# Patient Record
Sex: MALE | Race: BLACK OR AFRICAN AMERICAN | NOT HISPANIC OR LATINO | Employment: UNEMPLOYED | URBAN - METROPOLITAN AREA
[De-identification: names, ages, dates, MRNs, and addresses within clinical notes are randomized per-mention and may not be internally consistent; named-entity substitution may affect disease eponyms.]

---

## 2018-10-18 ENCOUNTER — RADIANT APPOINTMENT (OUTPATIENT)
Dept: GENERAL RADIOLOGY | Facility: CLINIC | Age: 43
End: 2018-10-18
Attending: NURSE PRACTITIONER
Payer: COMMERCIAL

## 2018-10-18 ENCOUNTER — TELEPHONE (OUTPATIENT)
Dept: FAMILY MEDICINE | Facility: CLINIC | Age: 43
End: 2018-10-18

## 2018-10-18 ENCOUNTER — TRANSFERRED RECORDS (OUTPATIENT)
Dept: HEALTH INFORMATION MANAGEMENT | Facility: CLINIC | Age: 43
End: 2018-10-18

## 2018-10-18 ENCOUNTER — OFFICE VISIT (OUTPATIENT)
Dept: FAMILY MEDICINE | Facility: CLINIC | Age: 43
End: 2018-10-18
Payer: COMMERCIAL

## 2018-10-18 VITALS
DIASTOLIC BLOOD PRESSURE: 80 MMHG | SYSTOLIC BLOOD PRESSURE: 124 MMHG | RESPIRATION RATE: 20 BRPM | BODY MASS INDEX: 26.89 KG/M2 | WEIGHT: 161.4 LBS | HEART RATE: 81 BPM | OXYGEN SATURATION: 97 % | TEMPERATURE: 97.9 F | HEIGHT: 65 IN

## 2018-10-18 DIAGNOSIS — Z86.11 HISTORY OF TB (TUBERCULOSIS): ICD-10-CM

## 2018-10-18 DIAGNOSIS — R23.8 VESICULAR SKIN LESIONS: ICD-10-CM

## 2018-10-18 DIAGNOSIS — N30.01 ACUTE CYSTITIS WITH HEMATURIA: ICD-10-CM

## 2018-10-18 DIAGNOSIS — R06.09 DYSPNEA ON EXERTION: Primary | ICD-10-CM

## 2018-10-18 DIAGNOSIS — R10.31 ABDOMINAL PAIN, RIGHT LOWER QUADRANT: ICD-10-CM

## 2018-10-18 DIAGNOSIS — Z11.3 ROUTINE SCREENING FOR STI (SEXUALLY TRANSMITTED INFECTION): ICD-10-CM

## 2018-10-18 DIAGNOSIS — R93.89 ABNORMAL CHEST X-RAY: ICD-10-CM

## 2018-10-18 DIAGNOSIS — R63.4 WEIGHT LOSS: ICD-10-CM

## 2018-10-18 DIAGNOSIS — R82.90 NONSPECIFIC FINDING ON EXAMINATION OF URINE: ICD-10-CM

## 2018-10-18 LAB
ALBUMIN SERPL-MCNC: 4.4 G/DL (ref 3.4–5)
ALBUMIN UR-MCNC: NEGATIVE MG/DL
ALP SERPL-CCNC: 93 U/L (ref 40–150)
ALT SERPL W P-5'-P-CCNC: 30 U/L (ref 0–70)
ANION GAP SERPL CALCULATED.3IONS-SCNC: 6 MMOL/L (ref 3–14)
APPEARANCE UR: ABNORMAL
AST SERPL W P-5'-P-CCNC: 27 U/L (ref 0–45)
BACTERIA #/AREA URNS HPF: ABNORMAL /HPF
BASOPHILS # BLD AUTO: 0 10E9/L (ref 0–0.2)
BASOPHILS NFR BLD AUTO: 0.2 %
BILIRUB SERPL-MCNC: 1.5 MG/DL (ref 0.2–1.3)
BILIRUB UR QL STRIP: NEGATIVE
BUN SERPL-MCNC: 17 MG/DL (ref 7–30)
CALCIUM SERPL-MCNC: 9.8 MG/DL (ref 8.5–10.1)
CHLORIDE SERPL-SCNC: 101 MMOL/L (ref 94–109)
CO2 SERPL-SCNC: 30 MMOL/L (ref 20–32)
COLOR UR AUTO: YELLOW
CREAT SERPL-MCNC: 1.16 MG/DL (ref 0.66–1.25)
DIFFERENTIAL METHOD BLD: ABNORMAL
EOSINOPHIL # BLD AUTO: 0.2 10E9/L (ref 0–0.7)
EOSINOPHIL NFR BLD AUTO: 3.7 %
ERYTHROCYTE [DISTWIDTH] IN BLOOD BY AUTOMATED COUNT: 10.9 % (ref 10–15)
GFR SERPL CREATININE-BSD FRML MDRD: 68 ML/MIN/1.7M2
GGT SERPL-CCNC: 54 U/L (ref 0–75)
GLUCOSE SERPL-MCNC: 76 MG/DL (ref 70–99)
GLUCOSE UR STRIP-MCNC: NEGATIVE MG/DL
HCT VFR BLD AUTO: 40.6 % (ref 40–53)
HGB BLD-MCNC: 13.7 G/DL (ref 13.3–17.7)
HGB UR QL STRIP: ABNORMAL
KETONES UR STRIP-MCNC: NEGATIVE MG/DL
LEUKOCYTE ESTERASE UR QL STRIP: ABNORMAL
LYMPHOCYTES # BLD AUTO: 2.1 10E9/L (ref 0.8–5.3)
LYMPHOCYTES NFR BLD AUTO: 42.8 %
MCH RBC QN AUTO: 32.9 PG (ref 26.5–33)
MCHC RBC AUTO-ENTMCNC: 33.7 G/DL (ref 31.5–36.5)
MCV RBC AUTO: 97 FL (ref 78–100)
MONOCYTES # BLD AUTO: 0.6 10E9/L (ref 0–1.3)
MONOCYTES NFR BLD AUTO: 11.4 %
NEUTROPHILS # BLD AUTO: 2 10E9/L (ref 1.6–8.3)
NEUTROPHILS NFR BLD AUTO: 41.9 %
NITRATE UR QL: POSITIVE
NON-SQ EPI CELLS #/AREA URNS LPF: ABNORMAL /LPF
PH UR STRIP: 7 PH (ref 5–7)
PLATELET # BLD AUTO: 196 10E9/L (ref 150–450)
POTASSIUM SERPL-SCNC: 3.9 MMOL/L (ref 3.4–5.3)
PROT SERPL-MCNC: 8.8 G/DL (ref 6.8–8.8)
RBC # BLD AUTO: 4.17 10E12/L (ref 4.4–5.9)
RBC #/AREA URNS AUTO: ABNORMAL /HPF
SODIUM SERPL-SCNC: 137 MMOL/L (ref 133–144)
SOURCE: ABNORMAL
SP GR UR STRIP: 1.02 (ref 1–1.03)
UROBILINOGEN UR STRIP-ACNC: 1 EU/DL (ref 0.2–1)
WBC # BLD AUTO: 4.8 10E9/L (ref 4–11)
WBC #/AREA URNS AUTO: ABNORMAL /HPF

## 2018-10-18 PROCEDURE — 87088 URINE BACTERIA CULTURE: CPT | Performed by: NURSE PRACTITIONER

## 2018-10-18 PROCEDURE — 85025 COMPLETE CBC W/AUTO DIFF WBC: CPT | Performed by: NURSE PRACTITIONER

## 2018-10-18 PROCEDURE — 36415 COLL VENOUS BLD VENIPUNCTURE: CPT | Performed by: NURSE PRACTITIONER

## 2018-10-18 PROCEDURE — 81001 URINALYSIS AUTO W/SCOPE: CPT | Performed by: NURSE PRACTITIONER

## 2018-10-18 PROCEDURE — 87389 HIV-1 AG W/HIV-1&-2 AB AG IA: CPT | Performed by: NURSE PRACTITIONER

## 2018-10-18 PROCEDURE — 86780 TREPONEMA PALLIDUM: CPT | Mod: 91 | Performed by: NURSE PRACTITIONER

## 2018-10-18 PROCEDURE — 86592 SYPHILIS TEST NON-TREP QUAL: CPT | Performed by: NURSE PRACTITIONER

## 2018-10-18 PROCEDURE — 87252 VIRUS INOCULATION TISSUE: CPT | Mod: 90 | Performed by: NURSE PRACTITIONER

## 2018-10-18 PROCEDURE — 87340 HEPATITIS B SURFACE AG IA: CPT | Performed by: NURSE PRACTITIONER

## 2018-10-18 PROCEDURE — 99207 ZZC OFFICE-HOSPITAL ADMIT: CPT | Performed by: NURSE PRACTITIONER

## 2018-10-18 PROCEDURE — 71046 X-RAY EXAM CHEST 2 VIEWS: CPT | Mod: FY

## 2018-10-18 PROCEDURE — 93000 ELECTROCARDIOGRAM COMPLETE: CPT | Performed by: NURSE PRACTITIONER

## 2018-10-18 PROCEDURE — 86803 HEPATITIS C AB TEST: CPT | Performed by: NURSE PRACTITIONER

## 2018-10-18 PROCEDURE — 87591 N.GONORRHOEAE DNA AMP PROB: CPT | Performed by: NURSE PRACTITIONER

## 2018-10-18 PROCEDURE — 86480 TB TEST CELL IMMUN MEASURE: CPT | Performed by: NURSE PRACTITIONER

## 2018-10-18 PROCEDURE — 87086 URINE CULTURE/COLONY COUNT: CPT | Performed by: NURSE PRACTITIONER

## 2018-10-18 PROCEDURE — 87186 SC STD MICRODIL/AGAR DIL: CPT | Performed by: NURSE PRACTITIONER

## 2018-10-18 PROCEDURE — 80053 COMPREHEN METABOLIC PANEL: CPT | Performed by: NURSE PRACTITIONER

## 2018-10-18 PROCEDURE — 82977 ASSAY OF GGT: CPT | Performed by: NURSE PRACTITIONER

## 2018-10-18 PROCEDURE — 87338 HPYLORI STOOL AG IA: CPT | Performed by: NURSE PRACTITIONER

## 2018-10-18 PROCEDURE — 87491 CHLMYD TRACH DNA AMP PROBE: CPT | Performed by: NURSE PRACTITIONER

## 2018-10-18 PROCEDURE — 99000 SPECIMEN HANDLING OFFICE-LAB: CPT | Performed by: NURSE PRACTITIONER

## 2018-10-18 PROCEDURE — 86780 TREPONEMA PALLIDUM: CPT | Mod: 90 | Performed by: NURSE PRACTITIONER

## 2018-10-18 ASSESSMENT — PAIN SCALES - GENERAL: PAINLEVEL: NO PAIN (0)

## 2018-10-18 NOTE — PROGRESS NOTES
"  SUBJECTIVE:   Chaim Norman is a 43 year old male who presents to clinic today for the following health issues:      ABDOMINAL and FLANK PAIN     Onset: x 1 week    Description:   Character: Sharp  Location: right upper quadrant right flank pelvic region  Radiation: None, Shoulder, Sternal area, Pelvic region and Scrotum    Intensity: moderate    Progression of Symptoms:  worsening    Accompanying Signs & Symptoms:  Fever/Chills?: YES- chills  Gas/Bloating: no   Nausea: no   Vomitting: no   Diarrhea?: no   Constipation:no   Dysuria or Hematuria: YES   History:   Trauma: no   Previous similar pain: no    Previous tests done: none    Precipitating factors:   Does the pain change with:     Food: no      BM: no     Urination: no     Alleviating factors:  none    Therapies Tried and outcome: no medication was taken    LMP:  not applicable   Pain with peeing  No fever  shortness of breath, + cough, yellow sputum, no blood for more than a month  No history surgery, no PMH  Last seen 2016- tests \"negative\" for stomach pain (different from now)  No recent travel, [facility] in [facility]  Had TB 2013- treatment was 6 months.  No repeat xray  No history of hepatitis    Bowel movements daily, sometimes constipated  No blood in urine, just painful and dark yellow, has to push for urine to come, normal flow  Sexually active one partner  No history of stds  + 60 lbs weight loss over last few months, doesn't feel well.  Admits to shortness of breath with exertion (running just a few steps).   Denies chest pain.      Problem list and histories reviewed & adjusted, as indicated.  Additional history: as documented    Patient Active Problem List   Diagnosis     History of TB (tuberculosis)     Weight loss     History reviewed. No pertinent surgical history.    Social History   Substance Use Topics     Smoking status: Never Smoker     Smokeless tobacco: Never Used     Alcohol use No     History reviewed. No pertinent family history.      No " "current outpatient prescriptions on file.     No Known Allergies  Recent Labs   Lab Test  10/18/18   1158   ALT  30   CR  1.16   GFRESTIMATED  68   GFRESTBLACK  82   POTASSIUM  3.9      BP Readings from Last 3 Encounters:   10/18/18 124/80    Wt Readings from Last 3 Encounters:   10/18/18 161 lb 6.4 oz (73.2 kg)                    Reviewed and updated as needed this visit by clinical staff  Tobacco  Allergies  Meds  Med Hx  Surg Hx  Fam Hx  Soc Hx      Reviewed and updated as needed this visit by Provider  Tobacco  Allergies  Meds  Med Hx  Surg Hx  Fam Hx  Soc Hx        ROS:  Constitutional, HEENT, cardiovascular, pulmonary, GI, , musculoskeletal, neuro, skin, endocrine and psych systems are negative, except as otherwise noted.    OBJECTIVE:     /80 (BP Location: Left arm, Patient Position: Chair, Cuff Size: Adult Regular)  Pulse 81  Temp 97.9  F (36.6  C) (Oral)  Resp 20  Ht 5' 5.25\" (1.657 m)  Wt 161 lb 6.4 oz (73.2 kg)  SpO2 97%  BMI 26.65 kg/m2  Body mass index is 26.65 kg/(m^2).  GENERAL: healthy, alert and no distress  EYES: Eyes grossly normal to inspection, PERRL and conjunctivae and sclerae normal  HENT: ear canals and TM's normal, nose and mouth without ulcers or lesions  NECK: no adenopathy, no asymmetry, masses, or scars and thyroid normal to palpation  RESP: somewhat diminished throughout, no crackles or wheezing, no increased WOB.  CV: regular rate and rhythm, normal S1 S2, no S3 or S4, no murmur, click or rub, no peripheral edema and peripheral pulses strong  ABDOMEN: soft, nontender, no hepatosplenomegaly, no masses and bowel sounds normal  MS: no gross musculoskeletal defects noted, no edema  SKIN: no suspicious lesions or rashes  NEURO: Normal strength and tone, mentation intact and speech normal  PSYCH: mentation appears normal, affect normal/bright    Diagnostic Test Results:  Results for orders placed or performed in visit on 10/18/18 (from the past 24 hour(s)) "   Comprehensive metabolic panel   Result Value Ref Range    Sodium 137 133 - 144 mmol/L    Potassium 3.9 3.4 - 5.3 mmol/L    Chloride 101 94 - 109 mmol/L    Carbon Dioxide 30 20 - 32 mmol/L    Anion Gap 6 3 - 14 mmol/L    Glucose 76 70 - 99 mg/dL    Urea Nitrogen 17 7 - 30 mg/dL    Creatinine 1.16 0.66 - 1.25 mg/dL    GFR Estimate 68 >60 mL/min/1.7m2    GFR Estimate If Black 82 >60 mL/min/1.7m2    Calcium 9.8 8.5 - 10.1 mg/dL    Bilirubin Total 1.5 (H) 0.2 - 1.3 mg/dL    Albumin 4.4 3.4 - 5.0 g/dL    Protein Total 8.8 6.8 - 8.8 g/dL    Alkaline Phosphatase 93 40 - 150 U/L    ALT 30 0 - 70 U/L    AST 27 0 - 45 U/L   GGT   Result Value Ref Range    GGT 54 0 - 75 U/L   CBC with platelets differential   Result Value Ref Range    WBC 4.8 4.0 - 11.0 10e9/L    RBC Count 4.17 (L) 4.4 - 5.9 10e12/L    Hemoglobin 13.7 13.3 - 17.7 g/dL    Hematocrit 40.6 40.0 - 53.0 %    MCV 97 78 - 100 fl    MCH 32.9 26.5 - 33.0 pg    MCHC 33.7 31.5 - 36.5 g/dL    RDW 10.9 10.0 - 15.0 %    Platelet Count 196 150 - 450 10e9/L    Diff Method Automated Method     % Neutrophils 41.9 %    % Lymphocytes 42.8 %    % Monocytes 11.4 %    % Eosinophils 3.7 %    % Basophils 0.2 %    Absolute Neutrophil 2.0 1.6 - 8.3 10e9/L    Absolute Lymphocytes 2.1 0.8 - 5.3 10e9/L    Absolute Monocytes 0.6 0.0 - 1.3 10e9/L    Absolute Eosinophils 0.2 0.0 - 0.7 10e9/L    Absolute Basophils 0.0 0.0 - 0.2 10e9/L   *UA reflex to Microscopic and Culture ([place] and [facility] (except [place] and [place])   Result Value Ref Range    Color Urine Yellow     Appearance Urine Cloudy     Glucose Urine Negative NEG^Negative mg/dL    Bilirubin Urine Negative NEG^Negative    Ketones Urine Negative NEG^Negative mg/dL    Specific Gravity Urine 1.020 1.003 - 1.035    Blood Urine Trace (A) NEG^Negative    pH Urine 7.0 5.0 - 7.0 pH    Protein Albumin Urine Negative NEG^Negative mg/dL    Urobilinogen Urine 1.0 0.2 - 1.0 EU/dL    Nitrite Urine Positive (A) NEG^Negative     Leukocyte Esterase Urine Moderate (A) NEG^Negative    Source Midstream Urine    Urine Microscopic   Result Value Ref Range    WBC Urine 25-50 (A) OTO5^0 - 5 /HPF    RBC Urine 2-5 (A) OTO2^O - 2 /HPF    Squamous Epithelial /LPF Urine Few FEW^Few /LPF    Bacteria Urine Many (A) NEG^Negative /HPF   Urine Culture Aerobic Bacterial   Result Value Ref Range    Specimen Description Midstream Urine     Culture Micro       Referred to [facility], await final report.       ASSESSMENT/PLAN:       ICD-10-CM    1. Dyspnea on exertion R06.09 EKG 12-lead complete w/read - Clinics     Urine Microscopic     Quantiferon TB Gold Plus   2. Acute cystitis with hematuria N30.01 NEISSERIA GONORRHOEA PCR     CHLAMYDIA TRACHOMATIS PCR     *UA reflex to Microscopic and Culture ([place] and [facility] (except [place] and [place])     Urine Culture Aerobic Bacterial   3. History of TB (tuberculosis) Z86.11 XR Chest 2 Views     CBC with platelets differential     CANCELED: M Tuberculosis by Quantiferon   4. Abnormal chest x-ray R93.89    5. Vesicular skin lesions R23.8 Viral Culture Non-respiratory   6. Abdominal pain, right lower quadrant R10.31 Comprehensive metabolic panel     GGT     H Pylori antigen, stool     H Pylori antigen, stool   7. Weight loss R63.4    8. Routine screening for STI (sexually transmitted infection) Z11.3 Hepatitis B surface antigen     HIV Antigen Antibody Combo     Treponema Abs w Reflex to RPR and Titer     Hepatitis C antibody   9. Nonspecific finding on examination of urine R82.90 Urine Culture Aerobic Bacterial     Upon seeing result of chest xray, patient was sent to emergency room.  See emergency room follow up notes for further information.  Concern is for active. TB.  UTI was handled inpatient.    Patient Instructions     Based on the work up we did today, we will follow up with a plan.  Plan to hear from us later today.    At [facility], we  strive to deliver an exceptional experience to you, every time we see you.  If you receive a survey in the mail, please send us back your thoughts. We really do value your feedback.    Your care team:                            Family Medicine Internal Medicine   MD Tunde Hernandez MD Shantel Branch-Fleming, MD Katya Georgiev PA-C Megan Hill, APRN CNP Nam Ho, MD Pediatrics   MINNA Ramsey, MD Beth Vee CNP, MD Bethany Templen, MD Deborah Mielke, MD Kim Thein, APRN CNP      Clinic hours: Monday - Thursday 7 am-7 pm; Fridays 7 am-5 pm.   Urgent care: Monday - Friday 11 am-9 pm; Saturday and Sunday 9 am-5 pm.  Pharmacy : Monday -Thursday 8 am-8 pm; Friday 8 am-6 pm; Saturday and Sunday 9 am-5 pm.     Clinic: [phone]   Pharmacy: [phone]            CHAZ Mcmahan CNP  [facility]

## 2018-10-18 NOTE — TELEPHONE ENCOUNTER
RN spoke with patient and relayed message to him regarding xray showing evidence of active TB. Patient had a difficult time understanding RN and asked if RN could call [facility] emergency department and do warm hand off to them as he stated that he did not know what to tell them when he arrived. RN called [facility] ER and ER would not allow this RN to speak with Nurse or provider to do warm hand off. RN huddled with Sana Joshua NP and told her this. She stated she would call the ER and do warm hand off.  Clotilde Segovia RN

## 2018-10-18 NOTE — TELEPHONE ENCOUNTER
Called [place] emergency room at 2:20 pm.  Discussed case with provider and likelihood of active TB.   CHAZ Mcmahan CNP

## 2018-10-18 NOTE — MR AVS SNAPSHOT
After Visit Summary   10/18/2018    Chaim Norman    MRN: [id number]           Patient Information     Date Of Birth          1975        Visit Information        Provider Department      10/18/2018 11:00 AM aSna Joshua APRN CNP [facility]        Today's Diagnoses     Acute chest pain    -  1    Dysuria        History of TB (tuberculosis)        Vesicular skin lesions        Abdominal pain, right lower quadrant          Care Instructions    Based on the work up we did today, we will follow up with a plan.  Plan to hear from us later today.    At [facility], we strive to deliver an exceptional experience to you, every time we see you.  If you receive a survey in the mail, please send us back your thoughts. We really do value your feedback.    Your care team:                            Family Medicine Internal Medicine   MD Tunde Hernandez MD Shantel Branch-Fleming, MD Katya Georgiev PA-C Megan Hill, APRN CNP Nam Ho, MD Pediatrics   MINNA Ramsey, MD Beth Vee CNP, MD Bethany Templen, MD Deborah Mielke, MD Kim Thein, CHAZ [facility]      Clinic hours: Monday - Thursday 7 am-7 pm; Fridays 7 am-5 pm.   Urgent care: Monday - Friday 11 am-9 pm; Saturday and Sunday 9 am-5 pm.  Pharmacy : Monday -Thursday 8 am-8 pm; Friday 8 am-6 pm; Saturday and Sunday 9 am-5 pm.     Clinic: [phone]   Pharmacy: [phone]                Follow-ups after your visit        Future tests that were ordered for you today     Open Future Orders        Priority Expected Expires Ordered    XR Chest 2 Views Routine 10/18/2018 10/18/2019 10/18/2018            Who to contact     If you have questions or need follow up information about today's clinic visit or your schedule [facility] [phone].  Normal or non-critical lab and imaging  "results will be communicated to you by MyChart, letter or phone within 4 business days after the clinic has received the results. If you do not hear from us within 7 days, please contact the clinic through Be[employer] or phone. If you have a critical or abnormal lab result, we will notify you by phone as soon as possible.  Submit refill requests through [employer] or call your pharmacy and they will forward the refill request to us. Please allow 3 business days for your refill to be completed.          Additional Information About Your Visit        [employer] Information     [employer] lets you send messages to your doctor, view your test results, renew your prescriptions, schedule appointments and more. To sign up, go to [email or web address] . Click on \"Log in\" on the left side of the screen, which will take you to the Welcome page. Then click on \"Sign up Now\" on the right side of the page.     You will be asked to enter the access code listed below, as well as some personal information. Please follow the directions to create your username and password.     Your access code is: [email or web address]  Expires: 2019 11:49 AM     Your access code will  in 90 days. If you need help or a new code, please call your [facility] clinic or [phone].        Care EveryWhere ID     This is your Care EveryWhere ID. This could be used by other organizations to access your [facility] medical records  [id number]        Your Vitals Were     Pulse Temperature Respirations Height Pulse Oximetry BMI (Body Mass Index)    81 97.9  F (36.6  C) (Oral) 20 5' 5.25\" (1.657 m) 97% 26.65 kg/m2       Blood Pressure from Last 3 Encounters:   10/18/18 124/80    Weight from Last 3 Encounters:   10/18/18 161 lb 6.4 oz (73.2 kg)              We Performed the Following     *UA reflex to Microscopic and Culture ([place] and [facility] (except [place] and [place])     CBC with platelets differential     CHLAMYDIA TRACHOMATIS PCR     Comprehensive " metabolic panel     EKG 12-lead complete w/read - Clinics     GGT     M Tuberculosis by Quantiferon     NEISSERIA GONORRHOEA PCR     Viral Culture Non-respiratory        Primary Care Provider Office Phone # Fax #    CHAZ Beasley [phone]       [address]  [facility] [address]        Equal Access to Services     [facility] AYAAN : Hadii aad ku hadasho Soomaali, waaxda luqadaha, qaybta kaalmada adeegyada, waxay idiin hayaan adeeg kharash la'aan . So [facility] [phone].    ATENCIÓN: Si habla español, tiene a garvey disposición servicios gratuitos de asistencia lingüística. Llame al [phone].    We comply with applicable federal civil rights laws and [place] laws. We do not discriminate on the basis of race, color, national origin, age, disability, sex, sexual orientation, or gender identity.            Thank you!     Thank you for choosing [facility]  for your care. Our goal is always to provide you with excellent care. Hearing back from our patients is one way we can continue to improve our services. Please take a few minutes to complete the written survey that you may receive in the mail after your visit with us. Thank you!             Your Updated Medication List - Protect others around you: Learn how to safely use, store and throw away your medicines at www.disposemymeds.org.      Notice  As of 10/18/2018 11:49 AM    You have not been prescribed any medications.

## 2018-10-18 NOTE — PATIENT INSTRUCTIONS
Based on the work up we did today, we will follow up with a plan.  Plan to hear from us later today.    At [facility], we strive to deliver an exceptional experience to you, every time we see you.  If you receive a survey in the mail, please send us back your thoughts. We really do value your feedback.    Your care team:                            Family Medicine Internal Medicine   MD Tunde Hernandez MD Shantel Branch-Fleming, MD Katya Georgiev PA-C Megan Hill, APRN CNP Nam Ho, MD Pediatrics   Dell Tabor, MINNA Joshua, MD Beth Vee APRN CNP   MD Nel Evans MD Deborah Mielke, MD Kim Thein, APRROBERT BARRIENTOS      Clinic hours: Monday - Thursday 7 am-7 pm; Fridays 7 am-5 pm.   Urgent care: Monday - Friday 11 am-9 pm; Saturday and Sunday 9 am-5 pm.  Pharmacy : Monday -Thursday 8 am-8 pm; Friday 8 am-6 pm; Saturday and Sunday 9 am-5 pm.     Clinic: [phone]   Pharmacy: [phone]

## 2018-10-18 NOTE — TELEPHONE ENCOUNTER
Attempted to call patient but no answer- LVM.  Please call him again and notify him that he has evidence of active TB on his xray and needs to go to emergency room immediately for further evaluation and treatment. If he does not answer, we need to contact the department of health.  Thank you,  CHAZ Mcmahan CNP

## 2018-10-19 LAB
C TRACH DNA SPEC QL NAA+PROBE: NEGATIVE
H PYLORI AG STL QL IA: NORMAL
HBV SURFACE AG SERPL QL IA: NONREACTIVE
HCV AB SERPL QL IA: NONREACTIVE
HIV 1+2 AB+HIV1 P24 AG SERPL QL IA: NONREACTIVE
N GONORRHOEA DNA SPEC QL NAA+PROBE: NEGATIVE
RPR SER QL: NONREACTIVE
SPECIMEN SOURCE: NORMAL
T PALLIDUM AB SER QL: REACTIVE

## 2018-10-20 LAB
BACTERIA SPEC CULT: ABNORMAL
SPECIMEN SOURCE: ABNORMAL

## 2018-10-21 LAB
GAMMA INTERFERON BACKGROUND BLD IA-ACNC: 0.33 IU/ML
M TB IFN-G BLD-IMP: POSITIVE
M TB IFN-G CD4+ BCKGRND COR BLD-ACNC: >10 IU/ML
MITOGEN IGNF BCKGRD COR BLD-ACNC: 3.21 IU/ML
MITOGEN IGNF BCKGRD COR BLD-ACNC: 3.51 IU/ML
RPR SER QL: NONREACTIVE
T PALLIDUM AB SER QL AGGL: REACTIVE

## 2018-10-25 ENCOUNTER — TRANSFERRED RECORDS (OUTPATIENT)
Dept: HEALTH INFORMATION MANAGEMENT | Facility: CLINIC | Age: 43
End: 2018-10-25

## 2018-10-25 ENCOUNTER — TELEPHONE (OUTPATIENT)
Dept: FAMILY MEDICINE | Facility: CLINIC | Age: 43
End: 2018-10-25

## 2018-10-25 NOTE — TELEPHONE ENCOUNTER
Reason for Call:  Other: Work Comp    Detailed comments: [employer]  calling to request diagnotic codes and out of work dates as well as a the projected start of work date as recommended by Pt's PCP.  They will be faxing a form requesting this information.    Phone Number [employer]  can be reached at: Other phone number:  [phone]    Best Time: anytime    Can we leave a detailed message on this number? YES    Call taken on 10/25/2018 at 1:37 PM by Devin Cuellar

## 2018-10-26 ENCOUNTER — TRANSFERRED RECORDS (OUTPATIENT)
Dept: HEALTH INFORMATION MANAGEMENT | Facility: CLINIC | Age: 43
End: 2018-10-26

## 2018-10-29 ENCOUNTER — TRANSFERRED RECORDS (OUTPATIENT)
Dept: HEALTH INFORMATION MANAGEMENT | Facility: CLINIC | Age: 43
End: 2018-10-29

## 2018-10-29 NOTE — TELEPHONE ENCOUNTER
Faxed completed/signed form to [employer], [phone], right fax confirmed at 1:32 pm today. Copy to TC and abstracting.  Miya Whitfield MA/  For [employer] and Devora

## 2018-10-30 LAB
SPECIMEN SOURCE: NORMAL
VIRUS SPEC CULT: NORMAL
VIRUS SPEC CULT: NORMAL

## 2018-11-13 ENCOUNTER — TELEPHONE (OUTPATIENT)
Dept: FAMILY MEDICINE | Facility: CLINIC | Age: 43
End: 2018-11-13

## 2018-11-13 NOTE — TELEPHONE ENCOUNTER
Postponing message for after appointment with Nyla Florian.  Miya Whitfield MA/  For Teams Christina

## 2018-11-13 NOTE — TELEPHONE ENCOUNTER
Received fomrs for [employer].  I would like patient to be seen in order to fill out.  Has appointment with LATONIA Florian CNP tomorrow.  I am ok with filling them out just need more information on his current condition.  Thanks!  Sana

## 2018-11-14 ENCOUNTER — OFFICE VISIT (OUTPATIENT)
Dept: FAMILY MEDICINE | Facility: CLINIC | Age: 43
End: 2018-11-14
Payer: COMMERCIAL

## 2018-11-14 ENCOUNTER — RADIANT APPOINTMENT (OUTPATIENT)
Dept: GENERAL RADIOLOGY | Facility: CLINIC | Age: 43
End: 2018-11-14
Attending: NURSE PRACTITIONER
Payer: COMMERCIAL

## 2018-11-14 VITALS
DIASTOLIC BLOOD PRESSURE: 90 MMHG | HEIGHT: 65 IN | OXYGEN SATURATION: 95 % | WEIGHT: 168 LBS | SYSTOLIC BLOOD PRESSURE: 138 MMHG | BODY MASS INDEX: 27.99 KG/M2 | TEMPERATURE: 98.3 F | RESPIRATION RATE: 16 BRPM | HEART RATE: 83 BPM

## 2018-11-14 DIAGNOSIS — Z86.11 HISTORY OF TB (TUBERCULOSIS): ICD-10-CM

## 2018-11-14 DIAGNOSIS — R63.4 WEIGHT LOSS: ICD-10-CM

## 2018-11-14 DIAGNOSIS — A52.8 LATE LATENT SYPHILIS: ICD-10-CM

## 2018-11-14 DIAGNOSIS — R91.8 PULMONARY NODULES: ICD-10-CM

## 2018-11-14 DIAGNOSIS — J95.811 IATROGENIC PNEUMOTHORAX: ICD-10-CM

## 2018-11-14 DIAGNOSIS — R07.9 CHEST PAIN, UNSPECIFIED TYPE: ICD-10-CM

## 2018-11-14 DIAGNOSIS — R07.9 CHEST PAIN, UNSPECIFIED TYPE: Primary | ICD-10-CM

## 2018-11-14 LAB
BASOPHILS # BLD AUTO: 0 10E9/L (ref 0–0.2)
BASOPHILS NFR BLD AUTO: 0.3 %
DIFFERENTIAL METHOD BLD: ABNORMAL
EOSINOPHIL # BLD AUTO: 0.1 10E9/L (ref 0–0.7)
EOSINOPHIL NFR BLD AUTO: 3.1 %
ERYTHROCYTE [DISTWIDTH] IN BLOOD BY AUTOMATED COUNT: 11.4 % (ref 10–15)
HCT VFR BLD AUTO: 38.4 % (ref 40–53)
HGB BLD-MCNC: 12.7 G/DL (ref 13.3–17.7)
LYMPHOCYTES # BLD AUTO: 1.2 10E9/L (ref 0.8–5.3)
LYMPHOCYTES NFR BLD AUTO: 41 %
MCH RBC QN AUTO: 33.5 PG (ref 26.5–33)
MCHC RBC AUTO-ENTMCNC: 33.1 G/DL (ref 31.5–36.5)
MCV RBC AUTO: 101 FL (ref 78–100)
MONOCYTES # BLD AUTO: 0.4 10E9/L (ref 0–1.3)
MONOCYTES NFR BLD AUTO: 13.8 %
NEUTROPHILS # BLD AUTO: 1.2 10E9/L (ref 1.6–8.3)
NEUTROPHILS NFR BLD AUTO: 41.8 %
PLATELET # BLD AUTO: 170 10E9/L (ref 150–450)
RBC # BLD AUTO: 3.79 10E12/L (ref 4.4–5.9)
WBC # BLD AUTO: 2.9 10E9/L (ref 4–11)

## 2018-11-14 PROCEDURE — 93000 ELECTROCARDIOGRAM COMPLETE: CPT | Performed by: NURSE PRACTITIONER

## 2018-11-14 PROCEDURE — 36415 COLL VENOUS BLD VENIPUNCTURE: CPT | Performed by: NURSE PRACTITIONER

## 2018-11-14 PROCEDURE — 71046 X-RAY EXAM CHEST 2 VIEWS: CPT | Mod: FY

## 2018-11-14 PROCEDURE — 85025 COMPLETE CBC W/AUTO DIFF WBC: CPT | Performed by: NURSE PRACTITIONER

## 2018-11-14 PROCEDURE — 99215 OFFICE O/P EST HI 40 MIN: CPT | Performed by: NURSE PRACTITIONER

## 2018-11-14 RX ORDER — TAMSULOSIN HYDROCHLORIDE 0.4 MG/1
0.4 CAPSULE ORAL
COMMUNITY
Start: 2018-10-23 | End: 2019-06-24

## 2018-11-14 RX ORDER — ALBUTEROL SULFATE 90 UG/1
2 AEROSOL, METERED RESPIRATORY (INHALATION)
COMMUNITY
Start: 2018-10-22 | End: 2019-05-20

## 2018-11-14 ASSESSMENT — PAIN SCALES - GENERAL: PAINLEVEL: SEVERE PAIN (7)

## 2018-11-14 NOTE — MR AVS SNAPSHOT
After Visit Summary   11/14/2018    Chaim Norman    MRN: [id number]           Patient Information     Date Of Birth          1975        Visit Information        Provider Department      11/14/2018 11:40 AM Nyla Florian APRN CNP [facility]        Today's Diagnoses     Chest pain, unspecified type    -  1    Pulmonary nodules        History of TB (tuberculosis)        Weight loss        Iatrogenic pneumothorax        Late latent syphilis          Care Instructions    Go to the emergency department now for further evaluation of your chest discomfort that occurred 2 days ago. If you experience pain again while driving, call [phone]  Please follow up with Sana in clinic for treatment of late latent syphilis      At [facility], we strive to deliver an exceptional experience to you, every time we see you.  If you receive a survey in the mail, please send us back your thoughts. We really do value your feedback.    Based on your medical history, these are the current health maintenance/preventive care services that you are due for (some may have been done at this visit.)  Health Maintenance Due   Topic Date Due     PHQ-2 Q1 YR  08/28/1985     TETANUS IMMUNIZATION (SYSTEM ASSIGNED)  08/28/1991     LIPID SCREEN Q5 YR MALE (SYSTEM ASSIGNED)  08/28/2008     INFLUENZA VACCINE (1) 09/01/2018         Suggested websites for health information:  Www.[employer] : Up to date and easily searchable information on multiple topics.  Www.medlineplus.gov : medication info, interactive tutorials, watch real surgeries online  Www.familydoctor.org : good info from the Academy of Family Physicians  Www.cdc.gov : public health info, travel advisories, epidemics (H1N1)  Www.aap.org : children's health info, normal development, vaccinations  Www.health.state.[place].us : [place] dept of health, public health issues in [place], N1N1    Your care team:                            Family Medicine  "Internal Medicine   MD Tunde Hernandez MD Shantel Branch-Fleming, MD Katya Georgiev PA-C Nam Ho, MD Pediatrics   MINNA Ramsey, CNP Beth Mcclellan APRN CNP   MD Nel Evans MD Deborah Mielke, MD Kim Thein, APRN [facility]      Clinic hours: Monday - Thursday 7 am-7 pm; Fridays 7 am-5 pm.   Urgent care: Monday - Friday 11 am-9 pm; Saturday and Sunday 9 am-5 pm.  Pharmacy : Monday -Thursday 8 am-8 pm; Friday 8 am-6 pm; Saturday and Sunday 9 am-5 pm.     Clinic: [phone]   Pharmacy: [phone]            Follow-ups after your visit        Who to contact     If you have questions or need follow up information about today's clinic visit or your schedule please contact [facility] directly at [phone].  Normal or non-critical lab and imaging results will be communicated to you by [employer], letter or phone within 4 business days after the clinic has received the results. If you do not hear from us within 7 days, please contact the clinic through [employer] or phone. If you have a critical or abnormal lab result, we will notify you by phone as soon as possible.  Submit refill requests through [employer] or call your pharmacy and they will forward the refill request to us. Please allow 3 business days for your refill to be completed.          Additional Information About Your Visit        [employer] Information     [employer] lets you send messages to your doctor, view your test results, renew your prescriptions, schedule appointments and more. To sign up, go to [email or web address] . Click on \"Log in\" on the left side of the screen, which will take you to the Welcome page. Then click on \"Sign up Now\" on the right side of the page.     You will be asked to enter the access code listed below, as well as some personal information. Please follow the directions to create your username and password.     Your access code is: [email or web address]  Expires: " "2019 10:49 AM     Your access code will  in 90 days. If you need help or a new code, please call your [facility] or [phone].        Care EveryWhere ID     This is your Care EveryWhere ID. This could be used by other organizations to access your [place] medical records  [id number]        Your Vitals Were     Pulse Temperature Respirations Height Pulse Oximetry BMI (Body Mass Index)    83 98.3  F (36.8  C) (Oral) 16 5' 5.25\" (1.657 m) 95% 27.74 kg/m2       Blood Pressure from Last 3 Encounters:   18 138/90   10/18/18 124/80    Weight from Last 3 Encounters:   18 168 lb (76.2 kg)   10/18/18 161 lb 6.4 oz (73.2 kg)              We Performed the Following     CBC with platelets differential     Comprehensive metabolic panel     D dimer, quantitative     EKG 12-lead complete w/read - Clinics     Troponin I        Primary Care Provider Office Phone # Fax #    Nyla JordynCHAZ Pizarro [phone]       [address]  [facility] [address]        Equal Access to Services     KEYUR KUO : Hadii aad ku hadasho Soomaali, waaxda luqadaha, qaybta kaalmada adeegyada, waxay idiin hayaan adeofe kharalilian lanilda ah. So [facility] [phone].    ATENCIÓN: Si habla español, tiene a garvey disposición servicios gratuitos de asistencia lingüística. Giancarloame al [phone].    We comply with applicable federal civil rights laws and [place] laws. We do not discriminate on the basis of race, color, national origin, age, disability, sex, sexual orientation, or gender identity.            Thank you!     Thank you for choosing [facility]  for your care. Our goal is always to provide you with excellent care. Hearing back from our patients is one way we can continue to improve our services. Please take a few minutes to complete the written survey that you may receive in the mail after your visit with us. Thank you!             Your Updated Medication List - Protect others around " you: Learn how to safely use, store and throw away your medicines at www.disposemymeds.org.          This list is accurate as of 11/14/18 12:50 PM.  Always use your most recent med list.                   Brand Name Dispense Instructions for use Diagnosis    PROVENTIL  (90 Base) MCG/ACT inhaler   Generic drug:  albuterol      Inhale 2 puffs into the lungs        tamsulosin 0.4 MG capsule    FLOMAX     Take 0.4 mg by mouth

## 2018-11-14 NOTE — PATIENT INSTRUCTIONS
Go to the emergency department now for further evaluation of your chest discomfort that occurred 2 days ago. If you experience pain again while driving, call 911  Please follow up with Sana in clinic for treatment of late latent syphilis      At [facility], we strive to deliver an exceptional experience to you, every time we see you.  If you receive a survey in the mail, please send us back your thoughts. We really do value your feedback.    Based on your medical history, these are the current health maintenance/preventive care services that you are due for (some may have been done at this visit.)  Health Maintenance Due   Topic Date Due     PHQ-2 Q1 YR  08/28/1985     TETANUS IMMUNIZATION (SYSTEM ASSIGNED)  08/28/1991     LIPID SCREEN Q5 YR MALE (SYSTEM ASSIGNED)  08/28/2008     INFLUENZA VACCINE (1) 09/01/2018         Suggested websites for health information:  Www.[employer] Life.[employer] : Up to date and easily searchable information on multiple topics.  Www.medlineplus.gov : medication info, interactive tutorials, watch real surgeries online  Www.familydoctor.org : good info from the Academy of Family Physicians  Www.cdc.gov : public health info, travel advisories, epidemics (H1N1)  Www.aap.org : children's health info, normal development, vaccinations  Www.health.[facility].[place].us : [place] dept of health, public health issues in [place], N1N1    Your care team:                            Family Medicine Internal Medicine   MD Tunde Hernandez MD Shantel Branch-Fleming, MD Katya Georgiev PA-C Nam Ho, MD Pediatrics   MINNA Ramsey, MD Nel Holt CNP, MD Deborah Mielke, MD Kim Thein, APRN CNP      Clinic hours: Monday - Thursday 7 am-7 pm; Fridays 7 am-5 pm.   Urgent care: Monday - Friday 11 am-9 pm; Saturday and Sunday 9 am-5 pm.  Pharmacy : Monday -Thursday 8 am-8 pm; Friday 8 am-6 pm; Saturday and Sunday 9 am-5 pm.      Clinic: [phone]   Pharmacy: [phone]

## 2018-11-14 NOTE — PROGRESS NOTES
SUBJECTIVE:   Chaim Norman is a 43 year old male who presents to clinic today for the following health issues:          Hospital Follow-up Visit:    Hospital/Nursing Home/IP Rehab Facility: [facility]  Date of Admission: 10/25/18  Date of Discharge: 10/30/18  Reason(s) for Admission: Respiratory Infection            Problems taking medications regularly:  None       Medication changes since discharge: Updated       Problems adhering to non-medication therapy:  None    Summary of hospitalization:  CareEverywhere information obtained and reviewed  Diagnostic Tests/Treatments reviewed.  Follow up needed: infectious disease  Other Healthcare Providers Involved in Patient s Care:         Specialist appointment - infectious disease, pulmonary medicine  Update since discharge: improved.  Additional issues: chest pain     Post Discharge Medication Reconciliation: discharge medications reconciled, continue medications without change. He has now completed 14 day course of ethambutol, isoniazid, pyrazinamide, B6, and rifampin.  Plan of care communicated with patient     Coding guidelines for this visit:  Type of Medical   Decision Making Face-to-Face Visit       within 7 Days of discharge Face-to-Face Visit        within 14 days of discharge   Moderate Complexity [id number]   High Complexity [id number]            Pleasant 43 year old male presents for hospital follow up. He was treated for reactivation TB with RIPE protocol and finished his antibiotics this morning. He suffered a pneumothorax after bronchoscopy on 10/25 and had a chest tube for a few days. He had been feeling well until 2 days ago when he developed chest pain. Pain was sharp and traveled from his chest to his neck. He took tylenol and thinks it might have helped. Last time he had it was 2 days ago. No shortness of breath. Pain is not reproducible. Feels like it might come on sometimes with exertion but hasn't come back like it did 2 days ago. No  "numbness, tingling or weakness. No headache. No fever. No cough or cold symptoms. No rash.     He missed his infectious disease appointment yesterday because his car broke down. He has not yet rescheduled.    On 10/18/18 his labs also revealed latent syphilis. Denies every having syphilis or being treated for it. No hx of penile or groin rash or lesion. No discharge. He was noted to have UTI in mid October but was successfully treated and now denies symptoms.     Problem list and histories reviewed & adjusted, as indicated.  Additional history: as documented    Patient Active Problem List   Diagnosis     History of TB (tuberculosis)     Weight loss     History reviewed. No pertinent surgical history.    Social History   Substance Use Topics     Smoking status: Never Smoker     Smokeless tobacco: Never Used     Alcohol use No     History reviewed. No pertinent family history.      Current Outpatient Prescriptions   Medication Sig Dispense Refill     albuterol (PROVENTIL HFA) 108 (90 Base) MCG/ACT inhaler Inhale 2 puffs into the lungs       tamsulosin (FLOMAX) 0.4 MG capsule Take 0.4 mg by mouth       No Known Allergies    Reviewed and updated as needed this visit by clinical staff  Tobacco  Allergies  Meds  Problems  Med Hx  Surg Hx  Fam Hx  Soc Hx        Reviewed and updated as needed this visit by Provider  Allergies  Meds  Problems         ROS:  Constitutional, HEENT, cardiovascular, pulmonary, GI, , musculoskeletal, neuro, skin, endocrine and psych systems are negative, except as otherwise noted.    OBJECTIVE:     /90  Pulse 83  Temp 98.3  F (36.8  C) (Oral)  Resp 16  Ht 5' 5.25\" (1.657 m)  Wt 168 lb (76.2 kg)  SpO2 95%  BMI 27.74 kg/m2  Body mass index is 27.74 kg/(m^2).  GENERAL: healthy, alert and no distress  EYES: Eyes grossly normal to inspection, PERRL and conjunctivae and sclerae normal  HENT: ear canals and TM's normal, nose and mouth without ulcers or lesions  NECK: no " adenopathy, no asymmetry, masses, or scars and thyroid normal to palpation  RESP: lungs clear to auscultation - no rales, rhonchi or wheezes  CV: regular rate and rhythm, normal S1 S2, no S3 or S4, no murmur, click or rub, no peripheral edema and peripheral pulses strong  MS: no gross musculoskeletal defects noted, no edema. Chest pain not reproducible with palpation  SKIN: healing wound to left chest consistent with old CT site. No drainage, erythema or pain  PSYCH: mentation appears normal, affect normal/bright    Diagnostic Test Results:  Results for orders placed or performed in visit on 11/14/18 (from the past 24 hour(s))   CBC with platelets differential   Result Value Ref Range    WBC 2.9 (L) 4.0 - 11.0 10e9/L    RBC Count 3.79 (L) 4.4 - 5.9 10e12/L    Hemoglobin 12.7 (L) 13.3 - 17.7 g/dL    Hematocrit 38.4 (L) 40.0 - 53.0 %     (H) 78 - 100 fl    MCH 33.5 (H) 26.5 - 33.0 pg    MCHC 33.1 31.5 - 36.5 g/dL    RDW 11.4 10.0 - 15.0 %    Platelet Count 170 150 - 450 10e9/L    % Neutrophils 41.8 %    % Lymphocytes 41.0 %    % Monocytes 13.8 %    % Eosinophils 3.1 %    % Basophils 0.3 %    Absolute Neutrophil 1.2 (L) 1.6 - 8.3 10e9/L    Absolute Lymphocytes 1.2 0.8 - 5.3 10e9/L    Absolute Monocytes 0.4 0.0 - 1.3 10e9/L    Absolute Eosinophils 0.1 0.0 - 0.7 10e9/L    Absolute Basophils 0.0 0.0 - 0.2 10e9/L    Diff Method Automated Method      EKG: sinus rhythm with new T waves changes    ASSESSMENT/PLAN:       ICD-10-CM    1. Chest pain, unspecified type R07.9 EKG 12-lead complete w/read - Clinics     XR Chest 2 Views     CBC with platelets differential                  2. Pulmonary nodules R91.8    3. History of TB (tuberculosis) Z86.11    4. Weight loss R63.4    5. Iatrogenic pneumothorax J95.811    6. Late latent syphilis A52.8      Given new chest pain 2 days ago and now EKG with new T waves changes, I ultimately referred patient to the emergency department. Although pain has not been present today, his  EKG is concerning for ischemia. I spoke with [facility] emergency department provider who also recommended emergency department for troponin.     I asked the patient to follow up in clinic for syphilis treatment.     I also asked the patient to reschedule with infectious disease.    He is not currently experiencing chest pain and is stable for private transport.     See Patient Instructions      CHAZ Garrett [facility]

## 2018-11-15 ENCOUNTER — TELEPHONE (OUTPATIENT)
Dept: FAMILY MEDICINE | Facility: CLINIC | Age: 43
End: 2018-11-15

## 2018-11-15 NOTE — TELEPHONE ENCOUNTER
Patient was sent to emergency room yesterday.  I cannot yet provide a return to work date. There is no other requested information on this form.  Will wait until more information about patient's condition.  CHAZ Mcmahan CNP

## 2018-11-15 NOTE — TELEPHONE ENCOUNTER
Patient saw Nyla Florian yesterday, 11/14/18. Routing message to Sana to complete forms.  Miya Whitfield MA/  For [employer] and [employer]

## 2018-11-15 NOTE — TELEPHONE ENCOUNTER
....Reason for Call:  Other     Detailed comments: Patient said he went to [facility] got the EKG there was no heart attack and he should follow up with Anival.    Phone Number Patient can be reached at: Home number on file [phone] (home)    Best Time: anytime    Can we leave a detailed message on this number? YES    Call taken on 11/15/2018 at 1:36 PM by Hermann Lynn

## 2018-11-20 ENCOUNTER — OFFICE VISIT (OUTPATIENT)
Dept: FAMILY MEDICINE | Facility: CLINIC | Age: 43
End: 2018-11-20
Payer: COMMERCIAL

## 2018-11-20 VITALS
SYSTOLIC BLOOD PRESSURE: 134 MMHG | BODY MASS INDEX: 29.01 KG/M2 | OXYGEN SATURATION: 95 % | TEMPERATURE: 97.5 F | HEART RATE: 66 BPM | WEIGHT: 169.9 LBS | DIASTOLIC BLOOD PRESSURE: 94 MMHG | RESPIRATION RATE: 14 BRPM | HEIGHT: 64 IN

## 2018-11-20 DIAGNOSIS — R63.4 WEIGHT LOSS: ICD-10-CM

## 2018-11-20 DIAGNOSIS — Z86.11 HISTORY OF TB (TUBERCULOSIS): ICD-10-CM

## 2018-11-20 DIAGNOSIS — Z23 NEED FOR PROPHYLACTIC VACCINATION WITH TETANUS-DIPHTHERIA (TD): ICD-10-CM

## 2018-11-20 DIAGNOSIS — J95.811 IATROGENIC PNEUMOTHORAX: ICD-10-CM

## 2018-11-20 DIAGNOSIS — R91.8 PULMONARY NODULES: ICD-10-CM

## 2018-11-20 DIAGNOSIS — A52.8 LATE LATENT SYPHILIS: Primary | ICD-10-CM

## 2018-11-20 DIAGNOSIS — R03.0 ELEVATED BLOOD PRESSURE READING WITHOUT DIAGNOSIS OF HYPERTENSION: ICD-10-CM

## 2018-11-20 PROCEDURE — 99214 OFFICE O/P EST MOD 30 MIN: CPT | Mod: 25 | Performed by: NURSE PRACTITIONER

## 2018-11-20 PROCEDURE — 90471 IMMUNIZATION ADMIN: CPT | Performed by: NURSE PRACTITIONER

## 2018-11-20 PROCEDURE — 90715 TDAP VACCINE 7 YRS/> IM: CPT | Performed by: NURSE PRACTITIONER

## 2018-11-20 PROCEDURE — 96372 THER/PROPH/DIAG INJ SC/IM: CPT | Performed by: NURSE PRACTITIONER

## 2018-11-20 ASSESSMENT — PAIN SCALES - GENERAL: PAINLEVEL: NO PAIN (0)

## 2018-11-20 NOTE — MR AVS SNAPSHOT
After Visit Summary   11/20/2018    Chaim Norman    MRN: [id number]           Patient Information     Date Of Birth          1975        Visit Information        Provider Department      11/20/2018 10:20 AM Nyla Florian APRN [facility]        Today's Diagnoses     Late latent syphilis    -  1    Pulmonary nodules        Weight loss        History of TB (tuberculosis)        Iatrogenic pneumothorax        Need for prophylactic vaccination with tetanus-diphtheria (Td)        Elevated blood pressure reading without diagnosis of hypertension          Care Instructions    We will give penicillin injection today (Bicillin 2.4 million units)  Please return for nurse only appointment in 7 and 14 days for another injection each day  Please follow up in for repeat testing in 6 months, 12 months, and 24 months for nontreponemal test (RPR). I will place future order for RPR in 6 months placed. Ok to make lab only appointment for this around May 20, 2019    Please follow up with infectious disease specialist next week and ask them to send us your visit summary    Your blood pressure is a bit elevated.  I recommend watching diet: eat lots of fruits/vegetables and lean meats. Avoid processed foods and added salt. Get at least 30 minutes of exercise 4-5 days per week. Please follow up in 3 months.      At [facility], we strive to deliver an exceptional experience to you, every time we see you.  If you receive a survey in the mail, please send us back your thoughts. We really do value your feedback.    Your care team:                            Family Medicine Internal Medicine   MD Tunde Hernandez MD Shantel Branch-Fleming, MD Katya Georgiev PA-C Megan Hill, APRN CNP Nam Ho, MD Pediatrics   MINNA Ramsey CNP Paula Brito, MD Amelia Massimini APRN CNP   MD Nel Evans MD Deborah Mielke, MD Kim  Minda APRROBERT CNP      Clinic hours: Monday - Thursday 7 am-7 pm; Fridays 7 am-5 pm.   Urgent care: Monday - Friday 11 am-9 pm; Saturday and Sunday 9 am-5 pm.  Pharmacy : Monday -Thursday 8 am-8 pm; Friday 8 am-6 pm; Saturday and Sunday 9 am-5 pm.     Clinic: [phone]   Pharmacy: [phone]        Syphilis  The sexually transmitted disease syphilis is a bacterial infection. Left untreated, it can cause heart and brain damage, even death. Pregnant women can infect their unborn babies. This can lead to deformities or even death.    People don t talk as much about syphilis today as they did in the past. But people still become infected with syphilis, and it can cause very serious problems.   Symptoms  Syphilis progresses in stages. The signs and symptoms in each stage may be so mild that you do not notice them. During the first stage, there is often a painless sore on the sex organs, anus, or mouth. The sore goes away in 3 to 6 weeks. During the next stage, you may develop a rash or flu-like symptoms. These also go away. But the infection is still there. You may have no other signs until serious problems develop, often years or even decades later.   Treatment  Syphilis is treated with antibiotics. During treatment, it is important not to have sex, or you may infect someone else. And be sure to return for follow-up visits. Your partner should also be checked for the disease.  Prevention  As with all STDs, knowing your partner s sexual history is a big step toward preventing syphilis. Know the signs and symptoms of the infection. And use latex condoms to reduce your risk.  Symptoms of syphilis stages  Stage 1: A painless sore on the mouth or sex organs  Stage 2: A rash, fever, sore throat, muscle aches, fatigue, and other flu-like symptoms  Stage 3: Severe diseases such as paralysis, mental deterioration, damage to internal organs, blindness, or death  Resources  American Social Health Association STD Hotline   [phone]  www.ashastd.org  Centers for Disease Control and Prevention  616.807.8131  www.cdc.gov/std   Date Last Reviewed: 12/1/2016 2000-2018 [employer]. [address] [place] [address], [place] [address]. All rights reserved. This information is not intended as a substitute for professional medical care. Always follow your healthcare professional's instructions.        High Blood Pressure, To Be Confirmed, No Treatment  Your blood pressure today was higher than normal. Sometimes anxiety or pain can cause a temporary rise in blood pressure. It later returns to normal. Blood pressure that is high only one time doesn t mean that you have high blood pressure (hypertension). High blood pressure is a chronic illness. But you should have your blood pressure measured again within the next few days to find out if it s still high.    Blood pressure measurements are given as 2 numbers. Systolic blood pressure is the upper number. This is the pressure when the heart contracts. Diastolic blood pressure is the lower number. This is the pressure when the heart relaxes between beats. You will see your blood pressure readings written together. For example, a person with a systolic pressure of 118 and a diastolic pressure of 78 will have 118/78 written in the medical record.  Blood pressure is categorized as normal, elevated, or stage 1 or stage 2 high blood pressure:    Normal blood pressure is systolic of less than 120 and diastolic of less than 80 (120/80)    Elevated blood pressure is systolic of 120 to 129 and diastolic less than 80    Stage 1 high blood pressure is systolic is 130 to 139 or diastolic between 80 to 89    Stage 2 high blood pressure is when systolic is 140 or higher or the diastolic is 90 or higher  Lifestyle changes such as weight loss, exercise, and quitting smoking, can help manage your blood pressure. Have your blood pressure checked regularly to be sure it is under control.  Home care  To  track your blood pressure, your provider may ask you to come into the office at different times and on different days. If your healthcare provider asks you to check your readings at home, ask him or her what times of the day to test and for how many days. Before you leave the office, ask your provider to show you how to take your blood pressure and be sure to ask questions if you don't understand something.  Consider buying an automatic blood pressure monitor. Ask your provider for a recommendation as well as the proper size cuff to fit your arm. You can buy blood pressure monitors at most pharmacies.  The American Heart Association recommends the following guidelines for home blood pressure monitoring:    Don't smoke or drink coffee or other caffeinated drinks for 30 minutes before taking your blood pressure.    Go to the bathroom before the test.    Relax for 5 minutes before taking the measurement.    Sit with your back supported (don't sit on a couch or soft chair); keep your feet on the floor uncrossed. Place your arm on a solid flat surface (like a table) with the upper part of the arm at heart level. Place the middle of the cuff directly above the bend of the elbow. Check the monitor's instruction manual for an illustration.    Take multiple readings. When you measure, take 2 to 3 readings one minute apart and record all of the results.    Take your blood pressure at the same time every day, or as your healthcare provider recommends.    Record the date, time, and blood pressure reading.    Take the record with you to your next medical appointment. If your blood pressure monitor has a built-in memory, simply take the monitor with you to your next appointment.    Call your provider if you have several high readings. Don't be frightened by a single high blood pressure reading, but if you get several high readings, check in with your healthcare provider.    Note: When blood pressure reaches a systolic (top  number) of 180 or higher OR diastolic (bottom number) of 110 or higher, seek emergency medical treatment.  Follow-up care  Keep all of your follow up appointments. If your blood pressure is more than 120 over 80 on 2 out of 3 days, you will need to follow up with your healthcare provider for more evaluation and treatment.  Don t put this off! High blood pressure can be treated. High blood pressure that s not treated raises your risk for heart attack, heart failure, and stroke.  When to seek medical advice  Call your healthcare provider right away if any of these occur:    Blood pressure reaches a systolic (top number) of 180 or higher, OR diastolic (bottom number) of 110 or higher    Chest pain or shortness of breath    Severe headache    Throbbing or rushing sound in the ears    Nosebleed    Sudden severe pain in your belly (abdomen)    Extreme drowsiness, confusion, or fainting    Dizziness or dizziness with spinning sensation (vertigo)    Weakness of an arm or leg or one side of the face    You have problems speaking or seeing   Date Last Reviewed: 12/1/2016 2000-2018 [employer]. [address] [place] [address]. All rights reserved. This information is not intended as a substitute for professional medical care. Always follow your healthcare professional's instructions.                Follow-ups after your visit        Your next 10 appointments already scheduled     Nov 27, 2018 11:00 AM CST   N[facility] with BK ANCILLARY   [facility]    [address] [place] [id number]   [phone]            Dec 04, 2018 11:20 AM CST   Nurse Only with BK ANCILLARY   [facility]    [address] [facility] [address]   [phone]              Future tests that were ordered for you today     Open Future Orders        Priority Expected Expires Ordered     "Treponema Abs w Reflex to RPR and Titer Routine 2019            Who to contact     If you have questions or need follow up information about today's clinic visit or your schedule please contact [facility] [place] directly at [phone].  Normal or non-critical lab and imaging results will be communicated to you by MyChart, letter or phone within 4 business days after the clinic has received the results. If you do not hear from us within 7 days, please contact the clinic through [employer] or phone. If you have a critical or abnormal lab result, we will notify you by phone as soon as possible.  Submit refill requests through [employer] or call your pharmacy and they will forward the refill request to us. Please allow 3 business days for your refill to be completed.          Additional Information About Your Visit        MyChart Information     [employer] lets you send messages to your doctor, view your test results, renew your prescriptions, schedule appointments and more. To sign up, go to [email or web address] . Click on \"Log in\" on the left side of the screen, which will take you to the Welcome page. Then click on \"Sign up Now\" on the right side of the page.     You will be asked to enter the access code listed below, as well as some personal information. Please follow the directions to create your username and password.     Your access code is: [email or web address]  Expires: 2019 10:49 AM     Your access code will  in 90 days. If you need help or a new code, please call your [facility] or [phone].        Care EveryWhere ID     This is your Care EveryWhere ID. This could be used by other organizations to access your [facility] medical records  [id number]        Your Vitals Were     Pulse Temperature Respirations Height Pulse Oximetry BMI (Body Mass Index)    66 97.5  F (36.4  C) (Oral) 14 5' 4\" (1.626 m) 95% 29.16 kg/m2       Blood Pressure from Last 3 Encounters: "   11/20/18 (!) 134/94   11/14/18 138/90   10/18/18 124/80    Weight from Last 3 Encounters:   11/20/18 169 lb 14.4 oz (77.1 kg)   11/14/18 168 lb (76.2 kg)   10/18/18 161 lb 6.4 oz (73.2 kg)              We Performed the Following     TDAP, IM (10 - 64 YRS) - Adacel          Today's Medication Changes          These changes are accurate as of 11/20/18 11:22 AM.  If you have any questions, ask your nurse or doctor.               Start taking these medicines.        Dose/Directions    penicillin G benzathine [id number] UNIT/4ML injection   Commonly known as:  BICILLIN L-A   Used for:  Late latent syphilis   Started by:  Nyla Florian APRN CNP        Dose:  [id number] Units   Inject 4 mLs (2,400,000 Units) into the muscle every 7 days for 3 doses   Quantity:  12 mL   Refills:  0            Where to get your medicines      Some of these will need a paper prescription and others can be bought over the counter.  Ask your nurse if you have questions.     You don't need a prescription for these medications     penicillin G benzathine [id number] UNIT/4ML injection                Primary Care Provider Office Phone # Fax #    CHAZ Beasley [phone]       [address]  [facility] [address]        Equal Access to Services     ROB KUO : Hadii matteo ku hadasho Soomaali, waaxda luqadaha, qaybta kaalmada adeegyada, jordan fitch. So [facility] [phone].    ATENCIÓN: Si habla español, tiene a garvey disposición servicios gratuitos de asistencia lingüística. Anthony al [phone].    We comply with applicable federal civil rights laws and [place] laws. We do not discriminate on the basis of race, color, national origin, age, disability, sex, sexual orientation, or gender identity.            Thank you!     Thank you for choosing [facility]  for your care. Our goal is always to provide you with excellent care. Hearing back from our patients is one way we can  continue to improve our services. Please take a few minutes to complete the written survey that you may receive in the mail after your visit with us. Thank you!             Your Updated Medication List - Protect others around you: Learn how to safely use, store and throw away your medicines at www.disposemymeds.org.          This list is accurate as of 11/20/18 11:22 AM.  Always use your most recent med list.                   Brand Name Dispense Instructions for use Diagnosis    penicillin G benzathine [id number] UNIT/4ML injection    BICILLIN L-A    12 mL    Inject 4 mLs (2,400,000 Units) into the muscle every 7 days for 3 doses    Late latent syphilis       PROVENTIL  (90 Base) MCG/ACT inhaler   Generic drug:  albuterol      Inhale 2 puffs into the lungs        tamsulosin 0.4 MG capsule    FLOMAX     Take 0.4 mg by mouth

## 2018-11-20 NOTE — PATIENT INSTRUCTIONS
We will give penicillin injection today (Bicillin 2.4 million units)  Please return for nurse only appointment in 7 and 14 days for another injection each day  Please follow up in for repeat testing in 6 months, 12 months, and 24 months for nontreponemal test (RPR). I will place future order for RPR in 6 months placed. Ok to make lab only appointment for this around May 20, 2019    Please follow up with infectious disease specialist next week and ask them to send us your visit summary    Your blood pressure is a bit elevated.  I recommend watching diet: eat lots of fruits/vegetables and lean meats. Avoid processed foods and added salt. Get at least 30 minutes of exercise 4-5 days per week. Please follow up in 3 months.      At [facility], we strive to deliver an exceptional experience to you, every time we see you.  If you receive a survey in the mail, please send us back your thoughts. We really do value your feedback.    Your care team:                            Family Medicine Internal Medicine   MD Tunde Hernandez MD Shantel Branch-Fleming, MD Katya Georgiev PA-C Megan Hill, APRN CNP    Marco Florentino MD Pediatrics   MINNA Ramsey, CNP MD Beth Platt APRN CNP   MD Nel Evans MD Deborah Mielke, MD Kim Thein, APRN [facility]      Clinic hours: Monday - Thursday 7 am-7 pm; Fridays 7 am-5 pm.   Urgent care: Monday - Friday 11 am-9 pm; Saturday and Sunday 9 am-5 pm.  Pharmacy : Monday -Thursday 8 am-8 pm; Friday 8 am-6 pm; Saturday and Sunday 9 am-5 pm.     Clinic: [phone]   Pharmacy: [phone]        Syphilis  The sexually transmitted disease syphilis is a bacterial infection. Left untreated, it can cause heart and brain damage, even death. Pregnant women can infect their unborn babies. This can lead to deformities or even death.    People don t talk as much about syphilis today as they did in the past. But  people still become infected with syphilis, and it can cause very serious problems.   Symptoms  Syphilis progresses in stages. The signs and symptoms in each stage may be so mild that you do not notice them. During the first stage, there is often a painless sore on the sex organs, anus, or mouth. The sore goes away in 3 to 6 weeks. During the next stage, you may develop a rash or flu-like symptoms. These also go away. But the infection is still there. You may have no other signs until serious problems develop, often years or even decades later.   Treatment  Syphilis is treated with antibiotics. During treatment, it is important not to have sex, or you may infect someone else. And be sure to return for follow-up visits. Your partner should also be checked for the disease.  Prevention  As with all STDs, knowing your partner s sexual history is a big step toward preventing syphilis. Know the signs and symptoms of the infection. And use latex condoms to reduce your risk.  Symptoms of syphilis stages  Stage 1: A painless sore on the mouth or sex organs  Stage 2: A rash, fever, sore throat, muscle aches, fatigue, and other flu-like symptoms  Stage 3: Severe diseases such as paralysis, mental deterioration, damage to internal organs, blindness, or death  Resources  American Social Health Association STD Hotline  124.329.8182  www.ashastd.org  Centers for Disease Control and Prevention  181.680.1324  www.cdc.gov/std   Date Last Reviewed: 12/1/2016 2000-2018 [employer]. [address] [place] [address]. All rights reserved. This information is not intended as a substitute for professional medical care. Always follow your healthcare professional's instructions.        High Blood Pressure, To Be Confirmed, No Treatment  Your blood pressure today was higher than normal. Sometimes anxiety or pain can cause a temporary rise in blood pressure. It later returns to normal. Blood pressure that is high only  one time doesn t mean that you have high blood pressure (hypertension). High blood pressure is a chronic illness. But you should have your blood pressure measured again within the next few days to find out if it s still high.    Blood pressure measurements are given as 2 numbers. Systolic blood pressure is the upper number. This is the pressure when the heart contracts. Diastolic blood pressure is the lower number. This is the pressure when the heart relaxes between beats. You will see your blood pressure readings written together. For example, a person with a systolic pressure of 118 and a diastolic pressure of 78 will have 118/78 written in the medical record.  Blood pressure is categorized as normal, elevated, or stage 1 or stage 2 high blood pressure:    Normal blood pressure is systolic of less than 120 and diastolic of less than 80 (120/80)    Elevated blood pressure is systolic of 120 to 129 and diastolic less than 80    Stage 1 high blood pressure is systolic is 130 to 139 or diastolic between 80 to 89    Stage 2 high blood pressure is when systolic is 140 or higher or the diastolic is 90 or higher  Lifestyle changes such as weight loss, exercise, and quitting smoking, can help manage your blood pressure. Have your blood pressure checked regularly to be sure it is under control.  Home care  To track your blood pressure, your provider may ask you to come into the office at different times and on different days. If your healthcare provider asks you to check your readings at home, ask him or her what times of the day to test and for how many days. Before you leave the office, ask your provider to show you how to take your blood pressure and be sure to ask questions if you don't understand something.  Consider buying an automatic blood pressure monitor. Ask your provider for a recommendation as well as the proper size cuff to fit your arm. You can buy blood pressure monitors at most pharmacies.  The American  Heart Association recommends the following guidelines for home blood pressure monitoring:    Don't smoke or drink coffee or other caffeinated drinks for 30 minutes before taking your blood pressure.    Go to the bathroom before the test.    Relax for 5 minutes before taking the measurement.    Sit with your back supported (don't sit on a couch or soft chair); keep your feet on the floor uncrossed. Place your arm on a solid flat surface (like a table) with the upper part of the arm at heart level. Place the middle of the cuff directly above the bend of the elbow. Check the monitor's instruction manual for an illustration.    Take multiple readings. When you measure, take 2 to 3 readings one minute apart and record all of the results.    Take your blood pressure at the same time every day, or as your healthcare provider recommends.    Record the date, time, and blood pressure reading.    Take the record with you to your next medical appointment. If your blood pressure monitor has a built-in memory, simply take the monitor with you to your next appointment.    Call your provider if you have several high readings. Don't be frightened by a single high blood pressure reading, but if you get several high readings, check in with your healthcare provider.    Note: When blood pressure reaches a systolic (top number) of 180 or higher OR diastolic (bottom number) of 110 or higher, seek emergency medical treatment.  Follow-up care  Keep all of your follow up appointments. If your blood pressure is more than 120 over 80 on 2 out of 3 days, you will need to follow up with your healthcare provider for more evaluation and treatment.  Don t put this off! High blood pressure can be treated. High blood pressure that s not treated raises your risk for heart attack, heart failure, and stroke.  When to seek medical advice  Call your healthcare provider right away if any of these occur:    Blood pressure reaches a systolic (top number) of  180 or higher, OR diastolic (bottom number) of 110 or higher    Chest pain or shortness of breath    Severe headache    Throbbing or rushing sound in the ears    Nosebleed    Sudden severe pain in your belly (abdomen)    Extreme drowsiness, confusion, or fainting    Dizziness or dizziness with spinning sensation (vertigo)    Weakness of an arm or leg or one side of the face    You have problems speaking or seeing   Date Last Reviewed: 12/1/2016 2000-2018 [employer]. [address] [place] [address]. All rights reserved. This information is not intended as a substitute for professional medical care. Always follow your healthcare professional's instructions.

## 2018-11-20 NOTE — PROGRESS NOTES
SUBJECTIVE:   Chaim Norman is a 43 year old male who presents to clinic today for the following health issues:    Lab Result:     Pleasant 43 year old male presents for follow up. Briefly, he was recently treated for reactivation TB and completed antibiotics over 1 week ago. During his bronch he suffered a pneumothorax. At follow up visit last week I sent him back to emergency department for chest pain work up as he had T wave changes on EKG and chest pain. Trops/work up were negative and he was discharged home. He was also dx with late latent syphilis but has not yet received treatment. No known exposures. No lesions. Feeling otherwise well.     Has ID follow up next week at [facility] for pulmonary nodules/reactivation TB.    Due for Tdap.    Problem list and histories reviewed & adjusted, as indicated.  Additional history: as documented    Patient Active Problem List   Diagnosis     History of TB (tuberculosis)     Weight loss     Pulmonary nodules     Late latent syphilis     Iatrogenic pneumothorax     Elevated blood pressure reading without diagnosis of hypertension     No past surgical history on file.    Social History   Substance Use Topics     Smoking status: Never Smoker     Smokeless tobacco: Never Used     Alcohol use No     No family history on file.      Current Outpatient Prescriptions   Medication Sig Dispense Refill     penicillin G benzathine (BICILLIN L-A) 4550508 UNIT/4ML injection Inject 4 mLs (2,400,000 Units) into the muscle every 7 days for 3 doses 12 mL 0     albuterol (PROVENTIL HFA) 108 (90 Base) MCG/ACT inhaler Inhale 2 puffs into the lungs       tamsulosin (FLOMAX) 0.4 MG capsule Take 0.4 mg by mouth       No Known Allergies  BP Readings from Last 3 Encounters:   11/20/18 (!) 134/94   11/14/18 138/90   10/18/18 124/80    Wt Readings from Last 3 Encounters:   11/20/18 169 lb 14.4 oz (77.1 kg)   11/14/18 168 lb (76.2 kg)   10/18/18 161 lb 6.4 oz (73.2 kg)                  Labs reviewed  "in EPIC    Reviewed and updated as needed this visit by clinical staff  Tobacco  Allergies  Meds  Problems       Reviewed and updated as needed this visit by Provider  Allergies  Meds  Problems         ROS:  Constitutional, HEENT, cardiovascular, pulmonary, GI, , musculoskeletal, neuro, skin, endocrine and psych systems are negative, except as otherwise noted.    OBJECTIVE:     BP (!) 134/94  Pulse 66  Temp 97.5  F (36.4  C) (Oral)  Resp 14  Ht 5' 4\" (1.626 m)  Wt 169 lb 14.4 oz (77.1 kg)  SpO2 95%  BMI 29.16 kg/m2  Body mass index is 29.16 kg/(m^2).  GENERAL: healthy, alert and no distress  EYES: Eyes grossly normal to inspection, PERRL and conjunctivae and sclerae normal  HENT: ear canals and TM's normal, nose and mouth without ulcers or lesions  NECK: no adenopathy, no asymmetry, masses, or scars and thyroid normal to palpation  RESP: lungs clear to auscultation - no rales, rhonchi or wheezes  CV: regular rate and rhythm, normal S1 S2, no S3 or S4, no murmur, click or rub, no peripheral edema and peripheral pulses strong  MS: no gross musculoskeletal defects noted, no edema  SKIN: no suspicious lesions or rashes  PSYCH: mentation appears normal, affect normal/bright    Diagnostic Test Results:  none     ASSESSMENT/PLAN:       ICD-10-CM    1. Late latent syphilis A52.8 penicillin G benzathine (BICILLIN L-A) [id number] UNIT/4ML injection     Treponema Abs w Reflex to RPR and Titer     C INJECTION, PENICILLIN G BENZATHINE ,000 UNITS     INJECTION INTRAMUSCULAR OR SUB-Q   2. Pulmonary nodules R91.8    3. Weight loss R63.4    4. History of TB (tuberculosis) Z86.11    5. Iatrogenic pneumothorax J95.811    6. Need for prophylactic vaccination with tetanus-diphtheria (Td) Z23 TDAP, IM (10 - 64 YRS) - Adacel   7. Elevated blood pressure reading without diagnosis of hypertension R03.0      We will give penicillin injection today (Bicillin 2.4 million units)  Please return for nurse only appointment in 7 " and 14 days for another injection each day  Please follow up in for repeat testing in 6 months, 12 months, and 24 months for nontreponemal test (RPR). I will place future order for RPR in 6 months placed. Ok to make lab only appointment for this around May 20, 2019    Please follow up with infectious disease specialist next week and ask them to send us your visit summary    Your blood pressure is a bit elevated.  I recommend watching diet: eat lots of fruits/vegetables and lean meats. Avoid processed foods and added salt. Get at least 30 minutes of exercise 4-5 days per week. Please follow up in 3 months.    See Patient Instructions    The benefits, risks and potential side effects were discussed in detail. Black box warnings discussed as relevant. All patient questions were answered. The patient was instructed to follow up immediately if any adverse reactions develop.    Patient verbalizes understanding and agrees with plan of care. Patient stable for discharge.      CHAZ Garrett [facility]

## 2018-11-28 ENCOUNTER — ALLIED HEALTH/NURSE VISIT (OUTPATIENT)
Dept: NURSING | Facility: CLINIC | Age: 43
End: 2018-11-28
Payer: COMMERCIAL

## 2018-11-28 DIAGNOSIS — A52.8 LATE LATENT SYPHILIS: Primary | ICD-10-CM

## 2018-11-28 PROCEDURE — 99207 ZZC NO CHARGE NURSE ONLY: CPT

## 2018-11-28 PROCEDURE — 96372 THER/PROPH/DIAG INJ SC/IM: CPT

## 2018-12-04 ENCOUNTER — ALLIED HEALTH/NURSE VISIT (OUTPATIENT)
Dept: NURSING | Facility: CLINIC | Age: 43
End: 2018-12-04
Payer: COMMERCIAL

## 2018-12-04 DIAGNOSIS — A52.8 LATE LATENT SYPHILIS: Primary | ICD-10-CM

## 2018-12-04 PROCEDURE — 96372 THER/PROPH/DIAG INJ SC/IM: CPT

## 2018-12-04 PROCEDURE — 99207 ZZC NO CHARGE NURSE ONLY: CPT

## 2019-05-20 ENCOUNTER — OFFICE VISIT (OUTPATIENT)
Dept: FAMILY MEDICINE | Facility: CLINIC | Age: 44
End: 2019-05-20
Payer: COMMERCIAL

## 2019-05-20 ENCOUNTER — ANCILLARY PROCEDURE (OUTPATIENT)
Dept: GENERAL RADIOLOGY | Facility: CLINIC | Age: 44
End: 2019-05-20
Attending: NURSE PRACTITIONER
Payer: COMMERCIAL

## 2019-05-20 VITALS
BODY MASS INDEX: 29.82 KG/M2 | WEIGHT: 179 LBS | HEIGHT: 65 IN | RESPIRATION RATE: 18 BRPM | SYSTOLIC BLOOD PRESSURE: 134 MMHG | OXYGEN SATURATION: 97 % | HEART RATE: 77 BPM | DIASTOLIC BLOOD PRESSURE: 93 MMHG | TEMPERATURE: 98.5 F

## 2019-05-20 DIAGNOSIS — B95.2 URINARY TRACT INFECTION DUE TO ENTEROCOCCUS: ICD-10-CM

## 2019-05-20 DIAGNOSIS — N39.0 URINARY TRACT INFECTION DUE TO ENTEROCOCCUS: ICD-10-CM

## 2019-05-20 DIAGNOSIS — R07.9 LEFT SIDED CHEST PAIN: ICD-10-CM

## 2019-05-20 DIAGNOSIS — R91.8 PULMONARY NODULES: ICD-10-CM

## 2019-05-20 DIAGNOSIS — R06.02 SHORTNESS OF BREATH: ICD-10-CM

## 2019-05-20 DIAGNOSIS — R30.0 DYSURIA: ICD-10-CM

## 2019-05-20 DIAGNOSIS — A52.8 LATE LATENT SYPHILIS: Primary | ICD-10-CM

## 2019-05-20 DIAGNOSIS — Z86.11 HISTORY OF TB (TUBERCULOSIS): ICD-10-CM

## 2019-05-20 LAB
ALBUMIN UR-MCNC: NEGATIVE MG/DL
APPEARANCE UR: ABNORMAL
BACTERIA #/AREA URNS HPF: ABNORMAL /HPF
BILIRUB UR QL STRIP: NEGATIVE
COLOR UR AUTO: YELLOW
GLUCOSE UR STRIP-MCNC: NEGATIVE MG/DL
HGB UR QL STRIP: ABNORMAL
KETONES UR STRIP-MCNC: NEGATIVE MG/DL
LEUKOCYTE ESTERASE UR QL STRIP: ABNORMAL
NITRATE UR QL: NEGATIVE
NON-SQ EPI CELLS #/AREA URNS LPF: ABNORMAL /LPF
PH UR STRIP: 6 PH (ref 5–7)
RBC #/AREA URNS AUTO: ABNORMAL /HPF
SOURCE: ABNORMAL
SP GR UR STRIP: 1.02 (ref 1–1.03)
UROBILINOGEN UR STRIP-ACNC: 0.2 EU/DL (ref 0.2–1)
WBC #/AREA URNS AUTO: ABNORMAL /HPF

## 2019-05-20 PROCEDURE — 87186 SC STD MICRODIL/AGAR DIL: CPT | Performed by: NURSE PRACTITIONER

## 2019-05-20 PROCEDURE — 99214 OFFICE O/P EST MOD 30 MIN: CPT | Performed by: NURSE PRACTITIONER

## 2019-05-20 PROCEDURE — 99000 SPECIMEN HANDLING OFFICE-LAB: CPT | Performed by: NURSE PRACTITIONER

## 2019-05-20 PROCEDURE — 36415 COLL VENOUS BLD VENIPUNCTURE: CPT | Performed by: NURSE PRACTITIONER

## 2019-05-20 PROCEDURE — 87088 URINE BACTERIA CULTURE: CPT | Performed by: NURSE PRACTITIONER

## 2019-05-20 PROCEDURE — 84484 ASSAY OF TROPONIN QUANT: CPT | Performed by: NURSE PRACTITIONER

## 2019-05-20 PROCEDURE — 93000 ELECTROCARDIOGRAM COMPLETE: CPT | Performed by: NURSE PRACTITIONER

## 2019-05-20 PROCEDURE — 71046 X-RAY EXAM CHEST 2 VIEWS: CPT

## 2019-05-20 PROCEDURE — 81001 URINALYSIS AUTO W/SCOPE: CPT | Performed by: NURSE PRACTITIONER

## 2019-05-20 PROCEDURE — 87086 URINE CULTURE/COLONY COUNT: CPT | Performed by: NURSE PRACTITIONER

## 2019-05-20 PROCEDURE — 86592 SYPHILIS TEST NON-TREP QUAL: CPT | Performed by: NURSE PRACTITIONER

## 2019-05-20 PROCEDURE — 86780 TREPONEMA PALLIDUM: CPT | Mod: 90 | Performed by: NURSE PRACTITIONER

## 2019-05-20 PROCEDURE — 87491 CHLMYD TRACH DNA AMP PROBE: CPT | Performed by: NURSE PRACTITIONER

## 2019-05-20 PROCEDURE — 86780 TREPONEMA PALLIDUM: CPT | Mod: 91 | Performed by: NURSE PRACTITIONER

## 2019-05-20 PROCEDURE — 87591 N.GONORRHOEAE DNA AMP PROB: CPT | Performed by: NURSE PRACTITIONER

## 2019-05-20 RX ORDER — ALBUTEROL SULFATE 90 UG/1
2 AEROSOL, METERED RESPIRATORY (INHALATION) EVERY 6 HOURS PRN
Qty: 8.5 G | Refills: 3 | Status: SHIPPED | OUTPATIENT
Start: 2019-05-20 | End: 2021-04-13

## 2019-05-20 ASSESSMENT — PAIN SCALES - GENERAL: PAINLEVEL: NO PAIN (0)

## 2019-05-20 ASSESSMENT — MIFFLIN-ST. JEOR: SCORE: 1629.43

## 2019-05-20 NOTE — PATIENT INSTRUCTIONS
At [facility], we strive to deliver an exceptional experience to you, every time we see you.  If you receive a survey in the mail, please send us back your thoughts. We really do value your feedback.    Based on your medical history, these are the current health maintenance/preventive care services that you are due for (some may have been done at this visit.)  Health Maintenance Due   Topic Date Due     PREVENTIVE CARE VISIT  1975     LIPID  08/28/2008     PHQ-2  01/01/2019         Suggested websites for health information:  Www.[email or web address].[email or web address] : Up to date and easily searchable information on multiple topics.  Www.medlineplus.gov : medication info, interactive tutorials, watch real surgeries online  Www.familydoctor.org : good info from the Academy of Family Physicians  Www.cdc.gov : public health info, travel advisories, epidemics (H1N1)  Www.aap.org : children's health info, normal development, vaccinations  Www.health.[facility].[place].us : [place] dept of health, public health issues in [place], N1N1    Your care team:                            Family Medicine Internal Medicine   MD Tunde Hernandez MD Shantel Branch-Fleming, MD Katya Georgiev PA-C Nam Ho, MD Pediatrics   MINNA Ramsey, FLOYD Mcclellan APRN CNP   MD Nel Evans MD Deborah Mielke, MD Kim Thein, APRN CNP      Clinic hours: Monday - Thursday 7 am-7 pm; Fridays 7 am-5 pm.   Urgent care: Monday - Friday 11 am-9 pm; Saturday and Sunday 9 am-5 pm.  Pharmacy : Monday -Thursday 8 am-8 pm; Friday 8 am-6 pm; Saturday and Sunday 9 am-5 pm.     Clinic: [phone]   Pharmacy: [phone]

## 2019-05-20 NOTE — PROGRESS NOTES
Subjective     Chaim Norman is a 43 year old male who presents to clinic today for the following health issues:    HPI   Concern - Breathing problem  Onset: 6-7 months ago    Description:   Difficulty breathing/Shortness of breath    Intensity: severe    Progression of Symptoms:  same    Accompanying Signs & Symptoms:  None    Previous history of similar problem:   Yes    Precipitating factors:   Worsened by: walking    Alleviating factors:  Improved by: rest    Therapies Tried and outcome: Prescribed meds: effective, currently out    Recheck STD: Late latent syphilis    Breathing worse with walking. Feels like he's more wheezing at times. Was using albuterol but ran out; however, it helped when he had it. Saw infectious disease who ruled out active TB in Dec.    Gets left sided chest pain at times. Last episode 1 week ago. Lasts for 2 days when he gets it. Waxes and wanes. 7/10 at it's worst. Pain doesn't radiate.   Breathing worse with walking. Feels like he's more wheezing at times. Was using albuterol but ran out; however, it helped when he had it    Here for late latent syphilis recheck. Reports some dysuria at times for couple months. Hx prostatitis vs UTI in the past? No fevers. No nausea or vomiting.     {  Patient Active Problem List   Diagnosis     History of TB (tuberculosis)     Weight loss     Pulmonary nodules     Late latent syphilis     Iatrogenic pneumothorax     Elevated blood pressure reading without diagnosis of hypertension     History reviewed. No pertinent surgical history.    Social History     Tobacco Use     Smoking status: Never Smoker     Smokeless tobacco: Never Used   Substance Use Topics     Alcohol use: No     History reviewed. No pertinent family history.      Current Outpatient Medications   Medication Sig Dispense Refill     albuterol (PROVENTIL HFA) 108 (90 Base) MCG/ACT inhaler Inhale 2 puffs into the lungs every 6 hours as needed for shortness of breath / dyspnea or wheezing 8.5 g  "3     tamsulosin (FLOMAX) 0.4 MG capsule Take 0.4 mg by mouth       No Known Allergies  BP Readings from Last 3 Encounters:   05/20/19 (!) 134/93   11/20/18 (!) 134/94   11/14/18 138/90    Wt Readings from Last 3 Encounters:   05/20/19 81.2 kg (179 lb)   11/20/18 77.1 kg (169 lb 14.4 oz)   11/14/18 76.2 kg (168 lb)                    Reviewed and updated as needed this visit by Provider  Tobacco  Allergies  Meds  Problems  Med Hx  Surg Hx  Fam Hx         Review of Systems   ROS COMP: Constitutional, HEENT, cardiovascular, pulmonary, GI, , musculoskeletal, neuro, skin, endocrine and psych systems are negative, except as otherwise noted.      Objective    BP (!) 134/93   Pulse 77   Temp 98.5  F (36.9  C) (Oral)   Resp 18   Ht 1.66 m (5' 5.35\")   Wt 81.2 kg (179 lb)   SpO2 97%   BMI 29.47 kg/m    Body mass index is 29.47 kg/m .  Physical Exam   GENERAL: healthy, alert and no distress  NECK: no adenopathy, no asymmetry, masses, or scars and thyroid normal to palpation  RESP: lungs clear to auscultation - no rales, rhonchi or wheezes  CV: regular rate and rhythm, normal S1 S2, no S3 or S4, no murmur, click or rub, no peripheral edema and peripheral pulses strong  ABDOMEN: soft, nontender, no hepatosplenomegaly, no masses and bowel sounds normal  MS: no gross musculoskeletal defects noted, no edema    Diagnostic Test Results:  Labs reviewed in Epic        Assessment & Plan       ICD-10-CM    1. Late latent syphilis A52.8 Treponema Abs w Reflex to RPR and Titer     Urine Microscopic   2. Dysuria R30.0 UA reflex to Microscopic and Culture     Chlamydia trachomatis PCR     Neisseria gonorrhoeae PCR     Urine Culture Aerobic Bacterial   3. Left sided chest pain R07.9 XR Chest 2 Views     EKG 12-lead complete w/read - Clinics     Troponin I   4. History of TB (tuberculosis) Z86.11 PULMONARY MEDICINE REFERRAL   5. Pulmonary nodules R91.8 PULMONARY MEDICINE REFERRAL   6. Shortness of breath R06.02 PULMONARY " "MEDICINE REFERRAL     albuterol (PROVENTIL HFA) 108 (90 Base) MCG/ACT inhaler     Will refer to pulmonology for shortness of breath - likely related to lung scaring.   Further plan pending results of the above.     BMI:   Estimated body mass index is 29.47 kg/m  as calculated from the following:    Height as of this encounter: 1.66 m (5' 5.35\").    Weight as of this encounter: 81.2 kg (179 lb).   Weight management plan: Discussed healthy diet and exercise guidelines        See Patient Instructions    Return in about 1 week (around 5/27/2019) for with pulmonology.    CHAZ Garrett [facility]    "

## 2019-05-20 NOTE — LETTER
May 24, 2019      Chaim Emerson  [address]    [place] [address]        Chaim,   Your lab results were normal - the treatment last fall was effective. Please be sure to finish the treatment for the painful urination and follow up in clinic if symptoms not improving. Please let us know if you have any questions.   Thank you for allowing us to participate in your care.   Nyla Florian, APRN CNP    Resulted Orders   Treponema Abs w Reflex to RPR and Titer   Result Value Ref Range    Treponema Antibodies Reactive (A) NR^Nonreactive      Comment:      The Anti-Treponema Antibody screening tends to remain positive for life,   therefore it does not distinguish between active and past syphilis infections.   All positive and equivocal results will automatically reflex to a   non-specific Rapid Plasma Reagin (RPR) test.  If the Treponema Antibody is positive, and the RPR is positive, this is   presumptive evidence of current infection, inadequately treated infection,   persistent infection or reinfection.  If the Anti-Treponema Antibody is positive and the RPR is negative, then a   second Treponema specific test (TP-PA) will be performed to determine whether   the antibody test is falsely positive or is detecting early infection.  If the   latter is suspected, repeat testing in approximately two weeks is   recommended.     UA reflex to Microscopic and Culture   Result Value Ref Range    Color Urine Yellow     Appearance Urine Slightly Cloudy     Glucose Urine Negative NEG^Negative mg/dL    Bilirubin Urine Negative NEG^Negative    Ketones Urine Negative NEG^Negative mg/dL    Specific Gravity Urine 1.020 1.003 - 1.035    Blood Urine Trace (A) NEG^Negative    pH Urine 6.0 5.0 - 7.0 pH    Protein Albumin Urine Negative NEG^Negative mg/dL    Urobilinogen Urine 0.2 0.2 - 1.0 EU/dL    Nitrite Urine Negative NEG^Negative    Leukocyte Esterase Urine Small (A) NEG^Negative    Source Midstream Urine    Chlamydia trachomatis  PCR   Result Value Ref Range    Specimen Description Urine     Chlamydia Trachomatis PCR Negative NEG^Negative      Comment:      Negative for C. trachomatis rRNA by transcription mediated amplification.  A negative result by transcription mediated amplification does not preclude   the presence of C. trachomatis infection because results are dependent on   proper and adequate collection, absence of inhibitors, and sufficient rRNA to   be detected.     Neisseria gonorrhoeae PCR   Result Value Ref Range    Specimen Descrip Urine     N Gonorrhea PCR Negative NEG^Negative      Comment:      Negative for N. gonorrhoeae rRNA by transcription mediated amplification.  A negative result by transcription mediated amplification does not preclude   the presence of N. gonorrhoeae infection because results are dependent on   proper and adequate collection, absence of inhibitors, and sufficient rRNA to   be detected.     Troponin I   Result Value Ref Range    Troponin I ES <0.015 0.000 - 0.045 ug/L      Comment:      The 99th percentile for upper reference range is 0.045 ug/L.  Troponin values   in the range of 0.045 - 0.120 ug/L may be associated with risks of adverse   clinical events.     Urine Microscopic   Result Value Ref Range    WBC Urine 5-10 (A) OTO5^0 - 5 /HPF      Comment:      Clumps of WBC's seen    RBC Urine O - 2 OTO2^O - 2 /HPF    Squamous Epithelial /LPF Urine Few FEW^Few /LPF    Bacteria Urine Few (A) NEG^Negative /HPF   Urine Culture Aerobic Bacterial   Result Value Ref Range    Specimen Description Midstream Urine     Culture Micro (A)      50,000 to 100,000 colonies/mL  Enterococcus faecalis     Rapid Plasma Reagin w Rflx to TITER   Result Value Ref Range    Rapid Plasma Reagin Nonreactive NR^Nonreactive      Comment:      This test should not be used as a primary diagnostic approach for syphilis,   and a serum specimen should be submitted for a treponemal-specific antibody   test.  Biological false-positive  reactions with cardiolipin-type antigens have been   reported in disease such as infectious mononucleosis, leprosy, malaria, lupus   erythematosus, vaccinia, and viral pneumonia.  Pregnancy, autoimmune diseases,   and narcotic additions may give false-positives. Pinta, yaws, bejel, and   other treponemal diseases may also produce false-positive results with this   test.  Specimen sent to [facility]) for   confirmation.     Rapid Plasma Reagin w Rflx to TITER   Result Value Ref Range    Rapid Plasma Reagin Nonreactive NR^Nonreactive      Comment:      This test should not be used as a primary diagnostic approach for syphilis,   and a serum specimen should be submitted for a treponemal-specific antibody   test.  Biological false-positive reactions with cardiolipin-type antigens have been   reported in disease such as infectious mononucleosis, leprosy, malaria, lupus   erythematosus, vaccinia, and viral pneumonia.  Pregnancy, autoimmune diseases,   and narcotic additions may give false-positives. Pinta, yaws, bejel, and   other treponemal diseases may also produce false-positive results with this   test.  Specimen sent to [facility]) for   confirmation.     Treponema pallidum antibody confirm   Result Value Ref Range    T Pallidum by TP-PA conf Reactive (A) Non Reactive      Comment:      (Note)  Performed by [facility],  [address] [place] [id number] [phone]  [email or web address], Ezra Mororw MD, Lab. Director

## 2019-05-21 LAB
RPR SER QL: NONREACTIVE
T PALLIDUM AB SER QL: REACTIVE
TROPONIN I SERPL-MCNC: <0.015 UG/L (ref 0–0.04)

## 2019-05-22 ENCOUNTER — TELEPHONE (OUTPATIENT)
Dept: FAMILY MEDICINE | Facility: CLINIC | Age: 44
End: 2019-05-22

## 2019-05-22 LAB
BACTERIA SPEC CULT: ABNORMAL
SPECIMEN SOURCE: ABNORMAL

## 2019-05-22 NOTE — TELEPHONE ENCOUNTER
Notes recorded by Nyla Florian APRN CNP on 5/22/2019 at 7:25 AM CDT  Please call patient and let them know their lab result was abnormal.  Urine culture shows UTI. I have sent Augmentin to the pharmacy. Take 1 tablet twice daily x5 days.  ------    Gave patient results and providers message. Patient had no further questions or concerns at this time.       Tien Sheth RN, BSN, PHN

## 2019-05-22 NOTE — RESULT ENCOUNTER NOTE
Please call patient and let them know their lab result was abnormal.  Urine culture shows UTI. I have sent Augmentin to the pharmacy. Take 1 tablet twice daily x5 days.

## 2019-05-23 LAB
C TRACH DNA SPEC QL NAA+PROBE: NEGATIVE
N GONORRHOEA DNA SPEC QL NAA+PROBE: NEGATIVE
SPECIMEN SOURCE: NORMAL
SPECIMEN SOURCE: NORMAL

## 2019-05-24 LAB
RPR SER QL: NONREACTIVE
T PALLIDUM AB SER QL AGGL: REACTIVE

## 2019-05-24 NOTE — RESULT ENCOUNTER NOTE
Please send letter:    Chaim,  Your lab results were normal - the treatment last fall was effective. Please be sure to finish the treatment for the painful urination and follow up in clinic if symptoms not improving. Please let us know if you have any questions.  Thank you for allowing us to participate in your care.  CHAZ Garrett CNP

## 2019-05-30 DIAGNOSIS — A15.9 TB (TUBERCULOSIS), TREATED: ICD-10-CM

## 2019-05-30 DIAGNOSIS — R91.8 PULMONARY NODULES: ICD-10-CM

## 2019-05-30 DIAGNOSIS — R06.02 SOB (SHORTNESS OF BREATH): Primary | ICD-10-CM

## 2019-06-17 ENCOUNTER — OFFICE VISIT (OUTPATIENT)
Dept: FAMILY MEDICINE | Facility: CLINIC | Age: 44
End: 2019-06-17
Payer: COMMERCIAL

## 2019-06-17 ENCOUNTER — ANCILLARY PROCEDURE (OUTPATIENT)
Dept: GENERAL RADIOLOGY | Facility: CLINIC | Age: 44
End: 2019-06-17
Attending: NURSE PRACTITIONER
Payer: COMMERCIAL

## 2019-06-17 VITALS
OXYGEN SATURATION: 92 % | HEART RATE: 118 BPM | RESPIRATION RATE: 19 BRPM | WEIGHT: 168.1 LBS | TEMPERATURE: 100.2 F | DIASTOLIC BLOOD PRESSURE: 81 MMHG | SYSTOLIC BLOOD PRESSURE: 123 MMHG | BODY MASS INDEX: 28.01 KG/M2 | HEIGHT: 65 IN

## 2019-06-17 DIAGNOSIS — R05.9 COUGH: ICD-10-CM

## 2019-06-17 DIAGNOSIS — R50.9 FEVER, UNSPECIFIED FEVER CAUSE: ICD-10-CM

## 2019-06-17 DIAGNOSIS — J18.9 ATYPICAL PNEUMONIA: ICD-10-CM

## 2019-06-17 DIAGNOSIS — R50.9 FEVER, UNSPECIFIED FEVER CAUSE: Primary | ICD-10-CM

## 2019-06-17 PROCEDURE — 96372 THER/PROPH/DIAG INJ SC/IM: CPT | Performed by: NURSE PRACTITIONER

## 2019-06-17 PROCEDURE — 99214 OFFICE O/P EST MOD 30 MIN: CPT | Mod: 25 | Performed by: NURSE PRACTITIONER

## 2019-06-17 PROCEDURE — 71046 X-RAY EXAM CHEST 2 VIEWS: CPT

## 2019-06-17 PROCEDURE — 94640 AIRWAY INHALATION TREATMENT: CPT | Performed by: NURSE PRACTITIONER

## 2019-06-17 RX ORDER — CEFTRIAXONE SODIUM 1 G
1 VIAL (EA) INJECTION ONCE
Status: COMPLETED | OUTPATIENT
Start: 2019-06-17 | End: 2019-06-17

## 2019-06-17 RX ORDER — ACETAMINOPHEN 500 MG
1000 TABLET ORAL ONCE
Status: COMPLETED | OUTPATIENT
Start: 2019-06-17 | End: 2019-06-17

## 2019-06-17 RX ORDER — LEVALBUTEROL INHALATION SOLUTION 1.25 MG/3ML
1.25 SOLUTION RESPIRATORY (INHALATION) ONCE
Status: COMPLETED | OUTPATIENT
Start: 2019-06-17 | End: 2019-06-17

## 2019-06-17 RX ORDER — DOXYCYCLINE 100 MG/1
100 CAPSULE ORAL 2 TIMES DAILY
Qty: 20 CAPSULE | Refills: 0 | Status: SHIPPED | OUTPATIENT
Start: 2019-06-17 | End: 2019-07-08

## 2019-06-17 RX ORDER — LEVALBUTEROL TARTRATE 45 UG/1
2 AEROSOL, METERED ORAL EVERY 4 HOURS PRN
Status: CANCELLED | OUTPATIENT
Start: 2019-06-17

## 2019-06-17 RX ORDER — DOXYCYCLINE 100 MG/1
100 CAPSULE ORAL 2 TIMES DAILY
Qty: 20 CAPSULE | Refills: 0 | Status: SHIPPED | OUTPATIENT
Start: 2019-06-17 | End: 2019-06-17

## 2019-06-17 RX ADMIN — LEVALBUTEROL INHALATION SOLUTION 1.25 MG: 1.25 SOLUTION RESPIRATORY (INHALATION) at 12:13

## 2019-06-17 RX ADMIN — Medication 1 G: at 12:13

## 2019-06-17 RX ADMIN — Medication 1000 MG: at 11:30

## 2019-06-17 ASSESSMENT — PAIN SCALES - GENERAL: PAINLEVEL: SEVERE PAIN (7)

## 2019-06-17 ASSESSMENT — MIFFLIN-ST. JEOR: SCORE: 1579.99

## 2019-06-17 NOTE — NURSING NOTE
Prior to injection, verified patient identity using patient's name and date of birth.  Due to injection administration, patient instructed to remain in clinic for 15 minutes  afterwards, and to report any adverse reaction to me immediately.    Tylenol 500mg [id number]    Drug Amount Wasted:  None.  Vial/Syringe: NA  Expiration Date:  5/20  NDC 8529-1418-08  Lot [id number]   GABO Hawthorne CMA

## 2019-06-17 NOTE — PATIENT INSTRUCTIONS
xopenex neb and rocephin (antibiotic) in clinic  Start doxycycline 1 tab every 12 hours x10 days  Push fluids, rest  Tylenol or ibuprofen as needed for fever  Follow up in 2 days if not improving    Patient Education     Pneumonia (Adult)  Pneumonia is an infection deep within the lungs. It is in the small air sacs (alveoli). Pneumonia may be caused by a virus or bacteria. Pneumonia caused by bacteria is usually treated with an antibiotic. Severe cases may need to be treated in the hospital. Milder cases can be treated at home. Symptoms usually start to get better during the first 2 days of treatment.    Home care  Follow these guidelines when caring for yourself at home:    Rest at home for the first 2 to 3 days, or until you feel stronger. Don t let yourself get overly tired when you go back to your activities.    Stay away from cigarette smoke - yours or other people s.    You may use acetaminophen or ibuprofen to control fever or pain, unless another medicine was prescribed. If you have chronic liver or kidney disease, talk with your healthcare provider before using these medicines. Also talk with your provider if you ve had a stomach ulcer or gastrointestinal bleeding. Don t give aspirin to anyone younger than 18 years of age who is ill with a fever. It may cause severe liver damage.    Your appetite may be poor, so a light diet is fine.    Drink 6 to 8 glasses of fluids every day to make sure you are getting enough fluids. Beverages can include water, sport drinks, sodas without caffeine, juices, tea, or soup. Fluids will help loosen secretions in the lung. This will make it easier for you to cough up the phlegm (sputum). If you also have heart or kidney disease, check with your healthcare provider before you drink extra fluids.    Take antibiotic medicine prescribed until it is all gone, even if you are feeling better after a few days.  Follow-up care  Follow up with your healthcare provider in the next 2 to  3 days, or as advised. This is to be sure the medicine is helping you get better.  If you are 65 or older, you should get a pneumococcal vaccine and a yearly flu (influenza) shot. You should also get these vaccines if you have chronic lung disease like asthma, emphysema, or COPD. Recently, a second type of pneumonia vaccine has become available for everyone over 65 years old. This is in addition to the previous vaccine. Ask your provider about this.  When to seek medical advice  Call your healthcare provider right away if any of these occur:    You don t get better within the first 48 hours of treatment    Shortness of breath gets worse    Rapid breathing (more than 25 breaths per minute)    Coughing up blood    Chest pain gets worse with breathing    Fever of 100.4 F (38 C) or higher that doesn t get better with fever medicine    Weakness, dizziness, or fainting that gets worse    Thirst or dry mouth that gets worse    Sinus pain, headache, or a stiff neck    Chest pain not caused by coughing  Date Last Reviewed: 1/1/2017 2000-2018 [employer]. [address] [place] [address], [place] [address]. All rights reserved. This information is not intended as a substitute for professional medical care. Always follow your healthcare professional's instructions.

## 2019-06-17 NOTE — PROGRESS NOTES
Subjective     Chaim Norman is a 43 year old male who presents to clinic today for the following health issues:    HPI   Acute Illness   Acute illness concerns: Fever, cough  Onset: X2 weeks    Fever: YES- 103.0    Chills/Sweats: YES    Headache (location?): YES- Frontal    Sinus Pressure:YES    Conjunctivitis:  no    Ear Pain: no    Rhinorrhea: no     Congestion: YES    Sore Throat: no      Cough: YES-productive of yellow sputum    Wheeze: YES    Decreased Appetite: YES    Nausea: no     Vomiting: no     Diarrhea:  no     Dysuria/Freq.: no     Fatigue/Achiness: YES    Sick/Strep Exposure: no      Therapies Tried and outcome: Nothing    More short of breath than baseline. No chest pain. Productive cough. Fever every night.     Patient Active Problem List   Diagnosis     History of TB (tuberculosis)     Weight loss     Pulmonary nodules     Late latent syphilis     Iatrogenic pneumothorax     Elevated blood pressure reading without diagnosis of hypertension     History reviewed. No pertinent surgical history.    Social History     Tobacco Use     Smoking status: Never Smoker     Smokeless tobacco: Never Used   Substance Use Topics     Alcohol use: No     History reviewed. No pertinent family history.      Current Outpatient Medications   Medication Sig Dispense Refill     albuterol (PROVENTIL HFA) 108 (90 Base) MCG/ACT inhaler Inhale 2 puffs into the lungs every 6 hours as needed for shortness of breath / dyspnea or wheezing 8.5 g 3     doxycycline hyclate (VIBRAMYCIN) 100 MG capsule Take 1 capsule (100 mg) by mouth 2 times daily 20 capsule 0     tamsulosin (FLOMAX) 0.4 MG capsule Take 0.4 mg by mouth       No Known Allergies  BP Readings from Last 3 Encounters:   06/17/19 123/81   05/20/19 (!) 134/93   11/20/18 (!) 134/94    Wt Readings from Last 3 Encounters:   06/17/19 76.2 kg (168 lb 1.6 oz)   05/20/19 81.2 kg (179 lb)   11/20/18 77.1 kg (169 lb 14.4 oz)                      Reviewed and updated as needed this visit  "by Provider  Tobacco  Allergies  Meds  Problems  Med Hx  Surg Hx  Fam Hx         Review of Systems   ROS COMP: Constitutional, HEENT, cardiovascular, pulmonary, gi and gu systems are negative, except as otherwise noted.      Objective    /81 (BP Location: Left arm, Patient Position: Sitting, Cuff Size: Adult Regular)   Pulse 118   Temp 100.2  F (37.9  C) (Oral)   Resp 19   Ht 1.66 m (5' 5.35\")   Wt 76.2 kg (168 lb 1.6 oz)   SpO2 92%   BMI 27.67 kg/m    Body mass index is 27.67 kg/m .  Physical Exam   GENERAL: healthy, alert and no distress  EYES: Eyes grossly normal to inspection, PERRL and conjunctivae and sclerae normal  HENT: ear canals and TM's normal, nose and mouth without ulcers or lesions  NECK: no adenopathy, no asymmetry, masses, or scars and thyroid normal to palpation  RESP: lungs clear to auscultation - no rales, rhonchi or wheezes  CV: regular rate and rhythm, normal S1 S2, no S3 or S4, no murmur, click or rub, no peripheral edema and peripheral pulses strong  MS: no gross musculoskeletal defects noted, no edema  PSYCH: mentation appears normal, affect normal/bright    Diagnostic Test Results:  Xray -     CHEST TWO VIEWS  6/17/2019 11:25 AM      HISTORY: Fever, cough, spo2 90. Fever, unspecified fever cause. Cough.                                                                      IMPRESSION: Severe right lung fibrosis, volume loss, and cystic  changes with deviation of the mediastinum towards the right, unchanged  from 5/20/2019. Left lung fibrotic changes are also noted and probably  unchanged. While the chest appears stable, extensive chronic changes  make superimposed pneumonitis difficult to entirely exclude.     YONI NINO MD        Assessment & Plan       ICD-10-CM    1. Fever, unspecified fever cause R50.9 acetaminophen (TYLENOL) tablet 1,000 mg     XR Chest 2 Views     cefTRIAXone (ROCEPHIN) injection 1 g     doxycycline hyclate (VIBRAMYCIN) 100 MG capsule        2. " "Cough R05 XR Chest 2 Views     levalbuterol (XOPENEX) neb solution 1.25 mg     cefTRIAXone (ROCEPHIN) injection 1 g     doxycycline hyclate (VIBRAMYCIN) 100 MG capsule        3. Atypical pneumonia J18.9 cefTRIAXone (ROCEPHIN) injection 1 g     doxycycline hyclate (VIBRAMYCIN) 100 MG capsule          xopenex neb and rocephin (antibiotic) in clinic  Start doxycycline 1 tab every 12 hours x10 days  Push fluids, rest  Tylenol or ibuprofen as needed for fever  Follow up in 2 days if not improving     BMI:   Estimated body mass index is 27.67 kg/m  as calculated from the following:    Height as of this encounter: 1.66 m (5' 5.35\").    Weight as of this encounter: 76.2 kg (168 lb 1.6 oz).           See Patient Instructions    Return in about 2 days (around 6/19/2019), or if symptoms worsen or fail to improve.    The benefits, risks and potential side effects were discussed in detail. Black box warnings discussed as relevant. All patient questions were answered. The patient was instructed to follow up immediately if any adverse reactions develop.    Return precautions discussed, including when to seek urgent/emergent care.    Patient verbalizes understanding and agrees with plan of care. Patient stable for discharge.      CHAZ Garrett [facility]      "

## 2019-06-17 NOTE — NURSING NOTE
The following medication was given:     MEDICATION: Rocephin 1g and Lidocaine 2.1cc  ROUTE: IM  SITE: Ventrogluteal - Right  DOSE: 1g  LOT #: [id number]  :  [employer]  EXPIRATION DATE:  07/2021  NDC#: 7375-8684-96       The following nebulizer treatment was given:     MEDICATION: Xopenox 1.25 mg (Levalbuterol HCL)  : [employer] [facility]  LOT #: [id number]  EXPIRATION DATE:  09/2019  NDC # 62878-254-95     Nebulizer Start Time:  11:57 AM  Nebulizer Stop Time:  12:03 PM  See Vital Signs Flowsheet    Val Avila MA on 6/17/2019 at 12:17 PM

## 2019-06-24 ENCOUNTER — OFFICE VISIT (OUTPATIENT)
Dept: FAMILY MEDICINE | Facility: CLINIC | Age: 44
End: 2019-06-24
Payer: COMMERCIAL

## 2019-06-24 VITALS
HEART RATE: 107 BPM | OXYGEN SATURATION: 94 % | WEIGHT: 165 LBS | RESPIRATION RATE: 18 BRPM | DIASTOLIC BLOOD PRESSURE: 70 MMHG | TEMPERATURE: 98.4 F | HEIGHT: 65 IN | SYSTOLIC BLOOD PRESSURE: 105 MMHG | BODY MASS INDEX: 27.49 KG/M2

## 2019-06-24 DIAGNOSIS — R91.8 PULMONARY NODULES: ICD-10-CM

## 2019-06-24 DIAGNOSIS — R05.9 COUGH: ICD-10-CM

## 2019-06-24 DIAGNOSIS — R06.02 SHORTNESS OF BREATH: Primary | ICD-10-CM

## 2019-06-24 PROCEDURE — 99213 OFFICE O/P EST LOW 20 MIN: CPT | Performed by: NURSE PRACTITIONER

## 2019-06-24 ASSESSMENT — MIFFLIN-ST. JEOR: SCORE: 1565.93

## 2019-06-24 ASSESSMENT — PAIN SCALES - GENERAL: PAINLEVEL: SEVERE PAIN (7)

## 2019-06-24 NOTE — PROGRESS NOTES
Subjective     Chaim Norman is a 43 year old male who presents to clinic today for the following health issues:    HPI   Medication Followup of Doxycycline 100mg capsules for Pneumonia    Taking Medication as prescribed: yes still continuing    Side Effects:  None    Medication Helping Symptoms:  Helped with some symptoms but not the coughing and fatigue     Pleasant 43 year old male presents for follow up of his visit last week. He is feeling somewhat better but still c/o shortness of breath, worse in the evenings. He reports this has been the same over the last few months. Feels better but not 100%. No leg swelling. No chest pain. Seeing pulmonology next Wed. Was using albuterol inhaler but ran out - doesn't have money to refill. No bloody sputum. Coughs occasionally to often. No night sweats. Weight stable. No fevers since last week when he was seen. No travel.    Breathing was good for 6 months and then worsened about 1 month ago.   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 Had bronch and then suffered post-procedure pneumothorax. He completed his antibiotics last fall - RIPE x14 days. It's unclear to me if he ever followed up for reactivation TB and pulmonary nodules with ID at [facility] - he had it scheduled and reports that he went but there is no record in Care Everywhere. He reports he has appointment at [facility] with ID on 7/3 which I do see in Epic.    Of note, he was previously treated for syphilis last fall and nonreactive RPR 5/20/19.     Reviewed and updated as needed this visit by Provider  Tobacco  Allergies  Meds  Problems  Med Hx  Surg Hx  Fam Hx         Review of Systems   ROS COMP: Constitutional, HEENT, cardiovascular, pulmonary, GI, , musculoskeletal, neuro, skin, endocrine and psych systems are negative, except as otherwise noted.      Objective    /70 (BP Location: Right arm, Patient Position: Chair, Cuff Size:  "Adult Regular)   Pulse (P) 97   Temp 98.4  F (36.9  C) (Oral)   Resp 18   Ht 1.66 m (5' 5.35\")   Wt 74.8 kg (165 lb)   SpO2 (P) 95%   BMI 27.16 kg/m    Body mass index is 27.16 kg/m .  Physical Exam   GENERAL: healthy, alert and no distress  EYES: Eyes grossly normal to inspection, PERRL and conjunctivae and sclerae normal  HENT: ear canals and TM's normal, nose and mouth without ulcers or lesions  NECK: no adenopathy, no asymmetry, masses, or scars and thyroid normal to palpation  RESP: expiratory wheezing throughout. No rhonchi or crackles  CV: regular rate and rhythm, normal S1 S2, no S3 or S4, no murmur, click or rub, no peripheral edema and peripheral pulses strong  ABDOMEN: soft, nontender, no hepatosplenomegaly, no masses and bowel sounds normal  MS: no gross musculoskeletal defects noted, no edema  NEURO: Normal strength and tone, mentation intact and speech normal  PSYCH: mentation appears normal, affect normal/bright    Diagnostic Test Results:  Labs reviewed in Epic        Assessment & Plan       ICD-10-CM    1. Shortness of breath R06.02 CANCELED: CBC with platelets differential     CANCELED: D dimer, quantitative   2. Cough R05 CANCELED: CBC with platelets differential     CANCELED: D dimer, quantitative   3. Pulmonary nodules R91.8       Vitals are improved today - no longer febrile.  Finish doxycycline  Declined labs as he reports he's been stable over the last month  Seeing pulmonology next week on Wednesday  Will wait to see what pulmonology finds next Wednesday. Discussed if worsening shortness of breath or if chest pain develops, go to the emergency department.    BMI:   Estimated body mass index is 27.16 kg/m  as calculated from the following:    Height as of this encounter: 1.66 m (5' 5.35\").    Weight as of this encounter: 74.8 kg (165 lb).           See Patient Instructions    Return in about 1 week (around 7/1/2019), or if symptoms worsen or fail to improve.     The benefits, risks and " potential side effects were discussed in detail. Black box warnings discussed as relevant. All patient questions were answered. The patient was instructed to follow up immediately if any adverse reactions develop.    Return precautions discussed, including when to seek urgent/emergent care.    Patient verbalizes understanding and agrees with plan of care. Patient stable for discharge.      CHAZ Garrett [facility]

## 2019-06-24 NOTE — PATIENT INSTRUCTIONS
Finish course of doxycycline  Blood work is pending - we will notify yo with results when available  Follow up with pulmonology at your appointment that's already scheduled    Patient Education     Shortness of Breath (Dyspnea)  Shortness of breath is the feeling that you can't catch your breath or get enough air. It is also known as dyspnea.  Dyspnea can be caused by many different conditions. They include:    Acute asthma attack    Worsening of chronic lung diseases such as chronic bronchitis and emphysema    Heart failure. This is when weak heart muscle allows extra fluid to collect in the lungs.    Panic attacks or anxiety. Fear can cause rapid breathing (hyperventilation).    Pneumonia, or an infection in the lung tissue    Exposure to toxic substances, fumes, smoke, or certain medicines    Blood clot in the lung (pulmonary embolism). This is often from a piece of blood clot in a deep vein of the leg (deep vein thrombosis) that breaks off and travels to the lungs.    Heart attack or heart-related chest pain (angina)    Anemia    Collapsed lung (pneumothorax)    Dehydration    Pregnancy  Based on your visit today, the exact cause of your shortness of breath is not certain. Your tests don t show any of the serious causes of dyspnea. You may need other tests to find out if you have a serious problem. It s important to watch for any new symptoms or symptoms that get worse. Follow up with your healthcare provider as directed.  Home care  Follow these tips to take care of yourself at home:    When your symptoms are better, go back to your usual activities.    If you smoke, you should stop. Join a quit-smoking program or ask your healthcare provider for help.    Eat a healthy diet and get plenty of sleep.    Get regular exercise. Talk with your healthcare provider before starting to exercise, especially if you have other medical problems.    Cut down on the amount of caffeine and stimulants you consume.  Follow-up  care  Follow up with your healthcare provider, or as advised.  If tests were done, you will be told if your treatment needs to be changed. You can call as directed for the results.  If an X-ray was taken, a specialist will review it. You will be notified of any new findings that may affect your care.  Call 911  Shortness of breath may be a sign of a serious medical problem. For example, it may be a problem with your heart or lungs. Call 911 if you have worsening shortness of breath or trouble breathing, especially with any of the symptoms below:    Confusion or difficulty waking    Fainting or loss of consciousness.    Fast or irregular heartbeat    Coughing up blood    Pain in your chest, arm, shoulder, neck, or upper back    Sweating  When to seek medical advice  Call your healthcare provider right away if any of these occur:    Slight shortness of breath or wheezing    Redness, pain or swelling in your leg, arm, or other body area    Swelling in both legs or ankles    Fast weight gain    Dizziness or weakness    Fever of 100.4 F (38 C) or higher, or as directed by your healthcare provider  Date Last Reviewed: 6/1/2018 2000-2018 [employer]. [address] [place] [address], [place] [address]. All rights reserved. This information is not intended as a substitute for professional medical care. Always follow your healthcare professional's instructions.

## 2019-06-25 NOTE — TELEPHONE ENCOUNTER
RECORDS RECEIVED FROM: External   DATE RECEIVED: 7.3.19   NOTES STATUS DETAILS   OFFICE NOTE from referring provider Internal 6.24.19 5.20.19 Dr. Florian  11.20.18   OFFICE NOTE from other specialist Internal 10.18.18 Dr. Joshua   DISCHARGE SUMMARY from hospital Care Everywhere 10.25.18  10.19.18   DISCHARGE REPORT from the ER Care Everywhere 11.15.18  10.18.18   MEDICATION LIST In process    IMAGING  (NEED IMAGES AND REPORTS)     CT SCAN N/A 10.29.18  10.21.18  10.18.18   CHEST XRAY (CXR) Internal 6.17.19  5.20.19  11.14.18  10.26.18  10.25.18  10.18.18   TESTS     PULMONARY FUNCTION TESTING (PFT) In process Scheduled for 7.3.19      Action    Action Taken Requested all chest imaging within the last 2 years. Reports in CE     Action    Action Taken Pulled all imaging.

## 2019-07-03 ENCOUNTER — PRE VISIT (OUTPATIENT)
Dept: PULMONOLOGY | Facility: CLINIC | Age: 44
End: 2019-07-03

## 2019-07-08 ENCOUNTER — OFFICE VISIT (OUTPATIENT)
Dept: FAMILY MEDICINE | Facility: CLINIC | Age: 44
End: 2019-07-08
Payer: COMMERCIAL

## 2019-07-08 ENCOUNTER — PATIENT OUTREACH (OUTPATIENT)
Dept: CARE COORDINATION | Facility: CLINIC | Age: 44
End: 2019-07-08

## 2019-07-08 VITALS
WEIGHT: 157 LBS | DIASTOLIC BLOOD PRESSURE: 58 MMHG | BODY MASS INDEX: 26.16 KG/M2 | RESPIRATION RATE: 16 BRPM | TEMPERATURE: 98.6 F | HEIGHT: 65 IN | HEART RATE: 97 BPM | OXYGEN SATURATION: 95 % | SYSTOLIC BLOOD PRESSURE: 108 MMHG

## 2019-07-08 DIAGNOSIS — R06.9 BREATHING PROBLEM: Primary | ICD-10-CM

## 2019-07-08 DIAGNOSIS — Z91.120 PATIENT'S INTENTIONAL UNDERDOSING OF MEDICATION REGIMEN DUE TO FINANCIAL HARDSHIP: ICD-10-CM

## 2019-07-08 PROCEDURE — 99213 OFFICE O/P EST LOW 20 MIN: CPT | Performed by: NURSE PRACTITIONER

## 2019-07-08 ASSESSMENT — MIFFLIN-ST. JEOR: SCORE: 1529.64

## 2019-07-08 ASSESSMENT — PAIN SCALES - GENERAL: PAINLEVEL: NO PAIN (0)

## 2019-07-08 NOTE — PATIENT INSTRUCTIONS
Schedule with pulmonology  I will enter care coordination referral to see if they're able to help with transportation and program for albuterol inhaler  They will reach out to you in the next few days

## 2019-07-08 NOTE — PROGRESS NOTES
Clinic Care Coordination Contact  [facility]/Voicemail    Referral Source: PCP Care Coordination Referral - Financial Support: Medication Affordability - unable to  albuterol inhaler due to financial concerns and Resources for Transportation - no showed pulmonology due to lack of reliable transportation  Clinical Data: Care Coordinator Outreach  Outreach attempted x 1.  Left message on voicemail with call back information and requested return call.  Plan: Care Coordinator will await return call. Care Coordinator will try to reach patient again in 1-2 business days.    Anya Good NOELLE  [facility]   Care Coordination  Brandy Guajardo Rogers  [phone]

## 2019-07-08 NOTE — LETTER
July 8, 2019      Chaim Norman  [address]   [place] [facility] [address]        To Whom It May Concern:    Chaim Norman is a patient of our clinic. He has an ongoing medical condition for which we have been treating him. He had an acute exacerbation over the last month and has been unable to work due to his illness. He may now return to work without restrictions.    Sincerely,        CHAZ Garrett CNP

## 2019-07-08 NOTE — LETTER
July 8, 2019      Chaim Norman  [address]   [place] [facility] [address]        To Whom It May Concern:    Chaim Norman is a patient of our clinic. He has an ongoing medication for which we have been treating him. He had an acute exacerbation over the last month and has been unable to work due to his illness. He may now return to work without restrictions.      Sincerely,        CHAZ Garrett CNP

## 2019-07-08 NOTE — PROGRESS NOTES
Subjective     Chaim Norman is a 43 year old male who presents to clinic today for the following health issues:    HPI   Recheck Lung Problem: No symptoms currently    Pleasant 43 year old male presents for follow up of breathing issues. He was supposed to see pulmonology last week but car broke down and his ride was 3 hours late to pick him up so he missed his appointment. He wants to reschedule but is worried about getting there. Breathing is much better, barbara over the last 2 days. No cough. Baseline shortness of breath. Hasn't been taking albuterol inhaler because can't afford. It was really helping when he had it.  Needs note for work - hasn't gone in the last month due to breathing issues.       Patient Active Problem List   Diagnosis     History of TB (tuberculosis)     Weight loss     Pulmonary nodules     Late latent syphilis     Iatrogenic pneumothorax     Elevated blood pressure reading without diagnosis of hypertension     Patient's intentional underdosing of medication regimen due to financial hardship     History reviewed. No pertinent surgical history.    Social History     Tobacco Use     Smoking status: Never Smoker     Smokeless tobacco: Never Used   Substance Use Topics     Alcohol use: No     History reviewed. No pertinent family history.      Current Outpatient Medications   Medication Sig Dispense Refill     albuterol (PROVENTIL HFA) 108 (90 Base) MCG/ACT inhaler Inhale 2 puffs into the lungs every 6 hours as needed for shortness of breath / dyspnea or wheezing (Patient not taking: Reported on 7/8/2019) 8.5 g 3     No Known Allergies      Reviewed and updated as needed this visit by Provider  Tobacco  Allergies  Meds  Problems  Med Hx  Surg Hx  Fam Hx         Review of Systems   ROS COMP: Constitutional, HEENT, cardiovascular, pulmonary, gi and gu systems are negative, except as otherwise noted.      Objective    /58 (BP Location: Right arm, Patient Position: Chair, Cuff Size: Adult  "Large)   Pulse 97   Temp 98.6  F (37  C) (Oral)   Resp 16   Ht 1.66 m (5' 5.35\")   Wt 71.2 kg (157 lb)   SpO2 95%   BMI 25.84 kg/m    Body mass index is 25.84 kg/m .  Physical Exam   GENERAL: healthy, alert and no distress  RESP: lungs clear to auscultation - no rales, rhonchi or wheezes  CV: regular rate and rhythm, normal S1 S2, no S3 or S4, no murmur, click or rub, no peripheral edema and peripheral pulses strong  PSYCH: mentation appears normal, affect normal/bright    Diagnostic Test Results:  Labs reviewed in Epic        Assessment & Plan       ICD-10-CM    1. Breathing problem R06.9 CARE COORDINATION REFERRAL   2. Patient's intentional underdosing of medication regimen due to financial hardship Z91.120 CARE COORDINATION REFERRAL     Doing well today. Note written to return to work. I recommend follow up with pulmonology due to ongoing breathing issues. He will reschedule. I will also enter care coordination referral so they can give him resources for transportation and medication.     BMI:   Estimated body mass index is 25.84 kg/m  as calculated from the following:    Height as of this encounter: 1.66 m (5' 5.35\").    Weight as of this encounter: 71.2 kg (157 lb).           See Patient Instructions    Return in about 3 months (around 10/8/2019).     Return precautions discussed, including when to seek urgent/emergent care.    Patient verbalizes understanding and agrees with plan of care. Patient stable for discharge.      CHAZ Garrett [facility]      "

## 2019-07-08 NOTE — LETTER
[place] CARE COORDINATION - [facility]  [address] [place] [address]    July 10, 2019    Chaim Norman  [address]   [facility] [address]      Dear Chaim,    I am a clinic care coordinator who works with CHAZ Garrett CNP at [place]. I have been trying to reach you recently to introduce Clinic Care Coordination and to see if there was anything I could assist you with.  I wanted to introduce myself and provide you with my contact information so that you can call me with questions or concerns about your health care. Below is a description of clinic care coordination and how I can further assist you.     The clinic care coordinator is a registered nurse and/or  who understand the health care system. The goal of clinic care coordination is to help you manage your health and improve access to the [facility] system in the most efficient manner. The registered nurse can assist you in meeting your health care goals by providing education, coordinating services, and strengthening the communication among your providers. The  can assist you with financial, behavioral, psychosocial, chemical dependency, counseling, and/or psychiatric resources.    Please feel free to contact me at [phone], with any questions or concerns. We at [facility] are focused on providing you with the highest-quality healthcare experience possible and that all starts with you.     Sincerely,     BING Jordan

## 2019-07-10 NOTE — PROGRESS NOTES
Clinic Care Coordination Contact  [facility]/Voicemail    Referral Source: PCP  Clinical Data: Care Coordinator Outreach  Outreach attempted x 2.  Left message on voicemail with call back information and requested return call.  Plan: Care Coordinator will mail out care coordination introduction letter with care coordinator contact information and explanation of care coordination services. Care Coordinator will do no further outreaches at this time.    Anya Good, [facility]   Care Coordination  Brandy Guajardo Rogers  [phone]

## 2019-07-11 ENCOUNTER — PATIENT OUTREACH (OUTPATIENT)
Dept: CARE COORDINATION | Facility: CLINIC | Age: 44
End: 2019-07-11

## 2019-07-11 ASSESSMENT — ACTIVITIES OF DAILY LIVING (ADL): DEPENDENT_IADLS:: INDEPENDENT

## 2019-07-11 NOTE — PROGRESS NOTES
Clinic Care Coordination Contact    Clinic Care Coordination Contact  OUTREACH    Referral Information:  Referral Source: PCP    Primary Diagnosis: Psychosocial    Chief Complaint   Patient presents with     Clinic Care Coordination - Follow-up     MIGUEL        [place] Utilization: Clinic Utilization  Difficulty keeping appointments:: Yes  Compliance Concerns: No  No-Show Concerns: Yes  No PCP office visit in Past Year: No  Utilization    Last refreshed: 7/10/2019  7:02 PM:  Hospital Admissions 0           Last refreshed: 7/10/2019  7:02 PM:  ED Visits 0           Last refreshed: 7/10/2019  7:02 PM:  No Show Count (past year) 3              Current as of: 7/10/2019  7:02 PM              Clinical Concerns:  Current Medical Concerns:    Patient Active Problem List   Diagnosis     History of TB (tuberculosis)     Weight loss     Pulmonary nodules     Late latent syphilis     Iatrogenic pneumothorax     Elevated blood pressure reading without diagnosis of hypertension     Patient's intentional underdosing of medication regimen due to financial hardship       Current Behavioral Concerns: None.      Education Provided to patient:   Introduced self and provided patient with overview of Care Coordination services. Patient reports he is still working on getting his car fixed. MIGUEL provides overview of the [facility]'s car repair program; it appears patient is eligible. MIGUEL mails application to patient. We discuss other transportation options so patient can get to pulmonary appt including [employer]. Contact number provided. After call, MIGUEL transferred patient to scheduling line to reschedule pulmonary appt. We discuss inability to afford inhaler; MIGUEL provides [facility] Rx Assistance resource. MIGUEL reviews chart, no HCD on file MIGUEL provides education on ACP and mails patient HCD and guide to ACP.     Pain  Pain (GOAL):: No  Health Maintenance Reviewed: Due/Overdue - To be reviewed with patient at next PCP office visit.   Clinical Pathway:  None    Medication Management:  Independently      Functional Status:  Dependent ADLs:: Independent  Dependent IADLs:: Independent  Bed or wheelchair confined:: No  Mobility Status: Independent  Fallen 2 or more times in the past year?: No  Any fall with injury in the past year?: No    Living Situation:  Current living arrangement:: I live in a private home with family(with son)  Type of residence:: Apartment    Diet/Exercise/Sleep:  Diet:: Regular  Inadequate nutrition (GOAL):: No  Food Insecurity: No  Tube Feeding: No  Exercise:: Currently not exercising  Inadequate activity/exercise (GOAL):: No  Significant changes in sleep pattern (GOAL): No    Transportation:  Transportation concerns (GOAL):: Yes  Transportation means:: Regular car(Car currently broke down)     Psychosocial:  Episcopalian or spiritual beliefs that impact treatment:: No  Mental health DX:: No  Mental health management concern (GOAL):: No  Informal Support system:: None     Financial/Insurance:   Financial/Insurance concerns (GOAL):: Yes     Resources and Interventions:  Current Resources:   Community Resources: None  Supplies used at home:: None  Equipment Currently Used at Home: none    Advance Care Plan/Directive  Advanced Care Plans/Directives on file:: No  Advanced Care Plan/Directive Status: In Process    Referrals Placed: Community Resources, Prescription Assistance Programs, Transportation, Financial Services ([facility] car repair program, [employer])     Goals:   Goals        General    #1 Transportation (pt-stated)     Notes - Note edited  7/11/2019  2:56 PM by Anya Good LSW    Goal Statement: I need reliable transportation.   Measure of Success: Patient will apply for [facility] Car Program and consider utilizing [employer] for medical appts.  Supportive Steps to Achieve: Provided patient with CAP application and [employer] resource.  Barriers: may be over income.  Strengths: Willingness to work ongoing with Care  Coordination.  Date to Achieve By: 9/1/19  Patient expressed understanding of goal: Yes      #2 Medical (pt-stated)     Notes - Note created  7/11/2019  2:58 PM by Anya Good LSW    Goal Statement: I need to complete HCD.  Measure of Success: Patient will complete and return completed HCD to [facility].   Supportive Steps to Achieve: Provided patient with education on ACP and copy of HCD.  Barriers: none identified.  Strengths: Willingness to work ongoing with Care Coordination.  Date to Achieve By: 10/1/19  Patient expressed understanding of goal: Yes          Patient/Caregiver understanding: Patient had a good understanding of our conversation related to next steps.    Outreach Frequency: monthly    Plan:   Patient will reschedule pulmonary appt and set up transportation with [employer].   Patient will contact  Prescription Assistance Program to apply for funding for inhaler.  Patient will apply for [employer]s car repair program.  Patient agreed to continue to work with Care Coordinator and will outreach to SW as needed.   SW mailed out blank copy of HCD with ACP guide, application for [employer]-'s car repair program; and patients care plan.  SW will begin monthly outreaches.    BING Jordan  [facility]   Care Coordination  Brandy Guajardo Rogers  [phone]

## 2019-07-11 NOTE — LETTER
[facility]  Complex Care Plan  About Me:    Patient Name:  Chiam Norman    YOB: 1975  Age:         43 year old   [place] MRN:    [id number] Telephone Information:  Home Phone [phone]   Mobile [phone]       Address:  [address]  [place] [facility] [address] Email address:  No e-mail address on record      Emergency Contact(s)    Name Relationship Lgl Grd Work Phone Home Phone Mobile Phone   SARAH EPPS Son   [phone]            Primary language:  English     needed? No   [place] [employer]:  [phone] op. 1  Other communication barriers: None  Preferred Method of Communication:  Mail  Current living arrangement: I live in a private home with family(with son)  Mobility Status/ Medical Equipment: Independent    Health Maintenance  Health Maintenance Reviewed: Due/Overdue   Health Maintenance Due   Topic Date Due     PREVENTIVE CARE VISIT  1973     LIPID  08/28/2008         My Access Plan  Medical Emergency 911   Primary Clinic Line [facility] - [phone]   24 Hour Appointment Line [phone] or  [phone]) (toll-free)   24 Hour Nurse Line [phone] (toll-free)   Preferred Urgent Care [facility], [phone]   Preferred Hospital [facility]  [phone]   Preferred Pharmacy [facility] [address] [facility] [address] [facility]     Behavioral Health Crisis Line The National Suicide Prevention Lifeline at 1-571.839.8415 or 911             My Care Team Members  Patient Care Team       Relationship Specialty Notifications Start End    Nyla Florian APRN CNP PCP - General Nurse Practitioner  9/25/18     Phone: [phone] Fax: [phone]         [address] [facility] [address]    Nyla Florian APRN CNP Assigned PCP   10/25/18     Phone: [phone] Fax: [phone]         [address] [facility] [address]    Karime Kendall MD Resident Student in organized health care education/training program  5/30/19     Phone: [phone] Fax: [phone]         [address] [place] [address] [address] [facility] [address]    Anya Good LSW Lead Care Coordinator Primary Care - CC Admissions 7/11/19     Phone: [phone]                 My Care Plans  Self Management and Treatment Plan  Goals and (Comments)  Goals        General    #1 Transportation (pt-stated)     Notes - Note edited  7/11/2019  2:56 PM by Anya Good LSW    Goal Statement: I need reliable transportation.   Measure of Success: Patient will apply for [facility] CAP Car Program and consider utilizing [employer] for medical appts.  Supportive Steps to Achieve: Provided patient with CAP application and [employer] resource.  Barriers: may be over income.  Strengths: Willingness to work ongoing with Care Coordination.  Date to Achieve By: 9/1/19  Patient expressed understanding of goal: Yes      #2 Medical (pt-stated)     Notes - Note created  7/11/2019  2:58 PM by Anya Good LSW    Goal Statement: I need to complete HCD.  Measure of Success: Patient will complete and return completed HCD to [facility].   Supportive Steps to Achieve: Provided patient with education on ACP and copy of HCD.  Barriers: none identified.  Strengths: Willingness to work ongoing with Care Coordination.  Date to Achieve By: 10/1/19  Patient expressed understanding of goal: Yes               My Medical and Care Information  Problem List   Patient Active Problem List   Diagnosis     History of TB (tuberculosis)     Weight loss     Pulmonary nodules     Late latent syphilis     Iatrogenic pneumothorax     Elevated blood pressure reading without diagnosis of hypertension     Patient's intentional underdosing of medication regimen due to financial hardship      Current Medications and Allergies:  See printed Medication Report.    Care Coordination Start  Date: 7/11/2019   Frequency of Care Coordination: monthly   Form Last Updated: 07/11/2019

## 2019-08-12 ENCOUNTER — PATIENT OUTREACH (OUTPATIENT)
Dept: CARE COORDINATION | Facility: CLINIC | Age: 44
End: 2019-08-12

## 2019-08-12 ASSESSMENT — ACTIVITIES OF DAILY LIVING (ADL): DEPENDENT_IADLS:: INDEPENDENT

## 2019-08-12 NOTE — PROGRESS NOTES
Clinic Care Coordination Contact  [facility]/Voicemail    Referral Source: PCP  Clinical Data: Care Coordinator Outreach  Outreach attempted x 1.  Left message on voicemail with call back information and requested return call.  Plan: Care Coordinator mailed out care coordination introduction letter on 7/10/19. Care Coordinator will try to reach patient again in 7-10 business days.    BING Jordan  [facility]   Care Coordination  [facility]  [phone]

## 2019-08-26 ENCOUNTER — PATIENT OUTREACH (OUTPATIENT)
Dept: CARE COORDINATION | Facility: CLINIC | Age: 44
End: 2019-08-26

## 2019-08-26 ASSESSMENT — ACTIVITIES OF DAILY LIVING (ADL): DEPENDENT_IADLS:: INDEPENDENT

## 2019-08-26 NOTE — PROGRESS NOTES
Clinic Care Coordination Contact  [facility]/Voicemail    Referral Source: PCP  Clinical Data: Care Coordinator Outreach  Outreach attempted x 2.  Left message on voicemail with call back information and requested return call.  Plan: Care Coordinator will mail out unable to contact letter with care coordinator contact information. Care Coordinator will try to reach patient again in 3-4 weeks.    BING Jordan  [facility]   Care Coordination  [facility]  [phone]

## 2019-08-26 NOTE — LETTER
[place] CARE COORDINATION - [facility]  [address] [place] [address]    August 26, 2019    Chaim Norman  [address]   [facility] [address]      Dear Chaim,    I have been attempting to reach you since our last contact. I would like to continue to work with you and provide any additional support you may need on achieving your health care related goals. I would appreciate if you would give me a call at [phone] to let me know if you would like to continue working together. I know that there are many things that can affect our ability to communicate and I hope we can continue to work together.    All of us at the [facility] are invested in your health and are here to assist you in meeting your goals.     Sincerely,    BING Jordan

## 2019-08-30 ENCOUNTER — TRANSFERRED RECORDS (OUTPATIENT)
Dept: HEALTH INFORMATION MANAGEMENT | Facility: CLINIC | Age: 44
End: 2019-08-30

## 2019-08-31 ENCOUNTER — TRANSFERRED RECORDS (OUTPATIENT)
Dept: HEALTH INFORMATION MANAGEMENT | Facility: CLINIC | Age: 44
End: 2019-08-31

## 2019-09-01 ENCOUNTER — TRANSFERRED RECORDS (OUTPATIENT)
Dept: HEALTH INFORMATION MANAGEMENT | Facility: CLINIC | Age: 44
End: 2019-09-01

## 2019-09-08 ENCOUNTER — TRANSFERRED RECORDS (OUTPATIENT)
Dept: HEALTH INFORMATION MANAGEMENT | Facility: CLINIC | Age: 44
End: 2019-09-08

## 2019-09-20 ENCOUNTER — TRANSFERRED RECORDS (OUTPATIENT)
Dept: HEALTH INFORMATION MANAGEMENT | Facility: CLINIC | Age: 44
End: 2019-09-20

## 2019-09-24 ENCOUNTER — PATIENT OUTREACH (OUTPATIENT)
Dept: CARE COORDINATION | Facility: CLINIC | Age: 44
End: 2019-09-24

## 2019-09-24 ASSESSMENT — ACTIVITIES OF DAILY LIVING (ADL): DEPENDENT_IADLS:: INDEPENDENT

## 2019-09-24 NOTE — PROGRESS NOTES
Clinic Care Coordination Contact  [facility] [[email or web address]]/Voicemail    Referral Source: PCP  Clinical Data: Care Coordinator Outreach  Outreach attempted x 3. Phone number appears to be out of service.  Plan: Care Coordinator will send disenrollment letter with care coordinator contact information via mail. Care Coordinator will do no further outreaches at this time.    BING Jordan  [facility]   Care Coordination  [facility]  [phone]

## 2019-09-24 NOTE — LETTER
[place] CARE COORDINATION - [facility]  [address] [place] [address]    September 24, 2019    Chaim Norman  [address]   [facility] [address]      Dear Chaim,    I have been unsuccessful in reaching you since our last contact. At this time I will make no further attempts to reach you, however this does not change your ability to continue receiving care from your providers at [place]. If you are needing additional support from a care coordinator in the future please contact me at [phone].    All of us at [facility] are invested in your health and are here to assist you in meeting your goals.    Sincerely,    BING Jordan

## 2019-12-26 ENCOUNTER — TRANSFERRED RECORDS (OUTPATIENT)
Dept: HEALTH INFORMATION MANAGEMENT | Facility: CLINIC | Age: 44
End: 2019-12-26

## 2019-12-29 ENCOUNTER — TRANSFERRED RECORDS (OUTPATIENT)
Dept: HEALTH INFORMATION MANAGEMENT | Facility: CLINIC | Age: 44
End: 2019-12-29

## 2019-12-30 ENCOUNTER — TRANSFERRED RECORDS (OUTPATIENT)
Dept: HEALTH INFORMATION MANAGEMENT | Facility: CLINIC | Age: 44
End: 2019-12-30

## 2020-01-07 ENCOUNTER — TRANSFERRED RECORDS (OUTPATIENT)
Dept: HEALTH INFORMATION MANAGEMENT | Facility: CLINIC | Age: 45
End: 2020-01-07

## 2020-01-07 ENCOUNTER — MEDICAL CORRESPONDENCE (OUTPATIENT)
Dept: HEALTH INFORMATION MANAGEMENT | Facility: CLINIC | Age: 45
End: 2020-01-07

## 2020-01-14 ENCOUNTER — OFFICE VISIT (OUTPATIENT)
Dept: FAMILY MEDICINE | Facility: CLINIC | Age: 45
End: 2020-01-14
Payer: COMMERCIAL

## 2020-01-14 VITALS
HEIGHT: 65 IN | BODY MASS INDEX: 26.08 KG/M2 | WEIGHT: 156.5 LBS | OXYGEN SATURATION: 93 % | DIASTOLIC BLOOD PRESSURE: 89 MMHG | TEMPERATURE: 98.5 F | SYSTOLIC BLOOD PRESSURE: 131 MMHG | HEART RATE: 96 BPM | RESPIRATION RATE: 20 BRPM

## 2020-01-14 DIAGNOSIS — R10.10 UPPER ABDOMINAL PAIN: ICD-10-CM

## 2020-01-14 DIAGNOSIS — I27.20 PULMONARY HYPERTENSION (H): ICD-10-CM

## 2020-01-14 DIAGNOSIS — G62.9 NEUROPATHY: ICD-10-CM

## 2020-01-14 DIAGNOSIS — A15.0 TB (PULMONARY TUBERCULOSIS): ICD-10-CM

## 2020-01-14 DIAGNOSIS — Z09 HOSPITAL DISCHARGE FOLLOW-UP: Primary | ICD-10-CM

## 2020-01-14 PROBLEM — N40.0 BENIGN PROSTATIC HYPERPLASIA, UNSPECIFIED WHETHER LOWER URINARY TRACT SYMPTOMS PRESENT: Status: ACTIVE | Noted: 2020-01-14

## 2020-01-14 PROBLEM — D63.8 ANEMIA OF CHRONIC DISEASE: Status: ACTIVE | Noted: 2018-10-19

## 2020-01-14 PROBLEM — R76.8 HEPATITIS B CORE ANTIBODY POSITIVE: Status: ACTIVE | Noted: 2017-03-10

## 2020-01-14 PROBLEM — J44.9 COPD (CHRONIC OBSTRUCTIVE PULMONARY DISEASE) (H): Status: ACTIVE | Noted: 2019-09-02

## 2020-01-14 PROCEDURE — 99215 OFFICE O/P EST HI 40 MIN: CPT | Performed by: NURSE PRACTITIONER

## 2020-01-14 RX ORDER — MULTIVITAMIN WITH IRON
200 TABLET ORAL DAILY
COMMUNITY
Start: 2019-12-19 | End: 2020-02-18

## 2020-01-14 RX ORDER — ACETAMINOPHEN 500 MG
500 TABLET ORAL
COMMUNITY
End: 2020-02-11

## 2020-01-14 RX ORDER — PYRAZINAMIDE TABLET 500 MG/1
1500 TABLET ORAL DAILY
COMMUNITY
Start: 2019-12-19 | End: 2020-12-18

## 2020-01-14 RX ORDER — GABAPENTIN 300 MG/1
900 CAPSULE ORAL 3 TIMES DAILY
COMMUNITY
Start: 2020-01-07 | End: 2020-02-25

## 2020-01-14 RX ORDER — ETHAMBUTOL HYDROCHLORIDE 400 MG/1
1200 TABLET, FILM COATED ORAL DAILY
COMMUNITY
Start: 2019-12-19 | End: 2020-12-18

## 2020-01-14 RX ORDER — AMLODIPINE BESYLATE 5 MG/1
5 TABLET ORAL DAILY
COMMUNITY
Start: 2019-12-19 | End: 2020-02-03

## 2020-01-14 RX ORDER — CEFUROXIME AXETIL 500 MG/1
TABLET ORAL
COMMUNITY
Start: 2020-01-06 | End: 2020-01-14

## 2020-01-14 RX ORDER — LEVOFLOXACIN 500 MG/1
750 TABLET, FILM COATED ORAL DAILY
COMMUNITY
Start: 2019-12-20 | End: 2020-07-23

## 2020-01-14 RX ORDER — CYCLOSERINE 250 1/1
250 CAPSULE ORAL 2 TIMES DAILY
COMMUNITY
Start: 2019-12-19 | End: 2020-12-18

## 2020-01-14 ASSESSMENT — PAIN SCALES - GENERAL: PAINLEVEL: SEVERE PAIN (7)

## 2020-01-14 ASSESSMENT — MIFFLIN-ST. JEOR: SCORE: 1522.37

## 2020-01-14 NOTE — PATIENT INSTRUCTIONS
We will call you later today about the pulmonary hypertension clinic  If abdominal pain returns, please let me know

## 2020-01-14 NOTE — PROGRESS NOTES
Subjective     Chaim Norman is a 44 year old male who presents to clinic today for the following health issues:    HPI       Hospital Follow-up Visit:    Hospital/Nursing Home/IP Rehab Facility: [facility]   Dates: 8/30-12/222019 AND 12/26/2019-1/7/2020    Reason(s) for Admission: Shortness of breath, TB, pulmonary hypertension, abdominal pain            Problems taking medications regularly:  None       Medication changes since discharge: Patient is on medicaitons but doesn't know what       Problems adhering to non-medication therapy:  None    Summary of hospitalization:  CareEverywhere information obtained and reviewed  Diagnostic Tests/Treatments reviewed.  Follow up needed: infectious disease,   Other Healthcare Providers Involved in Patient s Care:         Care Coordination and Specialist appointment - infectious disease, cardiology for pulmonary hypertension, neurology, general surgery (patient declined)  Update since discharge: improved.     Post Discharge Medication Reconciliation: discharge medications reconciled and changed, per note/orders (see AVS).  Plan of care communicated with patient     Coding guidelines for this visit:  Type of Medical   Decision Making Face-to-Face Visit       within 7 Days of discharge Face-to-Face Visit        within 14 days of discharge   Moderate Complexity [id number]   High Complexity [id number]            Pleasant 44 year old male presents for hospital follow up. He has been hospitalized twice: TB, pulmonary hypertension (new dx), upper abdominal pain, neuropathy, anemia  He had a battery of tests including echo, CT, LE US, HIDA, US.  He last had labs 4 days ago - stable  Upper abdominal pain has resolved. It was recommended that he follow up with general surgery to discuss lap zoraida for biliary dyskinesia and to have repeat HIDA scan. Patient declines at this time because he is no longer symptomatic. He states he will let us know if pain returns and will  consider referral at that time.   Breathing is much better. Denies shortness of breath. No cough. Continues on treatment for TB. He verbalizes frustration of having TB again. Reports he had it in [place] in 2013 and again this year. TB meds per ID and [facility]. He reports he has home RN that comes twice daily.  Toes continue to be numb and tingling. Workup in the hospital was negative. On gabapentin 600. It was ordered TID but patient taking BID. He will increase to TID.  Not working right now.   He reports difficulty with transportation due to unreliable car. He states this is why he missed his pulmonary appointment last supper.       Patient Active Problem List   Diagnosis     History of TB (tuberculosis)     Weight loss     Pulmonary nodules     Late latent syphilis     Iatrogenic pneumothorax     Elevated blood pressure reading without diagnosis of hypertension     Patient's intentional underdosing of medication regimen due to financial hardship     Anemia of chronic disease     Benign prostatic hyperplasia, unspecified whether lower urinary tract symptoms present     COPD (chronic obstructive pulmonary disease) (H)     Hepatitis B core antibody positive     Neutropenia (H)     Pulmonary hypertension (H)     History reviewed. No pertinent surgical history.    Social History     Tobacco Use     Smoking status: Never Smoker     Smokeless tobacco: Never Used   Substance Use Topics     Alcohol use: No     Family History   Family history unknown: Yes         Current Outpatient Medications   Medication Sig Dispense Refill     amLODIPine (NORVASC) 5 MG tablet Take 5 mg by mouth daily       bedaquiline (SITRURO) 100 MG tablet Take 200 mg by mouth Every Mon, Wed, Fri Morning       cycloSERINE (SEROMYCIN) 250 MG capsule Take 250 mg by mouth 2 times daily       ethambutol (MYAMBUTOL) 400 MG tablet Take 1,200 mg by mouth daily       fluticasone-vilanterol (BREO ELLIPTA) 100-25 MCG/INH inhaler Inhale 1 puff into the lungs daily    "    gabapentin (NEURONTIN) 300 MG capsule Take 600 mg by mouth 3 times daily       levofloxacin (LEVAQUIN) 500 MG tablet Take 750 mg by mouth daily       pyrazinamide 500 MG tablet Take 1,500 mg by mouth daily       vitamin B6 (PYRIDOXINE) 100 MG tablet Take 200 mg by mouth daily       acetaminophen (TYLENOL) 500 MG tablet Take 500 mg by mouth       albuterol (PROVENTIL HFA) 108 (90 Base) MCG/ACT inhaler Inhale 2 puffs into the lungs every 6 hours as needed for shortness of breath / dyspnea or wheezing (Patient not taking: Reported on 7/8/2019) 8.5 g 3     No Known Allergies  BP Readings from Last 3 Encounters:   01/14/20 131/89   07/08/19 108/58   06/24/19 105/70    Wt Readings from Last 3 Encounters:   01/14/20 71 kg (156 lb 8 oz)   07/08/19 71.2 kg (157 lb)   06/24/19 74.8 kg (165 lb)                      Reviewed and updated as needed this visit by Provider         Review of Systems   ROS COMP: Constitutional, HEENT, cardiovascular, pulmonary, GI, , musculoskeletal, neuro, skin, endocrine and psych systems are negative, except as otherwise noted.      Objective    /89 (BP Location: Right arm, Patient Position: Sitting, Cuff Size: Adult Large)   Pulse 96   Temp 98.5  F (36.9  C) (Oral)   Resp 20   Ht 1.66 m (5' 5.35\")   Wt 71 kg (156 lb 8 oz)   SpO2 93%   BMI 25.76 kg/m    Body mass index is 25.76 kg/m .  Physical Exam   GENERAL: healthy, alert and no distress  EYES: Eyes grossly normal to inspection, PERRL and conjunctivae and sclerae normal  HENT: ear canals and TM's normal, nose and mouth without ulcers or lesions  NECK: no adenopathy, no asymmetry, masses, or scars and thyroid normal to palpation  RESP: lungs clear to auscultation - no rales, rhonchi or wheezes  CV: regular rate and rhythm, normal S1 S2, no S3 or S4, no murmur, click or rub, no peripheral edema and peripheral pulses strong  ABDOMEN: soft, nontender, no hepatosplenomegaly, no masses and bowel sounds normal  MS: no gross " "musculoskeletal defects noted, no edema  SKIN: no suspicious lesions or rashes  NEURO: Normal strength and tone, mentation intact and speech normal  PSYCH: mentation appears normal, affect normal/bright    Diagnostic Test Results:  Labs reviewed in Epic        Assessment & Plan     1. Hospital discharge follow-up  Overall, doing much better. Will refer to care coordination to help with making appointments, follow up and transportation to/from appointments. Discussed importance of follow up.  - CARE COORDINATION REFERRAL    2. TB (pulmonary tuberculosis)  Treatment per ID and [facility]. States feeling much better and back to normal.     3. Pulmonary hypertension (H)  New diagnosis. I spoke with coordinator at [facility] and got him scheduled for 1/28. Care coordination will work with him to figure out transportation. Reiterated importance of keeping appointment and follow up.    4. Upper abdominal pain  Patient reports it has resolved. Declines repeat HIDA scan and referral to general surgery to discuss cholecystectomy.    5. Neuropathy  Likely from TB medications. Workup in hospital normal. He was unintentionally underdosing gabapentin. He will take 600 mg TID going forward.       BMI:   Estimated body mass index is 25.76 kg/m  as calculated from the following:    Height as of this encounter: 1.66 m (5' 5.35\").    Weight as of this encounter: 71 kg (156 lb 8 oz).           See Patient Instructions    Return in about 4 weeks (around 2/11/2020).     The benefits, risks and potential side effects were discussed in detail. Black box warnings discussed as relevant. All patient questions were answered. The patient was instructed to follow up immediately if any adverse reactions develop.    Return precautions discussed, including when to seek urgent/emergent care.    Patient verbalizes understanding and agrees with plan of care. Patient stable for discharge.    Greater than 50% of the 40 min visit was spent on counseling and " coordination of care for the above issues.       CHAZ Garrett CNP  [facility]

## 2020-01-15 ENCOUNTER — TELEPHONE (OUTPATIENT)
Dept: FAMILY MEDICINE | Facility: CLINIC | Age: 45
End: 2020-01-15

## 2020-01-15 ENCOUNTER — PATIENT OUTREACH (OUTPATIENT)
Dept: CARE COORDINATION | Facility: CLINIC | Age: 45
End: 2020-01-15

## 2020-01-15 DIAGNOSIS — I27.20 PULMONARY HYPERTENSION (H): ICD-10-CM

## 2020-01-15 DIAGNOSIS — J44.9 COPD (CHRONIC OBSTRUCTIVE PULMONARY DISEASE) (H): ICD-10-CM

## 2020-01-15 DIAGNOSIS — Z86.11 HISTORY OF TB (TUBERCULOSIS): Primary | ICD-10-CM

## 2020-01-15 DIAGNOSIS — Z91.120 PATIENT'S INTENTIONAL UNDERDOSING OF MEDICATION REGIMEN DUE TO FINANCIAL HARDSHIP: ICD-10-CM

## 2020-01-15 ASSESSMENT — ACTIVITIES OF DAILY LIVING (ADL): DEPENDENT_IADLS:: INDEPENDENT

## 2020-01-15 NOTE — PROGRESS NOTES
Clinic Care Coordination Contact    Clinic Care Coordination Contact  OUTREACH    Referral Information:  Referral Source: PCP  Reason for Referral: Complex Medical Concerns/Education: Chronic diagnosis - TB, pulmonary hypertension and Resources for Transportation    Additional pertinent details:  Patient with pulmonary TB (now outpatient - was hospitalized Aug-Dec and then Dec-early Jan) and new diagnosis of pulmonary hypertension. He needs help making appointments, reminders and with follow up. He also needs help getting to/from appointments.   Primary Diagnosis: Respiratory Disorders - other    Chief Complaint   Patient presents with     Clinic Care Coordination - Initial     RN        Universal Utilization: Patient is followed by infectious disease and neurology.  He utilizes [facility]- most recent admission was 12/26/19-1/7/19  He is followed by [facility]  Clinic Utilization  Difficulty keeping appointments:: Yes  Compliance Concerns: No  No-Show Concerns: Yes  No PCP office visit in Past Year: No  Utilization    Last refreshed: 1/15/2020  2:34 PM:  Hospital Admissions 0           Last refreshed: 1/15/2020  2:34 PM:  ED Visits 0           Last refreshed: 1/15/2020  2:34 PM:  No Show Count (past year) 4              Current as of: 1/15/2020  2:34 PM              Clinical Concerns:  Current Medical Concerns:  Patient was at [facility] 12/26/19-1/7/20 for symptomatic anemia, dyspnea and hypoxemia and discharged home on oxygen from [facility].  Patient was seen by PCP for follow up 1/14/20 who was able to schedule with [facility] 1/28/20.  There is concern that patient will not be able to receive transportation to this appointment and that patient has a history of lack of follow through.    Patient receives visits 2 times daily from his [facility] public health nurse to administer his medications.  Patient states his public health nurse administers all medications.  If patient is  not going to be home due to an appointment, the nurse sets up his medications for the next dose.    Patient states he receives rides from [facility] to his infectious disease appointments, but no other appointments.  Patient reports shortness of breath with minimal to moderate exertion, he is using oxygen 1 LPM at all times.  See COPD assessment.  A general surgery f/u within 2 weeks was advised upon hospital discharge due to biliary dyskinesia found on HIDA scan, however, patient states he spoke with PCP regarding this and was told since he was eating well and tolerating his food, this was not needed at this time.  Patient Active Problem List   Diagnosis     History of TB (tuberculosis)     Weight loss     Pulmonary nodules     Late latent syphilis     Iatrogenic pneumothorax     Elevated blood pressure reading without diagnosis of hypertension     Patient's intentional underdosing of medication regimen due to financial hardship     Anemia of chronic disease     Benign prostatic hyperplasia, unspecified whether lower urinary tract symptoms present     COPD (chronic obstructive pulmonary disease) (H)     Hepatitis B core antibody positive     Neutropenia (H)     Pulmonary hypertension (H)         Current Behavioral Concerns: none      Education Provided to patient: RN CC educated patient on care coordination services, transportation resource [employer] and importance of timely follow up with specialists.     Pain  Pain (GOAL):: No  Health Maintenance Reviewed: Due/Overdue   Health Maintenance Due   Topic Date Due     PREVENTIVE CARE VISIT  1973     SPIROMETRY  1973     COPD ACTION PLAN  1973     LIPID  08/28/2008     INFLUENZA VACCINE (1) 09/01/2019      Clinical Pathway: Clinic Care Coordination COPD Assessment    Discharge:    Hospital summary: See above, CT scan shows new MAITE ground glass infiltrates, started ceftin 500 mg 2 times daily from 1/3/20-1/8/20.  Received blood transfusion due to anemia.   "Pulmonary hypertension found on echo.  Nocturnal desaturations found and nocturnal oxygen recommended.  AFB's were positive, but felt noninfectious per ID and [facility], cultures from November 2019 were negative.  Day of hospital discharge:  1/7/20  What recommendations were made for follow up after your recent hospitalization? \"Take my medicine and see my doctor.\"  Have the follow up appointments been scheduled? Yes  If not, can I help you set up these appointments? N/A  Transportation concerns (GOAL):: Yes  Is there a referral for Pulmonary Rehab? no    Symptom Review:    How have you been feeling now that you are home? \"Better now than when I was in the hospital.  Breathing a little better.\"  Are you having any increased shortness of breath? No  Symptoms  Anxiety: No  Chest pain: : No  Chills: No  Cough: No  Fever: No  Fatigue: Yes  Increased sputum: No  Night sweats: No  Weight change: : Yes  Weight change:: decrease  Wheezing: No  COPD symptoms limiting activities?: Yes  What COPD zone are you currently in?: Yellow  Taking COPD medications as prescribed: : Yes  Took steroids (by mouth) for COPD: No  Overall your COPD symptoms are (GOAL):: Improving    Do you have a COPD Action Plan? No **send pt a copy       Is the COPD Action Plan on refrigerator or available: No  Writer reviewed  What number would you call if you were in the YELLOW zones:  [phone]    Medications:    Were you started on any new medications?  Yes, Ceftin, which is now completed  Were any of your previous medications changed? No  Do you have all of your medications? N/a, [facility] nurse brings and administers all of patient's medications to him 2 times per day.  Have you had trouble filling your prescriptions? n/a  Are you medications effective in controlling your symptoms? Yes  Are you currently on Prednisone (* does pt understand the tapering instructions)?  no  For patients with DM:     Are you monitoring your blood sugars, if so how are your blood " sugars? n/a  Did you start on insulin in the hospital or has your Insulin dose changed? n/a  Medication reconciliation completed? Attempted, but patient unable to complete on phone -- Epic MTM referral needed  Was MTM or Diabetic Education referral placed (*Make sure to put transitions as reason for referral)? Yes    Oxygen/DME:    Are you currently on oxygen?  Yes   Is this new for you? Yes   Is it set up at home? Yes   What liter flow were you instructed to use and how often do you use it? 1 LPM at night  What type of home oxygen system do you have (*ask about portability and ability to manage a portable oxygen delivery)?  uncertain  Who is your supply company? [employer]  Review with patient how to use/maintain nebulizers and inhalers: completed    Activity:    How much activity can you do before you are SOB? Light housework  Have you had to reduce your activities because of dyspnea or other symptoms? Yes, unable to work     Diet:    Do you weigh yourself daily? No    Are there any current diet restrictions or changes per discharge instructions? No    Emotions/Lifestyle:    Do you smoke?  reports that he has never smoked. He has never used smokeless tobacco.  Would you like to try to quit smoking? n/a    Medication Management:  Patient's [facility] public health nurse comes over 2 times per day to bring and administer his medications.  Patient is uncertain of all medications he is taking.  Patient states he will ask for a list of medications when she comes out tomorrow.     Functional Status:  Dependent ADLs:: Independent  Dependent IADLs:: Independent  Bed or wheelchair confined:: No  Mobility Status: Independent  Fallen 2 or more times in the past year?: No  Any fall with injury in the past year?: No    Living Situation:  Current living arrangement:: I live in a private home with family  Type of residence:: Apartment  Patient lives with his 18 year old son who is currently in high  school.    Diet/Exercise/Sleep:  Diet:: Regular  Inadequate nutrition (GOAL):: No  Food Insecurity: No  Tube Feeding: No  Exercise:: Currently not exercising  Inadequate activity/exercise (GOAL):: No  Significant changes in sleep pattern (GOAL): No    Transportation:  Transportation concerns (GOAL):: Yes  Transportation means:: None(Car currently broke down)  Patient states [facility] is currently providing transportation to his infectious disease appointments.  Patient does not have transportation benefits through his health insurance.  Patient has not utilized public transportation before.  RN CC provided resource on [employer].     Psychosocial:  Nondenominational or spiritual beliefs that impact treatment:: No  Mental health DX:: No  Mental health management concern (GOAL):: No  Informal Support system:: None     Financial/Insurance:   Financial/Insurance concerns (GOAL):: No  Patient states a  from [facility] is helping him with his cash assistance.  Patient is currently receiving food assistance.  RN CC inquired if patient needed information on food shelves and patient stated the food assistance was providing enough food.     Resources and Interventions:  Current Resources: [facility] Programs: food assistance, awaiting approval for cash assistance, [facility] ;   [facility] Resources: None  Supplies used at home:: None  Equipment Currently Used at Home: none    Advance Care Plan/Directive  Advanced Care Plans/Directives on file:: No  Advanced Care Plan/Directive Status: Considering Options    Referrals Placed: Transportation([facility]s car repair program, [employer])     Goals:   Goals        General    1. Transportation (pt-stated)     Notes - Note created  1/15/2020  4:08 PM by Behl, Melissa K, RN    Goal Statement: I need help getting transportation.  Date Goal set: 1/15/2020   Date to Achieve By: 2/15/20  Patient expressed understanding of goal: yes  Action steps to achieve this goal:  1. I will call [employer]  [phone]           2. Improve chronic symptoms (pt-stated)     Notes - Note created  1/15/2020  4:11 PM by Behl, Melissa K, RN    Goal Statement: I want to be able to do activity without being short of breath.  Date Goal set: 1/15/2020   Date to Achieve By: 6/15/20  Patient expressed understanding of goal: yes  Action steps to achieve this goal:  1. I will follow up with my infectious disease providers as scheduled, 1/16/20, 1/22/20  2. I will see the pulmonary hypertension clinic 1/28/20  3. I will wear my oxygen as directed               Barriers: not working, only adult in his household, history of poor follow through  Strengths: MD involved, has Social Work support from [facility]  Patient/Caregiver understanding: Patient verbalized understanding of information provided.    Outreach Frequency: 2 weeks  Future Appointments              In 1 week Rod Chauhan MD [facility], [facility]          Plan:   1. Patient will attend future infectious disease, neurology and pulmonary hypertension clinic appointments.  2. Patient will call [employer] for transportation to pulmonary hypertension clinic appointment.  3. Patient will ask MD nurse for a medication list.  4. Patient will wear oxygen as directed.  5. Patient will take medications as administered by [facility] public health nurse.  6. RN CC will mail out Care Coordination introduction letter, complex care plan, advance care planning information sheet and blank health care directive form.  7. RN CC will follow up with patient in 1 week.    Melissa Behl BSN, RN, PHN, [facility]  Primary Care Clinical RN Care Coordinator  [facility]   [phone]

## 2020-01-15 NOTE — PROGRESS NOTES
Clinic Care Coordination Contact  Care Team Conversations    RN CC contacted patient for initial assessment.  Patient requested a call at a later time.    Melissa Behl BSN, RN, PHN, CCM  Primary Care Clinical RN Care Coordinator  [facility]   [phone]

## 2020-01-15 NOTE — TELEPHONE ENCOUNTER
I called patient and let him know appointment with cardiology for pulmonary hypertension is Jan 28 at 8am. Our care coordinator, Courtney, will also be reaching out to him.

## 2020-01-15 NOTE — TELEPHONE ENCOUNTER
Reason for Call:  Other call back    Detailed comments: Pt states he is waiting for a phone call back concerning a referral. Thank you.    Phone Number Patient can be reached at: Home number on file [phone] (home)    Best Time: Any    Can we leave a detailed message on this number? YES    Call taken on 1/15/2020 at 11:06 AM by Phuong Fernandez

## 2020-01-15 NOTE — LETTER
[facility] CARE COORDINATION  [address] [address]    January 15, 2020    Chaim Norman  [address]   [place] [address]      Dear Chaim,    I am a clinic care coordinator who works with CHAZ House CNP at [facility]. I wanted to thank you for spending the time to talk with me.  I wanted to introduce myself and provide you with my contact information so that you can call me with questions or concerns about your health care. Below is a description of clinic care coordination and how I can further assist you.     The clinic care coordinator is a registered nurse and/or  who understand the health care system. The goal of clinic care coordination is to help you manage your health and improve access to the [facility] system in the most efficient manner. The Care Coordinator can assist you in meeting your health care goals by providing education and resources, coordinating services, and strengthening the communication among your providers.    Please feel free to contact me at [phone], with any questions or concerns. We at [facility] are focused on providing you with the highest-quality healthcare experience possible and that all starts with you.     Sincerely,     Melissa Behl BSN, RN, PHN, CCM  Primary Care Clinical RN Care Coordinator  [facility]   [phone]     Enclosed: I have enclosed a copy of the Complex Care Plan. This has helpful information and goals that we have talked about. Please keep this in an easy to access place to use as needed.

## 2020-01-15 NOTE — LETTER
Counts include 234 beds at the [facility]  Complex Care Plan  About Me:    Patient Name:  Chaim Norman    YOB: 1975  Age:         44 year old   Ellyn MRN:    [id number] Telephone Information:  Home Phone [phone]   Mobile Not on file.       Address:  [address]  [facility] [address] Email address:  No e-mail address on record      Emergency Contact(s)    Name Relationship Lgl Grd Work Phone Home Phone Mobile Phone   1SARAH CELESTIN Son   [phone]    2. DECLINED, PER * Other   [phone]            Primary language:  English     needed? No   [employer]:  [phone] op. 1  Other communication barriers: None  Preferred Method of Communication:  Mail  Current living arrangement: I live in a private home with family  Mobility Status/ Medical Equipment: Independent    Health Maintenance  Health Maintenance Reviewed: Due/Overdue   Health Maintenance Due   Topic Date Due     PREVENTIVE CARE VISIT  1975     SPIROMETRY  1975     COPD ACTION PLAN  1975     LIPID  08/28/2008     INFLUENZA VACCINE (1) 09/01/2019        My Access Plan  Medical Emergency 911   [facility] - [phone]   24 Hour Appointment Line [phone] or  [phone]) (toll-free)   24 Hour Nurse Line [phone] (toll-free)   [facility], [phone]   Preferred Hospital [facility]  [phone]   [id number] - [facility] [address] [facility] AT [facility] [address]     Behavioral Health Crisis Line The National Suicide Prevention Lifeline at 1-747.915.3057 or 911             My Care Team Members  Patient Care Team       Relationship Specialty Notifications Start Nyla Horan APRN CNP PCP - General Nurse Practitioner  9/25/18     Phone: [phone] Fax: [phone]         [address]  [facility] [address]    Nyla Florian APRN CNP Assigned PCP   10/25/18     Phone: [phone] Fax: [phone]         [address] [facility] [address]    Karime Kendall MD Resident Student in organized health care education/training program  5/30/19     Phone: [phone] Fax: [phone]         [address] [facility] [address] [facility] [address]    Behl, Melissa K, RN Lead Care Coordinator Primary Care - CC Admissions 1/14/20     Phone: [phone] Fax: [phone]                My Care Plans  Self Management and Treatment Plan  Goals and (Comments)  Goals        General    1. Transportation (pt-stated)     Notes - Note created  1/15/2020  4:08 PM by Behl, Melissa K, RN    Goal Statement: I need help getting transportation.  Date Goal set: 1/15/2020   Date to Achieve By: 2/15/20  Patient expressed understanding of goal: yes  Action steps to achieve this goal:  1. I will call [employer] [phone]           2. Improve chronic symptoms (pt-stated)     Notes - Note created  1/15/2020  4:11 PM by Behl, Melissa K, RN    Goal Statement: I want to be able to do activity without being short of breath.  Date Goal set: 1/15/2020   Date to Achieve By: 6/15/20  Patient expressed understanding of goal: yes  Action steps to achieve this goal:  1. I will follow up with my infectious disease providers as scheduled, 1/16/20, 1/22/20  2. I will see the pulmonary hypertension clinic 1/28/20  3. I will wear my oxygen as directed                Action Plans on File:                       Advance Care Plans/Directives Type:        My Medical and Care Information  Problem List   Patient Active Problem List   Diagnosis     History of TB (tuberculosis)     Weight loss     Pulmonary nodules     Late latent syphilis     Iatrogenic pneumothorax     Elevated blood pressure reading without diagnosis of hypertension     Patient's intentional underdosing of medication regimen due to financial hardship     Anemia of chronic disease      Benign prostatic hyperplasia, unspecified whether lower urinary tract symptoms present     COPD (chronic obstructive pulmonary disease) (H)     Hepatitis B core antibody positive     Neutropenia (H)     Pulmonary hypertension (H)      Current Medications and Allergies:  See printed Medication Report.    Care Coordination Start Date: 1/15/2020   Frequency of Care Coordination: 2 weeks   Form Last Updated: 01/15/2020

## 2020-01-17 ENCOUNTER — PATIENT OUTREACH (OUTPATIENT)
Dept: CARE COORDINATION | Facility: CLINIC | Age: 45
End: 2020-01-17

## 2020-01-17 NOTE — PROGRESS NOTES
Clinic Care Coordination Contact    Follow Up Progress Note      Assessment: RN CC spoke with patient who reports no change in symptoms, no new or worsening symptoms.  Patient states he contacted [employer], however, he is unable to pay for transportation to his appointment.  RN CC advised patient to go to the [facility] and apply for Medical Assistance, follow up on cash assistance and inquire about any further assistance for transportation.  Patient agreeable with plan.    Goals addressed this encounter:   Goals Addressed                 This Visit's Progress      1. Transportation (pt-stated)   10%     Goal Statement: I need help getting transportation.  Date Goal set: 1/15/2020   Date to Achieve By: 2/15/20  Patient expressed understanding of goal: yes  Action steps to achieve this goal:  1. I will call [employer] [phone]  2. I will go to [facility] and apply for Medical Assistance benefit             2. Improve chronic symptoms (pt-stated)   0%     Goal Statement: I want to be able to do activity without being short of breath.  Date Goal set: 1/15/2020   Date to Achieve By: 6/15/20  Patient expressed understanding of goal: yes  Action steps to achieve this goal:  1. I will follow up with my infectious disease providers as scheduled, 1/16/20, 1/22/20  2. I will see the pulmonary hypertension clinic 1/28/20  3. I will wear my oxygen as directed                Intervention/Education provided during outreach: RN CC informed patient of [facility] and advised patient to discuss with a  at the [facility] his current situation and needs for cash and transportation assistance.     Outreach Frequency: 2 weeks    Plan:   1. Patient will go to [facility] and ask to speak with a  to discuss his situation and apply for cash, medical assistance and inquire about transportation.  2. RN Care Coordinator will follow  up in 1 week.    Melissa Behl BSN, RN, PHN, CCM  Primary Care Clinical RN Care Coordinator  [facility]   [phone]

## 2020-01-20 ENCOUNTER — DOCUMENTATION ONLY (OUTPATIENT)
Dept: CARE COORDINATION | Facility: CLINIC | Age: 45
End: 2020-01-20

## 2020-01-22 ENCOUNTER — PATIENT OUTREACH (OUTPATIENT)
Dept: CARE COORDINATION | Facility: CLINIC | Age: 45
End: 2020-01-22

## 2020-01-22 NOTE — PROGRESS NOTES
Clinic Care Coordination Contact  Care Team Conversations    RN CC contacted patient for follow up.  Patient was currently at [facility] neurology appointment to address his leg pain and requested a call back tomorrow.  Patient informed writer he had not yet gone to the [facility].    Melissa Behl BSN, RN, PHN, [facility]  Primary Care Clinical RN Care Coordinator  [facility]   [phone]

## 2020-01-23 NOTE — PROGRESS NOTES
Clinic Care Coordination Contact  [facility]/Voicemail       Clinical Data: Care Coordinator Outreach  Outreach attempted x 1.  Left message on patient's voicemail with call back information and requested return call.  Plan:Care Coordinator will try to reach patient again in approximately 10 business days.    Melissa Behl BSN, RN, PHN, [facility]  Primary Care Clinical RN Care Coordinator  [facility]   [phone]

## 2020-01-27 ENCOUNTER — PRE VISIT (OUTPATIENT)
Dept: CARDIOLOGY | Facility: CLINIC | Age: 45
End: 2020-01-27

## 2020-01-27 NOTE — TELEPHONE ENCOUNTER
New Pulmonary Hypertension Patient Form   Patient Name: Chaim Norman   Age: 44     Referring Provider: [facility]   MRN: [id number]     Date Test Epic Media/Scan Care Everywhere     RHC ?  ?   ?      Angio/Stress ?  ?   ?     12/30/19 Echo ?  ?   ?    12/26/19 EKG ?   ?   ?      6MWT ?   ?   ?      PFT ?   ?   ?     1/1/20 Overnight ox ?  ?   ?    12/30/19 Chest CT ?  ?   ?      V/Q Scan ?   ?   ?     12/30/19 Abd/Liver US ?   ?   ?      Misc:  ?   ?   ?         ?   ?   ?         ?   ?   ?      Echo - Severely reduced right ventricular systolic function. The right ventricle is quantitatively enlarged. Severe pulmonary hypertension, estimated right ventricular systolic pressure is 64 mmHg.

## 2020-01-28 ENCOUNTER — OFFICE VISIT (OUTPATIENT)
Dept: CARDIOLOGY | Facility: CLINIC | Age: 45
End: 2020-01-28
Attending: INTERNAL MEDICINE
Payer: COMMERCIAL

## 2020-01-28 ENCOUNTER — HOSPITAL ENCOUNTER (OUTPATIENT)
Facility: CLINIC | Age: 45
End: 2020-01-28
Attending: INTERNAL MEDICINE | Admitting: INTERNAL MEDICINE
Payer: COMMERCIAL

## 2020-01-28 VITALS
HEART RATE: 96 BPM | WEIGHT: 154 LBS | SYSTOLIC BLOOD PRESSURE: 154 MMHG | HEIGHT: 64 IN | BODY MASS INDEX: 26.29 KG/M2 | DIASTOLIC BLOOD PRESSURE: 114 MMHG | OXYGEN SATURATION: 94 %

## 2020-01-28 DIAGNOSIS — R06.02 SOB (SHORTNESS OF BREATH): ICD-10-CM

## 2020-01-28 DIAGNOSIS — G47.00 PERSISTENT DISORDER OF INITIATING OR MAINTAINING SLEEP: ICD-10-CM

## 2020-01-28 DIAGNOSIS — I27.20 PULMONARY HYPERTENSION (H): ICD-10-CM

## 2020-01-28 DIAGNOSIS — D63.8 ANEMIA OF CHRONIC DISEASE: ICD-10-CM

## 2020-01-28 DIAGNOSIS — E78.5 HYPERLIPIDEMIA LDL GOAL <130: ICD-10-CM

## 2020-01-28 DIAGNOSIS — R91.8 PULMONARY NODULES: Primary | ICD-10-CM

## 2020-01-28 PROCEDURE — G0463 HOSPITAL OUTPT CLINIC VISIT: HCPCS | Mod: ZF

## 2020-01-28 PROCEDURE — 99205 OFFICE O/P NEW HI 60 MIN: CPT | Mod: ZP | Performed by: INTERNAL MEDICINE

## 2020-01-28 RX ORDER — LIDOCAINE 40 MG/G
CREAM TOPICAL
Status: CANCELLED | OUTPATIENT
Start: 2020-01-28

## 2020-01-28 RX ORDER — LANOLIN ALCOHOL/MO/W.PET/CERES
3 CREAM (GRAM) TOPICAL
Qty: 30 TABLET | Refills: 3 | Status: SHIPPED | OUTPATIENT
Start: 2020-01-28 | End: 2021-08-03

## 2020-01-28 ASSESSMENT — PAIN SCALES - GENERAL: PAINLEVEL: NO PAIN (0)

## 2020-01-28 ASSESSMENT — MIFFLIN-ST. JEOR: SCORE: 1489.54

## 2020-01-28 NOTE — PROGRESS NOTES
January 28, 2020    Nyla Florian NP, DNP  Family Medicine  [place], [place]    Dear Dr. Florian,    I had the pleasure of seeing your patient Chaim Norman at the [facility] Pulmonary Hypertension clinic.  As you know, Chaim is a very pleasant 44 year old male with a past medical history tuberculosis first diagnosed in 2014 with subsequent pulmonary complications including lung destruction, hypertension, neuropathy, and likely pulmonary hypertension after an echocardiogram showed RV dilation and dysfunction. Chaim comes in today after having an echocardiogram performed at [facility] in December of 2019 which showed RV dilation and dysfunction. Chaim states that his breathing is actually getting better recently on his TB medications. He does not do a lot of activity because he has bad neuropathy in his feet so walking distances is nearly impossible. He is limited by this so going up a flight of stairs is difficult because of foot pain and not dyspnea. He denies any chest pain or pressure, presyncope, or syncope. He has no orthopnea or paroxysmal dyspnea. 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    Chaim is currently seeing ID at [facility] for his TB. He tells me he is taking his medications. He is frustrated by this diagnosis. He does have a lot of pain in his legs and his biggest concern today is his foot pain and neuropathy. He is scheduled to see a pain clinic and Dr. Florian has been working with him using gabapentin for his pain.    Chaim also had a CT scan performed at [facility] in December which showed bilateral lung destruction. He has not had any pulmonary function tests to his recollection or per my review of the chart.    PAST MEDICAL HISTORY:  -Active TB  -Anemia  -Pancytopenia  -Echo suggesting pulmonary hypertension    FAMILY HISTORY:  Family History   Family history unknown: Yes       SOCIAL HISTORY:  Social History     Socioeconomic History      Marital status:      Spouse name: Not on file     Number of children: Not on file     Years of education: Not on file     Highest education level: Not on file   Occupational History     Not on file   Social Needs     Financial resource strain: Not on file     Food insecurity:     Worry: Not on file     Inability: Not on file     Transportation needs:     Medical: Not on file     Non-medical: Not on file   Tobacco Use     Smoking status: Never Smoker     Smokeless tobacco: Never Used   Substance and Sexual Activity     Alcohol use: No     Drug use: No     Sexual activity: Yes     Partners: Female     Birth control/protection: Condom   Lifestyle     Physical activity:     Days per week: Not on file     Minutes per session: Not on file     Stress: Not on file   Relationships     Social connections:     Talks on phone: Not on file     Gets together: Not on file     Attends Holiness service: Not on file     Active member of club or organization: Not on file     Attends meetings of clubs or organizations: Not on file     Relationship status: Not on file     Intimate partner violence:     Fear of current or ex partner: Not on file     Emotionally abused: Not on file     Physically abused: Not on file     Forced sexual activity: Not on file   Other Topics Concern     Not on file   Social History Narrative     Not on file       CURRENT MEDICATIONS:  Current Outpatient Medications   Medication Sig Dispense Refill     acetaminophen (TYLENOL) 500 MG tablet Take 500 mg by mouth       albuterol (PROVENTIL HFA) 108 (90 Base) MCG/ACT inhaler Inhale 2 puffs into the lungs every 6 hours as needed for shortness of breath / dyspnea or wheezing (Patient not taking: Reported on 7/8/2019) 8.5 g 3     amLODIPine (NORVASC) 5 MG tablet Take 5 mg by mouth daily       bedaquiline (SITRURO) 100 MG tablet Take 200 mg by mouth Every Mon, Wed, Fri Morning       cycloSERINE (SEROMYCIN) 250 MG capsule Take 250 mg by mouth 2 times daily        "ethambutol (MYAMBUTOL) 400 MG tablet Take 1,200 mg by mouth daily       fluticasone-vilanterol (BREO ELLIPTA) 100-25 MCG/INH inhaler Inhale 1 puff into the lungs daily       gabapentin (NEURONTIN) 300 MG capsule Take 600 mg by mouth 3 times daily       levofloxacin (LEVAQUIN) 500 MG tablet Take 750 mg by mouth daily       pyrazinamide 500 MG tablet Take 1,500 mg by mouth daily       vitamin B6 (PYRIDOXINE) 100 MG tablet Take 200 mg by mouth daily         ROS:   10 point ROS negative except HPI    EXAM:  BP (!) 154/114 (BP Location: Right arm, Patient Position: Chair, Cuff Size: Adult Regular)   Pulse 96   Ht 1.626 m (5' 4\")   Wt 69.9 kg (154 lb)   SpO2 94%   BMI 26.43 kg/m    General: appears comfortable, alert and articulate  Head: normocephalic, atraumatic  Eyes: anicteric sclera, EOMI  Neck: no adenopathy  Orophyarynx: moist mucosa, no lesions, dentition intact  Heart: regular, S1/S2, no murmur, gallop, rub, estimated JVP 3 cm  Lungs: decreased breath sounds bilaterally, worse on right side than left  Abdomen: soft, non-tender, bowel sounds present, no hepatosplenomegaly  Extremities: no clubbing, cyanosis or edema  Neurological: normal speech and affect, no gross motor deficits    Labs:  CBC RESULTS:  Lab Results   Component Value Date    WBC 2.9 (L) 11/14/2018    RBC 3.79 (L) 11/14/2018    HGB 12.7 (L) 11/14/2018    HCT 38.4 (L) 11/14/2018     (H) 11/14/2018    MCH 33.5 (H) 11/14/2018    MCHC 33.1 11/14/2018    RDW 11.4 11/14/2018     11/14/2018       CMP RESULTS:  Lab Results   Component Value Date     10/18/2018    POTASSIUM 3.9 10/18/2018    CHLORIDE 101 10/18/2018    CO2 30 10/18/2018    ANIONGAP 6 10/18/2018    GLC 76 10/18/2018    BUN 17 10/18/2018    CR 1.16 10/18/2018    GFRESTIMATED 68 10/18/2018    GFRESTBLACK 82 10/18/2018    ZANDER 9.8 10/18/2018    BILITOTAL 1.5 (H) 10/18/2018    ALBUMIN 4.4 10/18/2018    ALKPHOS 93 10/18/2018    ALT 30 10/18/2018    AST 27 10/18/2018    "     Echocardiogram 12/30/2019 ([facility]):  The left ventricular systolic function is normal, estimated LVEF 60-65%.  Abnormal septal motion secondary to right ventricular volume and pressure overload.  Severely reduced right ventricular systolic function. The right ventricle is quantitatively enlarged.  There is trace circumferential pericardial effusion.  Severe pulmonary hypertension, estimated right ventricular systolic pressure is 64 mmHg.    CT Scan 12/29/19 ([facility])  Small volume ascites is not changed.  Chronic changes of the right lung with further volume loss and mediastinal shift to the right since 8/30/2019.  Groundglass infiltrates of the left upper lobe and lingula are consistent with infectious or inflammatory process.  Lobulated mass of the left lower lobe with pleural thickening has decreased in size with interval resolution of the cavitation since 8/30/2019.    Assessment and Plan: Chaim Norman is a 44 year old male with history of active TB with significant lung destruction who presents for evaluation of pulmonary hypertension.     1. 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 I would like to perform a thorough evaluation including our normal screening PH labs, a VQ scan, pulmonary function tests, and an invasive hemodynamic study. I will hold off on a 6MWT today because his neuropathy is limiting his ability to walk.     Once we have collected this data, we will determine a plan of action. If this is truly Group 3 PH, he may need to be evaluated by Lung Transplantation as that is the only option for him. However, that will be very complicated due to his active TB.     2. Neuropathy: His neuropathy is limiting his sleep. We did give a prescription for melatonin. I will defer any further pain management to Dr. Florian and the pain clinic.     It was a pleasure seeing your patient Chaim Norman at the [facility] Pulmonary  Hypertension clinic.  Please contact us with any questions or concerns that you may have.    Sincerely,    Rod Chauhan MD, PhD   of Medicine  [facility] for Pulmonary Hypertension  Cardiovascular Division  [facility]

## 2020-01-28 NOTE — PATIENT INSTRUCTIONS
Medication Changes:  - Melatonin 3 mg nightly as needed to help with sleep    Patient Instructions:   1. Continue staying active and eat a heart healthy diet.    2. Please keep current list of medications with you at all times.    3. Remember to weigh yourself daily after voiding and before you consume any food or beverages and log the numbers.  If you have gained 2 pounds overnight or 5 pounds in a week contact us immediately for medication adjustments or further instructions.    4. **Please call us immediately if you have any syncope (fainting or passing out), chest pain, edema (swelling or weight gain), or decline in your functional status (general decline in how you are feeling).    Follow up Appointment Information:  - V/Q scan, PFTs, and Right Heart Catheterization to assess your pulmonary hypertension  - Follow up with Dr. Chauhan after all testing is complete to review results on March 31 at 9AM    Check-In  Time Check-In Location Estimated Length Procedure   Name     March 24  9:30AM   [facility]   waiting room 60 minutes VQ/Lung Perfusion Scan**   Procedure Preparations & Instructions     This is a non-invasive procedure and does NOT require any preparation         Check-In  Time Check-In Location Estimated Length Procedure   Name     March 24  11 AM   [facility]  waiting room 60-90 minutes Right Heart Catheterization**     Procedure Preparations & Instructions     This is an invasive procedure that DOES require preparation:    - Nothing to eat for 6 hours   - You may have clear liquids up until the time of your procedure  - A ride should be arranged for you in the instance you are unable to drive home, however you should be able to function as you normally would after the procedure   **[facility] waiting room located at the [facility] ([address], [place], [place])    Check-In  Time Check-In Location Estimated Length Procedure   Name     March 31  8AM   [facility]   3rd Floor 60 minutes Pulmonary Function Test**    Procedure Preparations & Instructions     This is a non-invasive procedure and does NOT require any preparation       We are located on the third floor of the [facility] on the [facility].  Our address is     [address] [place] [address] [place] [address]rd [place] [address]    Thank you for allowing us to be a part of your care here at the [facility]    If you have questions or concerns please contact us at:    Pat Story RN, BSN, PHN Dunia Hawthorne (Schedule,Prior Auth)  Nurse Coordinator     Clinic   Pulmonary Hypertension   Pulmonary Hypertension  [facility]  (Phone)[phone]   (Phone) [phone]        (Fax) [phone]    ** Please note that you will NOT receive a reminder call regarding your scheduled testing, reminder calls are for provider appointments only.  If you are scheduled for testing within the [facility] system you may receive a call regarding pre-registration for billing purposes only.**     Remember to weigh yourself daily after voiding and before you consume any food or beverages and log the numbers.  If you have gained/lost 2 pounds overnight or 5 pounds in a week contact us immediately for medication adjustments or further instructions.   **Please call us immediately if you have any syncope, chest pain, edema, or decline in your functional status.    Support Group:  Pulmonary Hypertension Association  Https://www.phassociation.org/  **Look at the Events Tab** They even have Support Groups that you can call into    [facility] Support Group  Second Saturday of the Month from 1-3 PM   Location: [address] [place], [place] [id number]  Leader: Cami Garcia and Lisa Zarco  Phone: [phone] or [phone]  Email: [email or web address]

## 2020-01-28 NOTE — NURSING NOTE
Chief Complaint   Patient presents with     New Patient     New PH referral from [facility]     Vitals were taken and medications were reconciled.     Cathy Pompa CMA    7:37 AM

## 2020-01-28 NOTE — NURSING NOTE
Procedures and/or Testing: Patient given instructions regarding  Pulmonary Function Test, V/Q Scan,. Discussed purpose, preparation, procedure and when to expect results reported back to the patient. Patient demonstrated understanding of this information and agreed to call with further questions or concerns.    Right Heart Catheterization: Patient was instructed regarding right heart catheterization, including discussion of the procedure, preparation, intra-procedural steps, and recovery at home. Patient demonstrated understanding of this information and agreed to call with further questions or concerns.    Diet: Patient instructed regarding a heart healthy diet, including discussion of reduced fat and sodium intake. Patient demonstrated understanding of this information and agreed to call with further questions or concerns.    Return Appointment: Patient given instructions regarding scheduling next clinic visit. Patient demonstrated understanding of this information and agreed to call with further questions or concerns.  Patient stated he understood all health information given and agreed to call with further questions or concerns.    Medication Changes:  - Melatonin 3 mg nightly as needed to help with sleep    Patient Instructions:   1. Continue staying active and eat a heart healthy diet.    2. Please keep current list of medications with you at all times.    3. Remember to weigh yourself daily after voiding and before you consume any food or beverages and log the numbers.  If you have gained 2 pounds overnight or 5 pounds in a week contact us immediately for medication adjustments or further instructions.    4. **Please call us immediately if you have any syncope (fainting or passing out), chest pain, edema (swelling or weight gain), or decline in your functional status (general decline in how you are feeling).    Follow up Appointment Information:  - V/Q scan, PFTs, and Right Heart Catheterization to assess  your pulmonary hypertension  - Follow up with Dr. Chauhan after all testing is complete to review results on March 31 at 9AM    Check-In  Time Check-In Location Estimated Length Procedure   Name     March 24  9:30AM   [facility]   waiting room 60 minutes VQ/Lung Perfusion Scan**   Procedure Preparations & Instructions     This is a non-invasive procedure and does NOT require any preparation         Check-In  Time Check-In Location Estimated Length Procedure   Name     March 24  11 AM   [facility]  waiting room 60-90 minutes Right Heart Catheterization**     Procedure Preparations & Instructions     This is an invasive procedure that DOES require preparation:    - Nothing to eat for 6 hours   - You may have clear liquids up until the time of your procedure  - A ride should be arranged for you in the instance you are unable to drive home, however you should be able to function as you normally would after the procedure   **[facility] waiting room located at the [facility] ([address], [place], [place])    Check-In  Time Check-In Location Estimated Length Procedure   Name     March 31  8AM   [facility]   3rd Floor 60 minutes Pulmonary Function Test**   Procedure Preparations & Instructions     This is a non-invasive procedure and does NOT require any preparation       **Delayed staff message sent to Pat to review pre-procedure instructions and dates of upcoming procedures. Tania rFias RN on 1/28/2020 at 8:20 AM

## 2020-01-28 NOTE — Clinical Note
1/28/2020      RE: Chaim Norman  [address]  [place] [address]       Dear Colleague,    Thank you for the opportunity to participate in the care of your patient, Chaim Norman, at the [facility] HEART [facility]. Please see a copy of my visit note below.    January 28, 2020    [facility]      Dear Ms. Florian,    I had the pleasure of seeing your patient Chaim Norman at the [facility] Pulmonary Hypertension clinic.  As you know, Chaim is a very pleasant 44 year old male with a past medical history      PAST MEDICAL HISTORY:  -Active TB  -Anemia  -Pancytopenia  -Echo suggesting pulmonary hypertension    FAMILY HISTORY:  Family History   Family history unknown: Yes       SOCIAL HISTORY:  Social History     Socioeconomic History     Marital status:      Spouse name: Not on file     Number of children: Not on file     Years of education: Not on file     Highest education level: Not on file   Occupational History     Not on file   Social Needs     Financial resource strain: Not on file     Food insecurity:     Worry: Not on file     Inability: Not on file     Transportation needs:     Medical: Not on file     Non-medical: Not on file   Tobacco Use     Smoking status: Never Smoker     Smokeless tobacco: Never Used   Substance and Sexual Activity     Alcohol use: No     Drug use: No     Sexual activity: Yes     Partners: Female     Birth control/protection: Condom   Lifestyle     Physical activity:     Days per week: Not on file     Minutes per session: Not on file     Stress: Not on file   Relationships     Social connections:     Talks on phone: Not on file     Gets together: Not on file     Attends Religion service: Not on file     Active member of club or organization: Not on file     Attends meetings of clubs or organizations: Not on file     Relationship status: Not on file     Intimate partner violence:     Fear of current or ex partner: Not  "on file     Emotionally abused: Not on file     Physically abused: Not on file     Forced sexual activity: Not on file   Other Topics Concern     Not on file   Social History Narrative     Not on file       CURRENT MEDICATIONS:  Current Outpatient Medications   Medication Sig Dispense Refill     acetaminophen (TYLENOL) 500 MG tablet Take 500 mg by mouth       albuterol (PROVENTIL HFA) 108 (90 Base) MCG/ACT inhaler Inhale 2 puffs into the lungs every 6 hours as needed for shortness of breath / dyspnea or wheezing (Patient not taking: Reported on 7/8/2019) 8.5 g 3     amLODIPine (NORVASC) 5 MG tablet Take 5 mg by mouth daily       bedaquiline (SITRURO) 100 MG tablet Take 200 mg by mouth Every Mon, Wed, Fri Morning       cycloSERINE (SEROMYCIN) 250 MG capsule Take 250 mg by mouth 2 times daily       ethambutol (MYAMBUTOL) 400 MG tablet Take 1,200 mg by mouth daily       fluticasone-vilanterol (BREO ELLIPTA) 100-25 MCG/INH inhaler Inhale 1 puff into the lungs daily       gabapentin (NEURONTIN) 300 MG capsule Take 600 mg by mouth 3 times daily       levofloxacin (LEVAQUIN) 500 MG tablet Take 750 mg by mouth daily       pyrazinamide 500 MG tablet Take 1,500 mg by mouth daily       vitamin B6 (PYRIDOXINE) 100 MG tablet Take 200 mg by mouth daily         ROS:   10 point ROS negative except HPI    EXAM:  BP (!) 154/114 (BP Location: Right arm, Patient Position: Chair, Cuff Size: Adult Regular)   Pulse 96   Ht 1.626 m (5' 4\")   Wt 69.9 kg (154 lb)   SpO2 94%   BMI 26.43 kg/m     General: appears comfortable, alert and articulate  Head: normocephalic, atraumatic  Eyes: anicteric sclera, EOMI  Neck: no adenopathy  Orophyarynx: moist mucosa, no lesions, dentition intact  Heart: regular, S1/S2, no murmur, gallop, rub, estimated JVP   Lungs: clear, no rales or wheezing  Abdomen: soft, non-tender, bowel sounds present, no hepatosplenomegaly  Extremities: no clubbing, cyanosis or edema  Neurological: normal speech and affect, " no gross motor deficits    Labs:  CBC RESULTS:  Lab Results   Component Value Date    WBC 2.9 (L) 11/14/2018    RBC 3.79 (L) 11/14/2018    HGB 12.7 (L) 11/14/2018    HCT 38.4 (L) 11/14/2018     (H) 11/14/2018    MCH 33.5 (H) 11/14/2018    MCHC 33.1 11/14/2018    RDW 11.4 11/14/2018     11/14/2018       CMP RESULTS:  Lab Results   Component Value Date     10/18/2018    POTASSIUM 3.9 10/18/2018    CHLORIDE 101 10/18/2018    CO2 30 10/18/2018    ANIONGAP 6 10/18/2018    GLC 76 10/18/2018    BUN 17 10/18/2018    CR 1.16 10/18/2018    GFRESTIMATED 68 10/18/2018    GFRESTBLACK 82 10/18/2018    ZANDER 9.8 10/18/2018    BILITOTAL 1.5 (H) 10/18/2018    ALBUMIN 4.4 10/18/2018    ALKPHOS 93 10/18/2018    ALT 30 10/18/2018    AST 27 10/18/2018        Echocardiogram 12/30/2019 ([facility]):  The left ventricular systolic function is normal, estimated LVEF 60-65%.  Abnormal septal motion secondary to right ventricular volume and pressure overload.  Severely reduced right ventricular systolic function. The right ventricle is quantitatively enlarged.  There is trace circumferential pericardial effusion.  Severe pulmonary hypertension, estimated right ventricular systolic pressure is 64 mmHg.    CT Scan 12/29/19 ([facility])  Small volume ascites is not changed.  Chronic changes of the right lung with further volume loss and mediastinal shift to the right since 8/30/2019.  Groundglass infiltrates of the left upper lobe and lingula are consistent with infectious or inflammatory process.  Lobulated mass of the left lower lobe with pleural thickening has decreased in size with interval resolution of the cavitation since 8/30/2019.    Assessment and Plan: Chaim Norman is a 44 year old male with history of active TB with significant lung destruction who presents for evaluation of pulmonary hypertension.     1.     It was a pleasure seeing your patient Chaim Norman at the [facility] Pulmonary Hypertension  clinic.  Please contact us with any questions or concerns that you may have.    Sincerely,    Rod Chauhan MD, PhD   of Medicine  [facility]  Cardiovascular Division  [facility]                    January 28, 2020    Nyla Florian NP, DNP  Family Medicine  [place], [place]    Dear Dr. Florian,    I had the pleasure of seeing your patient Chaim Norman at the [facility] Pulmonary Hypertension clinic.  As you know, Chaim is a very pleasant 44 year old male with a past medical history tuberculosis first diagnosed in 2014 with subsequent pulmonary complications including lung destruction, hypertension, neuropathy, and likely pulmonary hypertension after an echocardiogram showed RV dilation and dysfunction. Chaim comes in today after having an echocardiogram performed at [facility] in December of 2019 which showed RV dilation and dysfunction. Chaim states that his breathing is actually getting better recently on his TB medications. He does not do a lot of activity because he has bad neuropathy in his feet so walking distances is nearly impossible. He is limited by this so going up a flight of stairs is difficult because of foot pain and not dyspnea. He denies any chest pain or pressure, presyncope, or syncope. He has no orthopnea or paroxysmal dyspnea. 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    Chaim is currently [facility]. He tells me he is taking his medications. He is frustrated by this diagnosis. He does have a lot of pain in his legs and his biggest concern today is his foot pain and neuropathy. He is scheduled to see a pain clinic and Dr. Florian has been working with him using gabapentin for his pain.    Chaim also had a CT scan performed at [facility] in December which showed bilateral lung destruction. He has not had any pulmonary function tests to his recollection or per my review of  the chart.    PAST MEDICAL HISTORY:  -Active TB  -Anemia  -Pancytopenia  -Echo suggesting pulmonary hypertension    FAMILY HISTORY:  Family History   Family history unknown: Yes       SOCIAL HISTORY:  Social History     Socioeconomic History     Marital status:      Spouse name: Not on file     Number of children: Not on file     Years of education: Not on file     Highest education level: Not on file   Occupational History     Not on file   Social Needs     Financial resource strain: Not on file     Food insecurity:     Worry: Not on file     Inability: Not on file     Transportation needs:     Medical: Not on file     Non-medical: Not on file   Tobacco Use     Smoking status: Never Smoker     Smokeless tobacco: Never Used   Substance and Sexual Activity     Alcohol use: No     Drug use: No     Sexual activity: Yes     Partners: Female     Birth control/protection: Condom   Lifestyle     Physical activity:     Days per week: Not on file     Minutes per session: Not on file     Stress: Not on file   Relationships     Social connections:     Talks on phone: Not on file     Gets together: Not on file     Attends Taoist service: Not on file     Active member of club or organization: Not on file     Attends meetings of clubs or organizations: Not on file     Relationship status: Not on file     Intimate partner violence:     Fear of current or ex partner: Not on file     Emotionally abused: Not on file     Physically abused: Not on file     Forced sexual activity: Not on file   Other Topics Concern     Not on file   Social History Narrative     Not on file       CURRENT MEDICATIONS:  Current Outpatient Medications   Medication Sig Dispense Refill     acetaminophen (TYLENOL) 500 MG tablet Take 500 mg by mouth       albuterol (PROVENTIL HFA) 108 (90 Base) MCG/ACT inhaler Inhale 2 puffs into the lungs every 6 hours as needed for shortness of breath / dyspnea or wheezing (Patient not taking: Reported on 7/8/2019)  "8.5 g 3     amLODIPine (NORVASC) 5 MG tablet Take 5 mg by mouth daily       bedaquiline (SITRURO) 100 MG tablet Take 200 mg by mouth Every Mon, Wed, Fri Morning       cycloSERINE (SEROMYCIN) 250 MG capsule Take 250 mg by mouth 2 times daily       ethambutol (MYAMBUTOL) 400 MG tablet Take 1,200 mg by mouth daily       fluticasone-vilanterol (BREO ELLIPTA) 100-25 MCG/INH inhaler Inhale 1 puff into the lungs daily       gabapentin (NEURONTIN) 300 MG capsule Take 600 mg by mouth 3 times daily       levofloxacin (LEVAQUIN) 500 MG tablet Take 750 mg by mouth daily       pyrazinamide 500 MG tablet Take 1,500 mg by mouth daily       vitamin B6 (PYRIDOXINE) 100 MG tablet Take 200 mg by mouth daily         ROS:   10 point ROS negative except HPI    EXAM:  BP (!) 154/114 (BP Location: Right arm, Patient Position: Chair, Cuff Size: Adult Regular)   Pulse 96   Ht 1.626 m (5' 4\")   Wt 69.9 kg (154 lb)   SpO2 94%   BMI 26.43 kg/m     General: appears comfortable, alert and articulate  Head: normocephalic, atraumatic  Eyes: anicteric sclera, EOMI  Neck: no adenopathy  Orophyarynx: moist mucosa, no lesions, dentition intact  Heart: regular, S1/S2, no murmur, gallop, rub, estimated JVP 3 cm  Lungs: decreased breath sounds bilaterally, worse on right side than left  Abdomen: soft, non-tender, bowel sounds present, no hepatosplenomegaly  Extremities: no clubbing, cyanosis or edema  Neurological: normal speech and affect, no gross motor deficits    Labs:  CBC RESULTS:  Lab Results   Component Value Date    WBC 2.9 (L) 11/14/2018    RBC 3.79 (L) 11/14/2018    HGB 12.7 (L) 11/14/2018    HCT 38.4 (L) 11/14/2018     (H) 11/14/2018    MCH 33.5 (H) 11/14/2018    MCHC 33.1 11/14/2018    RDW 11.4 11/14/2018     11/14/2018       CMP RESULTS:  Lab Results   Component Value Date     10/18/2018    POTASSIUM 3.9 10/18/2018    CHLORIDE 101 10/18/2018    CO2 30 10/18/2018    ANIONGAP 6 10/18/2018    GLC 76 10/18/2018    BUN 17 " 10/18/2018    CR 1.16 10/18/2018    GFRESTIMATED 68 10/18/2018    GFRESTBLACK 82 10/18/2018    ZANDER 9.8 10/18/2018    BILITOTAL 1.5 (H) 10/18/2018    ALBUMIN 4.4 10/18/2018    ALKPHOS 93 10/18/2018    ALT 30 10/18/2018    AST 27 10/18/2018        Echocardiogram 12/30/2019 ([facility]):  The left ventricular systolic function is normal, estimated LVEF 60-65%.  Abnormal septal motion secondary to right ventricular volume and pressure overload.  Severely reduced right ventricular systolic function. The right ventricle is quantitatively enlarged.  There is trace circumferential pericardial effusion.  Severe pulmonary hypertension, estimated right ventricular systolic pressure is 64 mmHg.    CT Scan 12/29/19 ([facility])  Small volume ascites is not changed.  Chronic changes of the right lung with further volume loss and mediastinal shift to the right since 8/30/2019.  Groundglass infiltrates of the left upper lobe and lingula are consistent with infectious or inflammatory process.  Lobulated mass of the left lower lobe with pleural thickening has decreased in size with interval resolution of the cavitation since 8/30/2019.    Assessment and Plan: Chaim Norman is a 44 year old male with history of active TB with significant lung destruction who presents for evaluation of pulmonary hypertension.     1. 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 I would like to perform a thorough evaluation including our normal screening PH labs, a VQ scan, pulmonary function tests, and an invasive hemodynamic study. I will hold off on a 6MWT today because his neuropathy is limiting his ability to walk.     Once we have collected this data, we will determine a plan of action. If this is truly Group 3 PH, he may need to be evaluated by Lung Transplantation as that is the only option for him. However, that will be very complicated due to his active TB.     2. Neuropathy: His neuropathy  is limiting his sleep. We did give a prescription for melatonin. I will defer any further pain management to Dr. Florian and the pain clinic.     It was a pleasure seeing your patient Chaim Norman at the [facility] Pulmonary Hypertension clinic.  Please contact us with any questions or concerns that you may have.    Sincerely,    Rod Chauhan MD, PhD   of Medicine  [facility] Pulmonary Hypertension  Cardiovascular Division  [facility]                    Please do not hesitate to contact me if you have any questions/concerns.     Sincerely,     Rod Chauhan MD

## 2020-01-28 NOTE — LETTER
RE: Chaim Norman  [address]  [facility] [address]       January 28, 2020    Nyla Florian NP, DNP  Family Medicine  [place], [place]    Dear Dr. Florian,    I had the pleasure of seeing your patient Chaim Norman at the [facility] Pulmonary Hypertension clinic.  As you know, Chaim is a very pleasant 44 year old male with a past medical history tuberculosis first diagnosed in 2014 with subsequent pulmonary complications including lung destruction, hypertension, neuropathy, and likely pulmonary hypertension after an echocardiogram showed RV dilation and dysfunction. Chaim comes in today after having an echocardiogram performed at [facility] in December of 2019 which showed RV dilation and dysfunction. Chaim states that his breathing is actually getting better recently on his TB medications. He does not do a lot of activity because he has bad neuropathy in his feet so walking distances is nearly impossible. He is limited by this so going up a flight of stairs is difficult because of foot pain and not dyspnea. He denies any chest pain or pressure, presyncope, or syncope. He has no orthopnea or paroxysmal dyspnea. 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    Chaim is currently seeing ID at [facility] [place] [place] for his TB. He tells me he is taking his medications. He is frustrated by this diagnosis. He does have a lot of pain in his legs and his biggest concern today is his foot pain and neuropathy. He is scheduled to see a pain clinic and Dr. Florian has been working with him using gabapentin for his pain.    Chaim also had a CT scan performed at [facility] in December which showed bilateral lung destruction. He has not had any pulmonary function tests to his recollection or per my review of the chart.    PAST MEDICAL HISTORY:  -Active TB  -Anemia  -Pancytopenia  -Echo suggesting pulmonary hypertension    FAMILY HISTORY:  Family History   Family history unknown: Yes        SOCIAL HISTORY:  Social History     Socioeconomic History     Marital status:      Spouse name: Not on file     Number of children: Not on file     Years of education: Not on file     Highest education level: Not on file   Occupational History     Not on file   Social Needs     Financial resource strain: Not on file     Food insecurity:     Worry: Not on file     Inability: Not on file     Transportation needs:     Medical: Not on file     Non-medical: Not on file   Tobacco Use     Smoking status: Never Smoker     Smokeless tobacco: Never Used   Substance and Sexual Activity     Alcohol use: No     Drug use: No     Sexual activity: Yes     Partners: Female     Birth control/protection: Condom   Lifestyle     Physical activity:     Days per week: Not on file     Minutes per session: Not on file     Stress: Not on file   Relationships     Social connections:     Talks on phone: Not on file     Gets together: Not on file     Attends [employer] service: Not on file     Active member of club or organization: Not on file     Attends meetings of clubs or organizations: Not on file     Relationship status: Not on file     Intimate partner violence:     Fear of current or ex partner: Not on file     Emotionally abused: Not on file     Physically abused: Not on file     Forced sexual activity: Not on file   Other Topics Concern     Not on file   Social History Narrative     Not on file       CURRENT MEDICATIONS:  Current Outpatient Medications   Medication Sig Dispense Refill     acetaminophen (TYLENOL) 500 MG tablet Take 500 mg by mouth       albuterol (PROVENTIL HFA) 108 (90 Base) MCG/ACT inhaler Inhale 2 puffs into the lungs every 6 hours as needed for shortness of breath / dyspnea or wheezing (Patient not taking: Reported on 7/8/2019) 8.5 g 3     amLODIPine (NORVASC) 5 MG tablet Take 5 mg by mouth daily       bedaquiline (SITRURO) 100 MG tablet Take 200 mg by mouth Every Mon, Wed, Fri Morning       cycloSERINE  "(SEROMYCIN) 250 MG capsule Take 250 mg by mouth 2 times daily       ethambutol (MYAMBUTOL) 400 MG tablet Take 1,200 mg by mouth daily       fluticasone-vilanterol (BREO ELLIPTA) 100-25 MCG/INH inhaler Inhale 1 puff into the lungs daily       gabapentin (NEURONTIN) 300 MG capsule Take 600 mg by mouth 3 times daily       levofloxacin (LEVAQUIN) 500 MG tablet Take 750 mg by mouth daily       pyrazinamide 500 MG tablet Take 1,500 mg by mouth daily       vitamin B6 (PYRIDOXINE) 100 MG tablet Take 200 mg by mouth daily         ROS:   10 point ROS negative except HPI    EXAM:  BP (!) 154/114 (BP Location: Right arm, Patient Position: Chair, Cuff Size: Adult Regular)   Pulse 96   Ht 1.626 m (5' 4\")   Wt 69.9 kg (154 lb)   SpO2 94%   BMI 26.43 kg/m     General: appears comfortable, alert and articulate  Head: normocephalic, atraumatic  Eyes: anicteric sclera, EOMI  Neck: no adenopathy  Orophyarynx: moist mucosa, no lesions, dentition intact  Heart: regular, S1/S2, no murmur, gallop, rub, estimated JVP 3 cm  Lungs: decreased breath sounds bilaterally, worse on right side than left  Abdomen: soft, non-tender, bowel sounds present, no hepatosplenomegaly  Extremities: no clubbing, cyanosis or edema  Neurological: normal speech and affect, no gross motor deficits    Labs:  CBC RESULTS:  Lab Results   Component Value Date    WBC 2.9 (L) 11/14/2018    RBC 3.79 (L) 11/14/2018    HGB 12.7 (L) 11/14/2018    HCT 38.4 (L) 11/14/2018     (H) 11/14/2018    MCH 33.5 (H) 11/14/2018    MCHC 33.1 11/14/2018    RDW 11.4 11/14/2018     11/14/2018       CMP RESULTS:  Lab Results   Component Value Date     10/18/2018    POTASSIUM 3.9 10/18/2018    CHLORIDE 101 10/18/2018    CO2 30 10/18/2018    ANIONGAP 6 10/18/2018    GLC 76 10/18/2018    BUN 17 10/18/2018    CR 1.16 10/18/2018    GFRESTIMATED 68 10/18/2018    GFRESTBLACK 82 10/18/2018    ZANDER 9.8 10/18/2018    BILITOTAL 1.5 (H) 10/18/2018    ALBUMIN 4.4 10/18/2018    " ALKPHOS 93 10/18/2018    ALT 30 10/18/2018    AST 27 10/18/2018        Echocardiogram 12/30/2019 ([facility]):  The left ventricular systolic function is normal, estimated LVEF 60-65%.  Abnormal septal motion secondary to right ventricular volume and pressure overload.  Severely reduced right ventricular systolic function. The right ventricle is quantitatively enlarged.  There is trace circumferential pericardial effusion.  Severe pulmonary hypertension, estimated right ventricular systolic pressure is 64 mmHg.    CT Scan 12/29/19 ([facility])  Small volume ascites is not changed.  Chronic changes of the right lung with further volume loss and mediastinal shift to the right since 8/30/2019.  Groundglass infiltrates of the left upper lobe and lingula are consistent with infectious or inflammatory process.  Lobulated mass of the left lower lobe with pleural thickening has decreased in size with interval resolution of the cavitation since 8/30/2019.    Assessment and Plan: Chaim Norman is a 44 year old male with history of active TB with significant lung destruction who presents for evaluation of pulmonary hypertension.     1. 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 I would like to perform a thorough evaluation including our normal screening PH labs, a VQ scan, pulmonary function tests, and an invasive hemodynamic study. I will hold off on a 6MWT today because his neuropathy is limiting his ability to walk.     Once we have collected this data, we will determine a plan of action. If this is truly Group 3 PH, he may need to be evaluated by Lung Transplantation as that is the only option for him. However, that will be very complicated due to his active TB.     2. Neuropathy: His neuropathy is limiting his sleep. We did give a prescription for melatonin. I will defer any further pain management to Dr. Florian and the pain clinic.     It was a pleasure seeing your  patient Chaim Norman at the [facility] Pulmonary Hypertension clinic.  Please contact us with any questions or concerns that you may have.    Sincerely,    Rod Chauhan MD, PhD   of Medicine  [facility] for Pulmonary Hypertension  Cardiovascular Division  [facility]

## 2020-01-30 ENCOUNTER — PATIENT OUTREACH (OUTPATIENT)
Dept: CARE COORDINATION | Facility: CLINIC | Age: 45
End: 2020-01-30

## 2020-01-30 NOTE — LETTER
[facility]  [address] [address]    January 30, 2020    Chaim Norman  [address]   [facility] [address]      Dear Chaim,    I have been attempting to reach you since our last contact. I would like to continue to work with you and provide any additional support you may need on achieving your health care related goals. I would appreciate if you would give me a call at [phone] to let me know if you would like to continue working together. I know that there are many things that can affect our ability to communicate and I hope we can continue to work together.    All of us at the [facility] are invested in your health and are here to assist you in meeting your goals.     Sincerely,    Melissa Behl BSN, RN, PHN, [facility]  Primary Care Clinical RN Care Coordinator  [facility]   [phone]

## 2020-01-30 NOTE — PROGRESS NOTES
Clinic Care Coordination Contact  [facility]/Voicemail       Clinical Data: Care Coordinator Outreach  Outreach attempted x 2.  Left message on patient's voicemail with call back information and requested return call.  Plan: Care Coordinator will send unable to contact letter with care coordinator contact information via mail. Care Coordinator will try to reach patient again in 1 month.    Melissa Behl BSN, RN, PHN, CCM  Primary Care Clinical RN Care Coordinator  [facility]   [phone]

## 2020-02-03 ENCOUNTER — TELEPHONE (OUTPATIENT)
Dept: FAMILY MEDICINE | Facility: CLINIC | Age: 45
End: 2020-02-03

## 2020-02-03 ENCOUNTER — OFFICE VISIT (OUTPATIENT)
Dept: FAMILY MEDICINE | Facility: CLINIC | Age: 45
End: 2020-02-03
Payer: COMMERCIAL

## 2020-02-03 ENCOUNTER — TELEPHONE (OUTPATIENT)
Dept: PALLIATIVE MEDICINE | Facility: CLINIC | Age: 45
End: 2020-02-03

## 2020-02-03 VITALS
DIASTOLIC BLOOD PRESSURE: 106 MMHG | WEIGHT: 156 LBS | BODY MASS INDEX: 26.63 KG/M2 | SYSTOLIC BLOOD PRESSURE: 150 MMHG | HEIGHT: 64 IN | HEART RATE: 110 BPM | OXYGEN SATURATION: 95 % | TEMPERATURE: 98.6 F

## 2020-02-03 DIAGNOSIS — I10 HYPERTENSION GOAL BP (BLOOD PRESSURE) < 140/80: ICD-10-CM

## 2020-02-03 DIAGNOSIS — Z86.11 HISTORY OF TB (TUBERCULOSIS): ICD-10-CM

## 2020-02-03 DIAGNOSIS — G62.0 DRUG-INDUCED PERIPHERAL NEUROPATHY (H): Primary | ICD-10-CM

## 2020-02-03 PROBLEM — R03.0 ELEVATED BLOOD PRESSURE READING WITHOUT DIAGNOSIS OF HYPERTENSION: Status: RESOLVED | Noted: 2018-11-20 | Resolved: 2020-02-03

## 2020-02-03 PROCEDURE — 99214 OFFICE O/P EST MOD 30 MIN: CPT | Performed by: NURSE PRACTITIONER

## 2020-02-03 RX ORDER — AMLODIPINE BESYLATE 5 MG/1
5 TABLET ORAL DAILY
Qty: 30 TABLET | Refills: 2 | Status: SHIPPED | OUTPATIENT
Start: 2020-02-03 | End: 2020-04-21

## 2020-02-03 RX ORDER — OXYCODONE HYDROCHLORIDE 5 MG/1
5 TABLET ORAL
Qty: 15 TABLET | Refills: 0 | Status: SHIPPED | OUTPATIENT
Start: 2020-02-03 | End: 2020-02-11

## 2020-02-03 ASSESSMENT — MIFFLIN-ST. JEOR: SCORE: 1498.61

## 2020-02-03 ASSESSMENT — PAIN SCALES - GENERAL: PAINLEVEL: WORST PAIN (10)

## 2020-02-03 NOTE — TELEPHONE ENCOUNTER
Noted. It's because of the new law from July 2019. Will do 1 week for now and then we can re-evaluate next week rather than waiting 2 weeks as planned.

## 2020-02-03 NOTE — TELEPHONE ENCOUNTER
Reason for Call:  Other call back    Detailed comments: Pt having issues scheduling for the pain management clinic.     Phone Number Patient can be reached at: Cell number on file:    Telephone Information:   Mobile [phone]       Best Time: Anytime.    Can we leave a detailed message on this number? YES    Call taken on 2/3/2020 at 2:14 PM by Shasta Vee

## 2020-02-03 NOTE — TELEPHONE ENCOUNTER
Reason for Call:  Other prescription    Detailed comments: Chaim was just informed by his pharmacy that they were only able to dispense 7 days worth of oxyCODONE (ROXICODONE) 5 MG tablet, not the full 15 pills that you sent over. Chaim was told to have you call his insurance company to inform them that you are ordering 15 pills.     Phone Number Patient can be reached at: Home number on file [phone] (home)    Best Time: Any    Can we leave a detailed message on this number? YES    Call taken on 2/3/2020 at 1:06 PM by Kyle Chavez

## 2020-02-03 NOTE — PATIENT INSTRUCTIONS
Please take your amlodipine daily for blood pressure control  Referral for pain management sent  Continue current medications including gabapentin  Take tylenol 650-1000 mg every 6 hours as needed for pain  Take ibuprofen 600 mg every 6 hours as needed for pain. Take with food  May take oxycodone 5 mg at bedtime as needed for severe pain. Do not drive, operate machinery, or drink alcohol while taking. This medication is an opioid and can be addicting. Use it sparingly.         Abdomen soft, non-tender and non-distended without organomegaly or masses. Normal bowel sounds.

## 2020-02-03 NOTE — PROGRESS NOTES
Subjective     Chaim Norman is a 44 year old male who presents to clinic today for the following health issues:    HPI   Joint Pain    Onset: about 1 month    Description:   Location: Left & Right Foot  Character: Sharp, Dull ache and Burning    Intensity: 10/10    Progression of Symptoms: worse    Accompanying Signs & Symptoms:  Other symptoms: radiation of pain to knee and numbness    History:   Previous similar pain: YES      Precipitating factors:   Trauma or overuse: no Patient feels it might be a reaction to one of his medications       Alleviating factors:  Improved by: nothing    Therapies Tried and outcome: Gabapentin not working. 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 Was seen yesterday in emergency department for 10/10 neuropathic pain to bilateral feet. States this pain is constant, but worse at night. He describes pain up to his knees with numbness in bilateral feet and itching to bottoms of feet. Takes tylenol, ibuprofen, and gabapentin with no effect. Had one dose of Percocet last night from emergency department which relieved pain for 8 hours and would like more of this medication until he can see the pain clinic.   Patient has difficulties with transportation and is requesting pain clinic nearby.     Patient Active Problem List   Diagnosis     History of TB (tuberculosis)     Weight loss     Pulmonary nodules     Late latent syphilis     Iatrogenic pneumothorax     Patient's intentional underdosing of medication regimen due to financial hardship     Anemia of chronic disease     Benign prostatic hyperplasia, unspecified whether lower urinary tract symptoms present     COPD (chronic obstructive pulmonary disease) (H)     Hepatitis B core antibody positive     Neutropenia (H)     Pulmonary hypertension (H)     SOB (shortness of breath)     History reviewed. No pertinent surgical  history.    Social History     Tobacco Use     Smoking status: Never Smoker     Smokeless tobacco: Never Used   Substance Use Topics     Alcohol use: No     Family History   Family history unknown: Yes         Current Outpatient Medications   Medication Sig Dispense Refill     acetaminophen (TYLENOL) 500 MG tablet Take 500 mg by mouth       albuterol (PROVENTIL HFA) 108 (90 Base) MCG/ACT inhaler Inhale 2 puffs into the lungs every 6 hours as needed for shortness of breath / dyspnea or wheezing 8.5 g 3     amLODIPine (NORVASC) 5 MG tablet Take 1 tablet (5 mg) by mouth daily 30 tablet 2     bedaquiline (SITRURO) 100 MG tablet Take 200 mg by mouth Every Mon, Wed, Fri Morning       cycloSERINE (SEROMYCIN) 250 MG capsule Take 250 mg by mouth 2 times daily       ethambutol (MYAMBUTOL) 400 MG tablet Take 1,200 mg by mouth daily       fluticasone-vilanterol (BREO ELLIPTA) 100-25 MCG/INH inhaler Inhale 1 puff into the lungs daily       gabapentin (NEURONTIN) 300 MG capsule Take 600 mg by mouth 3 times daily       levofloxacin (LEVAQUIN) 500 MG tablet Take 750 mg by mouth daily       melatonin 3 MG tablet Take 1 tablet (3 mg) by mouth nightly as needed for sleep 30 tablet 3     oxyCODONE (ROXICODONE) 5 MG tablet Take 1 tablet (5 mg) by mouth nightly as needed for severe pain 15 tablet 0     pyrazinamide 500 MG tablet Take 1,500 mg by mouth daily       vitamin B6 (PYRIDOXINE) 100 MG tablet Take 200 mg by mouth daily       No Known Allergies  BP Readings from Last 3 Encounters:   02/03/20 (!) 150/106   01/28/20 (!) 154/114   01/14/20 131/89    Wt Readings from Last 3 Encounters:   02/03/20 70.8 kg (156 lb)   01/28/20 69.9 kg (154 lb)   01/14/20 71 kg (156 lb 8 oz)         Reviewed and updated as needed this visit by Provider       Review of Systems   ROS COMP: Constitutional, HEENT, cardiovascular, pulmonary, gi and gu systems are negative, except as otherwise noted.      Objective    BP (!) 148/108 (BP Location: Right arm,  "Patient Position: Chair, Cuff Size: Adult Large)   Pulse 110   Temp 98.6  F (37  C) (Oral)   Ht 1.626 m (5' 4\")   Wt 70.8 kg (156 lb)   SpO2 95%   BMI 26.78 kg/m    Body mass index is 26.78 kg/m .  Physical Exam   GENERAL: healthy, alert and no distress  NECK: no adenopathy, no asymmetry, masses, or scars and thyroid normal to palpation  RESP: lungs clear to auscultation - no rales, rhonchi or wheezes  CV: regular rate and rhythm, normal S1 S2, no S3 or S4, no murmur, click or rub, no peripheral edema and peripheral pulses strong  MS: no gross musculoskeletal defects noted, no edema  SKIN: no suspicious lesions or rashes    Diagnostic Test Results:  Labs reviewed in Epic        Assessment & Plan     1. Drug-induced peripheral neuropathy (H)  Constant neuropathic pain 10/10 per patient, bilateral feet up to knees. Patient was given 1x acetaminophen/oxycodone (Percocet) dose at emergency department discharge which relieved pain for 8 hours. He is requesting more of this medication for home use. Will give 5mg oxycodone for night time pain, advised patient on precautions with opioid medications. Referral to [facility] pain management placed. He also had follow up with neurology for EMG 2/26 and in clinic 3/25.  - PAIN MANAGEMENT REFERRAL  - oxyCODONE (ROXICODONE) 5 MG tablet; Take 1 tablet (5 mg) by mouth nightly as needed for severe pain  Dispense: 15 tablet; Refill: 0    2. Hypertension goal BP (blood pressure) < 140/80  BP has been consistently elevated, likely due to pain that patient is experiencing. Patient is unsure if he has been taking his amlodipine. Education provided on importance of taking BP medication. Refill sent to pharmacy.  - amLODIPine (NORVASC) 5 MG tablet; Take 1 tablet (5 mg) by mouth daily  Dispense: 30 tablet; Refill: 2    3. History of TB (tuberculosis)  On medications for MDR-TB, followed by ID with next appt 02/07.    See Patient Instructions.  Please take your amlodipine daily for blood " pressure control  Referral for pain management sent  Continue current medications including gabapentin  Take tylenol 650-1000 mg every 6 hours as needed for pain  Take ibuprofen 600 mg every 6 hours as needed for pain. Take with food  May take oxycodone 5 mg at bedtime as needed for severe pain. Do not drive, operate machinery, or drink alcohol while taking. This medication is an opioid and can be addicting. Use it sparingly.    Return in about 2 weeks (around 2/17/2020).     The benefits, risks and potential side effects were discussed in detail. Black box warnings discussed as relevant. All patient questions were answered. The patient was instructed to follow up immediately if any adverse reactions develop.    Return precautions discussed, including when to seek urgent/emergent care.    Patient verbalizes understanding and agrees with plan of care. Patient stable for discharge.      CHAZ [facility]

## 2020-02-03 NOTE — TELEPHONE ENCOUNTER
Called and spoke with patient. He states he want to go to [facility], but the pain clinic states he can't and he can only go to [place].    I called  pain clinic, I was informed that they don't schedule for  pain clinic, and that provider would need to place new order for [place] pain clinic and have patient call [phone] to make appointment.     Route to provider to place new referral for pain cinic.    Team to call patient to inform of new number to call and schedule with pain clinic after referral placed by provider.    Xin Gotti MA

## 2020-02-03 NOTE — TELEPHONE ENCOUNTER
Patient wants to go to [place]                 Pain Management Center Referral      1. Confirmed address with patient? Yes  2. Confirmed phone number with patient? Yes  3. Confirmed referring provider? Yes  4. Is the PCP the same as the referring provider? Yes  5. Has the patient been to any previous pain clinics? No  (If yes, send ROBIN with welcome letter)  6. Which insurance are we to bill for this appointment?  [employer]     7. Informed pt of cancellation (48 hour) policy? Yes    REGARDING OPIOID MEDICATIONS: We will always address appropriateness of opioid pain medications, but we generally will not automatically take on a prescribing role. When we do take on prescribing of opioids for chronic pain, it is in collaboration with the referring physician for an intermediate period of time (months), with an expectation that the primary physician or provider will assume the prescribing role if medications are effective at stable doses with demonstrated compliance. Therefore, please do not assume that your prescribing responsibilities end on the day of pain clinic consultation.  8. Informed pt of prescribing policy? Yes    9.Please be aware that once you are established with a pain provider and location, you will need to continue have all future visits with that provider and location. It is best to determine what location is the most convenient for you and schedule with that one.    ** PATIENT INFORMED OF THIS POLICY Yes      9. Referring Provider: Nyla Florian    [facility]

## 2020-02-04 NOTE — TELEPHONE ENCOUNTER
..Reason for Call:  referral    Detailed comments: Patient states he needs referral to [place] pain clinic    Phone Number Patient can be reached at: Home number on file [phone] (home)    Best Time: anhytime    Can we leave a detailed message on this number? YES    Call taken on 2/4/2020 at 8:58 AM by Yadi Tse

## 2020-02-04 NOTE — TELEPHONE ENCOUNTER
Pt called [facility] again to schedule an appt but stated he wants to go to [place]. Explained to pt that we had spoken with a nurse yesterday about this. Pt is still requesting to schedule at [place]. Pt was given our locations. Provider number to pt again for scheduling.      Jacobo KURTZ    [facility] [place]

## 2020-02-04 NOTE — TELEPHONE ENCOUNTER
Called  Pain Clinic - I was informed there are no pain clinic in .   Informed patient he will have to go to [place]. Informed patient to call the same number back and schedule his appointment at [place]. They will go over directions with patient.    Xin Gotti MA

## 2020-02-10 ENCOUNTER — TELEPHONE (OUTPATIENT)
Dept: FAMILY MEDICINE | Facility: CLINIC | Age: 45
End: 2020-02-10

## 2020-02-10 ENCOUNTER — PATIENT OUTREACH (OUTPATIENT)
Dept: CARE COORDINATION | Facility: CLINIC | Age: 45
End: 2020-02-10

## 2020-02-10 ASSESSMENT — ACTIVITIES OF DAILY LIVING (ADL): DEPENDENT_IADLS:: INDEPENDENT

## 2020-02-10 NOTE — PROGRESS NOTES
Clinic Care Coordination Contact    Follow Up Progress Note      Assessment: RN CC received call from patient requesting assistance in obtaining address and transportation to [facility], appointment scheduled for 2/18/20.  Patient requesting assistance with transportation.  RN CC inquired if patient had applied for and/or been approved for medical assistance.  Patient states he was uncertain, as the  from [facility] had helped him apply during his last hospitalization.  RN CC advised patient to check on status of application.  Patient informed writer he had left a message for the [facility]  prior to writer's call.    Patient has not followed up on calling or going to the [facility] to apply for cash and/or emergency assistance.  RN CC explained [employer] to patient.  Patient is interested.  See 2/10/20 telephone encounter.  Patient reports excruciating pain in BLE and states this has been interfering with his sleep.  Patient has been seen in the ED and by PCP for this.  Patient will establish with the pain clinic next week and has a follow up with PCP tomorrow.    Goals addressed this encounter:   Goals Addressed                 This Visit's Progress      1. Transportation (pt-stated)   10%     Goal Statement: I need help getting transportation.  Date Goal set: 1/15/2020   Date to Achieve By: 3/15/20  Patient expressed understanding of goal: yes  Action steps to achieve this goal:  1. I will call [employer] [phone]  2. I will go to [facility] and apply for Medical Assistance benefit  3. I will apply for [employer]             2. Improve chronic symptoms (pt-stated)   10%     Goal Statement: I want to be able to do activity without being short of breath.  Date Goal set: 1/15/2020   Date to Achieve By: 6/15/20  Patient expressed understanding of goal: yes  Action steps to achieve this goal:  1. I will  follow up with my infectious disease providers as scheduled, 1/16/20, 1/22/20  2. I will see the pulmonary hypertension clinic 1/28/20  3. I will wear my oxygen as directed                Intervention/Education provided during outreach: RN CC reviewed plan of care, advised patient to follow up with [facility] and [facility] as previously advised to apply for cash and emergency assistance programs.  RN CC informed patient of [employer] and placed application in  basket to be addressed by PCP.     Outreach Frequency: monthly    Plan:   1. Patient will follow up with PCP tomorrow.  2. Patient will apply for [employer].  3. Patient will check on status of medical assistance application.  4. Patient will apply for cash and emergency assistance programs through the county at the [facility] and [facility].  5. Patient will establish with the pain clinic as scheduled 2/18/20.  6. RN Care Coordinator will follow up in 2 weeks.    Melissa Behl BSN, RN, PHN, CCM  Primary Care Clinical RN Care Coordinator  [facility]   [phone]

## 2020-02-10 NOTE — TELEPHONE ENCOUNTER
Patient is coming in for an appointment 2/11/20 and would like [employer].  Form placed in  basket.    Melissa Behl BSN, RN, PHN, CCM  Primary Care Clinical RN Care Coordinator  [facility]   [phone]

## 2020-02-11 ENCOUNTER — OFFICE VISIT (OUTPATIENT)
Dept: FAMILY MEDICINE | Facility: CLINIC | Age: 45
End: 2020-02-11
Payer: COMMERCIAL

## 2020-02-11 VITALS
TEMPERATURE: 98.2 F | OXYGEN SATURATION: 96 % | HEIGHT: 64 IN | HEART RATE: 108 BPM | RESPIRATION RATE: 18 BRPM | WEIGHT: 158 LBS | BODY MASS INDEX: 26.98 KG/M2 | SYSTOLIC BLOOD PRESSURE: 130 MMHG | DIASTOLIC BLOOD PRESSURE: 100 MMHG

## 2020-02-11 DIAGNOSIS — Z02.89 ENCOUNTER FOR COMPLETION OF FORM WITH PATIENT: ICD-10-CM

## 2020-02-11 DIAGNOSIS — G62.0 DRUG-INDUCED PERIPHERAL NEUROPATHY (H): Primary | ICD-10-CM

## 2020-02-11 DIAGNOSIS — Z86.11 HISTORY OF TB (TUBERCULOSIS): ICD-10-CM

## 2020-02-11 PROCEDURE — 99214 OFFICE O/P EST MOD 30 MIN: CPT | Performed by: NURSE PRACTITIONER

## 2020-02-11 RX ORDER — OXYCODONE HYDROCHLORIDE 5 MG/1
5 TABLET ORAL 2 TIMES DAILY PRN
Qty: 14 TABLET | Refills: 0 | Status: SHIPPED | OUTPATIENT
Start: 2020-02-11 | End: 2021-05-14

## 2020-02-11 RX ORDER — ACETAMINOPHEN 325 MG/1
650 TABLET ORAL EVERY 6 HOURS PRN
Qty: 50 TABLET | Refills: 1 | Status: SHIPPED | OUTPATIENT
Start: 2020-02-11 | End: 2020-02-25

## 2020-02-11 ASSESSMENT — MIFFLIN-ST. JEOR: SCORE: 1507.68

## 2020-02-11 ASSESSMENT — PAIN SCALES - GENERAL: PAINLEVEL: WORST PAIN (10)

## 2020-02-11 NOTE — PATIENT INSTRUCTIONS
Gabapentin 900 mg 3 times per day  Tylenol 650 mg every 6 hours  Oxycodone 5 mg twice per day  Follow up with pain management next week  YOU mail in the [employer] form  We will fax in the disability form

## 2020-02-11 NOTE — PROGRESS NOTES
"Subjective     Chaim Norman is a 44 year old male who presents to clinic today for the following health issues:    HPI   Pain    Onset: >1 month    Description:   Location: Bilateral knees down to feet  Character: Sharp, heaviness, burning    Intensity: severe    Progression of Symptoms: worse, constant  Unable to sleep during day and night due to pain  Pain clinic 2/18/2020    Accompanying Signs & Symptoms:  Other symptoms: numbness, tingling and swelling    History:   Previous similar pain: yes      Precipitating factors:   Trauma or overuse: YES    Alleviating factors:  Improved by: Oxycodone    Therapies Tried and outcome: Oxycodone with some relief but when pain returns feel hubert pain intensity is doubled    Feels like doing better with his breathing but pain in feet continue. It has been severe for about 2 months.     Gabapentin 900 TID - not helping much  Oxycodone at bedtime - helped a little bit but when it stops working pain is \"twice as bad\"  Tylenol 3 tabs of 325 BID - ran out but helped a little  Ibuprofen 3 tabs of 200 mg BID - ran out but helped a little     Saw ID last week and no changes.  Biggest issue right now is feet pain. He is unable to work due to pain. Shoes hurt his feet and can't wear them.  Followed by neuro - Has EMG in 2 weeks  Has first visit with pain clinic next week    He also has paper work for [employer] and disability for [facility]. He is very worried he can't pay his rent if he can't work due to pain in his feet.    Patient Active Problem List   Diagnosis     History of TB (tuberculosis)     Weight loss     Pulmonary nodules     Late latent syphilis     Iatrogenic pneumothorax     Patient's intentional underdosing of medication regimen due to financial hardship     Anemia of chronic disease     Benign prostatic hyperplasia, unspecified whether lower urinary tract symptoms present     COPD (chronic obstructive pulmonary disease) (H)     Hepatitis B core antibody positive "     Neutropenia (H)     Pulmonary hypertension (H)     SOB (shortness of breath)     History reviewed. No pertinent surgical history.    Social History     Tobacco Use     Smoking status: Never Smoker     Smokeless tobacco: Never Used   Substance Use Topics     Alcohol use: No     Family History   Family history unknown: Yes         Current Outpatient Medications   Medication Sig Dispense Refill     acetaminophen (TYLENOL) 325 MG tablet Take 2 tablets (650 mg) by mouth every 6 hours as needed for mild pain 50 tablet 1     albuterol (PROVENTIL HFA) 108 (90 Base) MCG/ACT inhaler Inhale 2 puffs into the lungs every 6 hours as needed for shortness of breath / dyspnea or wheezing 8.5 g 3     amLODIPine (NORVASC) 5 MG tablet Take 1 tablet (5 mg) by mouth daily 30 tablet 2     bedaquiline (SITRURO) 100 MG tablet Take 200 mg by mouth Every Mon, Wed, Fri Morning       cycloSERINE (SEROMYCIN) 250 MG capsule Take 250 mg by mouth 2 times daily       ethambutol (MYAMBUTOL) 400 MG tablet Take 1,200 mg by mouth daily       fluticasone-vilanterol (BREO ELLIPTA) 100-25 MCG/INH inhaler Inhale 1 puff into the lungs daily       gabapentin (NEURONTIN) 300 MG capsule Take 900 mg by mouth 3 times daily       levofloxacin (LEVAQUIN) 500 MG tablet Take 750 mg by mouth daily       melatonin 3 MG tablet Take 1 tablet (3 mg) by mouth nightly as needed for sleep 30 tablet 3     oxyCODONE (ROXICODONE) 5 MG tablet Take 1 tablet (5 mg) by mouth 2 times daily as needed for severe pain 14 tablet 0     pyrazinamide 500 MG tablet Take 1,500 mg by mouth daily       vitamin B6 (PYRIDOXINE) 100 MG tablet Take 200 mg by mouth daily       No Known Allergies  BP Readings from Last 3 Encounters:   02/11/20 (!) 130/100   02/03/20 (!) 150/106   01/28/20 (!) 154/114    Wt Readings from Last 3 Encounters:   02/11/20 71.7 kg (158 lb)   02/03/20 70.8 kg (156 lb)   01/28/20 69.9 kg (154 lb)                    Reviewed and updated as needed this visit by  "Provider         Review of Systems   ROS COMP: Constitutional, HEENT, cardiovascular, pulmonary, gi and gu systems are negative, except as otherwise noted.      Objective    BP (!) 130/100   Pulse 108   Temp 98.2  F (36.8  C) (Oral)   Resp 18   Ht 1.626 m (5' 4\")   Wt 71.7 kg (158 lb)   SpO2 96%   BMI 27.12 kg/m    Body mass index is 27.12 kg/m .  Physical Exam   GENERAL: healthy, alert and no distress  RESP: lungs clear to auscultation - no rales, rhonchi or wheezes  CV: regular rate and rhythm, normal S1 S2, no S3 or S4, no murmur, click or rub, no peripheral edema and peripheral pulses strong  MS: no gross musculoskeletal defects noted, no edema  SKIN: no suspicious lesions or rashes  NEURO: Normal strength and tone, mentation intact and speech normal  PSYCH: mentation appears normal, affect normal/bright    Diagnostic Test Results:  Labs reviewed in Epic        Assessment & Plan     1. Drug-induced peripheral neuropathy (H)  MN  checked and no red flags. Increasing oxycodone to BID from at bedtime to hopefully give him some more relief. Adding tylenol back in - sounds like he ran out. Continue gabapentin 900 mg TID. Pain appointment next week and will await their recommendations.   - oxyCODONE (ROXICODONE) 5 MG tablet; Take 1 tablet (5 mg) by mouth 2 times daily as needed for severe pain  Dispense: 14 tablet; Refill: 0  - acetaminophen (TYLENOL) 325 MG tablet; Take 2 tablets (650 mg) by mouth every 6 hours as needed for mild pain  Dispense: 50 tablet; Refill: 1    2. Encounter for form completion of form with patient  [facility] [employer] and [facility] disability paperwork completed    3. History of TB  Managed by ID       See Patient Instructions    Gabapentin 900 mg 3 times per day  Tylenol 650 mg every 6 hours  Oxycodone 5 mg twice per day  Follow up with pain management next week  YOU mail in the [facility] [employer] form  We will fax in the disability form    Return in about 1 week (around 2/18/2020). "     The benefits, risks and potential side effects were discussed in detail. Black box warnings discussed as relevant. All patient questions were answered. The patient was instructed to follow up immediately if any adverse reactions develop.    Return precautions discussed, including when to seek urgent/emergent care.    Patient verbalizes understanding and agrees with plan of care. Patient stable for discharge.    Greater than 50% of the 25 min visit was spent on counseling and coordination of care for the above issues.       CHAZ Garrett [facility]

## 2020-02-13 DIAGNOSIS — J44.9 CHRONIC OBSTRUCTIVE PULMONARY DISEASE, UNSPECIFIED COPD TYPE (H): Primary | ICD-10-CM

## 2020-02-13 ASSESSMENT — ACTIVITIES OF DAILY LIVING (ADL): DEPENDENT_IADLS:: INDEPENDENT

## 2020-02-13 NOTE — TELEPHONE ENCOUNTER
Routing refill request to provider for review/approval because:  Medication is reported/historical    Tien Sheth RN, BSN, PHN

## 2020-02-13 NOTE — TELEPHONE ENCOUNTER
RN CC received a call from patient stating he is out of his Breo Ellipta inhaler and is requesting for it to be refilled today.    Please advise.    Melissa Behl BSN, RN, PHN, CCM  Primary Care Clinical RN Care Coordinator  [facility]   [phone]

## 2020-02-13 NOTE — LETTER
[address]  [address]  [phone]    February 14, 2020      Chaim Norman  [address]   [place] [address]      Dear Chaim,    We have refilled your inhaler.   Please call [phone] with any questions..      Thank you,    [facility]

## 2020-02-13 NOTE — TELEPHONE ENCOUNTER
"Requested Prescriptions   Pending Prescriptions Disp Refills     fluticasone-vilanterol (BREO ELLIPTA) 100-25 MCG/INH inhaler        Last Written Prescription Date:  na  Last Fill Quantity: na,   # refills: na  Last Office Visit: 02/11/2020-[place]  Future Office visit:       Routing refill request to provider for review/approval because:  Medication is reported/historical       Sig: Inhale 1 puff into the lungs daily       Inhaled Steroids Protocol Passed - 2/13/2020 11:37 AM        Passed - Patient is age 12 or older        Passed - Recent (12 mo) or future (30 days) visit within the authorizing provider's specialty     Patient has had an office visit with the authorizing provider or a provider within the authorizing providers department within the previous 12 mos or has a future within next 30 days. See \"Patient Info\" tab in inbasket, or \"Choose Columns\" in Meds & Orders section of the refill encounter.              Passed - Medication is active on med list          "

## 2020-02-13 NOTE — PROGRESS NOTES
Clinic Care Coordination Contact  Care Team Conversations    RN CC received a call from patient requesting assistance in refilling his Breo Ellipta inhaler due to being completely out.  RN CC educated patient on requesting refills prior to running out of medication.  See 2/13/20 refill encounter.    Patient has not gone to the [facility] or inquired about the status of his Medical Assistance application.  RN CC again educated patient on the importance of completing this to obtain assistance and transportation.  Patient verbalized understanding.  Patient stated he believes he may be able to drive or find someone to drive him to his pain clinic appointment next week.  Patient completed his [employer] application and has mailed it in.    Patient reports being unable to pay his rent this month.  Patient states he was working with the  from [facility], however, she is no longer answering or returning his calls.  Patient inquires if writer can connect him with a  to assist with financial difficulties and being unable to pay for rent due to being unable to work.  RN CC will forward to [facility].  RN CC again advised patient to go to the [facility] and [facility] to apply for emergency assistance programs and advised patient to request to talk with a [facility]  regarding his situation.  Patient verbalized understanding.    Melissa Behl BSN, RN, PHN, CCM  Primary Care Clinical RN Care Coordinator  [facility]   [phone]

## 2020-02-13 NOTE — TELEPHONE ENCOUNTER
My part of form was completed and given to patient during the visit. He will do his part and mail it in. Thank you

## 2020-02-14 ENCOUNTER — TELEPHONE (OUTPATIENT)
Dept: FAMILY MEDICINE | Facility: CLINIC | Age: 45
End: 2020-02-14

## 2020-02-14 ENCOUNTER — PATIENT OUTREACH (OUTPATIENT)
Dept: CARE COORDINATION | Facility: CLINIC | Age: 45
End: 2020-02-14

## 2020-02-14 DIAGNOSIS — J44.9 CHRONIC OBSTRUCTIVE PULMONARY DISEASE, UNSPECIFIED COPD TYPE (H): Primary | ICD-10-CM

## 2020-02-14 ASSESSMENT — ACTIVITIES OF DAILY LIVING (ADL): DEPENDENT_IADLS:: INDEPENDENT

## 2020-02-14 NOTE — TELEPHONE ENCOUNTER
Plan does not cover fluticasone-vilanterol (BREO ELLIPTA) 100-25 MCG/INH inhaler.    Additional Information: Needs alternavtive    Chen Clark

## 2020-02-14 NOTE — PROGRESS NOTES
Clinic Care Coordination Contact    Clinic Care Coordination Contact  OUTREACH    Referral Information:  Referral Source: PCP    Primary Diagnosis: Respiratory Disorders - other    No chief complaint on file.       Universal Utilization: [facility] Utilization  Difficulty keeping appointments:: Yes  Compliance Concerns: No  No-Show Concerns: Yes  No PCP office visit in Past Year: No  Utilization    Last refreshed: 2/14/2020 10:58 AM:  Hospital Admissions 0           Last refreshed: 2/14/2020 10:58 AM:  ED Visits 0           Last refreshed: 2/14/2020 10:58 AM:  No Show Count (past year) 4              Current as of: 2/14/2020 10:58 AM              Clinical Concerns:  Current Medical Concerns:    Patient Active Problem List   Diagnosis     History of TB (tuberculosis)     Weight loss     Pulmonary nodules     Late latent syphilis     Iatrogenic pneumothorax     Patient's intentional underdosing of medication regimen due to financial hardship     Anemia of chronic disease     Benign prostatic hyperplasia, unspecified whether lower urinary tract symptoms present     COPD (chronic obstructive pulmonary disease) (H)     Hepatitis B core antibody positive     Neutropenia (H)     Pulmonary hypertension (H)     SOB (shortness of breath)         Current Behavioral Concerns: None. Patient was engaged in conversation    Education Provided to patient: Introduced self and role. Encouraged patient to fill out his emergency assistance paperwork   Pain  Pain (GOAL):: No  Health Maintenance Reviewed: Due/Overdue   Health Maintenance Due   Topic Date Due     PREVENTIVE CARE VISIT  1973     SPIROMETRY  1973     COPD ACTION PLAN  1973     INFLUENZA VACCINE (1) 09/01/2019       Clinical Pathway: None    Medication Management:  Did not discuss medications during this converstation     Functional Status: Patient reported that his fit were hurting him today, making it difficult to get  around.   Dependent ADLs:: Independent  Dependent IADLs:: Independent  Bed or wheelchair confined:: No  Mobility Status: Independent  Fallen 2 or more times in the past year?: No  Any fall with injury in the past year?: No    Living Situation:  Current living arrangement:: I live in a private home with family(Lives with son (18 years old))  Type of residence:: Apartment    Lifestyle & Psychosocial Needs: SWCC reached out to patient, as he had notified care team he was having troubles paying his rent. It was suggested by RNCC on 2/10 that patient go to [facility] to apply for emergency assistance programs. Patient stated that he has the application but has not filled it out. He is unable to get to the service center today d/t his feet hurting him too badly to get around. He stated that he will fill it out this weekend and bring it to the service center on Monday.   Diet:: Regular  Inadequate nutrition (GOAL):: No  Tube Feeding: No  Inadequate activity/exercise (GOAL):: No  Significant changes in sleep pattern (GOAL): No  None(Car currently broke down)  Financial/Insurance concerns (GOAL):: No  Baptism or spiritual beliefs that impact treatment:: No  Mental health DX:: No  Mental health management concern (GOAL):: No  Informal Support system:: None   Socioeconomic History     Marital status:      Spouse name: Not on file     Number of children: Not on file     Years of education: Not on file     Highest education level: Not on file     Tobacco Use     Smoking status: Never Smoker     Smokeless tobacco: Never Used   Substance and Sexual Activity     Alcohol use: No     Drug use: No     Sexual activity: Yes     Partners: Female     Birth control/protection: Condom        Resources and Interventions:  Current Resources:      Community Resources: [facility] Programs(food assistance, awaiting approval for cash assistance, [facility])  Supplies used at home:: None  Equipment Currently Used at Home:  none    Advance Care Plan/Directive  Advanced Care Plans/Directives on file:: No  Advanced Care Plan/Directive Status: Considering Options    Referrals Placed: Transportation, County Resources([facility], [employer], [employer])     Goals:   Goals        General    1. Transportation (pt-stated)     Notes - Note edited  2/10/2020  2:54 PM by Behl, Melissa K, RN    Goal Statement: I need help getting transportation.  Date Goal set: 1/15/2020   Date to Achieve By: 3/15/20  Patient expressed understanding of goal: yes  Action steps to achieve this goal:  1. I will call [employer] [phone]  2. I will go to [facility] and apply for Medical Assistance benefit  3. I will apply for [employer]           2. Improve chronic symptoms (pt-stated)     Notes - Note created  1/15/2020  4:11 PM by Behl, Melissa K, RN    Goal Statement: I want to be able to do activity without being short of breath.  Date Goal set: 1/15/2020   Date to Achieve By: 6/15/20  Patient expressed understanding of goal: yes  Action steps to achieve this goal:  1. I will follow up with my infectious disease providers as scheduled, 1/16/20, 1/22/20  2. I will see the pulmonary hypertension clinic 1/28/20  3. I will wear my oxygen as directed         Financial Wellbeing (pt-stated)     Notes - Note edited  2/14/2020 12:30 PM by Robin Jansen BSW    Goal Statement: I need help paying for my rent  Date Goal set: 2/14/2020  Barriers: Feet have been hurting too much to get back to the [facility]   Strengths: Patient is motivated to getting assistancce  Date to Achieve By: 3/14/2020  Patient expressed understanding of goal: Yes  Action steps to achieve this goal:  1. I will fill out application for emergency assistance  2. I will bring my application in to the [facility]  3. I will obtain emergency assistance, if found eligible                Patient/Caregiver  understanding: Yes    Outreach Frequency: monthly  Future Appointments              In 4 days Marleny Mauro APRN [facility], [facility] PAIN BLAI    In 1 month [facility]    In 1 month U[facility] LAB GOLD WAITING [facility]    In 1 month U2A ROOM 3 [address] [facility] O    In 1 month  PFL A [facility] Pulmonary Function Testing, [facility]    In 1 month Rod Chauhan MD [facility] Heart Care, [facility]          Plan: 1. Patient will fill out his emergency assistance application  2. Patient will bring the application in to the [facility]  3. Care Coordination needs identified at this time. Patient enrolled in Care Coordination. Complex care plan and letter sent.   4. SWCC will reach out to patient in 2 weeks, if additional needs arise patient encouraged to contact Care Coordinator sooner.     BING Garcia  Primary Care Clinic- Social Work Care Coordinator  [facility]  Ph: [phone]  2/14/2020 1:40 PM

## 2020-02-14 NOTE — LETTER
[facility] CARE COORDINATION  [address]  [place] [address]    February 14, 2020    Chaim Norman  [address]   [place] [address]      Dear Chaim,    I am a clinic care coordinator who works with CHAZ Garrett CNP at [facility] . I wanted to thank you for spending the time to talk with me. Just a reminder to fill out and send in your application for emergency assistance. Below is a description of clinic care coordination and how I can further assist you.      The clinic care coordinator team is made up of a registered nurse,  and community health worker who understand the health care system. The goal of clinic care coordination is to help you manage your health and improve access to the health care system in the most efficient manner. The team can assist you in meeting your health care goals by providing education, coordinating services, strengthening the communication among your providers  and supporting you with any resource needs.    Please feel free to contact me, at [phone] with any questions or concerns. We are focused on providing you with the highest-quality healthcare experience possible and that all starts with you.     Sincerely,     Robin Jansen, NOELLE  Primary Care Clinic- Social Work Care Coordinator  [facility]  Ph: [phone]  2/14/2020 1:43 PM  Enclosed: I have enclosed a copy of the Complex Care Plan. This has helpful information and goals that we have talked about. Please keep this in an easy to access place to use as needed.

## 2020-02-14 NOTE — LETTER
[facility]  Complex Care Plan  About Me:    Patient Name:  Chaim Norman    YOB: 1975  Age:         44 year old   Ellyn MRN:    [id number] Telephone Information:  Home Phone [phone]   Mobile [phone]       Address:  [address]  [facility] [address] Email address:  No e-mail address on record      Emergency Contact(s)    Name Relationship Lgl Grd Work Phone Home Phone Mobile Phone   1. CLIFF NORMANDIE Son   [phone]    2. DECLINED, PER * Other   [phone]            Primary language:  English     needed? No   [place] [facility]:  [phone] op. 1  Other communication barriers: None  Preferred Method of Communication:  Mail  Current living arrangement: I live in a private home with family(Lives with son (18 years old))  Mobility Status/ Medical Equipment: Independent    Health Maintenance  Health Maintenance Reviewed: Due/Overdue   Health Maintenance Due   Topic Date Due     PREVENTIVE CARE VISIT  1975     SPIROMETRY  1975     COPD ACTION PLAN  1975     INFLUENZA VACCINE (1) 09/01/2019         My Access Plan  Medical Emergency 911   Primary Clinic Line [facility] - [phone]   24 Hour Appointment Line [phone] or  [phone]) (toll-free)   24 Hour Nurse Line [phone] (toll-free)   Preferred Urgent Care [facility] [phone]   [facility]  [phone]   Preferred Pharmacy [id number] - [facility] [address] [facility] [address]     Behavioral Health Crisis Line The National Suicide Prevention Lifeline at 1-451.373.7839 or 911       My Care Team Members  Patient Care Team       Relationship Specialty Notifications Start Nyla Horan APRN CNP PCP - General Nurse Practitioner  9/25/18     Phone: [phone] Fax: [phone]         [address] [facility] [address]    Nyla FlorianCHAZ CNP Assigned PCP   10/25/18     Phone: [phone] Fax: [phone]         [address] [facility] [address]    Behl, Melissa K, RN Lead Care Coordinator Primary Care - CC Admissions 1/14/20     Phone: [phone] Fax: [phone]        Tania Frias, RN Specialty Care Coordinator Cardiology Admissions 1/28/20     Pulmonary HTN    Phone: [phone] Pager: [phone]        Rosa Malcolm, RN Specialty Care Coordinator Cardiology Admissions 1/28/20     Pulmonary HTN    Phone: [phone] Pager: [phone] Fax: [phone]       Pat Story, RN Specialty Care Coordinator Cardiology Admissions 1/28/20     Pulmonary HTN    Phone: [phone] Pager: [phone] Fax: [phone]       Robin Jansen BSW Clinic Care Coordinator Primary Care - CC Admissions 2/14/20             My Care Plans  Self Management and Treatment Plan  Goals and (Comments)  Goals        General    1. Transportation (pt-stated)     Notes - Note edited  2/10/2020  2:54 PM by Behl, Melissa K, RN    Goal Statement: I need help getting transportation.  Date Goal set: 1/15/2020   Date to Achieve By: 3/15/20  Patient expressed understanding of goal: yes  Action steps to achieve this goal:  1. I will call [employer] [phone]  2. [facility] Medical Assistance benefit  3. I will apply for [employer]           2. Improve chronic symptoms (pt-stated)     Notes - Note created  1/15/2020  4:11 PM by Behl, Melissa K, RN    Goal Statement: I want to be able to do activity without being short of breath.  Date Goal set: 1/15/2020   Date to Achieve By: 6/15/20  Patient expressed understanding of goal: yes  Action steps to achieve this goal:  1. I will follow up with my infectious disease providers as scheduled, 1/16/20, 1/22/20  2. I will see the pulmonary hypertension clinic 1/28/20  3. I will wear my oxygen as directed          Financial Wellbeing (pt-stated)     Notes - Note edited  2/14/2020 12:30 PM by Robin Jansen BSW    Goal Statement: I need help paying for my rent  Date Goal set: 2/14/2020  Barriers: Feet have been hurting too much to get back to the [facility]   Strengths: Patient is motivated to getting assistancce  Date to Achieve By: 3/14/2020  Patient expressed understanding of goal: Yes  Action steps to achieve this goal:  1. I will fill out application for emergency assistance  2. I will bring my application in to the [facility]  3. I will obtain emergency assistance, if found eligible                 Action Plans on File:     Advance Care Plans/Directives Type:        My Medical and Care Information  Problem List   Patient Active Problem List   Diagnosis     History of TB (tuberculosis)     Weight loss     Pulmonary nodules     Late latent syphilis     Iatrogenic pneumothorax     Patient's intentional underdosing of medication regimen due to financial hardship     Anemia of chronic disease     Benign prostatic hyperplasia, unspecified whether lower urinary tract symptoms present     COPD (chronic obstructive pulmonary disease) (H)     Hepatitis B core antibody positive     Neutropenia (H)     Pulmonary hypertension (H)     SOB (shortness of breath)      Current Medications and Allergies:  See printed Medication Report.    Care Coordination Start Date: 1/15/2020   Frequency of Care Coordination: monthly   Form Last Updated: 02/14/2020

## 2020-02-14 NOTE — TELEPHONE ENCOUNTER
Went online to [facility] website to check what is covered. Looks like symbicort, Dulera and advair are covered.    Xin Gotti MA

## 2020-02-17 ENCOUNTER — PATIENT OUTREACH (OUTPATIENT)
Dept: CARE COORDINATION | Facility: CLINIC | Age: 45
End: 2020-02-17

## 2020-02-17 NOTE — PROGRESS NOTES
Clinic Care Coordination Contact  Care Team Conversations    RN CC reviewed call from Noelle, Social Work Care Coordinator with [facility], [phone].  Noelle has been working with patient since his hospitalizations October 2019.  Noelle states patient is unable to qualify for medical assistance due to not having [place] citizenship and not having been a permanent resident for 5 year.  Patient has been in the [place] for 4.5 years.  Noelle wanted to update the ambulatory care team on the resources she has tried.  She has been able to receive assistance for patient for his rent from [employer], 30 days [employer] and patient has cashed out and spent his 401K.  Noelle attempted to have patient use [employer] assist with patient's rent, however, patient had already paid his rent and [employer] will only pay outstanding balances.  Noelle states the next resources she was going to try for patient was [facility] at [facility].  Noelle informed writer if patient is 10 days late on his rent he will be evicted.  Applying for social security disability may be an option for patient.    Noelle states she will continue to do what she can and appreciates collaboration from the ambulatory care coordination team.    FYI to [facility]IGUEL CC.    Melissa Behl BSN, RN, PHN, CCM  Primary Care Clinical RN Care Coordinator  [facility]   [phone]

## 2020-02-18 ENCOUNTER — TELEPHONE (OUTPATIENT)
Dept: PALLIATIVE MEDICINE | Facility: CLINIC | Age: 45
End: 2020-02-18

## 2020-02-18 ENCOUNTER — OFFICE VISIT (OUTPATIENT)
Dept: PALLIATIVE MEDICINE | Facility: CLINIC | Age: 45
End: 2020-02-18
Payer: COMMERCIAL

## 2020-02-18 VITALS — SYSTOLIC BLOOD PRESSURE: 136 MMHG | HEART RATE: 101 BPM | DIASTOLIC BLOOD PRESSURE: 99 MMHG

## 2020-02-18 DIAGNOSIS — G89.29 CHRONIC PAIN OF BOTH SHOULDERS: ICD-10-CM

## 2020-02-18 DIAGNOSIS — G47.00 INSOMNIA, UNSPECIFIED TYPE: ICD-10-CM

## 2020-02-18 DIAGNOSIS — M79.672 BILATERAL FOOT PAIN: ICD-10-CM

## 2020-02-18 DIAGNOSIS — M79.671 BILATERAL FOOT PAIN: ICD-10-CM

## 2020-02-18 DIAGNOSIS — M25.512 CHRONIC PAIN OF BOTH SHOULDERS: ICD-10-CM

## 2020-02-18 DIAGNOSIS — M25.511 CHRONIC PAIN OF BOTH SHOULDERS: ICD-10-CM

## 2020-02-18 DIAGNOSIS — M79.2 NEUROPATHIC PAIN: Primary | ICD-10-CM

## 2020-02-18 PROCEDURE — 99207 ZZC DOWN CODE DUE TO SUBSEQUENT EXAM: CPT | Performed by: NURSE PRACTITIONER

## 2020-02-18 PROCEDURE — 99215 OFFICE O/P EST HI 40 MIN: CPT | Performed by: NURSE PRACTITIONER

## 2020-02-18 RX ORDER — NORTRIPTYLINE HCL 10 MG
10-20 CAPSULE ORAL AT BEDTIME
Qty: 60 CAPSULE | Refills: 1 | Status: SHIPPED | OUTPATIENT
Start: 2020-02-18 | End: 2020-04-28

## 2020-02-18 RX ORDER — DULOXETIN HYDROCHLORIDE 30 MG/1
CAPSULE, DELAYED RELEASE ORAL
Qty: 60 CAPSULE | Refills: 0 | Status: SHIPPED | OUTPATIENT
Start: 2020-02-18 | End: 2020-03-31

## 2020-02-18 RX ORDER — MULTIVITAMIN WITH IRON
200 TABLET ORAL DAILY
Status: ON HOLD | COMMUNITY
Start: 2020-02-18 | End: 2020-05-08

## 2020-02-18 ASSESSMENT — PAIN SCALES - GENERAL: PAINLEVEL: WORST PAIN (10)

## 2020-02-18 NOTE — TELEPHONE ENCOUNTER
Prior Authorization Specialty Medication Request    Medication/Dose: diclofenac (VOLTAREN) 1 % topical gel  ICD code (if different than what is on RX):  Chronic pain of both shoulders [M25.511, G89.29, M25.512]   Previously Tried and Failed: .    Important Lab Values:.   Rationale:.     Insurance [facility]  Insurance ID: [id number]   Insurance Phone [phone] r:

## 2020-02-18 NOTE — TELEPHONE ENCOUNTER
Can we try to do a PA and then appeal?    Marleny WARE, RN CNP, FNP  [facility]  [facility]

## 2020-02-18 NOTE — PROGRESS NOTES
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      Primary Care Provider is Nyla Florian.  Pain medications are being prescribed by Nyla Florian.    Please see the [facility] health questionnaire which the patient completed and reviewed with me in detail.    Chief Complaint:    Chief Complaint   Patient presents with     Pain       Pain history:  Chaim Norman is a 44 year old male who first started having problems with pain as follows:  -Onset of bilateral leg pain was two months ago. This pain is a possible side effect of TB medication that the patient takes. Started as numbness over the toes and gradually moved up the legs into the knees.  Aggravated by walking and feet are tender to palpation all over, especially on the bottom of the feet and big toes. This pain is most bothersome.  -Bilateral shoulder pain that started about three weeks ago, located over the shoulder joint. Raising the shoulders is painful.   -Numbness and tingling of the digits on both hands.  -Oxycodone is helpful for pain temporarily, but he notes that the pain comes back double.    Pain rating: intensity ranges from 10/10 to 9/10, and Averages 10/10 on a 0-10 scale.    Describes pain as \"achy, sharp, and shooting.\"  Pain is constant.    Home self care includes: Nothing is helpful    Aggravating factors include: Every position    Relieving factors include: Nothing    Any bowel or bladder incontinence: none    Current pain-related medication treatments include:  -Tylenol 650mg Q 6 hours PRN pain (barely helpful-8 tablets/day)  -gabapentin 300mg capsules, take 900mg TID (somewhat helpful)  -oxycodone 5mg BID PRN " severe pain (helpful temporarily)      Other pertinent medications:  -Sitruro 200mg  -Seromycin 250mg  -Myambutol 1,200mg  -melatonin 2mg HS     Previous medication treatments included:  OPIATES:Oxycodone (helpful temporarily)  NSAIDS: None  MUSCLE RELAXANTS: None  ANTI-MIGRAINE MEDS: None  ANTI-DEPRESSANTS: None  SLEEP AIDS: None  ANTI-CONVULSANTS: Gabapentin (helpful temporarily)  TOPICALS: Lidocaine ointment (not helpful)  Other meds: Acetaminophen (not helpful)      Other treatments have included:  Chaim Norman has not been seen at a pain clinic in the past.    PT: none  Chiropractic care: none  Acupuncture: none  TENs Unit: none    Injections: none    Past Medical History:  No past medical history on file.  Past Surgical History:  No past surgical history on file.  Medications:  Current Outpatient Medications   Medication Sig Dispense Refill     acetaminophen (TYLENOL) 325 MG tablet Take 2 tablets (650 mg) by mouth every 6 hours as needed for mild pain 50 tablet 1     albuterol (PROVENTIL HFA) 108 (90 Base) MCG/ACT inhaler Inhale 2 puffs into the lungs every 6 hours as needed for shortness of breath / dyspnea or wheezing 8.5 g 3     amLODIPine (NORVASC) 5 MG tablet Take 1 tablet (5 mg) by mouth daily 30 tablet 2     bedaquiline (SITRURO) 100 MG tablet Take 200 mg by mouth Every Mon, Wed, Fri Morning       cycloSERINE (SEROMYCIN) 250 MG capsule Take 250 mg by mouth 2 times daily       diclofenac (VOLTAREN) 1 % topical gel Place 2 g onto the skin 4 times daily as needed for moderate pain Of both shoulders 1 Tube 3     DULoxetine (CYMBALTA) 30 MG capsule Take 1 capsule in the AM for 2 weeks, then increase to 2 capsules each morning 60 capsule 0     ethambutol (MYAMBUTOL) 400 MG tablet Take 1,200 mg by mouth daily       gabapentin (NEURONTIN) 300 MG capsule Take 900 mg by mouth 3 times daily       levofloxacin (LEVAQUIN) 500 MG tablet Take 750 mg by mouth daily       melatonin 3 MG tablet Take 1 tablet (3 mg) by mouth  nightly as needed for sleep 30 tablet 3     nortriptyline (PAMELOR) 10 MG capsule Take 1-2 capsules (10-20 mg) by mouth At Bedtime For pain and insomnia 60 capsule 1     oxyCODONE (ROXICODONE) 5 MG tablet Take 1 tablet (5 mg) by mouth 2 times daily as needed for severe pain 14 tablet 0     pyrazinamide 500 MG tablet Take 1,500 mg by mouth daily       vitamin B6 (PYRIDOXINE) 100 MG tablet Take 2 tablets (200 mg) by mouth daily       fluticasone-salmeterol (ADVAIR) 100-50 MCG/DOSE inhaler Inhale 1 puff into the lungs every 12 hours (Patient not taking: Reported on 2/18/2020) 1 Inhaler 2     Allergies:   No Known Allergies  Social History:  Home situation: Single and lives with son  Occupation/Schooling: Not presently working  Tobacco use: Non smoker  Alcohol use: None  Drug use: None  History of chemical dependency treatment: None    Family history:  Family History   Family history unknown: Yes     Review of Systems:  Skin: negative  Eyes: negative  Ears/Nose/Throat: negative  Respiratory: No shortness of breath, dyspnea on exertion, cough, or hemoptysis  Cardiovascular: negative  Gastrointestinal: negative  Genitourinary: negative  Musculoskeletal: negative  Neurologic: negative  Psychiatric: negative  Hematologic/Lymphatic/Immunologic: negative  Endocrine: negative    This document serves as a record of the services and decisions personally performed and made by aMrleny WARE. It was created on her behalf by Deo Cannon, a trained medical scribe. The creation of this document is based on the provider's statements to the medical scribe.  Deo Cannon 9:22 AM February 18, 2020      Physical Exam:  Vitals:    02/18/20 0905 02/18/20 0908   BP: (!) 149/114 (!) 136/99   Pulse: 111 101     Exam:  Constitutional: healthy, alert and no distress  Head: normocephalic. Atraumatic.   Eyes: no redness or jaundice noted   ENT: oropharnx normal.  MMM.  Neck supple.    Cardiovascular: RRR no m/g/r   Respiratory: clear to  auscultation A/P. Respirations easy and unlabored. Able to speak in full sentences without SOB or cough noted.    Gastrointestinal: soft, non-tender  : deferred  Skin: no suspicious lesions or rashes  Psychiatric: mentation appears normal and affect normal/bright    Musculoskeletal exam:  Gait/Station/Posture: Gait is symmetric. The patient can perform heel and toe walk as well as tandem gait without deficit.        Cervical spine:    Flex:  25 degrees   Ext: 20 degrees   Rotation to right: 90 degrees   Rotation to left: 90 degrees       Thoracic spine:  Normal            Myofascial tenderness:  none          Neurologic exam:  CN:  Cranial nerves 2-12 are  Grossly normal  Motor:  5/5 UE and LE strength   Reflexes:     Biceps:     R:  2/4 L: 2/4   Brachioradialis   R:  2/4 L: 2/4      Patella:  R:  2/4 L: 2/4   Achilles:  R:  1/4 L: 1/4  Other reflexes:  Toes downgoing   Nogueira's negative  Sensory:  (upper and lower extremities):   Light touch: normal     Vibration: normal, except for reduction of bilateral feet   Pin prick: normal   Allodynia: absent , except for with pin prick on feet   Dysethesia: absent    Hyperalgesia: absent     Diagnostic tests:  None    Other testing (labs, diagnostics):  2/7/2020  Cr. >90  Est       Screening tools:     DIRE Score for ongoing opioid management is calculated as follows:    Diagnosis = 2    Intractability = 2    Risk: Psych = 2  Chem Hlth = 2  Reliability = 2  Social = 2    Efficacy = 2    Total DIRE Score = 14 (14 or higher predicts good candidate for ongoing opioid management; 13 or lower predicts poor candidate for opioid management)         Assessment:  1. Neuropathic pain  2. Bilateral foot pain  3. Chronic pain of both shoulders  4. Insomnia, unspecified site   5. PMHx includes: None  6. PSHx includes: None        Plan:  Diagnosis reviewed, treatment option addressed, and risk/benefits discussed.  Self-care instructions given.  I am recommending a  multidisciplinary treatment plan to help this patient better manage his pain.      1. Physical Therapy: None at present  2. Clinical Health Psychologist to address issues of relaxation, behavioral change, coping style, and other factors important to improvement: None at present  3. Diagnostic Studies: Upcoming EMG as scheduled. The patient will sign a release of information.  4. Medication Management:   1. Continue Acetaminophen 650mg Q6 hours PRN  2. Continue Gabapentin 900mg TID  3. Continue Oxycodone 5mg BID PRN, as managed by PCP. I don't recommend using Oxycodone for pain as this medication is not helpful and doesn't treat the source of the pain.  4. Start Cymbalta 30mg for 2 weeks, then increase to 60mg everyday for pain and mood. The pt will start this medication three days after starting Nortriptyline.   5. Start Nortriptyline 10-20mg HS for improved pain and improved sleep  6. Start Voltaren gel PRN re: bilateral shoulder pain  7. I certified the pt for medical cannabis today, dispensary to contact patient. Handout provided.  5. Further procedures recommended: none at present  6. Acupuncture: patient to follow-up with insurance company to see if this is covered.  7. Urine toxicology screen today: none at present   8. Recommendations/follow-up for PCP:  See above  9. Release of information: Upcoming EMG.  10. Follow up: 4-6 weeks    Total time spent was 61 minutes, and more than 50% of face to face time was spent in counseling and/or coordination of care regarding principles of multidisciplinary care, medication management.     The information in this document, created by the medical scribe for me, accurately reflects the services I personally performed and the decisions made by me. I have reviewed and approved this document for accuracy prior to leaving the patient care area.  February 18, 2020         Marleny WARE, RN CNP, FNP  [facility]  [facility]

## 2020-02-18 NOTE — NURSING NOTE
----------------------------------------------------------------  [facility] Number:  [phone]     Call with any questions about your care and for scheduling assistance.     Calls are returned Monday through Friday between 8 AM and 4:30 PM. We usually get back to you within 2 business days depending on the issue/request.    If we are prescribing your medications:    For opioid medication refills, call the clinic or send a BonaYou message 7 days in advance.  Please include:    Name of requested medication    Name of the pharmacy.    For non-opioid medications, call your pharmacy directly to request a refill. Please allow 3-4 days to be processed.     Per [place] State Law:    All controlled substance prescriptions must be filled within 30 days of being written.      For those controlled substances allowing refills, pickup must occur within 30 days of last fill.      We believe regular attendance is key to your success in our program!      Any time you are unable to keep your appointment we ask that you call us at least 24 hours in advance to cancel.This will allow us to offer the appointment time to another patient.     Multiple missed appointments may lead to dismissal from the clinic.

## 2020-02-18 NOTE — PATIENT INSTRUCTIONS
"PLAN  1. Medications:   1. Continue Acetaminophen 650mg every 6 hours as needed.  2. Continue Gabapentin 900mg three times daily.  3. I don't recommend using oxycodone or opiates to manage your nerve related pain  4. Start Cymbalta (duloxetine) 30mg in the morning for 2 weeks, then increase to 60mg per day and continue. Stop this medication immediately if your mood worsens or you have thoughts of suicide. Normal to have low grade headache, stomach feels \"off\" and looser bowel movements for 3-10 days  5. Start nortriptyline 10-20mg at bedtime. Start this tonight.   6. Wait 3 days before you start the Cymbalta/duloxetine after you start the nortriptyline  7. Start to use Voltaren gel 2 grams above the waist and 4 grams below up to four times daily as needed re: shoulder pain.  8. I certified you for medical cannabis today, you will receive an e-mail with further instructions.  2. Procedures: none at present   3. Consider scheduling acupuncture appointments. Check with your insurance to see if it's covered.   4. I recommend that you always wear shoes or anything with soles in the home. Try to use ice on the feet.  5. Chaim to follow up with Primary Care provider regarding elevated blood pressure.  6. Follow-up with me in 4-6 weeks  7. Bring my business card to your EMG next week and sign a release of information so that I can see your results.              ----------------------------------------------------------------  Clinic Number:  [phone]     Call with any questions about your care and for scheduling assistance.     Calls are returned Monday through Friday between 8 AM and 4:30 PM. We usually get back to you within 2 business days depending on the issue/request.    If we are prescribing your medications:    For opioid medication refills, call the clinic or send a [facility] [facility] message 7 days in advance.  Please include:    Name of requested medication    Name of the pharmacy.    For non-opioid medications, call " your pharmacy directly to request a refill. Please allow 3-4 days to be processed.     Per [place] State Law:    All controlled substance prescriptions must be filled within 30 days of being written.      For those controlled substances allowing refills, pickup must occur within 30 days of last fill.      We believe regular attendance is key to your success in our program!      Any time you are unable to keep your appointment we ask that you call us at least 24 hours in advance to cancel.This will allow us to offer the appointment time to another patient.     Multiple missed appointments may lead to dismissal from the clinic.

## 2020-02-18 NOTE — TELEPHONE ENCOUNTER
Received fax from [facility] pharmacy. diclofenac (VOLTAREN) 1 % topical gel is not covered.

## 2020-02-19 ENCOUNTER — PATIENT OUTREACH (OUTPATIENT)
Dept: CARE COORDINATION | Facility: CLINIC | Age: 45
End: 2020-02-19

## 2020-02-19 DIAGNOSIS — M79.2 NEUROPATHIC PAIN: Primary | ICD-10-CM

## 2020-02-19 DIAGNOSIS — M79.672 BILATERAL FOOT PAIN: ICD-10-CM

## 2020-02-19 DIAGNOSIS — M25.511 CHRONIC PAIN OF BOTH SHOULDERS: ICD-10-CM

## 2020-02-19 DIAGNOSIS — M79.671 BILATERAL FOOT PAIN: ICD-10-CM

## 2020-02-19 DIAGNOSIS — G89.29 CHRONIC PAIN OF BOTH SHOULDERS: ICD-10-CM

## 2020-02-19 DIAGNOSIS — M25.512 CHRONIC PAIN OF BOTH SHOULDERS: ICD-10-CM

## 2020-02-19 RX ORDER — DULOXETIN HYDROCHLORIDE 60 MG/1
60 CAPSULE, DELAYED RELEASE ORAL DAILY
Qty: 30 CAPSULE | Refills: 3 | Status: SHIPPED | OUTPATIENT
Start: 2020-02-19 | End: 2020-06-05

## 2020-02-19 NOTE — PROGRESS NOTES
Clinic Care Coordination Contact    Follow Up Progress Note      Assessment: Patient contacted Melissa Behl, RN CC with question regarding prescription after his Pain clinic appt yesterday.    Writer contacted patient by phone. He reviewed pain clinic appointment and follow up plan.  He states [facility] pharmacy only dispensed 14 tablets of duloxetine yesterday, even though prescription was for 60 tablets.  He states they advised him to call PCP and further action was needed.  RN TINY explained that prescription is actually from Pain clinic provider so the action request may need to go to her.    GIANNA FRANKS spoke to Yoni, pharmacist at [facility]. He states patient's insurance will need provider to either complete a prior auth to have dose of 2-30 mg capsules of duloxetine, or provider can send prescription in for 60 mg capsule dose. Will route in telephone encounter to Marleny Mauro NP.    Reviewed other current patient stated goals as below- patient reports his breathing is continuing to get better. He hopes the new pain medication plan established yesterday will help overall health and his ability to perform ADLs with managed pain level.    Transportation form was reviewed with patient during recent PCP appt on 2/11. He was instructed to mail [employer] mobility form.  He then asks if Robin MIGULE is in the office today. RN CC explained that she is not. Patient reports it's a non-urgent question and he will try to contact her later in week.    Goals addressed this encounter:   Goals Addressed                 This Visit's Progress      #3 Pain Management (pt-stated)   30%     Goal Statement: I will have suitable pain relief to perform activities of daily living.  Date Goal set: 2/19/2020  Date to Achieve By: 6/1/2020  Patient expressed understanding of goal: Yes  Action steps to achieve this goal:  1. I will take medications as prescribed to maintain adequate pain control/relief.  2. I will utilize non-pharmaceutical  measures to help with pain control/relief.  3. I will maintain routine follow up and contact with my clinic care team.          1. Transportation (pt-stated)   20%     Goal Statement: I need help getting transportation.  Date Goal set: 1/15/2020   Date to Achieve By: 3/15/20  Patient expressed understanding of goal: yes  Action steps to achieve this goal:  1. I will call [employer] [phone]  2. I will go to [facility] and apply for Medical Assistance benefit  3. I will apply for [employer]             2. Improve chronic symptoms (pt-stated)   20%     Goal Statement: I want to be able to do activity without being short of breath.  Date Goal set: 1/15/2020   Date to Achieve By: 6/15/20  Patient expressed understanding of goal: yes  Action steps to achieve this goal:  1. I will follow up with my infectious disease providers as scheduled, 1/16/20, 1/22/20  2. I will see the pulmonary hypertension clinic 1/28/20  3. I will wear my oxygen as directed                Intervention/Education provided during outreach: Reviewed plan of care and clinic contact information. Advised that RN CC will try to clarify if his question regarding duloxetine prescription needs to go to PCP or Marleny Mauro CNP at Pain clinic.     Outreach Frequency: monthly    Plan:   1) Patient will continue to follow steps as recently discussed with Lead GIANNA CC and MIGUEL FRANKS.    2) Patient will contact clinic care team with questions or concerns as needed.   3) Patient will take medications as prescribed and follow recommended steps as instructed by Pain Clinic to support pain management.  Care Coordinator will follow up in 2-3 weeks.    RAJINDER Whitman, RN (covering for Melissa Behl, RN CC on 2/19/20)  [facility]  - Clinic Care Coordinator  Phone: [phone]

## 2020-02-19 NOTE — TELEPHONE ENCOUNTER
Signed Prescriptions:                        Disp   Refills    DULoxetine 60 MG PO capsule                30 cap*3        Sig: Take 1 capsule (60 mg) by mouth daily  Authorizing Provider: MARLENY CHILDERS  please call patient to let him know that I ordered the increased strength (60mg capsules) that he can start in 2 weeks once done with the 30mg capsules once per day. thanks  Marleny WARE, RN CNP, FNP  [facility]

## 2020-02-19 NOTE — TELEPHONE ENCOUNTER
Called pt.  He picked up the #14 tabs of the 30mg cymbalta and started today.   Advised him to continue at 1 a day for 2 weeks then to  the 60mg tabs at 1/day.    Med prepped for CHAZ [place] CNP to review and sign.    Jessica Sosa, RN-BSN  [place] [facility]

## 2020-02-19 NOTE — TELEPHONE ENCOUNTER
Background: [facility] [place] [facility]  Marleny prescribed following medication:  DULoxetine (CYMBALTA) 30 MG capsule 60 capsule 0 2/18/2020  --   Sig: Take 1 capsule in the AM for 2 weeks, then increase to 2 capsules each morning     Situation: Patient contacted PCP clinic as his pharmacy would only dispense 14 capsules of above medication and said he needed to contact provider for further action. He was told to contact PCP clinic but writer wants to actually relay this back to prescribing provider:    RN Care Coordinator spoke to Yoni, pharmacist at [facility]. He states patient's insurance will need provider to either complete a prior auth to have dose of 2-30 mg capsules of duloxetine, or provider can send in new prescription for 60 mg capsule dose (second option shouldn't warrant a prior auth). Patient has adequate supply to cover initial 2 weeks but needs a new prescription sent in for 60 mg dose before 3/3/2020.    Thank you,  ELLIOTT WhitmanN, RN (covering for Melissa Behl, RN Care Coordinator with [facility])  [facility]  - [facility] Care Coordinator

## 2020-02-20 NOTE — TELEPHONE ENCOUNTER
PRIOR AUTHORIZATION DENIED    Medication: diclofenac (VOLTAREN) 1 % topical gel-DENIED    Denial Date: 2/20/2020    Denial Rational: The medication is not covered for diagnosis. Diclofenac is covered for the diagnosis of Osteoarthritis pain in the joints such as feet, ankles, knees, hands, wrists and/or elbows.         Appeal Information:

## 2020-02-20 NOTE — TELEPHONE ENCOUNTER
Central Prior Authorization Team   Phone: [phone]      PA Initiation    Medication: diclofenac (VOLTAREN) 1 % topical gel-Initiated  Insurance Company: [employer] - Phone [phone] Fax [phone]  Pharmacy Filling the Rx: [id number] - [place], [place] - [address] AT [facility] [address]  Filling Pharmacy Phone: [phone]  Filling Pharmacy Fax:    Start Date: 2/20/2020

## 2020-02-21 ENCOUNTER — TELEPHONE (OUTPATIENT)
Dept: PALLIATIVE MEDICINE | Facility: CLINIC | Age: 45
End: 2020-02-21

## 2020-02-21 NOTE — TELEPHONE ENCOUNTER
Call placed to Pt.  Pt will continue Cymbalta for a few days and call back with results.    Santo Fuentes, RN  Care Coordinator   [facility] [place]

## 2020-02-21 NOTE — TELEPHONE ENCOUNTER
Patient called with questions about his DULoxetine (CYMBALTA) 30 MG capsule        Devora Downey    [facility]

## 2020-02-21 NOTE — TELEPHONE ENCOUNTER
Pt stated that he started the duloxetine two days ago, Pt then stated that his foot pain increased because of the duloxetine.       Pt stated that now his foot pain is a 10/10.      Pt stated that Marleny informed Pt that if this medication caused an increase in pain that he should call and let Marleny Know.      Will forward to Marleny to review and advise.    Santo Fuentes, RN  Care Coordinator   [facility] [facility] [place]

## 2020-02-21 NOTE — TELEPHONE ENCOUNTER
Hmmmm. It would be unusual for Cymbalta to cause an increase in pain. It may be a coincidence. I would recommend continuing the Cymbalta for a few more days to see if increase in pain continues with the Cymbalta, alternative would be to stop the Cymbalta to see if pain is quickly reduced.    Thanks.  Marleny WARE RN CNP, FNP  [facility]  [facility]

## 2020-02-24 NOTE — TELEPHONE ENCOUNTER
Sending to Provider to review.    Perla Winn [facility]  [place]

## 2020-02-26 ENCOUNTER — TELEPHONE (OUTPATIENT)
Dept: PALLIATIVE MEDICINE | Facility: CLINIC | Age: 45
End: 2020-02-26

## 2020-02-26 NOTE — TELEPHONE ENCOUNTER
Pt asking for a call back from THOR Mauro. He stated he has seen a Neurologist and wants to discuss.       Jacobo KURTZ    [facility] [place]

## 2020-02-26 NOTE — TELEPHONE ENCOUNTER
Pt stated that he met with an neurologist and they are going to send a report to Marleny.    Pt has Appt on 3/31/2020.  Writer informed Pt that Marleny will discuss further at office visit.    Pt was agreeable to this and just wanted Marleny to know that records will be sent to her.    Will forward to Marleny as FYI.    Santo Fuentes, RN  Care Coordinator   [place] [facility] [place]

## 2020-02-26 NOTE — TELEPHONE ENCOUNTER
Excellent.     Thanks.  Marleny WARE, RN CNP, FNP  [facility]  [facility]

## 2020-02-28 ENCOUNTER — PATIENT OUTREACH (OUTPATIENT)
Dept: CARE COORDINATION | Facility: CLINIC | Age: 45
End: 2020-02-28

## 2020-02-28 NOTE — TELEPHONE ENCOUNTER
Ask patient to try Aspercreme with 4% lidocaine cream; this is found over-the-counter and may be used according to package instructions for topical pain relief  INSTEAD of the Voltaren gel as his insurance has denied coverage.     Thanks.  Marleny WARE RN CNP, FNP  [facility]  [facility]

## 2020-02-28 NOTE — PROGRESS NOTES
Clinic Care Coordination Contact    Follow Up Progress Note      Assessment: Patient called [facility]. He stated that he sent in his [employer] application and is waiting to hear a response. Patient continues to have concerns and worries about paying his rent. He stated that his rent is due March 1st. Patient continues to work on his emergency assistance application and had a lot of questions about it. He confirmed there was a number he could call for more assistance, if needed. [facility] encouraged him to reach out to the [facility] to ask his specific questions. [facility] reviewed chart and noted that Sandi with [facility] Social Work team has been working with patient and providing resources as well. Provided patient with number for [facility] and [facility].     Goals addressed this encounter:   Goals Addressed                 This Visit's Progress       Patient Stated      1. Transportation (pt-stated)   80%     Goal Statement: I need help getting transportation.  Date Goal set: 1/15/2020   Date to Achieve By: 3/15/20  Patient expressed understanding of goal: yes  Action steps to achieve this goal:  1. I will call [employer] [phone]  2. I will go to [facility] and apply for Medical Assistance benefit  3. I will apply for [employer]    2/28/2020: Patient stated that he was able to complete a [employer] application and send it in. Waiting to hear response.        Financial Wellbeing (pt-stated)   10%     Goal Statement: I need help paying for my rent  Date Goal set: 2/14/2020  Barriers: Feet have been hurting too much to get back to the [facility]   Strengths: Patient is motivated to getting assistancce  Date to Achieve By: 3/14/2020  Patient expressed understanding of goal: Yes  Action steps to achieve this goal:  1. I will fill out application for emergency assistance  2. I will bring my application in to the [facility]  3. I will  obtain emergency assistance, if found eligible    2/28/2020: Patient stated that he is still working on his emergency assistance application and had more questions about it. [facility] provided additional phone numbers to him for rent assistance             Intervention/Education provided during outreach: Open ended questions; Phone numbers for potential assistance with rent     Outreach Frequency: monthly    Plan:     Care Coordinator will follow up in 1 month    BING Garcia  Primary Care Clinic- Social Work Care Coordinator  [facility]  Ph: [phone]  2/28/2020 2:58 PM

## 2020-03-02 NOTE — TELEPHONE ENCOUNTER
Call to Pt.  Pt verbalized understanding.  No further questions    Santo Fuentes, RN  Care Coordinator   [place] [facility] [place]

## 2020-03-05 ENCOUNTER — PATIENT OUTREACH (OUTPATIENT)
Dept: CARE COORDINATION | Facility: CLINIC | Age: 45
End: 2020-03-05

## 2020-03-05 ASSESSMENT — ACTIVITIES OF DAILY LIVING (ADL): DEPENDENT_IADLS:: INDEPENDENT

## 2020-03-05 NOTE — PROGRESS NOTES
Clinic Care Coordination Contact    Follow Up Progress Note      Assessment: RN CC spoke with patient for follow up.  Patient states he is no longer short of breath with everyday activity.  Patient states the only shortness of breath he experiences is with exercising such as doing push ups or sit ups.   Patient states he is now able to walk around his house, which he was not able to do before due to pain.  Patient rates pain 8/10 in his bilateral feet, knees and legs and was rating previously 10/10.  Patient has f/u with pain clinic 3/31/20.  Patient states he will be going to the [facility] to bring in documents they need for his emergency assistance application.  Patient continues to await a response from [employer].  Patient stated he was running out of acetaminophen and amlodipine, however, he was unable to refill at the pharmacy.  RN CC spoke with [facility] pharmacy who states they do have refills on file and will fill today for patient.  Goals addressed this encounter:   Goals Addressed                 This Visit's Progress      #3 Pain Management (pt-stated)   40%     Goal Statement: I will have suitable pain relief to perform activities of daily living.  Date Goal set: 2/19/2020  Date to Achieve By: 6/1/2020  Patient expressed understanding of goal: Yes  Action steps to achieve this goal:  1. I will take medications as prescribed to maintain adequate pain control/relief.  2. I will utilize non-pharmaceutical measures to help with pain control/relief.  3. I will maintain routine follow up and contact with my clinic care team.          1. Transportation (pt-stated)   80%     Goal Statement: I need help getting transportation.  Date Goal set: 1/15/2020   Date to Achieve By: 3/15/20  Patient expressed understanding of goal: yes  Action steps to achieve this goal:  1. I will call [employer] [phone]  2. I will go to [facility] and apply for Medical Assistance  benefit  3. I will apply for [employer]    2/28/2020: Patient stated that he was able to complete a [employer] application and send it in. Waiting to hear response.        COMPLETED: 2. Improve chronic symptoms (pt-stated)        Goal Statement: I want to be able to do activity without being short of breath.  Date Goal set: 1/15/2020   Date to Achieve By: 6/15/20  Patient expressed understanding of goal: yes  Action steps to achieve this goal:  1. I will wear my oxygen as directed                Intervention/Education provided during outreach: RN CC reviewed plan of care, assisted patient in ensuring refills were available at his pharmacy.     Outreach Frequency: monthly    Plan:   1. Patient will go to the [facility] with documents needed for her emergency assistance application.  2. Patient will continue pain regimen set forth by his pain clinic and f/u with the pain clinic as scheduled.  3. Patient will await response from [employer] application.  4. RN Care Coordinator will follow up in 1 month.    Melissa Behl BSN, RN, PHN, [facility]  Primary Care Clinical RN Care Coordinator  [facility]   [phone]

## 2020-03-11 ENCOUNTER — TELEPHONE (OUTPATIENT)
Dept: FAMILY MEDICINE | Facility: CLINIC | Age: 45
End: 2020-03-11

## 2020-03-11 NOTE — TELEPHONE ENCOUNTER
Ce ([facility] [employer] work program)   would like to discuss the form you filled out for patient on 2/11/2020    [phone]

## 2020-03-13 NOTE — TELEPHONE ENCOUNTER
This writer attempted to contact Ce on 03/13/20    Reason for call 2/11/2020 form-questions and left message to return call.    When patient calls back, please contact 1st floor Brandy Hernandez. routine priority.        Rod Hawthorne

## 2020-03-17 NOTE — TELEPHONE ENCOUNTER
This writer attempted to contact Ce on 03/16/20      Reason for call clarify message and left detailed message.      If patient calls back:   1st floor [place] Care Team (MA/TC) called. Inform patient that someone from the team will contact them, document that pt called and route to care team.         Baldev Pride MA

## 2020-03-26 DIAGNOSIS — G62.0 DRUG-INDUCED PERIPHERAL NEUROPATHY (H): ICD-10-CM

## 2020-03-26 RX ORDER — ACETAMINOPHEN 325 MG/1
650 TABLET ORAL EVERY 6 HOURS PRN
Qty: 50 TABLET | Refills: 0 | Status: SHIPPED | OUTPATIENT
Start: 2020-03-26 | End: 2020-03-31

## 2020-03-26 NOTE — TELEPHONE ENCOUNTER
"Requested Prescriptions   Pending Prescriptions Disp Refills     acetaminophen (TYLENOL) 325 MG tablet 50 tablet 0     Sig: Take 2 tablets (650 mg) by mouth every 6 hours as needed for mild pain       Analgesics (Non-Narcotic Tylenol and ASA Only) Passed - 3/26/2020  1:41 PM        Passed - Recent (12 mo) or future (30 days) visit within the authorizing provider's specialty     Patient has had an office visit with the authorizing provider or a provider within the authorizing providers department within the previous 12 mos or has a future within next 30 days. See \"Patient Info\" tab in inbasket, or \"Choose Columns\" in Meds & Orders section of the refill encounter.              Passed - Patient is 7 months old or older     If patient is a peds patient of the age 7 mos -12 years, ok to refill using weight-based dosing.     If >3g daily and/or sig is not \"prn\", check for liver enzymes. If normal in the last year, ok to refill.  If not, refer to the provider.          Passed - Medication is active on med list           Last Written Prescription Date: 2/25/20  Last Fill Quantity: 50,  # refills: 0   Last office visit: 2/11/2020 with prescribing provider:  Anival   Future Office Visit:   Next 5 appointments (look out 90 days)    Mar 31, 2020 10:30 AM CDT  Return Visit with CHAZ Mejia CNP  [facility] ([facility]) [id number] [facility] [id number] [phone]           "

## 2020-03-27 ENCOUNTER — TELEPHONE (OUTPATIENT)
Dept: PALLIATIVE MEDICINE | Facility: CLINIC | Age: 45
End: 2020-03-27

## 2020-03-27 NOTE — TELEPHONE ENCOUNTER
Patient's return visit will be converted to Telephone Visit due to COVID-19.    Called patient and explained:    We re taking every precaution to prevent the spread of COVID-19. Our top priority is to protect and care for our patients.    After review by your provider, we are changing your visit to a telephone appointment.    Your visit is at 10:30 am on 03/31/2020.  We will be calling you 15-20 min early for check-in with the MA. Please be available at 20 min prior to appointment for this check in.    Confirm number that the patient wants us to call for this appointment: [phone]    If you have concerns about this plan, please let us know, and we will send a message to the nurses to further discuss your concerns.    Destin Menjivar MA on 3/27/2020 at 10:10 AM

## 2020-03-30 ENCOUNTER — PATIENT OUTREACH (OUTPATIENT)
Dept: CARE COORDINATION | Facility: CLINIC | Age: 45
End: 2020-03-30

## 2020-03-30 NOTE — PROGRESS NOTES
Clinic Care Coordination Contact    Follow Up Progress Note      Assessment: [facility] contacted patient for monthly follow up. Patient stated that [employer] provided him with a one time rent assistance payment for March. He states that he has submitted necessary documentation needed for rent assistance in April to [facility]. He is waiting to hear if they can help him out. Patient still hasn't heard from [employer] on his application yet. [facility] provided number for customer service so he can check on the status of his application (Ph: [phone]). Patient had questions regarding his medications and refills. [facility] encouraged patient to reach out to provider to discuss refill requests.     Goals addressed this encounter:   Goals Addressed                 This Visit's Progress       Patient Stated      1. Transportation (pt-stated)   60%     Goal Statement: I need help getting transportation.  Date Goal set: 1/15/2020   Date to Achieve By: 4/30/2020  Patient expressed understanding of goal: yes  Action steps to achieve this goal:  1. I will call [employer] [phone]  2. I will go to [facility] and apply for Medical Assistance benefit  3. I will apply for [employer]    2/28/2020: Patient stated that he was able to complete a [employer] application and send it in. Waiting to hear response.    3/30/2020: Has not heard back from [employer] yet. [facility] provided the number for [employer] Customer Service to call and inquire about his application (Ph: [phone])        Financial Wellbeing (pt-stated)        Goal Statement: I need help paying for my rent  Date Goal set: 2/14/2020  Barriers: Feet have been hurting too much to get back to the [facility]   Strengths: Patient is motivated to getting assistancce  Date to Achieve By: 4/30/2020  Patient expressed understanding of goal: Yes  Action steps to achieve this goal:  1. I will fill out  application for emergency assistance  2. I will bring my application in to the [facility]  3. I will obtain emergency assistance, if found eligible    2/28/2020: Patient stated that he is still working on his emergency assistance application and had more questions about it. [facility] provided additional phone numbers to him for rent assistance    3/30/2020: Went to [facility]. Submitted documentation that was needed. Got rent assistance through another agency for March. Reapplied for assistance in April through [facility]             Intervention/Education provided during outreach: Open ended questions; Provided contact information for [employer]     Outreach Frequency: monthly    Plan:   No further outreach needed this month  Care Coordinator will follow up in 1 month    BING Garcia  Primary Care Clinic- Social Work Care Coordinator  [facility]  Ph: [phone]  3/30/2020 10:14 AM

## 2020-03-31 ENCOUNTER — VIRTUAL VISIT (OUTPATIENT)
Dept: PALLIATIVE MEDICINE | Facility: CLINIC | Age: 45
End: 2020-03-31
Payer: COMMERCIAL

## 2020-03-31 DIAGNOSIS — G47.00 PERSISTENT INSOMNIA: ICD-10-CM

## 2020-03-31 DIAGNOSIS — G89.29 CHRONIC PAIN OF BOTH SHOULDERS: ICD-10-CM

## 2020-03-31 DIAGNOSIS — G62.0 DRUG-INDUCED PERIPHERAL NEUROPATHY (H): Primary | ICD-10-CM

## 2020-03-31 DIAGNOSIS — M79.672 BILATERAL FOOT PAIN: ICD-10-CM

## 2020-03-31 DIAGNOSIS — M25.511 CHRONIC PAIN OF BOTH SHOULDERS: ICD-10-CM

## 2020-03-31 DIAGNOSIS — M25.512 CHRONIC PAIN OF BOTH SHOULDERS: ICD-10-CM

## 2020-03-31 DIAGNOSIS — M79.671 BILATERAL FOOT PAIN: ICD-10-CM

## 2020-03-31 PROCEDURE — 99442 ZZC PHYSICIAN TELEPHONE EVALUATION 11-20 MIN: CPT | Performed by: NURSE PRACTITIONER

## 2020-03-31 RX ORDER — ACETAMINOPHEN 325 MG/1
650 TABLET ORAL EVERY 6 HOURS PRN
Qty: 180 TABLET | Refills: 3 | Status: SHIPPED | OUTPATIENT
Start: 2020-03-31 | End: 2020-06-05

## 2020-03-31 ASSESSMENT — PAIN SCALES - GENERAL: PAINLEVEL: SEVERE PAIN (7)

## 2020-03-31 NOTE — PROGRESS NOTES
The patient has been notified of following:     This telephone visit will be conducted via a call between you and your provider. We have found that certain health care needs can be provided without the need for a physical exam.  This service lets us provide the care you need with a phone conversation.  If a prescription is necessary we can send it directly to your pharmacy.  If lab work is needed we can place an order for that and you can then stop by our lab to have the test done at a later time. This is a billable service but we do not know the cost at this time.     Nimco Patel on 3/31/2020 at 10:14 AM

## 2020-03-31 NOTE — PROGRESS NOTES
[facility]    March 31, 2020      Chief complaint: bilateral neuropathic foot pain, bilateral shoulder pain    Interval history:  Chaim Norman is a 44 year old male is known to me for   Neuropathic pain  Bilateral foot pain  Chronic pain of both shoulders  Insomnia, unspecified site   PMHx includes: None  PSHx includes: None.    Recommendations/plan at the last visit on 2/18/2020 included:  1. Physical Therapy: None at present  2. Clinical Health Psychologist to address issues of relaxation, behavioral change, coping style, and other factors important to improvement: None at present  3. Diagnostic Studies: Upcoming EMG as scheduled. The patient will sign a release of information.  4. Medication Management:   1. Continue Acetaminophen 650mg Q6 hours PRN  2. Continue Gabapentin 900mg TID  3. Continue Oxycodone 5mg BID PRN, as managed by PCP. I don't recommend using Oxycodone for pain as this medication is not helpful and doesn't treat the source of the pain.  4. Start Cymbalta 30mg for 2 weeks, then increase to 60mg everyday for pain and mood. The pt will start this medication three days after starting Nortriptyline.   5. Start Nortriptyline 10-20mg HS for improved pain and improved sleep  6. Start Voltaren gel PRN re: bilateral shoulder pain  7. I certified the pt for medical cannabis today, dispensary to contact patient. Handout provided.  5. Further procedures recommended: none at present  6. Acupuncture: patient to follow-up with insurance company to see if this is covered.  7. Urine toxicology screen today: none at present   8. Recommendations/follow-up for PCP:  See above  9. Release of information: Upcoming EMG.  10. Follow up: 4-6 weeks    Since his last visit, Chaim Norman reports:  - he is starting to sleep better on the nortriptyline  - he feels that Cymbalta has been somewhat helpful for pain, tolerating well.   -neuropathic pain remains in legs and feet  -still has shoulder pain    At this  "point, the patient's participation with our multidisciplinary team includes:  The patient has been compliant with the program.  PT - none at present time  Health Psych - none at present time      Pain scores:  Pain intensity on average is 8 on a scale of 0-10.    Range is 7-9/10.   Pain right now is 8/10.   Pain is described as \"numbness, aching sharp and shooting.\"    Pain is constant in nature    Current pain relevant medications:   -Tylenol 650mg Q 6 hours PRN pain (barely helpful-8 tablets/day)  -gabapentin 300mg capsules, take 900mg TID (somewhat helpful)  -oxycodone 5mg BID PRN severe pain (helpful temporarily)  -voltaren gel PRN (helpful)  -Cymbalta 60mg every day (helpful for pain and mood)  -nortriptyline 10-20mg at HS (taking 10mg at bedtime, helpful, will try increasing to 20mg)           Other pertinent medications:  -Sitruro 200mg  -Seromycin 250mg  -Myambutol 1,200mg  -melatonin 2mg HS      Previous medication treatments included:  OPIATES:Oxycodone (helpful temporarily)  NSAIDS: None  MUSCLE RELAXANTS: None  ANTI-MIGRAINE MEDS: None  ANTI-DEPRESSANTS: Cymbalta (helpful), nortriptyline (helpful)  SLEEP AIDS: None  ANTI-CONVULSANTS: Gabapentin (helpful temporarily)  TOPICALS: Lidocaine ointment (not helpful)  Other meds: Acetaminophen (not helpful)        Other treatments have included:  Chaim Norman has not been seen at a pain clinic in the past.    PT: none  Chiropractic care: none  Acupuncture: none  TENs Unit: none     Injections: none          Side Effects: no side effect  Patient is using the medication as prescribed: YES    Medications:  Current Outpatient Medications   Medication Sig Dispense Refill     acetaminophen (TYLENOL) 325 MG tablet Take 2 tablets (650 mg) by mouth every 6 hours as needed for mild pain 50 tablet 0     albuterol (PROVENTIL HFA) 108 (90 Base) MCG/ACT inhaler Inhale 2 puffs into the lungs every 6 hours as needed for shortness of breath / dyspnea or wheezing 8.5 g 3     " amLODIPine (NORVASC) 5 MG tablet Take 1 tablet (5 mg) by mouth daily 30 tablet 2     bedaquiline (SITRURO) 100 MG tablet Take 200 mg by mouth Every Mon, Wed, Fri Morning       cycloSERINE (SEROMYCIN) 250 MG capsule Take 250 mg by mouth 2 times daily       diclofenac (VOLTAREN) 1 % topical gel Place 2 g onto the skin 4 times daily as needed for moderate pain Of both shoulders 1 Tube 3     DULoxetine (CYMBALTA) 30 MG capsule Take 1 capsule in the AM for 2 weeks, then increase to 2 capsules each morning 60 capsule 0     DULoxetine 60 MG PO capsule Take 1 capsule (60 mg) by mouth daily 30 capsule 3     ethambutol (MYAMBUTOL) 400 MG tablet Take 1,200 mg by mouth daily       fluticasone-salmeterol (ADVAIR) 100-50 MCG/DOSE inhaler Inhale 1 puff into the lungs every 12 hours 1 Inhaler 2     gabapentin (NEURONTIN) 300 MG capsule Take 3 capsules (900 mg) by mouth 3 times daily 270 capsule 1     levofloxacin (LEVAQUIN) 500 MG tablet Take 750 mg by mouth daily       melatonin 3 MG tablet Take 1 tablet (3 mg) by mouth nightly as needed for sleep 30 tablet 3     nortriptyline (PAMELOR) 10 MG capsule Take 1-2 capsules (10-20 mg) by mouth At Bedtime For pain and insomnia 60 capsule 1     oxyCODONE (ROXICODONE) 5 MG tablet Take 1 tablet (5 mg) by mouth 2 times daily as needed for severe pain 14 tablet 0     pyrazinamide 500 MG tablet Take 1,500 mg by mouth daily       vitamin B6 (PYRIDOXINE) 100 MG tablet Take 2 tablets (200 mg) by mouth daily         Medical History: any changes in medical history since they were last seen? No    Social History:   Home situation: Single and lives with son  Occupation/Schooling: Not presently working  Tobacco use: Non smoker  Alcohol use: None  Drug use: None  History of chemical dependency treatment: None    Is patient a current smoker or tobacco user?  no  If yes, was cessation counseling offered?  na      Review of Systems:  The 14 system ROS was reviewed from the intake questionnaire, and is  positive for:  Constitutional: fever/chills, fatigue, weight gain, weight loss  Eyes/Head: headache, dizziness  ENT: ringing in ears  Allergy/Immune: allergies  Skin: itching, rash, hives  Hematologic: easy bruising  Respiratory: cough, wheezing, shortness of breath  Cardiovascular: swelling in feet, fainting, palpitations, chest pain  GI: abdominal pain, nausea, vomiting, diarrhea, constipation  Endocrine: steroid use  Musculoskeletal:  joint pain, arthritis, stiffness, gout, back pain, neck pain  Urinary: frequency, urgency, incontinence, hesitancy  Neurologic: weakness, numbness/tingling, seizure, stroke, memory loss  Mental health: depression, anxiety, stress, suicidal ideation    Physical Exam:  Vital signs: There were no vitals taken for this visit.    Behavioral observations:  Awake, alert, cooperative            [place] Prescription Monitoring Program:  Reviewed [place] [facility] March 31, 2020- no concerning fills.  Marleny WARE RN CNP, MEMO  [facility] location          Assessment:   1. Drug induced peripheral neuropathy  2. Bilateral foot pain  3. Chronic pain of both shoulder  4. Persistent insomnia  5. PMHx includes: Non  6. PSHx includes: None      Plan:   1. Physical Therapy:  None at present time  2. Clinical Health Psychologist: not at present time  3. Diagnostic Studies:  none  4. Medication Management:    1. Continue cymbalta 60mg every day  2. Patient may increase nortriptyline to 20mg at bedtime if needed   3. Continue gabapentin  5. Further procedures recommended: none  6. Recommendations to PCP: see above  7. Follow up: 4 weeks    Phone call duration: 17 minutes      Marleny WARE RN CNP, MEMO  [facility] location

## 2020-04-02 ENCOUNTER — PATIENT OUTREACH (OUTPATIENT)
Dept: CARE COORDINATION | Facility: CLINIC | Age: 45
End: 2020-04-02

## 2020-04-02 NOTE — PROGRESS NOTES
Clinic Care Coordination Contact  RUST/Voicemail       Clinical Data: Care Coordinator Outreach  Outreach attempted x 1.  Left message on patient's voicemail with call back information and requested return call.  Plan:Care Coordinator will try to reach patient again in approximately 10 business days.    Melissa Behl BSN, RN, PHN, [facility]  Primary Care Clinical RN Care Coordinator  [facility]   [phone]

## 2020-04-06 ENCOUNTER — TELEPHONE (OUTPATIENT)
Dept: CARDIOLOGY | Facility: CLINIC | Age: 45
End: 2020-04-06

## 2020-04-06 DIAGNOSIS — I27.20 PULMONARY HYPERTENSION (H): Primary | ICD-10-CM

## 2020-04-06 DIAGNOSIS — R60.0 LOCALIZED EDEMA: ICD-10-CM

## 2020-04-06 DIAGNOSIS — R06.02 SOB (SHORTNESS OF BREATH): ICD-10-CM

## 2020-04-06 RX ORDER — POTASSIUM CHLORIDE 1500 MG/1
20 TABLET, EXTENDED RELEASE ORAL DAILY
Qty: 30 TABLET | Refills: 3 | Status: SHIPPED | OUTPATIENT
Start: 2020-04-06 | End: 2021-05-14

## 2020-04-06 RX ORDER — FUROSEMIDE 20 MG
40 TABLET ORAL DAILY
Qty: 60 TABLET | Refills: 3 | Status: SHIPPED | OUTPATIENT
Start: 2020-04-06 | End: 2020-04-21

## 2020-04-06 NOTE — TELEPHONE ENCOUNTER
[facility] Call Center    Phone Message    May a detailed message be left on voicemail: yes     Reason for Call: Symptoms or Concerns     If patient has red-flag symptoms, warm transfer to triage line    Current symptom or concern: Feet Swelling     Symptoms have been present for:  2 week(s)    Has patient previously been seen for this? Yes    By : Dr. Chauhan     Date: 04/06/2020    Are there any new or worsening symptoms? Yes: Feet Swelling       Action Taken: Message routed to:  [facility] ([facility] Cardio    Travel Screening: Not Applicable

## 2020-04-06 NOTE — TELEPHONE ENCOUNTER
Called patient who admitted he does have swelling in his feet which has developed over 2 weeks. He denies abdominal swelling, bowel or appetite changes, but when he goes up stairs he has a hard time breathing.    Weight today is @ 174.6lbs, with last weight at clinic being 154lbs (+20 lbs).  He denies any new medications since we saw him in January, but he has had 2 medications removed because his lungs are doing better.  He was unable to tell me which meds they were that were removed however.    patient denies any hospitalizations since we saw him last.  Advised patient I would talk with Dr. Chauhan to find out what he wants to do and call him back. Patient verbalized understanding, agreed with plan and denied any further questions. Pat Story RN on 4/6/2020 at 4:44 PM  -----------------------------  Based on Care Everywhere, he recently had labs drawn by Infectious Disease MD on 4/3/20 which all look stable. His documented weight at MD office was 174 lbs.      Discussed with Dr. Chauhan who gave me the following TORB: Order Lasix 40mg once daily and Potassium 20 meq once daily, with an in-person visit next Friday with labs prior.  Verbalized understanding. Pat Story RN on 4/6/2020 at 4:52 PM  -------------------------------  Called patient and reviewed Dr. Chauhan' recommendations.  We discussed sodium consumption, daily weights and when to call me. patient stated he would try to purchase a scale at [employer] when he picks up the meds tonight. Left my direct dial number for call back.  Erx sent to pharmacy for both meds and patient advised [facility] would call him to make in-person appt with Dr. Chauhan.  Patient verbalized understanding, agreed with plan and denied any further questions. Pat Story RN on 4/6/2020 at 5:06 PM  -------------------------------  Sent staff msg to [facility] to help make appt. Pat Story RN on 4/6/2020 at 5:11 PM

## 2020-04-07 ENCOUNTER — TELEPHONE (OUTPATIENT)
Dept: PALLIATIVE MEDICINE | Facility: CLINIC | Age: 45
End: 2020-04-07

## 2020-04-07 NOTE — TELEPHONE ENCOUNTER
Pt stated that he was to double pills at night, since doubling his pain started to come back.       Pain is a 9/10.  Writer reviewed last office visit, Pt was to increase his Nortriptyline to 20.  Writer reviewed that this will take a while to start to help, francine had pt schedule for 4 weeks so she could evaluate after medication has started to work.    Pt verbalized understanding.    Santo Fuentes, RN  Care Coordinator   [facility]

## 2020-04-07 NOTE — TELEPHONE ENCOUNTER
Reason for call:  Other   Patient called regarding (reason for call): call back  Additional comments: Called pt to schedule 4 week f/u. Pt states he has increased pain in his feet and would like to speak with the care team.    Phone number to reach patient:  Home number on file [phone] (home)    Best Time:  anytime    Can we leave a detailed message on this number?  YES    Travel screening: Not Applicable     Cathy DEL REAL    [facility]

## 2020-04-09 ENCOUNTER — PATIENT OUTREACH (OUTPATIENT)
Dept: CARE COORDINATION | Facility: CLINIC | Age: 45
End: 2020-04-09

## 2020-04-21 ENCOUNTER — OFFICE VISIT (OUTPATIENT)
Dept: CARDIOLOGY | Facility: CLINIC | Age: 45
End: 2020-04-21
Attending: INTERNAL MEDICINE
Payer: COMMERCIAL

## 2020-04-21 ENCOUNTER — TELEPHONE (OUTPATIENT)
Dept: CARDIOLOGY | Facility: CLINIC | Age: 45
End: 2020-04-21

## 2020-04-21 VITALS
OXYGEN SATURATION: 97 % | SYSTOLIC BLOOD PRESSURE: 133 MMHG | TEMPERATURE: 97.9 F | DIASTOLIC BLOOD PRESSURE: 102 MMHG | HEART RATE: 97 BPM | BODY MASS INDEX: 31.26 KG/M2 | WEIGHT: 183.1 LBS | HEIGHT: 64 IN

## 2020-04-21 DIAGNOSIS — E78.5 HYPERLIPIDEMIA LDL GOAL <130: ICD-10-CM

## 2020-04-21 DIAGNOSIS — I10 HYPERTENSION GOAL BP (BLOOD PRESSURE) < 140/80: Primary | ICD-10-CM

## 2020-04-21 DIAGNOSIS — R06.02 SOB (SHORTNESS OF BREATH): ICD-10-CM

## 2020-04-21 DIAGNOSIS — R06.09 DYSPNEA ON EXERTION: Primary | ICD-10-CM

## 2020-04-21 DIAGNOSIS — D63.8 ANEMIA OF CHRONIC DISEASE: ICD-10-CM

## 2020-04-21 DIAGNOSIS — I27.20 PULMONARY HYPERTENSION (H): Primary | ICD-10-CM

## 2020-04-21 DIAGNOSIS — I27.20 PULMONARY HYPERTENSION (H): ICD-10-CM

## 2020-04-21 LAB
ALBUMIN SERPL-MCNC: 4 G/DL (ref 3.4–5)
ALP SERPL-CCNC: 90 U/L (ref 40–150)
ALT SERPL W P-5'-P-CCNC: 60 U/L (ref 0–70)
ANION GAP SERPL CALCULATED.3IONS-SCNC: 4 MMOL/L (ref 3–14)
AST SERPL W P-5'-P-CCNC: 58 U/L (ref 0–45)
BILIRUB DIRECT SERPL-MCNC: 0.2 MG/DL (ref 0–0.2)
BILIRUB SERPL-MCNC: 0.5 MG/DL (ref 0.2–1.3)
BUN SERPL-MCNC: 12 MG/DL (ref 7–30)
CALCIUM SERPL-MCNC: 9.9 MG/DL (ref 8.5–10.1)
CHLORIDE SERPL-SCNC: 107 MMOL/L (ref 94–109)
CHOLEST SERPL-MCNC: 176 MG/DL
CO2 SERPL-SCNC: 27 MMOL/L (ref 20–32)
CREAT SERPL-MCNC: 0.88 MG/DL (ref 0.66–1.25)
CRP SERPL-MCNC: <2.9 MG/L (ref 0–8)
ERYTHROCYTE [DISTWIDTH] IN BLOOD BY AUTOMATED COUNT: 14.1 % (ref 10–15)
GFR SERPL CREATININE-BSD FRML MDRD: >90 ML/MIN/{1.73_M2}
GLUCOSE SERPL-MCNC: 100 MG/DL (ref 70–99)
HBV CORE AB SERPL QL IA: REACTIVE
HBV SURFACE AB SERPL IA-ACNC: 99.11 M[IU]/ML
HBV SURFACE AG SERPL QL IA: NONREACTIVE
HCT VFR BLD AUTO: 43.2 % (ref 40–53)
HCV AB SERPL QL IA: NONREACTIVE
HDLC SERPL-MCNC: 66 MG/DL
HGB BLD-MCNC: 13.7 G/DL (ref 13.3–17.7)
HIV 1+2 AB+HIV1 P24 AG SERPL QL IA: NONREACTIVE
INR PPP: 0.94 (ref 0.86–1.14)
IRON SATN MFR SERPL: 27 % (ref 15–46)
IRON SERPL-MCNC: 99 UG/DL (ref 35–180)
LDLC SERPL CALC-MCNC: 69 MG/DL
MCH RBC QN AUTO: 30.9 PG (ref 26.5–33)
MCHC RBC AUTO-ENTMCNC: 31.7 G/DL (ref 31.5–36.5)
MCV RBC AUTO: 98 FL (ref 78–100)
NONHDLC SERPL-MCNC: 110 MG/DL
NT-PROBNP SERPL-MCNC: 6 PG/ML (ref 0–125)
PLATELET # BLD AUTO: 197 10E9/L (ref 150–450)
POTASSIUM SERPL-SCNC: 3.7 MMOL/L (ref 3.4–5.3)
PROT SERPL-MCNC: 8.3 G/DL (ref 6.8–8.8)
RBC # BLD AUTO: 4.43 10E12/L (ref 4.4–5.9)
SODIUM SERPL-SCNC: 138 MMOL/L (ref 133–144)
TIBC SERPL-MCNC: 362 UG/DL (ref 240–430)
TRIGL SERPL-MCNC: 207 MG/DL
TSH SERPL DL<=0.005 MIU/L-ACNC: 3.64 MU/L (ref 0.4–4)
WBC # BLD AUTO: 4.5 10E9/L (ref 4–11)

## 2020-04-21 PROCEDURE — 86706 HEP B SURFACE ANTIBODY: CPT | Performed by: INTERNAL MEDICINE

## 2020-04-21 PROCEDURE — 83520 IMMUNOASSAY QUANT NOS NONAB: CPT | Performed by: INTERNAL MEDICINE

## 2020-04-21 PROCEDURE — 83540 ASSAY OF IRON: CPT | Performed by: INTERNAL MEDICINE

## 2020-04-21 PROCEDURE — 85613 RUSSELL VIPER VENOM DILUTED: CPT | Performed by: INTERNAL MEDICINE

## 2020-04-21 PROCEDURE — 80061 LIPID PANEL: CPT | Performed by: INTERNAL MEDICINE

## 2020-04-21 PROCEDURE — 87340 HEPATITIS B SURFACE AG IA: CPT | Performed by: INTERNAL MEDICINE

## 2020-04-21 PROCEDURE — 84443 ASSAY THYROID STIM HORMONE: CPT | Performed by: INTERNAL MEDICINE

## 2020-04-21 PROCEDURE — 85027 COMPLETE CBC AUTOMATED: CPT | Performed by: INTERNAL MEDICINE

## 2020-04-21 PROCEDURE — 86140 C-REACTIVE PROTEIN: CPT | Performed by: INTERNAL MEDICINE

## 2020-04-21 PROCEDURE — 80076 HEPATIC FUNCTION PANEL: CPT | Performed by: INTERNAL MEDICINE

## 2020-04-21 PROCEDURE — 87389 HIV-1 AG W/HIV-1&-2 AB AG IA: CPT | Performed by: INTERNAL MEDICINE

## 2020-04-21 PROCEDURE — 86803 HEPATITIS C AB TEST: CPT | Performed by: INTERNAL MEDICINE

## 2020-04-21 PROCEDURE — 83550 IRON BINDING TEST: CPT | Performed by: INTERNAL MEDICINE

## 2020-04-21 PROCEDURE — 99215 OFFICE O/P EST HI 40 MIN: CPT | Mod: ZP | Performed by: INTERNAL MEDICINE

## 2020-04-21 PROCEDURE — 80048 BASIC METABOLIC PNL TOTAL CA: CPT | Performed by: INTERNAL MEDICINE

## 2020-04-21 PROCEDURE — 85610 PROTHROMBIN TIME: CPT | Performed by: INTERNAL MEDICINE

## 2020-04-21 PROCEDURE — G0463 HOSPITAL OUTPT CLINIC VISIT: HCPCS | Mod: ZF

## 2020-04-21 PROCEDURE — 83880 ASSAY OF NATRIURETIC PEPTIDE: CPT | Performed by: INTERNAL MEDICINE

## 2020-04-21 PROCEDURE — 00000401 ZZHCL STATISTIC THROMBIN TIME NC: Performed by: INTERNAL MEDICINE

## 2020-04-21 PROCEDURE — 84238 ASSAY NONENDOCRINE RECEPTOR: CPT | Performed by: INTERNAL MEDICINE

## 2020-04-21 PROCEDURE — 36415 COLL VENOUS BLD VENIPUNCTURE: CPT | Performed by: INTERNAL MEDICINE

## 2020-04-21 PROCEDURE — 86038 ANTINUCLEAR ANTIBODIES: CPT | Performed by: INTERNAL MEDICINE

## 2020-04-21 PROCEDURE — 85730 THROMBOPLASTIN TIME PARTIAL: CPT | Performed by: INTERNAL MEDICINE

## 2020-04-21 PROCEDURE — 86704 HEP B CORE ANTIBODY TOTAL: CPT | Performed by: INTERNAL MEDICINE

## 2020-04-21 PROCEDURE — 86431 RHEUMATOID FACTOR QUANT: CPT | Performed by: INTERNAL MEDICINE

## 2020-04-21 RX ORDER — LIDOCAINE 40 MG/G
CREAM TOPICAL
Status: CANCELLED | OUTPATIENT
Start: 2020-04-21

## 2020-04-21 RX ORDER — AMLODIPINE BESYLATE 5 MG/1
10 TABLET ORAL DAILY
Qty: 180 TABLET | Refills: 3 | Status: SHIPPED | OUTPATIENT
Start: 2020-04-21 | End: 2021-05-26

## 2020-04-21 ASSESSMENT — PAIN SCALES - GENERAL: PAINLEVEL: NO PAIN (0)

## 2020-04-21 ASSESSMENT — MIFFLIN-ST. JEOR: SCORE: 1621.54

## 2020-04-21 NOTE — PROGRESS NOTES
April 21, 2020    Nyla Florian NP, DNP  Family Medicine  [place], [place]    Dear Dr. Florian,    I had the pleasure of seeing your patient Chaim Norman at the [facility] Pulmonary Hypertension clinic.  As you know, Chaim is a very pleasant 44 year old male with a past medical history tuberculosis first diagnosed in 2014 with subsequent pulmonary complications including lung destruction, hypertension, neuropathy, and likely pulmonary hypertension after an echocardiogram showed RV dilation and dysfunction. Chaim comes in today after having an echocardiogram performed at [facility] in December of 2019 which showed RV dilation and dysfunction. Chaim states that his breathing is actually getting better recently on his TB medications. He does not do a lot of activity because he has bad neuropathy in his feet so walking distances is nearly impossible. He is limited by this so going up a flight of stairs is difficult because of foot pain and not dyspnea. He denies any chest pain or pressure, presyncope, or syncope. He has no orthopnea or paroxysmal dyspnea. 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    Chaim is currently seeing ID at [facility] for his TB. He tells me he is taking his medications. He is frustrated by this diagnosis. He does have a lot of pain in his legs and his biggest concern today is his foot pain and neuropathy. He is scheduled to see a pain clinic and Dr. Florian has been working with him using gabapentin for his pain.    Chaim also had a CT scan performed at [facility] in December which showed bilateral lung destruction. He has not had any pulmonary function tests to his recollection or per my review of the chart.    Chaim returns to clinic today after struggling with weight gain. He was started on lasix but stopped because it was causing some issues with excess urination. He is actually feeling much better and is gaining weight because he is eating a  lot more. He is feeling better and hoping to get an answer to his dyspnea.     PAST MEDICAL HISTORY:  -Active TB  -Anemia  -Pancytopenia  -Echo suggesting pulmonary hypertension    FAMILY HISTORY:  Family History   Family history unknown: Yes       SOCIAL HISTORY:  Social History     Socioeconomic History     Marital status:      Spouse name: Not on file     Number of children: Not on file     Years of education: Not on file     Highest education level: Not on file   Occupational History     Not on file   Social Needs     Financial resource strain: Not on file     Food insecurity:     Worry: Not on file     Inability: Not on file     Transportation needs:     Medical: Not on file     Non-medical: Not on file   Tobacco Use     Smoking status: Never Smoker     Smokeless tobacco: Never Used   Substance and Sexual Activity     Alcohol use: No     Drug use: No     Sexual activity: Yes     Partners: Female     Birth control/protection: Condom   Lifestyle     Physical activity:     Days per week: Not on file     Minutes per session: Not on file     Stress: Not on file   Relationships     Social connections:     Talks on phone: Not on file     Gets together: Not on file     Attends Yarsanism service: Not on file     Active member of club or organization: Not on file     Attends meetings of clubs or organizations: Not on file     Relationship status: Not on file     Intimate partner violence:     Fear of current or ex partner: Not on file     Emotionally abused: Not on file     Physically abused: Not on file     Forced sexual activity: Not on file   Other Topics Concern     Not on file   Social History Narrative     Not on file       CURRENT MEDICATIONS:  Current Outpatient Medications   Medication Sig Dispense Refill     acetaminophen (TYLENOL) 325 MG tablet Take 2 tablets (650 mg) by mouth every 6 hours as needed for mild pain 180 tablet 3     albuterol (PROVENTIL HFA) 108 (90 Base) MCG/ACT inhaler Inhale 2 puffs  "into the lungs every 6 hours as needed for shortness of breath / dyspnea or wheezing 8.5 g 3     amLODIPine (NORVASC) 5 MG tablet Take 1 tablet (5 mg) by mouth daily 30 tablet 2     bedaquiline (SITRURO) 100 MG tablet Take 200 mg by mouth Every Mon, Wed, Fri Morning       cycloSERINE (SEROMYCIN) 250 MG capsule Take 250 mg by mouth 2 times daily       diclofenac (VOLTAREN) 1 % topical gel Place 2 g onto the skin 4 times daily as needed for moderate pain Of both shoulders 1 Tube 3     DULoxetine 60 MG PO capsule Take 1 capsule (60 mg) by mouth daily 30 capsule 3     ethambutol (MYAMBUTOL) 400 MG tablet Take 1,200 mg by mouth daily       fluticasone-salmeterol (ADVAIR) 100-50 MCG/DOSE inhaler Inhale 1 puff into the lungs every 12 hours 1 Inhaler 2     furosemide (LASIX) 20 MG tablet Take 2 tablets (40 mg) by mouth daily 60 tablet 3     gabapentin (NEURONTIN) 300 MG capsule Take 3 capsules (900 mg) by mouth 3 times daily 270 capsule 1     levofloxacin (LEVAQUIN) 500 MG tablet Take 750 mg by mouth daily       melatonin 3 MG tablet Take 1 tablet (3 mg) by mouth nightly as needed for sleep 30 tablet 3     nortriptyline (PAMELOR) 10 MG capsule Take 1-2 capsules (10-20 mg) by mouth At Bedtime For pain and insomnia 60 capsule 1     oxyCODONE (ROXICODONE) 5 MG tablet Take 1 tablet (5 mg) by mouth 2 times daily as needed for severe pain 14 tablet 0     potassium chloride ER (KLOR-CON M) 20 MEQ CR tablet Take 1 tablet (20 mEq) by mouth daily 30 tablet 3     pyrazinamide 500 MG tablet Take 1,500 mg by mouth daily       vitamin B6 (PYRIDOXINE) 100 MG tablet Take 2 tablets (200 mg) by mouth daily         ROS:   10 point ROS negative except HPI    EXAM:  BP (!) 133/102 (BP Location: Right arm, Patient Position: Sitting, Cuff Size: Adult Regular)   Pulse 97   Temp 97.9  F (36.6  C) (Oral)   Ht 1.626 m (5' 4\")   Wt 83.1 kg (183 lb 1.6 oz)   SpO2 97%   BMI 31.43 kg/m    General: appears comfortable, alert and articulate  Head: " normocephalic, atraumatic  Eyes: anicteric sclera, EOMI  Neck: no adenopathy  Orophyarynx: moist mucosa, no lesions, dentition intact  Heart: regular, S1/S2, no murmur, gallop, rub, estimated JVP 3 cm  Lungs: decreased breath sounds bilaterally, worse on right side than left  Abdomen: soft, non-tender, bowel sounds present, no hepatosplenomegaly  Extremities: no clubbing, cyanosis or edema  Neurological: normal speech and affect, no gross motor deficits    Labs:  CBC RESULTS:  Lab Results   Component Value Date    WBC 4.5 04/21/2020    RBC 4.43 04/21/2020    HGB 13.7 04/21/2020    HCT 43.2 04/21/2020    MCV 98 04/21/2020    MCH 30.9 04/21/2020    MCHC 31.7 04/21/2020    RDW 14.1 04/21/2020     04/21/2020       CMP RESULTS:  Lab Results   Component Value Date     10/18/2018    POTASSIUM 3.9 10/18/2018    CHLORIDE 101 10/18/2018    CO2 30 10/18/2018    ANIONGAP 6 10/18/2018    GLC 76 10/18/2018    BUN 17 10/18/2018    CR 1.16 10/18/2018    GFRESTIMATED 68 10/18/2018    GFRESTBLACK 82 10/18/2018    ZANDER 9.8 10/18/2018    BILITOTAL 1.5 (H) 10/18/2018    ALBUMIN 4.4 10/18/2018    ALKPHOS 93 10/18/2018    ALT 30 10/18/2018    AST 27 10/18/2018        Echocardiogram 12/30/2019 ([facility]):  The left ventricular systolic function is normal, estimated LVEF 60-65%.  Abnormal septal motion secondary to right ventricular volume and pressure overload.  Severely reduced right ventricular systolic function. The right ventricle is quantitatively enlarged.  There is trace circumferential pericardial effusion.  Severe pulmonary hypertension, estimated right ventricular systolic pressure is 64 mmHg.    CT Scan 12/29/19 ([facility])  Small volume ascites is not changed.  Chronic changes of the right lung with further volume loss and mediastinal shift to the right since 8/30/2019.  Groundglass infiltrates of the left upper lobe and lingula are consistent with infectious or inflammatory process.  Lobulated mass of  the left lower lobe with pleural thickening has decreased in size with interval resolution of the cavitation since 8/30/2019.    Assessment and Plan: Chaim Norman is a 44 year old male with history of active TB with significant lung destruction who presents for evaluation of pulmonary hypertension.     1. 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 Because of COVID-19 we have been unable to complete his work-up. I would like to get his hemodynamic study completed because he has gained a lot of weight. I don't think this is right heart failure, but I want to be certain.     If he does have significant pulmonary hypertension, we may need to get him started on inhaled therapy to reduce the risk of VQ mismatch.     2. Hypertension: Diastolic BP is quite elevated, we will increase amlodipine to 10 mg daily.     It was a pleasure seeing your patient Chaim Norman at the [facility] Pulmonary Hypertension clinic.  Please contact us with any questions or concerns that you may have.    Sincerely,    Rod Chauhan MD, PhD   of Medicine  [facility]pert[facility]  Cardiovascular Division  [facility]

## 2020-04-21 NOTE — NURSING NOTE
Chief Complaint   Patient presents with     Follow Up     Return after starting new diuretic for 20 lb weight gain w/labs prior     Vitals were taken and medications were reconciled.     Charlene Jordan  9:28 AM

## 2020-04-21 NOTE — Clinical Note
4/21/2020      RE: Chaim Norman  [address]  [facility] [address]       Dear Colleague,    Thank you for the opportunity to participate in the care of your patient, Chaim Norman, at the [facility] at [facility]. Please see a copy of my visit note below.    April 21, 2020      Nyla Florian NP, [facility]  Family Medicine  [place], [place]    Dear Dr. Florian,    I had the pleasure of seeing your patient Chaim Norman at the [facility] Pulmonary Hypertension clinic.  As you know, Chaim is a very pleasant 44 year old male with a past medical history tuberculosis first diagnosed in 2014 with subsequent pulmonary complications including lung destruction, hypertension, neuropathy, and likely pulmonary hypertension after an echocardiogram showed RV dilation and dysfunction. Chaim comes in today after having an echocardiogram performed at [facility] in December of 2019 which showed RV dilation and dysfunction. Chaim states that his breathing is actually getting better recently on his TB medications. He does not do a lot of activity because he has bad neuropathy in his feet so walking distances is nearly impossible. He is limited by this so going up a flight of stairs is difficult because of foot pain and not dyspnea. He denies any chest pain or pressure, presyncope, or syncope. He has no orthopnea or paroxysmal dyspnea. 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    Chaim is currently seeing ID at [facility] for his TB. He tells me he is taking his medications. He is frustrated by this diagnosis. He does have a lot of pain in his legs and his biggest concern today is his foot pain and neuropathy. He is scheduled to see a pain clinic and Dr. Florian has been working with him using gabapentin for his pain.    Chaim also had a CT scan performed at [facility] in December which showed bilateral lung destruction. He has not had any  pulmonary function tests to his recollection or per my review of the chart.    Chaim returns to clinic today after struggling with lower extremity edema.     PAST MEDICAL HISTORY:  -Active TB  -Anemia  -Pancytopenia  -Echo suggesting pulmonary hypertension    FAMILY HISTORY:  Family History   Family history unknown: Yes       SOCIAL HISTORY:  Social History     Socioeconomic History     Marital status:      Spouse name: Not on file     Number of children: Not on file     Years of education: Not on file     Highest education level: Not on file   Occupational History     Not on file   Social Needs     Financial resource strain: Not on file     Food insecurity:     Worry: Not on file     Inability: Not on file     Transportation needs:     Medical: Not on file     Non-medical: Not on file   Tobacco Use     Smoking status: Never Smoker     Smokeless tobacco: Never Used   Substance and Sexual Activity     Alcohol use: No     Drug use: No     Sexual activity: Yes     Partners: Female     Birth control/protection: Condom   Lifestyle     Physical activity:     Days per week: Not on file     Minutes per session: Not on file     Stress: Not on file   Relationships     Social connections:     Talks on phone: Not on file     Gets together: Not on file     Attends [employer] service: Not on file     Active member of club or organization: Not on file     Attends meetings of clubs or organizations: Not on file     Relationship status: Not on file     Intimate partner violence:     Fear of current or ex partner: Not on file     Emotionally abused: Not on file     Physically abused: Not on file     Forced sexual activity: Not on file   Other Topics Concern     Not on file   Social History Narrative     Not on file       CURRENT MEDICATIONS:  Current Outpatient Medications   Medication Sig Dispense Refill     acetaminophen (TYLENOL) 325 MG tablet Take 2 tablets (650 mg) by mouth every 6 hours as needed for mild pain 180 tablet 3  "    albuterol (PROVENTIL HFA) 108 (90 Base) MCG/ACT inhaler Inhale 2 puffs into the lungs every 6 hours as needed for shortness of breath / dyspnea or wheezing 8.5 g 3     amLODIPine (NORVASC) 5 MG tablet Take 1 tablet (5 mg) by mouth daily 30 tablet 2     bedaquiline (SITRURO) 100 MG tablet Take 200 mg by mouth Every Mon, Wed, Fri Morning       cycloSERINE (SEROMYCIN) 250 MG capsule Take 250 mg by mouth 2 times daily       diclofenac (VOLTAREN) 1 % topical gel Place 2 g onto the skin 4 times daily as needed for moderate pain Of both shoulders 1 Tube 3     DULoxetine 60 MG PO capsule Take 1 capsule (60 mg) by mouth daily 30 capsule 3     ethambutol (MYAMBUTOL) 400 MG tablet Take 1,200 mg by mouth daily       fluticasone-salmeterol (ADVAIR) 100-50 MCG/DOSE inhaler Inhale 1 puff into the lungs every 12 hours 1 Inhaler 2     furosemide (LASIX) 20 MG tablet Take 2 tablets (40 mg) by mouth daily 60 tablet 3     gabapentin (NEURONTIN) 300 MG capsule Take 3 capsules (900 mg) by mouth 3 times daily 270 capsule 1     levofloxacin (LEVAQUIN) 500 MG tablet Take 750 mg by mouth daily       melatonin 3 MG tablet Take 1 tablet (3 mg) by mouth nightly as needed for sleep 30 tablet 3     nortriptyline (PAMELOR) 10 MG capsule Take 1-2 capsules (10-20 mg) by mouth At Bedtime For pain and insomnia 60 capsule 1     oxyCODONE (ROXICODONE) 5 MG tablet Take 1 tablet (5 mg) by mouth 2 times daily as needed for severe pain 14 tablet 0     potassium chloride ER (KLOR-CON M) 20 MEQ CR tablet Take 1 tablet (20 mEq) by mouth daily 30 tablet 3     pyrazinamide 500 MG tablet Take 1,500 mg by mouth daily       vitamin B6 (PYRIDOXINE) 100 MG tablet Take 2 tablets (200 mg) by mouth daily         ROS:   10 point ROS negative except HPI    EXAM:  BP (!) 133/102 (BP Location: Right arm, Patient Position: Sitting, Cuff Size: Adult Regular)   Pulse 97   Temp 97.9  F (36.6  C) (Oral)   Ht 1.626 m (5' 4\")   Wt 83.1 kg (183 lb 1.6 oz)   SpO2 97%   BMI " 31.43 kg/m    General: appears comfortable, alert and articulate  Head: normocephalic, atraumatic  Eyes: anicteric sclera, EOMI  Neck: no adenopathy  Orophyarynx: moist mucosa, no lesions, dentition intact  Heart: regular, S1/S2, no murmur, gallop, rub, estimated JVP 3 cm  Lungs: decreased breath sounds bilaterally, worse on right side than left  Abdomen: soft, non-tender, bowel sounds present, no hepatosplenomegaly  Extremities: no clubbing, cyanosis or edema  Neurological: normal speech and affect, no gross motor deficits    Labs:  CBC RESULTS:  Lab Results   Component Value Date    WBC 4.5 04/21/2020    RBC 4.43 04/21/2020    HGB 13.7 04/21/2020    HCT 43.2 04/21/2020    MCV 98 04/21/2020    MCH 30.9 04/21/2020    MCHC 31.7 04/21/2020    RDW 14.1 04/21/2020     04/21/2020       CMP RESULTS:  Lab Results   Component Value Date     10/18/2018    POTASSIUM 3.9 10/18/2018    CHLORIDE 101 10/18/2018    CO2 30 10/18/2018    ANIONGAP 6 10/18/2018    GLC 76 10/18/2018    BUN 17 10/18/2018    CR 1.16 10/18/2018    GFRESTIMATED 68 10/18/2018    GFRESTBLACK 82 10/18/2018    ZANDER 9.8 10/18/2018    BILITOTAL 1.5 (H) 10/18/2018    ALBUMIN 4.4 10/18/2018    ALKPHOS 93 10/18/2018    ALT 30 10/18/2018    AST 27 10/18/2018        Echocardiogram 12/30/2019 ([facility]):  The left ventricular systolic function is normal, estimated LVEF 60-65%.  Abnormal septal motion secondary to right ventricular volume and pressure overload.  Severely reduced right ventricular systolic function. The right ventricle is quantitatively enlarged.  There is trace circumferential pericardial effusion.  Severe pulmonary hypertension, estimated right ventricular systolic pressure is 64 mmHg.    CT Scan 12/29/19 ([facility])  Small volume ascites is not changed.  Chronic changes of the right lung with further volume loss and mediastinal shift to the right since 8/30/2019.  Groundglass infiltrates of the left upper lobe and lingula are  consistent with infectious or inflammatory process.  Lobulated mass of the left lower lobe with pleural thickening has decreased in size with interval resolution of the cavitation since 8/30/2019.    Assessment and Plan: Chaim Norman is a 44 year old male with history of active TB with significant lung destruction who presents for evaluation of pulmonary hypertension.     1. 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 Because of COVID-19 we have been unable to complete his work-up. However, he was having symptoms of right heart failure    2. Hypertension: Diastolic BP is quite elevated, we will increase amlodipine to 10 mg daily.     At this point, I will like to work to complete his work up by getting an invasive hemodynamic evaluation because he is having right heart failure.      It was a pleasure seeing your patient Chaim Norman at the [facility] Pulmonary Hypertension clinic.  Please contact us with any questions or concerns that you may have.    Sincerely,    Rod Chauhan MD, PhD   of Medicine  [facility]  Cardiovascular Division  [facility]                    April 21, 2020      Nyla Florian NP, [facility]  Family Medicine  [place], [place]    Dear Dr. Florian,    I had the pleasure of seeing your patient Chaim Norman at the [facility] Pulmonary Hypertension clinic.  As you know, Chaim is a very pleasant 44 year old male with a past medical history tuberculosis first diagnosed in 2014 with subsequent pulmonary complications including lung destruction, hypertension, neuropathy, and likely pulmonary hypertension after an echocardiogram showed RV dilation and dysfunction. Chaim comes in today after having an echocardiogram performed at [facility] in December of 2019 which showed RV dilation and dysfunction. Chaim states that his breathing is actually getting better recently on his TB medications. He  does not do a lot of activity because he has bad neuropathy in his feet so walking distances is nearly impossible. He is limited by this so going up a flight of stairs is difficult because of foot pain and not dyspnea. He denies any chest pain or pressure, presyncope, or syncope. He has no orthopnea or paroxysmal dyspnea. 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    Chaim is currently seeing ID at [facility] for his TB. He tells me he is taking his medications. He is frustrated by this diagnosis. He does have a lot of pain in his legs and his biggest concern today is his foot pain and neuropathy. He is scheduled to see a pain clinic and Dr. Florian has been working with him using gabapentin for his pain.    Chaim also had a CT scan performed at [facility] in December which showed bilateral lung destruction. He has not had any pulmonary function tests to his recollection or per my review of the chart.    Chaim returns to clinic today after struggling with weight gain. He was started on lasix but stopped because it was causing some issues with excess urination. He is actually feeling much better and is gaining weight because he is eating a lot more. He is feeling better and hoping to get an answer to his dyspnea.     PAST MEDICAL HISTORY:  -Active TB  -Anemia  -Pancytopenia  -Echo suggesting pulmonary hypertension    FAMILY HISTORY:  Family History   Family history unknown: Yes       SOCIAL HISTORY:  Social History     Socioeconomic History     Marital status:      Spouse name: Not on file     Number of children: Not on file     Years of education: Not on file     Highest education level: Not on file   Occupational History     Not on file   Social Needs     Financial resource strain: Not on file     Food insecurity:     Worry: Not on file     Inability: Not on file     Transportation needs:     Medical: Not on file     Non-medical: Not on file   Tobacco Use     Smoking  status: Never Smoker     Smokeless tobacco: Never Used   Substance and Sexual Activity     Alcohol use: No     Drug use: No     Sexual activity: Yes     Partners: Female     Birth control/protection: Condom   Lifestyle     Physical activity:     Days per week: Not on file     Minutes per session: Not on file     Stress: Not on file   Relationships     Social connections:     Talks on phone: Not on file     Gets together: Not on file     Attends Zoroastrianism service: Not on file     Active member of club or organization: Not on file     Attends meetings of clubs or organizations: Not on file     Relationship status: Not on file     Intimate partner violence:     Fear of current or ex partner: Not on file     Emotionally abused: Not on file     Physically abused: Not on file     Forced sexual activity: Not on file   Other Topics Concern     Not on file   Social History Narrative     Not on file       CURRENT MEDICATIONS:  Current Outpatient Medications   Medication Sig Dispense Refill     acetaminophen (TYLENOL) 325 MG tablet Take 2 tablets (650 mg) by mouth every 6 hours as needed for mild pain 180 tablet 3     albuterol (PROVENTIL HFA) 108 (90 Base) MCG/ACT inhaler Inhale 2 puffs into the lungs every 6 hours as needed for shortness of breath / dyspnea or wheezing 8.5 g 3     amLODIPine (NORVASC) 5 MG tablet Take 1 tablet (5 mg) by mouth daily 30 tablet 2     bedaquiline (SITRURO) 100 MG tablet Take 200 mg by mouth Every Mon, Wed, Fri Morning       cycloSERINE (SEROMYCIN) 250 MG capsule Take 250 mg by mouth 2 times daily       diclofenac (VOLTAREN) 1 % topical gel Place 2 g onto the skin 4 times daily as needed for moderate pain Of both shoulders 1 Tube 3     DULoxetine 60 MG PO capsule Take 1 capsule (60 mg) by mouth daily 30 capsule 3     ethambutol (MYAMBUTOL) 400 MG tablet Take 1,200 mg by mouth daily       fluticasone-salmeterol (ADVAIR) 100-50 MCG/DOSE inhaler Inhale 1 puff into the lungs every 12 hours 1 Inhaler  "2     furosemide (LASIX) 20 MG tablet Take 2 tablets (40 mg) by mouth daily 60 tablet 3     gabapentin (NEURONTIN) 300 MG capsule Take 3 capsules (900 mg) by mouth 3 times daily 270 capsule 1     levofloxacin (LEVAQUIN) 500 MG tablet Take 750 mg by mouth daily       melatonin 3 MG tablet Take 1 tablet (3 mg) by mouth nightly as needed for sleep 30 tablet 3     nortriptyline (PAMELOR) 10 MG capsule Take 1-2 capsules (10-20 mg) by mouth At Bedtime For pain and insomnia 60 capsule 1     oxyCODONE (ROXICODONE) 5 MG tablet Take 1 tablet (5 mg) by mouth 2 times daily as needed for severe pain 14 tablet 0     potassium chloride ER (KLOR-CON M) 20 MEQ CR tablet Take 1 tablet (20 mEq) by mouth daily 30 tablet 3     pyrazinamide 500 MG tablet Take 1,500 mg by mouth daily       vitamin B6 (PYRIDOXINE) 100 MG tablet Take 2 tablets (200 mg) by mouth daily         ROS:   10 point ROS negative except HPI    EXAM:  BP (!) 133/102 (BP Location: Right arm, Patient Position: Sitting, Cuff Size: Adult Regular)   Pulse 97   Temp 97.9  F (36.6  C) (Oral)   Ht 1.626 m (5' 4\")   Wt 83.1 kg (183 lb 1.6 oz)   SpO2 97%   BMI 31.43 kg/m    General: appears comfortable, alert and articulate  Head: normocephalic, atraumatic  Eyes: anicteric sclera, EOMI  Neck: no adenopathy  Orophyarynx: moist mucosa, no lesions, dentition intact  Heart: regular, S1/S2, no murmur, gallop, rub, estimated JVP 3 cm  Lungs: decreased breath sounds bilaterally, worse on right side than left  Abdomen: soft, non-tender, bowel sounds present, no hepatosplenomegaly  Extremities: no clubbing, cyanosis or edema  Neurological: normal speech and affect, no gross motor deficits    Labs:  CBC RESULTS:  Lab Results   Component Value Date    WBC 4.5 04/21/2020    RBC 4.43 04/21/2020    HGB 13.7 04/21/2020    HCT 43.2 04/21/2020    MCV 98 04/21/2020    MCH 30.9 04/21/2020    MCHC 31.7 04/21/2020    RDW 14.1 04/21/2020     04/21/2020       CMP RESULTS:  Lab Results "   Component Value Date     10/18/2018    POTASSIUM 3.9 10/18/2018    CHLORIDE 101 10/18/2018    CO2 30 10/18/2018    ANIONGAP 6 10/18/2018    GLC 76 10/18/2018    BUN 17 10/18/2018    CR 1.16 10/18/2018    GFRESTIMATED 68 10/18/2018    GFRESTBLACK 82 10/18/2018    ZANDER 9.8 10/18/2018    BILITOTAL 1.5 (H) 10/18/2018    ALBUMIN 4.4 10/18/2018    ALKPHOS 93 10/18/2018    ALT 30 10/18/2018    AST 27 10/18/2018        Echocardiogram 12/30/2019 ([facility]):  The left ventricular systolic function is normal, estimated LVEF 60-65%.  Abnormal septal motion secondary to right ventricular volume and pressure overload.  Severely reduced right ventricular systolic function. The right ventricle is quantitatively enlarged.  There is trace circumferential pericardial effusion.  Severe pulmonary hypertension, estimated right ventricular systolic pressure is 64 mmHg.    CT Scan 12/29/19 ([facility])  Small volume ascites is not changed.  Chronic changes of the right lung with further volume loss and mediastinal shift to the right since 8/30/2019.  Groundglass infiltrates of the left upper lobe and lingula are consistent with infectious or inflammatory process.  Lobulated mass of the left lower lobe with pleural thickening has decreased in size with interval resolution of the cavitation since 8/30/2019.    Assessment and Plan: Chaim Norman is a 44 year old male with history of active TB with significant lung destruction who presents for evaluation of pulmonary hypertension.     1. 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 Because of COVID-19 we have been unable to complete his work-up. I would like to get his hemodynamic study completed because he has gained a lot of weight. I don't think this is right heart failure, but I want to be certain.     If he does have significant pulmonary hypertension, we may need to get him started on inhaled therapy to reduce the risk of VQ  mismatch.     2. Hypertension: Diastolic BP is quite elevated, we will increase amlodipine to 10 mg daily.     It was a pleasure seeing your patient Chaim Norman at the [facility] Pulmonary Hypertension clinic.  Please contact us with any questions or concerns that you may have.    Sincerely,    Rod Chauhan MD, PhD   of Medicine  [facility]  Cardiovascular Division  [facility]                    Please do not hesitate to contact me if you have any questions/concerns.     Sincerely,     Rod Chauhan MD

## 2020-04-21 NOTE — TELEPHONE ENCOUNTER
Team,     Here is an update for today's clinic.     Tonny: BP quite elevated, need to increase amlodipine to 10 mg daily. His weight gain is likely secondary to his increased apetite and not right heart failure. We can stop his lasix because he isn't taking it anyways. I would like to get a RHC with vasodilator study completed in the next two weeks if we can. Let me know if we can't. Follow up after RHC.   ---------------------------  Clarified with Dr. Chauhan he had seen patient's Hepatitis labs. Pat Story RN on 4/21/2020 at 2:19 PM    RHC orders placed in separate encounter. Pat Stroy RN on 4/21/2020 at 2:19PM    Msg sent to [facility] letting her know RHC ordered were in and can be scheduled. Pat Story RN on 4/21/2020 at 2:22 PM

## 2020-04-21 NOTE — PATIENT INSTRUCTIONS
Increase amlodipine to 10 mg and then take it before bed.    No furosemide    Watch your diet so you don't gain too much weight.    We will call to schedule to catheterization.

## 2020-04-22 LAB
IL6 SERPL-MCNC: 3.08 PG/ML
LA PPP-IMP: NEGATIVE
RHEUMATOID FACT SER NEPH-ACNC: <7 IU/ML (ref 0–20)
STFR SERPL-SCNC: 4.3 MG/L (ref 2.2–5)

## 2020-04-23 ENCOUNTER — PATIENT OUTREACH (OUTPATIENT)
Dept: CARE COORDINATION | Facility: CLINIC | Age: 45
End: 2020-04-23

## 2020-04-23 ENCOUNTER — TELEPHONE (OUTPATIENT)
Dept: FAMILY MEDICINE | Facility: CLINIC | Age: 45
End: 2020-04-23

## 2020-04-23 LAB — ANA SER QL IF: NEGATIVE

## 2020-04-23 NOTE — TELEPHONE ENCOUNTER
What type of form? [place] Unemployment  What day did you drop off your forms? Thursday, April 23  Is there a due date? asap (7-10 business days to compete forms)   How would you like to receive these forms? Please fax to [phone]     What is the best number to contact you?  [phone]    What time works best to contact you with in 4 hrs? anytime  Is it okay to leave a message? Yes    Angelica Aranda (Auto signs name of person logged into Epic)

## 2020-04-23 NOTE — PROGRESS NOTES
Clinic Care Coordination Contact    Follow Up Progress Note      Assessment: RN CC received call back from patient for follow up.  Patient states he has a form for PCP to fill out for him to receive unemployment and unemployment insurance.  Patient states he did not know how to drive to [place], as he was instructed by the [facility] to drop the form off there.  RN CC spent time reviewing address of [facility] with writer, going letter by letter of how to spell the address.  Patient was then able to read back the correct address.  Patient reports chronic pain is 8/10 of his BLE.  This is down from 9/10 at last telephone encounter with the pain clinic.  Patient is taking medication as directed, but needs reinforcement of correct dosing.  Patient stated he would need a refill of gabapentin.  RN CC reviewed with patient he has a refill on file at his pharmacy and reviewed how to request a refill.  RN CC reviewed importance of requesting a refill of medication prior to running out of it.    Goals addressed this encounter:   Goals Addressed                 This Visit's Progress      #3 Pain Management (pt-stated)   40%     Goal Statement: I will have suitable pain relief to perform activities of daily living.  Date Goal set: 2/19/2020  Date to Achieve By: 6/1/2020  Patient expressed understanding of goal: Yes  Action steps to achieve this goal:  1. I will take medications as prescribed to maintain adequate pain control/relief.  2. I will utilize non-pharmaceutical measures to help with pain control/relief.  3. I will maintain routine follow up and contact with my clinic care team.  4. I will call my pharmacy to request refills of my medications before they run out.             Intervention/Education provided during outreach: RN CC reviewed plan of care, how and when to request medication refills and provided patient with the [facility] address.     Outreach Frequency:  monthly    Plan:   1. Patient will drop off his unemployment forms to [facility] today or tomorrow.  2. Patient will continue to take his medications as prescribed.  3. Patient will request refills of his medications from his pharmacy prior to them running out.  4. RN Care Coordinator will follow up in 1 month.    Melissa Behl BSN, RN, PHN, [facility]  Primary Care Clinical RN Care Coordinator  [facility]   [phone]

## 2020-04-24 NOTE — TELEPHONE ENCOUNTER
Form faxed to [place] [employer], [phone] and placed in immediate scan bin for abstracting.      Massiel WATSON)(TESSIE)

## 2020-04-24 NOTE — TELEPHONE ENCOUNTER
Form received at the  , placed on the desk [facility] 's working at today for possible completion.       Massiel WATSON)(TESSIE)

## 2020-04-27 ENCOUNTER — TELEPHONE (OUTPATIENT)
Dept: CARDIOLOGY | Facility: CLINIC | Age: 45
End: 2020-04-27

## 2020-04-27 DIAGNOSIS — G62.0 DRUG-INDUCED PERIPHERAL NEUROPATHY (H): ICD-10-CM

## 2020-04-27 RX ORDER — GABAPENTIN 300 MG/1
900 CAPSULE ORAL 3 TIMES DAILY
Qty: 270 CAPSULE | Refills: 1 | Status: CANCELLED | OUTPATIENT
Start: 2020-04-27

## 2020-04-27 NOTE — TELEPHONE ENCOUNTER
.Reason for Call:  Medication or medication refill:    Do you use a [facility]?  Name of the pharmacy and phone number for the current request:  [id number] - [place], [place] - [address] [facility] [address]    Name of the medication requested: gabapentin (NEURONTIN) 300 MG capsule       Other request:     Can we leave a detailed message on this number? YES    Phone number patient can be reached at: Cell number on file:    Telephone Information:   Mobile [phone]       Best Time:     Call taken on 4/27/2020 at 8:19 AM by Lalo Wagner

## 2020-04-27 NOTE — TELEPHONE ENCOUNTER
----- Message from Pat Story RN sent at 4/24/2020  1:55 PM CDT -----  Regarding: Pre-procedure education  patient has RHC we/vaso on 5/8 w/Chris.  Call patient with pre-procedure education.  No Dm or blood thinners.  ------------------------  Called patient and reviewed pre-procedure instructions and location.  patient asked that he receive an itinerary with address of procedure.  Agreed I would ask Dunia to mail it to his home.  Patient verbalized understanding, agreed with plan and denied any further questions. Pat Story RN on 4/27/2020 at 12:10 PM      Sent staff msg to dunia asking her to send patient itinerary.

## 2020-04-28 ENCOUNTER — VIRTUAL VISIT (OUTPATIENT)
Dept: PALLIATIVE MEDICINE | Facility: CLINIC | Age: 45
End: 2020-04-28
Payer: COMMERCIAL

## 2020-04-28 DIAGNOSIS — M79.671 BILATERAL FOOT PAIN: ICD-10-CM

## 2020-04-28 DIAGNOSIS — G47.00 INSOMNIA, UNSPECIFIED TYPE: ICD-10-CM

## 2020-04-28 DIAGNOSIS — M79.2 NEUROPATHIC PAIN: ICD-10-CM

## 2020-04-28 DIAGNOSIS — M25.512 CHRONIC PAIN OF BOTH SHOULDERS: ICD-10-CM

## 2020-04-28 DIAGNOSIS — G89.29 CHRONIC PAIN OF BOTH SHOULDERS: ICD-10-CM

## 2020-04-28 DIAGNOSIS — M25.511 CHRONIC PAIN OF BOTH SHOULDERS: ICD-10-CM

## 2020-04-28 DIAGNOSIS — G62.0 DRUG-INDUCED PERIPHERAL NEUROPATHY (H): Primary | ICD-10-CM

## 2020-04-28 DIAGNOSIS — G62.0 DRUG-INDUCED POLYNEUROPATHY (H): ICD-10-CM

## 2020-04-28 DIAGNOSIS — M79.672 BILATERAL FOOT PAIN: ICD-10-CM

## 2020-04-28 PROCEDURE — 99214 OFFICE O/P EST MOD 30 MIN: CPT | Mod: 95 | Performed by: NURSE PRACTITIONER

## 2020-04-28 RX ORDER — NORTRIPTYLINE HCL 10 MG
10-20 CAPSULE ORAL AT BEDTIME
Qty: 60 CAPSULE | Refills: 1 | Status: SHIPPED | OUTPATIENT
Start: 2020-04-28 | End: 2020-06-05

## 2020-04-28 RX ORDER — GABAPENTIN 300 MG/1
900 CAPSULE ORAL 3 TIMES DAILY
Qty: 270 CAPSULE | Refills: 1 | Status: SHIPPED | OUTPATIENT
Start: 2020-04-28 | End: 2020-06-05

## 2020-04-28 ASSESSMENT — PAIN SCALES - GENERAL: PAINLEVEL: EXTREME PAIN (8)

## 2020-04-28 NOTE — PROGRESS NOTES
The patient has been notified of following:     This telephone visit will be conducted via a call between you and your provider. We have found that certain health care needs can be provided without the need for a physical exam.  This service lets us provide the care you need with a phone conversation.  If a prescription is necessary we can send it directly to your pharmacy.  If lab work is needed we can place an order for that and you can then stop by our lab to have the test done at a later time. This is a billable service but we do not know the cost at this time.     Patient has given verbal consent for Telephone visit?  Yes  Nimco Patel CMA ([facility]    4/28/2020    Chaim Norman is a 44 year old male who is being evaluated via a billable telephone visit.        Chief complaint: bilateral neuropathic foot pain, bilateral shoulder pain    Interval history:  Chaim Norman is a 44 year old male is known to me for   Neuropathic pain  Bilateral foot pain  Chronic pain of both shoulders  Insomnia, unspecified site   PMHx includes: None  PSHx includes: None.    Recommendations/plan at the last visit on 3/31/2020 included:  1. Physical Therapy:  None at present time  2. Clinical Health Psychologist: not at present time  3. Diagnostic Studies:  none  4. Medication Management:    1. Continue cymbalta 60mg every day  2. Patient may increase nortriptyline to 20mg at bedtime if needed   3. Continue gabapentin  5. Further procedures recommended: none  6. Recommendations to PCP: see above  7. Follow up: 4 weeks        Since his last visit, Chaim Norman reports:  -he is sleeping a bit better, but still suffering with neuropathic pain in his feet.  Discussed option of trying compounded naltrexone that has been shown to be helpful for neuropathic pain.       On 3/31/2020   he is starting to sleep better on the nortriptyline  - he feels that Cymbalta has been somewhat helpful for pain,  "tolerating well.   -neuropathic pain remains in legs and feet  -still has shoulder pain    At this point, the patient's participation with our multidisciplinary team includes:  The patient has been compliant with the program.  PT - none at present time  Health Psych - none at present time      Pain scores:  Pain intensity on average is 9 on a scale of 0-10.    Range is 8-9/10.   Pain right now is 8/10.   Pain is described as \"sharp and numb .\"    Pain is constant in nature    Current pain relevant medications:   -Tylenol 650mg Q 6 hours PRN pain (barely helpful-8 tablets/day)  -gabapentin 300mg capsules, take 900mg TID (somewhat helpful)  -oxycodone 5mg BID PRN severe pain (helpful temporarily)  -voltaren gel PRN (helpful)  -Cymbalta 60mg every day (helpful for pain and mood)  -nortriptyline 20mg at HS (taking 20mg at bedtime, helpful)           Other pertinent medications:  -Sitruro 200mg  -Seromycin 250mg  -Myambutol 1,200mg  -melatonin 2mg HS      Previous medication treatments included:  OPIATES:Oxycodone (helpful temporarily)  NSAIDS: None  MUSCLE RELAXANTS: None  ANTI-MIGRAINE MEDS: None  ANTI-DEPRESSANTS: Cymbalta (helpful), nortriptyline (helpful)  SLEEP AIDS: None  ANTI-CONVULSANTS: Gabapentin (helpful temporarily)  TOPICALS: Lidocaine ointment (not helpful)  Other meds: Acetaminophen (not helpful)        Other treatments have included:  Chaim Norman has not been seen at a pain clinic in the past.    PT: none  Chiropractic care: none  Acupuncture: none  TENs Unit: none     Injections: none          Side Effects: no side effect  Patient is using the medication as prescribed: YES    Medications:  Current Outpatient Medications   Medication Sig Dispense Refill     acetaminophen (TYLENOL) 325 MG tablet Take 2 tablets (650 mg) by mouth every 6 hours as needed for mild pain 180 tablet 3     albuterol (PROVENTIL HFA) 108 (90 Base) MCG/ACT inhaler Inhale 2 puffs into the lungs every 6 hours as needed for shortness of " breath / dyspnea or wheezing 8.5 g 3     amLODIPine (NORVASC) 5 MG tablet Take 2 tablets (10 mg) by mouth daily 180 tablet 3     bedaquiline (SITRURO) 100 MG tablet Take 200 mg by mouth Every Mon, Wed, Fri Morning       cycloSERINE (SEROMYCIN) 250 MG capsule Take 250 mg by mouth 2 times daily       diclofenac (VOLTAREN) 1 % topical gel Place 2 g onto the skin 4 times daily as needed for moderate pain Of both shoulders (Patient not taking: Reported on 4/21/2020) 1 Tube 3     DULoxetine 60 MG PO capsule Take 1 capsule (60 mg) by mouth daily 30 capsule 3     ethambutol (MYAMBUTOL) 400 MG tablet Take 1,200 mg by mouth daily       fluticasone-salmeterol (ADVAIR) 100-50 MCG/DOSE inhaler Inhale 1 puff into the lungs every 12 hours 1 Inhaler 2     gabapentin (NEURONTIN) 300 MG capsule Take 3 capsules (900 mg) by mouth 3 times daily 270 capsule 1     levofloxacin (LEVAQUIN) 500 MG tablet Take 750 mg by mouth daily       melatonin 3 MG tablet Take 1 tablet (3 mg) by mouth nightly as needed for sleep 30 tablet 3     nortriptyline (PAMELOR) 10 MG capsule Take 1-2 capsules (10-20 mg) by mouth At Bedtime For pain and insomnia 60 capsule 1     oxyCODONE (ROXICODONE) 5 MG tablet Take 1 tablet (5 mg) by mouth 2 times daily as needed for severe pain 14 tablet 0     potassium chloride ER (KLOR-CON M) 20 MEQ CR tablet Take 1 tablet (20 mEq) by mouth daily 30 tablet 3     pyrazinamide 500 MG tablet Take 1,500 mg by mouth daily       vitamin B6 (PYRIDOXINE) 100 MG tablet Take 2 tablets (200 mg) by mouth daily         Medical History: any changes in medical history since they were last seen? No    Social History:   Home situation: Single and lives with son  Occupation/Schooling: Not presently working  Tobacco use: Non smoker  Alcohol use: None  Drug use: None  History of chemical dependency treatment: None    Is patient a current smoker or tobacco user?  no  If yes, was cessation counseling offered?  na      Review of Systems:    The 14  system ROS was reviewed from the intake questionnaire, and is positive for:  Constitutional: fever/chills, fatigue, weight gain, weight loss  Eyes/Head: headache, dizziness  ENT: ringing in ears  Allergy/Immune: allergies  Skin: itching, rash, hives  Hematologic: easy bruising  Respiratory: cough, wheezing, shortness of breath  Cardiovascular: swelling in feet, fainting, palpitations, chest pain  GI: abdominal pain, nausea, vomiting, diarrhea, constipation  Endocrine: steroid use  Musculoskeletal:  joint pain, arthritis, stiffness, gout, back pain, neck pain  Urinary: frequency, urgency, incontinence, hesitancy  Neurologic: weakness, numbness/tingling, seizure, stroke, memory loss  Mental health: depression, anxiety, stress, suicidal ideation      Physical Exam:  Vital signs: There were no vitals taken for this visit.    Behavioral observations:  Awake, alert, cooperative            [place] Prescription Monitoring Program:  Reviewed [place] [facility] 4/28/2020- no concerning fills.  Marleny WARE RN CNP, [facility] location          Assessment:   1. Drug induced peripheral neuropathy  2. Drug-induced polyneuropathy  3. Neuropathic pain  4. Bilateral foot pain  5. Chronic pain of both shoulder  6.  insomnia  7. PMHx includes: Non  8. PSHx includes: None      Plan:   1. Physical Therapy:  None at present time  2. Clinical Health Psychologist: not at present time  3. Diagnostic Studies:  none  4. Medication Management:    1. Continue cymbalta 60mg every day  2. continue nortriptyline to 20mg at bedtime if needed   3. Continue gabapentin  4. Start naltrexone 4.5mg/day for neuropathic pain. Advised NOT to take this with oxycodone  5. Further procedures recommended: none  6. Recommendations to PCP: see above  7. Follow up: 4 weeks    Phone call duration: 29 minutes      Marleny WARE RN CNP, [facility] location

## 2020-04-30 ENCOUNTER — PATIENT OUTREACH (OUTPATIENT)
Dept: CARE COORDINATION | Facility: CLINIC | Age: 45
End: 2020-04-30

## 2020-04-30 ENCOUNTER — TELEPHONE (OUTPATIENT)
Dept: FAMILY MEDICINE | Facility: CLINIC | Age: 45
End: 2020-04-30

## 2020-04-30 NOTE — TELEPHONE ENCOUNTER
Reason for Call:  Fax    Detailed comments: Pt states that he needs a form that was left for provider to be filled out and sent to his insurance. States that he left all of the information already with the team. He would like to request a call back to touch base on where in the process it is. Thank you.    Phone Number Patient can be reached at: Home number on file [phone] (home)    Best Time: Any    Can we leave a detailed message on this number? YES    Call taken on 4/30/2020 at 11:23 AM by Phuong Fernandez

## 2020-04-30 NOTE — PROGRESS NOTES
Clinic Care Coordination Contact    Follow Up Progress Note      Assessment: [facility] contacted patient for monthly follow up. Patient stated that he is working on applying for unemployment. He has some paperwork he needs his PCP to fill out for him so he can submit everything as soon as possible. Encouraged patient to reach out to PCP clinic to notify care team of the paperwork needing to be filled out. Patient stated that he would. He reports that he did not get rental assistance from the agencies that were discussed for this month, but hopes that the money he anticipates he will receive from unemployment will pay his rent. [facility] inquired if patient has been working at all this year. He stated he hasn't been working since last year, which made him decide now he should apply for unemployment. Patient called [employer] last month and was told that his application is still in process. [facility] stated that if he hasn't heard anything by the middle of May, he should call again.     Goals addressed this encounter:   Goals Addressed                 This Visit's Progress       Patient Stated      1. Transportation (pt-stated)   60%     Goal Statement: I need help getting transportation.  Date Goal set: 1/15/2020   Date to Achieve By: 5/30/2020  Patient expressed understanding of goal: yes  Action steps to achieve this goal:  1. I will call [employer] [phone]  2. I will go to [facility] and apply for Medical Assistance benefit  3. I will apply for [employer]    2/28/2020: Patient stated that he was able to complete a [employer] application and send it in. Waiting to hear response.    3/30/2020: Has not heard back from [employer] yet. [facility] provided the number for [employer] Customer Service to call and inquire about his application (Ph: [phone]).        Financial Wellbeing (pt-stated)   10%     Goal Statement: I need help paying for my rent  Date Goal set: 2/14/2020  Barriers:  Feet have been hurting too much to get back to the [facility]   Strengths: Patient is motivated to getting assistancce  Date to Achieve By: 5/30/2020  Patient expressed understanding of goal: Yes  Action steps to achieve this goal:  1. I will fill out application for emergency assistance  2. I will bring my application in to the [facility]  3. I will obtain emergency assistance, if found eligible    2/28/2020: Patient stated that he is still working on his emergency assistance application and had more questions about it. [facility] provided additional phone numbers to him for rent assistance    3/30/2020: Went to [facility]. Submitted documentation that was needed. Got rent assistance through another agency for March. Reapplied for assistance in April through [facility]             Intervention/Education provided during outreach: Open ended questions; Encouraged patient to contact his PCP and [employer]     Outreach Frequency: monthly    Plan:   No further follow up needed this month  Care Coordinator will follow up in 1 month    BING Garcia  Primary Care Clinic- Social Work Care Coordinator  [facility] and [place]  Ph: [phone]  4/30/2020 2:37 PM

## 2020-05-01 ENCOUNTER — TELEPHONE (OUTPATIENT)
Dept: FAMILY MEDICINE | Facility: CLINIC | Age: 45
End: 2020-05-01

## 2020-05-01 NOTE — TELEPHONE ENCOUNTER
Received the [place] unemployment form today and holding for Nyla to return from [facility] to [facility]. See telephone encounter dated 5/1/2020.  Wolf Ruelas,  For 1st Floor Primary Care ([facility])

## 2020-05-01 NOTE — TELEPHONE ENCOUNTER
Form is received from the , holding for Monday when Nyla returns to clinic.  Wolf Ruelas,  For 1st Floor Primary Care ([facility])

## 2020-05-01 NOTE — TELEPHONE ENCOUNTER
What type of form? unemployment  What day did you drop off your forms? 5/1/2020  Is there a due date? asap (7-10 business day to compete forms)   How would you like to receive these forms? Please mail to [address]  [place] [address]  Which clinic was the form dropped off at? [facility]    What is the best number to contact you? Home [phone]  What time works best to contact you with in 4 hrs? any  Is it okay to leave a message? Yes    More Morrow    Placed in 1st floor mailbox

## 2020-05-04 DIAGNOSIS — Z11.59 ENCOUNTER FOR SCREENING FOR OTHER VIRAL DISEASES: Primary | ICD-10-CM

## 2020-05-04 NOTE — TELEPHONE ENCOUNTER
Form is [employer] faxed to Nyla to address.  Wolf Ruelas,  For 1st Floor Primary Care ([facility])

## 2020-05-04 NOTE — TELEPHONE ENCOUNTER
I already completed this on 4/23. It was abstracted on 4/28. Please let me know if he needs it to be completed again

## 2020-05-07 ENCOUNTER — TELEPHONE (OUTPATIENT)
Dept: CARDIOLOGY | Facility: CLINIC | Age: 45
End: 2020-05-07

## 2020-05-07 DIAGNOSIS — R06.02 SOB (SHORTNESS OF BREATH): ICD-10-CM

## 2020-05-07 DIAGNOSIS — I27.20 PULMONARY HYPERTENSION (H): Primary | ICD-10-CM

## 2020-05-08 ENCOUNTER — APPOINTMENT (OUTPATIENT)
Dept: LAB | Facility: CLINIC | Age: 45
End: 2020-05-08
Attending: INTERNAL MEDICINE
Payer: COMMERCIAL

## 2020-05-08 ENCOUNTER — APPOINTMENT (OUTPATIENT)
Dept: MEDSURG UNIT | Facility: CLINIC | Age: 45
End: 2020-05-08
Attending: INTERNAL MEDICINE
Payer: COMMERCIAL

## 2020-05-08 ENCOUNTER — HOSPITAL ENCOUNTER (OUTPATIENT)
Facility: CLINIC | Age: 45
Discharge: HOME | End: 2020-05-08
Attending: INTERNAL MEDICINE | Admitting: INTERNAL MEDICINE
Payer: COMMERCIAL

## 2020-05-08 VITALS
SYSTOLIC BLOOD PRESSURE: 130 MMHG | OXYGEN SATURATION: 98 % | HEART RATE: 96 BPM | RESPIRATION RATE: 18 BRPM | DIASTOLIC BLOOD PRESSURE: 97 MMHG | TEMPERATURE: 98.5 F

## 2020-05-08 DIAGNOSIS — R06.02 SOB (SHORTNESS OF BREATH): ICD-10-CM

## 2020-05-08 DIAGNOSIS — I27.20 PULMONARY HYPERTENSION (H): ICD-10-CM

## 2020-05-08 PROBLEM — Z98.890 STATUS POST CORONARY ANGIOGRAM: Status: ACTIVE | Noted: 2020-05-08

## 2020-05-08 LAB
ALBUMIN SERPL-MCNC: 3.9 G/DL (ref 3.4–5)
ALP SERPL-CCNC: 93 U/L (ref 40–150)
ALT SERPL W P-5'-P-CCNC: 31 U/L (ref 0–70)
ANION GAP SERPL CALCULATED.3IONS-SCNC: 6 MMOL/L (ref 3–14)
AST SERPL W P-5'-P-CCNC: 21 U/L (ref 0–45)
BILIRUB SERPL-MCNC: 0.7 MG/DL (ref 0.2–1.3)
BUN SERPL-MCNC: 9 MG/DL (ref 7–30)
CALCIUM SERPL-MCNC: 9.3 MG/DL (ref 8.5–10.1)
CHLORIDE SERPL-SCNC: 106 MMOL/L (ref 94–109)
CO2 SERPL-SCNC: 26 MMOL/L (ref 20–32)
CREAT SERPL-MCNC: 0.94 MG/DL (ref 0.66–1.25)
ERYTHROCYTE [DISTWIDTH] IN BLOOD BY AUTOMATED COUNT: 13.7 % (ref 10–15)
GFR SERPL CREATININE-BSD FRML MDRD: >90 ML/MIN/{1.73_M2}
GLUCOSE SERPL-MCNC: 110 MG/DL (ref 70–99)
HCT VFR BLD AUTO: 41.6 % (ref 40–53)
HGB BLD-MCNC: 12.9 G/DL (ref 13.3–17.7)
MCH RBC QN AUTO: 30.8 PG (ref 26.5–33)
MCHC RBC AUTO-ENTMCNC: 31 G/DL (ref 31.5–36.5)
MCV RBC AUTO: 99 FL (ref 78–100)
NT-PROBNP SERPL-MCNC: <5 PG/ML (ref 0–125)
PLATELET # BLD AUTO: 171 10E9/L (ref 150–450)
POTASSIUM SERPL-SCNC: 3.6 MMOL/L (ref 3.4–5.3)
PROT SERPL-MCNC: 8.3 G/DL (ref 6.8–8.8)
RBC # BLD AUTO: 4.19 10E12/L (ref 4.4–5.9)
SODIUM SERPL-SCNC: 138 MMOL/L (ref 133–144)
WBC # BLD AUTO: 3.7 10E9/L (ref 4–11)

## 2020-05-08 PROCEDURE — 93453 R&L HRT CATH W/VENTRICLGRPHY: CPT | Performed by: INTERNAL MEDICINE

## 2020-05-08 PROCEDURE — 25000128 H RX IP 250 OP 636: Performed by: INTERNAL MEDICINE

## 2020-05-08 PROCEDURE — 25000125 ZZHC RX 250: Performed by: INTERNAL MEDICINE

## 2020-05-08 PROCEDURE — 40000168 ZZH STATISTIC PP CARE STAGE 3

## 2020-05-08 PROCEDURE — 27210794 ZZH OR GENERAL SUPPLY STERILE: Performed by: INTERNAL MEDICINE

## 2020-05-08 PROCEDURE — 36415 COLL VENOUS BLD VENIPUNCTURE: CPT | Performed by: INTERNAL MEDICINE

## 2020-05-08 PROCEDURE — 85027 COMPLETE CBC AUTOMATED: CPT | Performed by: INTERNAL MEDICINE

## 2020-05-08 PROCEDURE — 80053 COMPREHEN METABOLIC PANEL: CPT | Performed by: INTERNAL MEDICINE

## 2020-05-08 PROCEDURE — C1769 GUIDE WIRE: HCPCS | Performed by: INTERNAL MEDICINE

## 2020-05-08 PROCEDURE — 93568 NJX CAR CTH NSLC P-ART ANGRP: CPT | Performed by: INTERNAL MEDICINE

## 2020-05-08 PROCEDURE — 83880 ASSAY OF NATRIURETIC PEPTIDE: CPT | Performed by: INTERNAL MEDICINE

## 2020-05-08 PROCEDURE — C1894 INTRO/SHEATH, NON-LASER: HCPCS | Performed by: INTERNAL MEDICINE

## 2020-05-08 RX ORDER — ATROPINE SULFATE 0.1 MG/ML
0.5 INJECTION INTRAVENOUS EVERY 5 MIN PRN
Status: DISCONTINUED | OUTPATIENT
Start: 2020-05-08 | End: 2020-05-08 | Stop reason: HOSPADM

## 2020-05-08 RX ORDER — NICARDIPINE HYDROCHLORIDE 2.5 MG/ML
INJECTION INTRAVENOUS
Status: DISCONTINUED | OUTPATIENT
Start: 2020-05-08 | End: 2020-05-08 | Stop reason: HOSPADM

## 2020-05-08 RX ORDER — IOPAMIDOL 755 MG/ML
INJECTION, SOLUTION INTRAVASCULAR
Status: DISCONTINUED | OUTPATIENT
Start: 2020-05-08 | End: 2020-05-08 | Stop reason: HOSPADM

## 2020-05-08 RX ORDER — NITROGLYCERIN 5 MG/ML
VIAL (ML) INTRAVENOUS
Status: DISCONTINUED | OUTPATIENT
Start: 2020-05-08 | End: 2020-05-08 | Stop reason: HOSPADM

## 2020-05-08 RX ORDER — FLUMAZENIL 0.1 MG/ML
0.2 INJECTION, SOLUTION INTRAVENOUS
Status: DISCONTINUED | OUTPATIENT
Start: 2020-05-08 | End: 2020-05-08 | Stop reason: HOSPADM

## 2020-05-08 RX ORDER — LIDOCAINE 40 MG/G
CREAM TOPICAL
Status: COMPLETED | OUTPATIENT
Start: 2020-05-08 | End: 2020-05-08

## 2020-05-08 RX ORDER — LIDOCAINE 40 MG/G
CREAM TOPICAL
Status: DISCONTINUED | OUTPATIENT
Start: 2020-05-08 | End: 2020-05-08 | Stop reason: HOSPADM

## 2020-05-08 RX ORDER — ADENOSINE 3 MG/ML
INJECTION, SOLUTION INTRAVENOUS
Status: DISCONTINUED | OUTPATIENT
Start: 2020-05-08 | End: 2020-05-08 | Stop reason: HOSPADM

## 2020-05-08 RX ORDER — NALOXONE HYDROCHLORIDE 0.4 MG/ML
.2-.4 INJECTION, SOLUTION INTRAMUSCULAR; INTRAVENOUS; SUBCUTANEOUS
Status: DISCONTINUED | OUTPATIENT
Start: 2020-05-08 | End: 2020-05-08 | Stop reason: HOSPADM

## 2020-05-08 RX ORDER — HEPARIN SODIUM 1000 [USP'U]/ML
INJECTION, SOLUTION INTRAVENOUS; SUBCUTANEOUS
Status: DISCONTINUED | OUTPATIENT
Start: 2020-05-08 | End: 2020-05-08 | Stop reason: HOSPADM

## 2020-05-08 RX ORDER — FENTANYL CITRATE 50 UG/ML
25-50 INJECTION, SOLUTION INTRAMUSCULAR; INTRAVENOUS
Status: DISCONTINUED | OUTPATIENT
Start: 2020-05-08 | End: 2020-05-08 | Stop reason: HOSPADM

## 2020-05-08 RX ORDER — NALOXONE HYDROCHLORIDE 0.4 MG/ML
.1-.4 INJECTION, SOLUTION INTRAMUSCULAR; INTRAVENOUS; SUBCUTANEOUS
Status: DISCONTINUED | OUTPATIENT
Start: 2020-05-08 | End: 2020-05-08 | Stop reason: HOSPADM

## 2020-05-08 RX ADMIN — LIDOCAINE: 40 CREAM TOPICAL at 09:55

## 2020-05-08 NOTE — PROGRESS NOTES
Pt has returned to unit 2a. Right neck site CDI, soft, flat, non tender. Right wrist with TR band, site CDI, soft, flat, non tender. Pt alert, tolerating PO. VSS. Pt denies pain.   Per Dr Vivar pt does not need telemetry while recovering on 2a.

## 2020-05-08 NOTE — IP AVS SNAPSHOT
[address] [address]                                    After Visit Summary   5/8/2020    Chaim Norman    MRN: [id number]           After Visit Summary Signature Page    I have received my discharge instructions, and my questions have been answered. I have discussed any challenges I see with this plan with the nurse or doctor.    ..........................................................................................................................................  Patient/Patient Representative Signature      ..........................................................................................................................................  Patient Representative Print Name and Relationship to Patient    ..................................................               ................................................  Date                                   Time    ..........................................................................................................................................  Reviewed by Signature/Title    ...................................................              ..............................................  Date                                               Time          22EPIC Rev 08/18

## 2020-05-08 NOTE — IP AVS SNAPSHOT
MRN:[id number]                      After Visit Summary   5/8/2020    Chaim Norman    MRN: [id number]           Visit Information        Department      5/8/2020  8:17 AM [address] [facility] [place]          Review of your medicines      UNREVIEWED medicines. Ask your doctor about these medicines       Dose / Directions   acetaminophen 325 MG tablet  Commonly known as:  TYLENOL  Used for:  Drug-induced peripheral neuropathy (H)      Dose:  650 mg  Take 2 tablets (650 mg) by mouth every 6 hours as needed for mild pain  Quantity:  180 tablet  Refills:  3     albuterol 108 (90 Base) MCG/ACT inhaler  Commonly known as:  Proventil HFA  Used for:  Shortness of breath      Dose:  2 puff  Inhale 2 puffs into the lungs every 6 hours as needed for shortness of breath / dyspnea or wheezing  Quantity:  8.5 g  Refills:  3     amLODIPine 5 MG tablet  Commonly known as:  NORVASC  Used for:  Hypertension goal BP (blood pressure) < 140/80      Dose:  10 mg  Take 2 tablets (10 mg) by mouth daily  Quantity:  180 tablet  Refills:  3     bedaquiline 100 MG tablet  Commonly known as:  SITRURO      Dose:  200 mg  Take 200 mg by mouth Every Mon, Wed, Fri Morning  Refills:  0     COMPOUNDED NON-CONTROLLED SUBSTANCE - PHARMACY TO MIX COMPOUNDED MEDICATION  Commonly known as:  CMPD RX  Used for:  Neuropathic pain, Drug-induced polyneuropathy (H)      Take one capsule containing 4.5mg of naltrexone by mouth per day  Quantity:  135 mg  Refills:  1     cycloSERINE 250 MG capsule  Commonly known as:  SEROMYCIN      Dose:  250 mg  Take 250 mg by mouth 2 times daily  Refills:  0     diclofenac 1 % topical gel  Commonly known as:  VOLTAREN  Used for:  Chronic pain of both shoulders      Dose:  2 g  Place 2 g onto the skin 4 times daily as needed for moderate pain Of both shoulders  Quantity:  1 Tube  Refills:  3     DULoxetine 60 MG capsule  Commonly known as:  CYMBALTA  Used for:  Neuropathic pain, Bilateral foot pain, Chronic pain of  both shoulders      Dose:  60 mg  Take 1 capsule (60 mg) by mouth daily  Quantity:  30 capsule  Refills:  3     ethambutol 400 MG tablet  Commonly known as:  MYAMBUTOL      Dose:  1,200 mg  Take 1,200 mg by mouth daily  Refills:  0     fluticasone-salmeterol 100-50 MCG/DOSE inhaler  Commonly known as:  ADVAIR  Used for:  Chronic obstructive pulmonary disease, unspecified COPD type (H)      Dose:  1 puff  Inhale 1 puff into the lungs every 12 hours  Quantity:  1 Inhaler  Refills:  2     gabapentin 300 MG capsule  Commonly known as:  NEURONTIN  Used for:  Drug-induced peripheral neuropathy (H)      Dose:  900 mg  Take 3 capsules (900 mg) by mouth 3 times daily  Quantity:  270 capsule  Refills:  1     levofloxacin 500 MG tablet  Commonly known as:  LEVAQUIN      Dose:  750 mg  Take 750 mg by mouth daily  Refills:  0     nortriptyline 10 MG capsule  Commonly known as:  PAMELOR  Used for:  Neuropathic pain, Bilateral foot pain, Chronic pain of both shoulders, Insomnia, unspecified type      Dose:  10-20 mg  Take 1-2 capsules (10-20 mg) by mouth At Bedtime For pain and insomnia  Quantity:  60 capsule  Refills:  1     oxyCODONE 5 MG tablet  Commonly known as:  ROXICODONE  Used for:  Drug-induced peripheral neuropathy (H)      Dose:  5 mg  Take 1 tablet (5 mg) by mouth 2 times daily as needed for severe pain  Quantity:  14 tablet  Refills:  0     potassium chloride ER 20 MEQ CR tablet  Commonly known as:  KLOR-CON M  Used for:  Pulmonary hypertension (H), SOB (shortness of breath), Localized edema      Dose:  20 mEq  Take 1 tablet (20 mEq) by mouth daily  Quantity:  30 tablet  Refills:  3     pyrazinamide 500 MG tablet      Dose:  1,500 mg  Take 1,500 mg by mouth daily  Refills:  0        CONTINUE these medicines which have NOT CHANGED       Dose / Directions   melatonin 3 MG tablet  Used for:  Persistent disorder of initiating or maintaining sleep      Dose:  3 mg  Take 1 tablet (3 mg) by mouth nightly as needed for  sleep  Quantity:  30 tablet  Refills:  3              Protect others around you: Learn how to safely use, store and throw away your medicines at www.disposemymeds.org.       Follow-ups after your visit       Additional Services     Follow-Up with Cardiac Advanced Practice Provider      Follow up appointment should be made in 2 weeks after discharge           Care Instructions       After Care Instructions     Discharge Instructions - IF on Metformin (Glucophage or Glucovance) or Metformin containing medications      IF on Metformin (Glucophage or Glucovance) or Metformin containing medications , schedule a Basic Metabolic Panel at [facility] or Primary Clinic in 48 - 72 hours post procedure and PRIOR TO resuming the Metformin or Metformin containing medications.  Hold Metformin (Glucophage or Glucovance) or Metformin containing medications until after the Basic Metabolic Panel on the 2nd or 3rd day following the procedure.  May resume after blood draw is complete.           Further instructions from your care team       Going Home after an Angioplasty        After you go home:    Have an adult stay with you for 24 hours.    Drink plenty of fluids.    You may eat your normal diet, unless your doctor tells you otherwise.    For 24 hours:  - Relax and take it easy.  - Do NOT smoke.  - Do NOT make any important or legal decisions.  - Do NOT drive or operate machines at home or at work.  - Do NOT drink alcohol.      Remove the Band-Aid after 24 hours. If there is minor oozing, apply another Band-aid and remove it after 12 hours.    Do NOT take a bath, or use a hot tub or pool for at least 2 days. You may shower.  Care of wrist or arm site  It is normal to have soreness at the puncture site and mild tingling in your hand for up to 3 days.    For 2 days, do not use your hand or arm to support your weight (such as rising from a chair) or bend your wrist (such as lifting a garage door).    For 2 days, do not lift more than 5  pounds or exercise your arm (tennis, golf or bowling).      If you start bleeding from the site in your arm:    Sit down and press firmly on the site with your fingers for 10 minutes. Call your doctor as soon as you can.    If the bleeding stops, sit still and keep your wrist straight for 2 hours.  Medicines    If you have begun Plavix or Effient (with a stent), do not stop taking it until you talk to your heart doctor (cardiologist).    If you are on metformin (Glucophage), do not restart it until you have blood tests (within 2 to 3 days after discharge). When your doctor tells you it is safe, you may restart the metformin.  Call your doctor if:    You have a large or growing hard lump around the site.      The site is red, swollen, hot or tender.    Blood or fluid is draining from the site.    You have chills or a fever greater than 101 F (38 C).    Your arm turns bluish, feels numb or cool.    You have hives, a rash or unusual itching.          [facility]                        Interventional Cardiology  Discharge Instructions   Post Right Heart Cath      AFTER YOU GO HOME:    DO drink plenty of fluids    DO resume your regular diet and medications unless otherwise instructed by your Primary Physician    Do Not scrub the procedure site vigorously    No lotion or powder to the puncture site for 3 days    CALL YOUR PRIMARY PHYSICIAN IF: You may resume all normal activity.  Monitor neck site for bleeding, swelling, or voice changes. If you notice bleeding or swelling immediately apply pressure to the site and call number below to speak with Cardiology Fellow.  If you experience any changes in your breathing you should call your doctor immediately or come to the closest Emergency Department.  Do not drive yourself.    ADDITIONAL INSTRUCTIONS: Medications: You are to resume all home medications including anticoagulation therapy unless otherwise advised by your primary cardiologist or nurse  coordinator.    Follow Up: Per your primary cardiology team    If you have any questions or concerns regarding your procedure site please call [phone] at anytime and ask for Cardiology Fellow on call.  They are available 24 hours a day.  You may also contact the Cardiology Clinic after hours number at [phone].                                                       Telephone Numbers [phone] Monday-Friday 8:00 am to 4:30 pm    [phone] After 4:30 pm Monday-Friday, Weekends & Holidays  Ask for Interventional Cardiologist on call. Someone is on call 24 hours/day   [facility] toll free number [phone] Monday-Friday 8:00 am to 4:30 pm   [facility] Emergency Dept [phone]                   Additional Information About Your Visit       Care EveryWhere ID    This is your Care EveryWhere ID. This could be used by other organizations to access your [facility] medical records  [id number]       Your Vitals Were  Most recent update: 5/8/2020  2:26 PM    Blood Pressure   134/84 (BP Location: Left arm)    Pulse   96    Temperature   98.5  F (36.9  C) (Oral)    Respirations   18    Pulse Oximetry   97%          Primary Care Provider Office Phone # Fax #    Nyla Cobb CHAZ Florian [phone]      Equal Access to Services    KEYUR KUO AH: Hadii matteo bueno hadasho Soomaali, waaxda luqadaha, qaybta kaalmada adeegyada, jordan fitch. So [facility] [phone].    ATENCIÓN: Si habla español, tiene a garvey disposición servicios gratuitos de asistencia lingüística. Llame al [phone].    We comply with applicable federal and state civil rights laws, including the [place] Human Rights Act. We do not discriminate on the basis of race, color, creed, Yarsani, national origin, marital status, age, disability, sex, sexual orientation, or gender identity.       Thank you!    Thank you for choosing [facility] for your care. Our goal is always to provide you with excellent care.  Hearing back from our patients is one way we can continue to improve our services. Please take a few minutes to complete the written survey that you may receive in the mail after you visit with us. Thank you!            Medication List      Medications          Morning Afternoon Evening Bedtime As Needed    melatonin 3 MG tablet  INSTRUCTIONS:  Take 1 tablet (3 mg) by mouth nightly as needed for sleep                       ASK your doctor about these medications          Morning Afternoon Evening Bedtime As Needed    acetaminophen 325 MG tablet  Also known as:  TYLENOL  INSTRUCTIONS:  Take 2 tablets (650 mg) by mouth every 6 hours as needed for mild pain                     albuterol 108 (90 Base) MCG/ACT inhaler  Also known as:  Proventil HFA  INSTRUCTIONS:  Inhale 2 puffs into the lungs every 6 hours as needed for shortness of breath / dyspnea or wheezing                     amLODIPine 5 MG tablet  Also known as:  NORVASC  INSTRUCTIONS:  Take 2 tablets (10 mg) by mouth daily  Doctor's comments:  Dose increase                     bedaquiline 100 MG tablet  Also known as:  SITRURO  INSTRUCTIONS:  Take 200 mg by mouth Every Mon, Wed, Fri Morning                     COMPOUNDED NON-CONTROLLED SUBSTANCE - PHARMACY TO MIX COMPOUNDED MEDICATION  Also known as:  CMPD RX  INSTRUCTIONS:  Take one capsule containing 4.5mg of naltrexone by mouth per day  Doctor's comments:  Pharmacy, place Naltrexone 4.5mg into each capsule (#30 caps/month) & patient is to take 1 cap/day. CALL PATIENT PRIOR TO FILLING TO DISCUSS MONTHLY COST & SHIPPING COSTS.  TELEPHONE # is [phone].                     cycloSERINE 250 MG capsule  Also known as:  SEROMYCIN  INSTRUCTIONS:  Take 250 mg by mouth 2 times daily                     diclofenac 1 % topical gel  Also known as:  VOLTAREN  INSTRUCTIONS:  Place 2 g onto the skin 4 times daily as needed for moderate pain Of both shoulders                     DULoxetine 60 MG capsule  Also known as:   CYMBALTA  INSTRUCTIONS:  Take 1 capsule (60 mg) by mouth daily                     ethambutol 400 MG tablet  Also known as:  MYAMBUTOL  INSTRUCTIONS:  Take 1,200 mg by mouth daily                     fluticasone-salmeterol 100-50 MCG/DOSE inhaler  Also known as:  ADVAIR  INSTRUCTIONS:  Inhale 1 puff into the lungs every 12 hours                     gabapentin 300 MG capsule  Also known as:  NEURONTIN  INSTRUCTIONS:  Take 3 capsules (900 mg) by mouth 3 times daily                     levofloxacin 500 MG tablet  Also known as:  LEVAQUIN  INSTRUCTIONS:  Take 750 mg by mouth daily                     nortriptyline 10 MG capsule  Also known as:  PAMELOR  INSTRUCTIONS:  Take 1-2 capsules (10-20 mg) by mouth At Bedtime For pain and insomnia                     oxyCODONE 5 MG tablet  Also known as:  ROXICODONE  INSTRUCTIONS:  Take 1 tablet (5 mg) by mouth 2 times daily as needed for severe pain                     potassium chloride ER 20 MEQ CR tablet  Also known as:  KLOR-CON M  INSTRUCTIONS:  Take 1 tablet (20 mEq) by mouth daily                     pyrazinamide 500 MG tablet  INSTRUCTIONS:  Take 1,500 mg by mouth daily

## 2020-05-08 NOTE — DISCHARGE INSTRUCTIONS
Going Home after an Angioplasty        After you go home:    Have an adult stay with you for 24 hours.    Drink plenty of fluids.    You may eat your normal diet, unless your doctor tells you otherwise.    For 24 hours:  - Relax and take it easy.  - Do NOT smoke.  - Do NOT make any important or legal decisions.  - Do NOT drive or operate machines at home or at work.  - Do NOT drink alcohol.      Remove the Band-Aid after 24 hours. If there is minor oozing, apply another Band-aid and remove it after 12 hours.    Do NOT take a bath, or use a hot tub or pool for at least 2 days. You may shower.  Care of wrist or arm site  It is normal to have soreness at the puncture site and mild tingling in your hand for up to 3 days.    For 2 days, do not use your hand or arm to support your weight (such as rising from a chair) or bend your wrist (such as lifting a garage door).    For 2 days, do not lift more than 5 pounds or exercise your arm (tennis, golf or bowling).      If you start bleeding from the site in your arm:    Sit down and press firmly on the site with your fingers for 10 minutes. Call your doctor as soon as you can.    If the bleeding stops, sit still and keep your wrist straight for 2 hours.  Medicines    If you have begun Plavix or Effient (with a stent), do not stop taking it until you talk to your heart doctor (cardiologist).    If you are on metformin (Glucophage), do not restart it until you have blood tests (within 2 to 3 days after discharge). When your doctor tells you it is safe, you may restart the metformin.  Call your doctor if:    You have a large or growing hard lump around the site.      The site is red, swollen, hot or tender.    Blood or fluid is draining from the site.    You have chills or a fever greater than 101 F (38 C).    Your arm turns bluish, feels numb or cool.    You have hives, a rash or unusual itching.          [facility]                        Interventional  Cardiology  Discharge Instructions   Post Right Heart Cath      AFTER YOU GO HOME:    DO drink plenty of fluids    DO resume your regular diet and medications unless otherwise instructed by your Primary Physician    Do Not scrub the procedure site vigorously    No lotion or powder to the puncture site for 3 days    CALL YOUR PRIMARY PHYSICIAN IF: You may resume all normal activity.  Monitor neck site for bleeding, swelling, or voice changes. If you notice bleeding or swelling immediately apply pressure to the site and call number below to speak with Cardiology Fellow.  If you experience any changes in your breathing you should call your doctor immediately or come to the closest Emergency Department.  Do not drive yourself.    ADDITIONAL INSTRUCTIONS: Medications: You are to resume all home medications including anticoagulation therapy unless otherwise advised by your primary cardiologist or nurse coordinator.    Follow Up: Per your primary cardiology team    If you have any questions or concerns regarding your procedure site please call [phone] at anytime and ask for Cardiology Fellow on call.  They are available 24 hours a day.  You may also contact the Cardiology Clinic after hours number at [phone].                                                       Telephone Numbers [phone] Monday-Friday 8:00 am to 4:30 pm    [phone] After 4:30 pm Monday-Friday, Weekends & Holidays  Ask for Interventional Cardiologist on call. Someone is on call 24 hours/day   [facility] toll free number [phone] Monday-Friday 8:00 am to 4:30 pm   [facility] Emergency Dept [phone]

## 2020-05-08 NOTE — PROGRESS NOTES
Pt has been consented, labs wnl.  Pt ready for procedure.  Pt's son will pick him up post procedure.

## 2020-05-08 NOTE — PROGRESS NOTES
TR band removed, right wrist site F/D/I.  discharge instructions went over with and given to pt, all questions answered.  Pt tolerated ambulation in the hallway.  1630-Rt wrist site remains F/D/I. Armband on right wrist.  Pt instructed to remove the armboard tomorrow.    1645-Pt D/C'ed to home with son.

## 2020-05-12 ENCOUNTER — TELEPHONE (OUTPATIENT)
Dept: CARDIOLOGY | Facility: CLINIC | Age: 45
End: 2020-05-12

## 2020-05-12 NOTE — TELEPHONE ENCOUNTER
Verified orders were placed. 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 We will not give him therapy. He can follow up with Danika via a video visit or telephone visit.     Danika, we can also discuss a referral to Pulm because of his bad lung disease and potential evaluation for lung transplantation. If he does not want to do that, that is fine.    Thanks,    KP    ----- Message -----  From: Pat Story RN  Sent: 5/8/2020   2:36 PM CDT  To: Rod Chauhan MD, Cardiology Ph Nurse-  Subject: Post RHC                                         Dr. Chauhan,    patient had his RHC today.  Will you please review and let us know what the plan is, he does not have a follow up scheduled.    Thanks!  Pat

## 2020-05-21 ENCOUNTER — TELEPHONE (OUTPATIENT)
Dept: CARDIOLOGY | Facility: CLINIC | Age: 45
End: 2020-05-21

## 2020-05-21 DIAGNOSIS — R06.02 SOB (SHORTNESS OF BREATH): ICD-10-CM

## 2020-05-21 DIAGNOSIS — I27.20 PULMONARY HYPERTENSION (H): Primary | ICD-10-CM

## 2020-05-21 NOTE — TELEPHONE ENCOUNTER
[facility] Call Center    Phone Message    May a detailed message be left on voicemail: yes     Reason for Call: Other: Chaim calling to request a call back from Dr. Chauhan. Chaim had a procedure on 5/8/20 with Dr. Perez and Chaim was told he would hear back from Dr. Chauhan after the surgery with a medication recommendation. Chaim has not heard from the care team, so he wanted to follow up. Please give Chaim a call back at your earliest convenience.     Action Taken: Message routed to:  [facility]  Cardio    Travel Screening: Not Applicable    ------------------      Called patient and advised him of the test results and that he doesn't qualify for any PH therapies, as it's caused by a lung injury.  Advised patient I was placing a referral for him to see a Pulmonologist for ongoing care.  We also arranged a follow up video visit with Danika Mckeon PA-C for Wednesday 5/27/20 where he could discuss this more.  Patient verbalized understanding, agreed with plan and denied any further questions. Pat Story RN on 5/22/2020 at 5:30 PM

## 2020-05-22 ENCOUNTER — PATIENT OUTREACH (OUTPATIENT)
Dept: CARE COORDINATION | Facility: CLINIC | Age: 45
End: 2020-05-22

## 2020-05-22 ASSESSMENT — ACTIVITIES OF DAILY LIVING (ADL): DEPENDENT_IADLS:: INDEPENDENT

## 2020-05-22 NOTE — TELEPHONE ENCOUNTER
Patients care coordinate called and stated patient can not afford naltrexone..    Wondering about other options          Devora Downey    [facility]

## 2020-05-22 NOTE — LETTER
[facility] CARE COORDINATION  [address] [address]   May 22, 2020        Chaim Norman  [address]  [facility] [address]          Dear Chaim,     It was nice to talk to you today!  Attached is an updated Complex Care Plan for your continued enrollment in Care Coordination. Please let us know if you have additional questions, concerns, or goals that we can assist with.    Sincerely,    Melissa Behl BSN, RN, PHN, CCM  Primary Care Clinical RN Care Coordinator  [facility]   [phone]                                                     [facility]  Complex Care Plan  About Me:    Patient Name:  Chaim Norman    YOB: 1975  Age:         44 year old   [employer] MRN:    [id number] Telephone Information:  Home Phone [phone]   Mobile [phone]       Address:  [address] [place] [address] [address]  [facility] [address] Email address:  [email or web address]      Emergency Contact(s)    Name Relationship Lgl Grd Work Phone Home Phone Mobile Phone   SARAH EPPS Son   [phone]    2. DECLINED, PER * Other   [phone]            Primary language:  English     needed? No   [employer]:  [phone] op. 1  Other communication barriers: None  Preferred Method of Communication:  Mail  Current living arrangement: I live in a private home with family(Lives with son)  Mobility Status/ Medical Equipment: Independent    Health Maintenance  Health Maintenance Reviewed: Due/Overdue   Health Maintenance Due   Topic Date Due     PREVENTIVE CARE VISIT  1975     SPIROMETRY  1975     URINE DRUG SCREEN  1973     COPD ACTION PLAN  1973        My Access Plan  Medical Emergency 911   Primary Clinic Line [facility] - [phone]   24 Hour Appointment Line [phone] or  [phone]) (toll-free)   24 Hour  Nurse Line [phone] (toll-free)   Preferred Urgent Care [facility] - [place], [phone]   [facility] [place]  [phone]   Preferred Pharmacy [id number] - [place], [place] - [address] [facility]     Behavioral Health Crisis Line The National Suicide Prevention Lifeline at 1-682.229.1665 or 911             My Care Team Members  Patient Care Team       Relationship Specialty Notifications Start End    Nyla Florian APRN CNP PCP - General Nurse Practitioner  9/25/18     Phone: [phone] Fax: [phone]         [address] [place] [address]    Nyla Florian APRN CNP Assigned PCP   10/25/18     Phone: [phone] Fax: [phone]         [address] [place] [address]    Behl, Melissa K, RN Clinic Care Coordinator Primary Care - CC Admissions 1/14/20     Phone: [phone] Fax: [phone]        Tania Frias, RN Specialty Care Coordinator Cardiology Admissions 1/28/20     Pulmonary HTN    Phone: [phone] Pager: [phone]        Rosa Malcolm, RN Specialty Care Coordinator Cardiology Admissions 1/28/20     Pulmonary HTN    Phone: [phone] Pager: [phone] Fax: [phone]       Pat Story, RN Specialty Care Coordinator Cardiology Admissions 1/28/20     Pulmonary HTN    Phone: [phone] Pager: [phone] Fax: [phone]       Robin Jansen, RANJITH Lead Care Coordinator Primary Care - CC Admissions 2/14/20             My Care Plans  Self Management and Treatment Plan  Goals and (Comments)  Goals        General    #3 Pain Management (pt-stated)     Notes - Note edited  4/23/2020  1:12 PM by Behl, Melissa K, RN    Goal Statement: I will have suitable pain relief to perform activities of daily living.  Date Goal set: 2/19/2020  Date to Achieve By: 6/1/2020  Patient expressed understanding of goal: Yes  Action steps to achieve this goal:  1. I will  take medications as prescribed to maintain adequate pain control/relief.  2. I will utilize non-pharmaceutical measures to help with pain control/relief.  3. I will maintain routine follow up and contact with my clinic care team.  4. I will call my pharmacy to request refills of my medications before they run out.      1. Transportation (pt-stated)     Notes - Note edited  4/30/2020  2:26 PM by Robin Jansen BSW    Goal Statement: I need help getting transportation.  Date Goal set: 1/15/2020   Date to Achieve By: 5/30/2020  Patient expressed understanding of goal: yes  Action steps to achieve this goal:  1. I will call [employer] [phone]  2. I will go to [facility] and apply for Medical Assistance benefit  3. I will apply for [employer]    2/28/2020: Patient stated that he was able to complete a [employer] application and send it in. Waiting to hear response.    3/30/2020: Has not heard back from [employer] yet. [facility] provided the number for [employer] Customer Service to call and inquire about his application (Ph: [phone]).      Financial Wellbeing (pt-stated)     Notes - Note edited  4/30/2020  2:24 PM by Robin Jansen BSW    Goal Statement: I need help paying for my rent  Date Goal set: 2/14/2020  Barriers: Feet have been hurting too much to get back to the [facility]   Strengths: Patient is motivated to getting assistancce  Date to Achieve By: 5/30/2020  Patient expressed understanding of goal: Yes  Action steps to achieve this goal:  1. I will fill out application for emergency assistance  2. I will bring my application in to the [facility]  3. I will obtain emergency assistance, if found eligible    2/28/2020: Patient stated that he is still working on his emergency assistance application and had more questions about it. [facility] provided additional phone numbers to him for rent assistance    3/30/2020: Went to [place]  [facility]. Submitted documentation that was needed. Got rent assistance through another agency for March. Reapplied for assistance in April through [facility]             Action Plans on File:                       Advance Care Plans/Directives Type:        My Medical and Care Information  Problem List   Patient Active Problem List   Diagnosis     History of TB (tuberculosis)     Weight loss     Pulmonary nodules     Late latent syphilis     Iatrogenic pneumothorax     Patient's intentional underdosing of medication regimen due to financial hardship     Anemia of chronic disease     Benign prostatic hyperplasia, unspecified whether lower urinary tract symptoms present     COPD (chronic obstructive pulmonary disease) (H)     Hepatitis B core antibody positive     Neutropenia (H)     Pulmonary hypertension (H)     SOB (shortness of breath)     Status post coronary angiogram      Current Medications and Allergies:  See printed Medication Report.    Care Coordination Start Date: 1/15/2020   Frequency of Care Coordination: 2 weeks   Form Last Updated: 05/22/2020

## 2020-05-22 NOTE — TELEPHONE ENCOUNTER
Patient contacted RN CC and states he needs the form completed again and would like it mailed this time per address below:     Please mail to [address]  [place] [address]    Melissa Behl BSN, RN, PHN, CCM  Primary Care Clinical RN Care Coordinator  [facility]   [phone]

## 2020-05-22 NOTE — PROGRESS NOTES
Clinic Care Coordination Contact    Follow Up Progress Note      Assessment: RN CC spoke with patient for follow up.  Patient had a virtual visit with his pain clinic provider 4/28/20 and was prescribed naltrexone for his chronic pain.  Patient was unfortunately unable to afford the prescription cost of $50 and did not pick it up.  Patient did not let his pain clinic provider know he did not start the new medication for his chronic pain.  RN CC contacted patient's clinic and left a message for his pain provider with this update.  Patient has a follow up pain clinic appointment 6/5/20.  Patient reports having all other pain medications.    Patient underwent a right heart cath on 5/8/20 and is awaiting a call back from his cardiology team with medication recommendations, see 5/21/20 telephone encounter.  Patient requested assisted with his unemployment forms, as he states he needed this mailed to unemployment, not faxed.  RN CC updated 5/8/20 telephone encounter.    Goals addressed this encounter:   Goals Addressed                 This Visit's Progress      #3 Pain Management (pt-stated)   40%     Goal Statement: I will have suitable pain relief to perform activities of daily living.  Date Goal set: 2/19/2020  Date to Achieve By: 6/1/2020  Patient expressed understanding of goal: Yes  Action steps to achieve this goal:  1. I will take medications as prescribed to maintain adequate pain control/relief.  2. I will utilize non-pharmaceutical measures to help with pain control/relief.  3. I will maintain routine follow up and contact with my clinic care team.  4. I will call my pharmacy to request refills of my medications before they run out.             Intervention/Education provided during outreach: RN CC reviewed plan of care and, updated pain clinic provider on patient unable to afford naltrexone and updated 5/8/20 telephone encounter regarding unemployment forms.     Outreach Frequency: 2 weeks.    Plan:   1.  Patient will await response from pain clinic provider regarding being unable to afford naltrexone.  2. Patient will attend future pain clinic appointment.  3. Patient will await call back from cardiology regarding right heart cath results and recommendations.  4. RN  CC will mail out updated complex care plan.  5. RN Care Coordinator will follow up in 2 weeks.    Melissa Behl BSN, RN, PHN, CCM  Primary Care Clinical RN Care Coordinator  [facility]   [phone]

## 2020-05-22 NOTE — TELEPHONE ENCOUNTER
Will forward to Marleny to review and advise if there is a different medication more cost effective.    Santo Fuentes, GIANNA  Care Coordinator   [facility] [place]

## 2020-05-26 ENCOUNTER — TELEPHONE (OUTPATIENT)
Dept: CARDIOLOGY | Facility: CLINIC | Age: 45
End: 2020-05-26

## 2020-05-26 NOTE — TELEPHONE ENCOUNTER
4/28/20 [place] unemployment insurance form mailed to [employer] , [address], [place] [address]    SIOBHAN Laughlin.

## 2020-05-26 NOTE — TELEPHONE ENCOUNTER
Wigfield, Pat, RN sent to Pat Story RN               Call patient and have him call the lung ctr to make appt.       [facility] [place] [phone]        ---------------------------  Called patient to advise him he needs to call the Lung center to make new patient appt.  Gave patient above phone number. Patient verbalized understanding, agreed with plan and denied any further questions. Pat Story RN on 5/26/2020 at 1:57 PM

## 2020-05-27 ENCOUNTER — TELEPHONE (OUTPATIENT)
Dept: CARDIOLOGY | Facility: CLINIC | Age: 45
End: 2020-05-27

## 2020-05-27 ENCOUNTER — VIRTUAL VISIT (OUTPATIENT)
Dept: CARDIOLOGY | Facility: CLINIC | Age: 45
End: 2020-05-27
Attending: PHYSICIAN ASSISTANT
Payer: COMMERCIAL

## 2020-05-27 DIAGNOSIS — I27.20 PULMONARY HYPERTENSION (H): Primary | ICD-10-CM

## 2020-05-27 PROCEDURE — 99214 OFFICE O/P EST MOD 30 MIN: CPT | Mod: 95 | Performed by: PHYSICIAN ASSISTANT

## 2020-05-27 NOTE — TELEPHONE ENCOUNTER
----- Message from DUTCH Rabago sent at 5/27/2020  2:50 PM CDT -----  Regarding: pulm appt  So this leonie got your message with the number for the lung team to make an appt and said he isn't able to get to talk to anyone? He read the number correctly back to me. He asked if you could just go ahead and make him an appt and then let him know when it is and he will make himself available?    Thanks  Danika  --------------------------  Called [facility] scheduling and secured telephone appt for patient on Friday June 26th @ 2:20pm Dr. Kendrick.  Verbalized understanding.  -------------------------  Called patient and gave him the appt information.  He read back the appt information to verify he had it correctly.  Verified he had the appt number in case he had to change it. Pat Story RN on 5/27/2020 at 3:18 PM

## 2020-05-27 NOTE — LETTER
5/27/2020      RE: Chaim Norman  [address]  [facility] [address]       Dear Colleague,    Thank you for the opportunity to participate in the care of your patient, Chaim Norman, at the [facility]. Please see a copy of my visit note below.    CARDIOLOGY PULMONARY HYPERTENSION CLINIC VIRTUAL VISIT    Chaim Norman is a 44 year old male who is being evaluated via a billable video visit.        I have reviewed and updated the patient's Past Medical History, Social History, Family History and Medication List.    MEDICATIONS:  Current Outpatient Medications   Medication Sig Dispense Refill     acetaminophen (TYLENOL) 325 MG tablet Take 2 tablets (650 mg) by mouth every 6 hours as needed for mild pain 180 tablet 3     albuterol (PROVENTIL HFA) 108 (90 Base) MCG/ACT inhaler Inhale 2 puffs into the lungs every 6 hours as needed for shortness of breath / dyspnea or wheezing 8.5 g 3     amLODIPine (NORVASC) 5 MG tablet Take 2 tablets (10 mg) by mouth daily 180 tablet 3     bedaquiline (SITRURO) 100 MG tablet Take 200 mg by mouth Every Mon, Wed, Fri Morning       COMPOUNDED NON-CONTROLLED SUBSTANCE (CMPD RX) - PHARMACY TO MIX COMPOUNDED MEDICATION Take one capsule containing 4.5mg of naltrexone by mouth per day 135 mg 1     cycloSERINE (SEROMYCIN) 250 MG capsule Take 250 mg by mouth 2 times daily       diclofenac (VOLTAREN) 1 % topical gel Place 2 g onto the skin 4 times daily as needed for moderate pain Of both shoulders 1 Tube 3     DULoxetine 60 MG PO capsule Take 1 capsule (60 mg) by mouth daily 30 capsule 3     ethambutol (MYAMBUTOL) 400 MG tablet Take 1,200 mg by mouth daily       fluticasone-salmeterol (ADVAIR) 100-50 MCG/DOSE inhaler Inhale 1 puff into the lungs every 12 hours 1 Inhaler 2     gabapentin (NEURONTIN) 300 MG capsule Take 3 capsules (900 mg) by mouth 3 times daily 270 capsule 1     levofloxacin (LEVAQUIN) 500 MG tablet Take 750 mg by mouth  daily       melatonin 3 MG tablet Take 1 tablet (3 mg) by mouth nightly as needed for sleep 30 tablet 3     nortriptyline (PAMELOR) 10 MG capsule Take 1-2 capsules (10-20 mg) by mouth At Bedtime For pain and insomnia 60 capsule 1     oxyCODONE (ROXICODONE) 5 MG tablet Take 1 tablet (5 mg) by mouth 2 times daily as needed for severe pain 14 tablet 0     potassium chloride ER (KLOR-CON M) 20 MEQ CR tablet Take 1 tablet (20 mEq) by mouth daily 30 tablet 3     pyrazinamide 500 MG tablet Take 1,500 mg by mouth daily         ALLERGIES  Patient has no known allergies.      Primary PH cardiologist: Dr. Chauhan      HPI:  Mr. Norman is a pleasant 44 year old AAM with a PMhx including tuberculosis first diagnosed in 2014 with subsequent pulmonary complications including lung destruction, hypertension, neuropathy, and pulmonary hypertension after an echocardiogram showed RV dilation and dysfunction. He was seen by Dr. Chauhan initially in Jan 2020 after an echo performed at [facility] in December of 2019 showed RV dilation and dysfunction. At that time he stated that his breathing had actually started getting better on his TB medications. He does not do a lot of activity because he has bad neuropathy in his feet so walking distances is nearly impossible. Overall, he was felt to be a WHO FC III but his limitations are mostly due to his neuropathy.     Chaim also had a CT scan performed at [facility] in December which showed bilateral lung destruction. It did not appear that he had formal pulmonary function tests to his recollection or review of the chart. A formal right heart catheterization and pulmonary angiogram was recommended, but due to COVID-19 restrictions, this was initially postponed.    He ultimately had this performed on 5/8/2020. He was found to have mildly elevated right sided filling pressures and LVEDP with moderate pulmonary hypertension and normal cardiac output (RA 11, PAP 53/29/40, unable to obtain PCWP,  LVEDP 19. CO TD 6.97). 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 Today, we were to have a video visit to discuss these results, though he was unable to get this to work. Thus, we converted to a telephone visit.    Dr. Chauhan had reviewed the above results previously and does not recommend pulmonary vasodilator therapy. He feels he would be best served by seeing a pulmonologist and to see whether a lung transplantation is an option for him.          Assessment and Plan:     1. Pulmonary hypertension.   --Moderate PH in the setting of tuberculosis and significant lung destruction. This is felt to be WHO group 3, and pulmonary vasodilators have not been recommended. Dr Chauhan has referred him for formal pulmonary and possible transplant evaluation. I relayed this information to Mr. Norman today, and he is agreeable to this.       Follow up plan: He will follow up in our clinic as needed.       Phone visit details:  Start time 1444  End time 1452      Danika Mckeon PA-C  [facility]  Pager [phone]      Please do not hesitate to contact me if you have any questions/concerns.     Sincerely,     DUTCH Rabago

## 2020-05-27 NOTE — PATIENT INSTRUCTIONS
Visit Summary:    Today we discussed:   We reviewed the results of your recent testing. The pressures are moderately elevated in the right side of your heart, but this is due to your lung issues from the tuberculosis.  We will assist in helping you arrange an appointment to see a lung specialist.

## 2020-05-27 NOTE — PROGRESS NOTES
"CARDIOLOGY CLINIC VIDEO VISIT    Chaim Norman is a 44 year old male who is being evaluated via a billable video visit.      The patient has been notified of following:     This video visit will be conducted via a call between you and your physician/provider. We have found that certain health care needs can be provided without the need for an in-person physical exam.  This service lets us provide the care you need with a video conversation.  If a prescription is necessary we can send it directly to your pharmacy.  If lab work is needed we can place an order for that and you can then stop by our lab to have the test done at a later time.    Virtual visits are billed at different rates depending on your insurance coverage. During this emergency period, for some insurers they may be billed the same as an in-person visit.  Please reach out to your insurance provider with any questions.    If during the course of the call the physician/provider feels a video visit is not appropriate, you will not be charged for this service.      Physician has received verbal consent for a Video Visit from the patient? {YES-NO  Default Yes:4444::\"Yes\"}    Patient would like the video invitation sent by: {video visit invitation:664603}      I have reviewed and updated the patient's Past Medical History, Social History, Family History and Medication List.    MEDICATIONS:  Current Outpatient Medications   Medication Sig Dispense Refill     acetaminophen (TYLENOL) 325 MG tablet Take 2 tablets (650 mg) by mouth every 6 hours as needed for mild pain 180 tablet 3     albuterol (PROVENTIL HFA) 108 (90 Base) MCG/ACT inhaler Inhale 2 puffs into the lungs every 6 hours as needed for shortness of breath / dyspnea or wheezing 8.5 g 3     amLODIPine (NORVASC) 5 MG tablet Take 2 tablets (10 mg) by mouth daily 180 tablet 3     bedaquiline (SITRURO) 100 MG tablet Take 200 mg by mouth Every Mon, Wed, Fri Morning       COMPOUNDED NON-CONTROLLED SUBSTANCE (CMPD " "RX) - PHARMACY TO MIX COMPOUNDED MEDICATION Take one capsule containing 4.5mg of naltrexone by mouth per day 135 mg 1     cycloSERINE (SEROMYCIN) 250 MG capsule Take 250 mg by mouth 2 times daily       diclofenac (VOLTAREN) 1 % topical gel Place 2 g onto the skin 4 times daily as needed for moderate pain Of both shoulders 1 Tube 3     DULoxetine 60 MG PO capsule Take 1 capsule (60 mg) by mouth daily 30 capsule 3     ethambutol (MYAMBUTOL) 400 MG tablet Take 1,200 mg by mouth daily       fluticasone-salmeterol (ADVAIR) 100-50 MCG/DOSE inhaler Inhale 1 puff into the lungs every 12 hours 1 Inhaler 2     gabapentin (NEURONTIN) 300 MG capsule Take 3 capsules (900 mg) by mouth 3 times daily 270 capsule 1     levofloxacin (LEVAQUIN) 500 MG tablet Take 750 mg by mouth daily       melatonin 3 MG tablet Take 1 tablet (3 mg) by mouth nightly as needed for sleep 30 tablet 3     nortriptyline (PAMELOR) 10 MG capsule Take 1-2 capsules (10-20 mg) by mouth At Bedtime For pain and insomnia 60 capsule 1     oxyCODONE (ROXICODONE) 5 MG tablet Take 1 tablet (5 mg) by mouth 2 times daily as needed for severe pain 14 tablet 0     potassium chloride ER (KLOR-CON M) 20 MEQ CR tablet Take 1 tablet (20 mEq) by mouth daily 30 tablet 3     pyrazinamide 500 MG tablet Take 1,500 mg by mouth daily         ALLERGIES  Patient has no known allergies.    Review Of Systems:     Skin: {Skin:100::\"negative\"}  Eyes: {Eyes:200::\"negative\"}  Ears/Nose/Throat: {ENT:300::\"negative\"}  Respiratory: {Resp:400::\"No shortness of breath, dyspnea on exertion, cough, or hemoptysis\"}  Cardiovascular: {CV:500::\"negative\"}  Gastrointestinal: {GI:600::\"negative\"}  Genitourinary: {:700::\"negative\"}  Musculoskeletal: {Musc:800::\"negative\"}  Neurologic: {Neur:900::\"negative\"}  Psychiatric: {Psych:1000::\"negative\"}  Hematologic/Lymphatic/Immunologic: {Hem:1100::\"negative\"}  Endocrine: {Endo:1200::\"negative\"}  (ROS TAKEN BY *** PRIOR TO VISIT)    Self reported " vitals:  Weight: ***  BP ***  HR ***    Brief physical exam:  General: In no acute distress, upright and calm.  Eyes: No apparent redness or discharge.   Chest: No labored breathing, no cough during exam or audible wheezing.   Neuro: No obvious focal defects or tremors.   Psych: Alert and oriented. Does not appear anxious. ***    The rest of a comprehensive physical examination is deferred due to public health emergency video visit restrictions.     Most recent labs: ***      Primary cardiologist: ***      HPI:  I had the pleasure of seeing your patient Chaim Norman at the [facility]pert[facility] clinic.  As you know, Chaim is a very pleasant 44 year old male with a past medical history tuberculosis first diagnosed in 2014 with subsequent pulmonary complications including lung destruction, hypertension, neuropathy, and likely pulmonary hypertension after an echocardiogram showed RV dilation and dysfunction. Chaim comes in today after having an echocardiogram performed at [facility] in December of 2019 which showed RV dilation and dysfunction. Chaim states that his breathing is actually getting better recently on his TB medications. He does not do a lot of activity because he has bad neuropathy in his feet so walking distances is nearly impossible. He is limited by this so going up a flight of stairs is difficult because of foot pain and not dyspnea. He denies any chest pain or pressure, presyncope, or syncope. He has no orthopnea or paroxysmal dyspnea. 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     Chaim is currently seeing ID at [facility] for his TB. He tells me he is taking his medications. He is frustrated by this diagnosis. He does have a lot of pain in his legs and his biggest concern today is his foot pain and neuropathy. He is scheduled to see a pain clinic and Dr. Florian has been working with him using gabapentin for his pain.     Chaim also had a  CT scan performed at [facility] in December which showed bilateral lung destruction. He has not had any pulmonary function tests to his recollection or per my review of the chart.     Chaim returns to clinic today after struggling with weight gain. He was started on lasix but stopped because it was causing some issues with excess urination. He is actually feeling much better and is gaining weight because he is eating a lot more. He is feeling better and hoping to get an answer to his dyspnea.      PAST MEDICAL HISTORY:  -Active TB  -Anemia  -Pancytopenia  -Echo suggesting pulmonary hypertension    Assessment/Plan:  Echocardiogram 12/30/2019 ([facility]):  The left ventricular systolic function is normal, estimated LVEF 60-65%.  Abnormal septal motion secondary to right ventricular volume and pressure overload.  Severely reduced right ventricular systolic function. The right ventricle is quantitatively enlarged.  There is trace circumferential pericardial effusion.  Severe pulmonary hypertension, estimated right ventricular systolic pressure is 64 mmHg.     CT Scan 12/29/19 ([facility])  Small volume ascites is not changed.  Chronic changes of the right lung with further volume loss and mediastinal shift to the right since 8/30/2019.  Groundglass infiltrates of the left upper lobe and lingula are consistent with infectious or inflammatory process.  Lobulated mass of the left lower lobe with pleural thickening has decreased in size with interval resolution of the cavitation since 8/30/2019.     Assessment and Plan: Chaim Norman is a 44 year old male with history of active TB with significant lung destruction who presents for evaluation of pulmonary hypertension.      1. 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 Because of COVID-19 we have been unable to complete his work-up. I would like to get his hemodynamic study completed because he has gained a lot of  "weight. I don't think this is right heart failure, but I want to be certain.      If he does have significant pulmonary hypertension, we may need to get him started on inhaled therapy to reduce the risk of VQ mismatch.      2. Hypertension: Diastolic BP is quite elevated, we will increase amlodipine to 10 mg daily.          Follow up plan: ***          Video-Visit Details    Type of service:  Video Visit    Video Start Time: {video visit start/end time:152948}  Video End Time: {video visit start/end time:152948}    Originating Location (pt. Location): {video visit patient location:717901::\"Home\"}    Distant Location (provider location):  [facility] ***[employer]    Platform used for Video Visit: {Virtual Visit Platforms:160675::\"[employer]\"}      Danika Mckeon PA-C  [facility]  Pager [phone]      "

## 2020-05-28 NOTE — TELEPHONE ENCOUNTER
RECORDS RECEIVED FROM: External, internal   DATE RECEIVED: 6.26.20   NOTES STATUS DETAILS   OFFICE NOTE from referring provider Internal 5.21.2020 TESSIE Chauhan [facility]   OFFICE NOTE from other specialist Care Everywhere 5.15.2020 Dr. Timmons, [facility]  4.3.2020 Dr. Timmons [facility]  3.6.2020 Dr. Timmons [facility]  2.7.2020 Dr. Timmons [facility]  7.8.19 Nyla Florian CNP, [facility]  More in Epic if needed   DISCHARGE SUMMARY from hospital Care Everywhere 8.30.19 Dr. Nicholson  [facility]  10.25.18  10.19.18   DISCHARGE REPORT from the ER Care Everywhere 11.15.18  10.18.18   MEDICATION LIST Internal    IMAGING  (NEED IMAGES AND REPORTS)     CT SCAN Internal 10.29.18  10.21.18  10.18.18   CHEST XRAY (CXR) Internal 6.17.19 5.20.19  11.14.18  10.18.18  More in EPIC   TESTS     PULMONARY FUNCTION TESTING (PFT) N/A

## 2020-05-29 ENCOUNTER — TELEPHONE (OUTPATIENT)
Dept: CARDIOLOGY | Facility: CLINIC | Age: 45
End: 2020-05-29

## 2020-05-29 DIAGNOSIS — R06.02 SOB (SHORTNESS OF BREATH): ICD-10-CM

## 2020-05-29 DIAGNOSIS — I27.20 PULMONARY HYPERTENSION (H): Primary | ICD-10-CM

## 2020-05-29 NOTE — TELEPHONE ENCOUNTER
Will route to provider for further review.     ELLIOTT CuevaN, RN-BC  Patient Care Supervisor  [facility] [place]

## 2020-05-29 NOTE — TELEPHONE ENCOUNTER
----- Message from DUTCH Rabago sent at 2020  1:39 PM CDT -----  Regardin month follow up  I had initially put PRN follow up for him, but I ran it by Dr Chauhan and he said he will see him in 6 months. Can you place orders for follow up with him please?    Thanks!  Danika

## 2020-06-01 ENCOUNTER — PATIENT OUTREACH (OUTPATIENT)
Dept: CARE COORDINATION | Facility: CLINIC | Age: 45
End: 2020-06-01

## 2020-06-01 NOTE — PROGRESS NOTES
"Clinic Care Coordination Contact    Follow Up Progress Note      Assessment: [facility] contacted patient for monthly follow up. Patient still has not heard much about transportation through [employer]. He reports that his main concern this month is that he can't afford his cell phone bill. Patient reports that his friend usually helps to pay for his cell phone bill, but isn't going to be helping this month. He also states that he isn't able to afford his Naltrexone prescription either. [facility] asked if he has tried applying for [employer] Prescription Assistance Program. Patient stated that he has tried applying for this in the past, but \"wasn't able to get through to anyone\". [facility] encouraged patient to contact them again, as this could potentially help assist patient in getting his Naltrexone prescription filled. Patient agreeable to having the cell phone resource and [place] Prescription Assistance Program contact information emailed to him. His email is [email or web address]. [facility] emailed patient the cell phone resource, as well as prescription assistance contact information.    Goals addressed this encounter:   Goals Addressed                 This Visit's Progress       Patient Stated      1. Transportation (pt-stated)   60%     Goal Statement: I need help getting transportation.  Date Goal set: 1/15/2020   Date to Achieve By: 7/30/2020  Patient expressed understanding of goal: yes  Action steps to achieve this goal:  1. I will call [employer] [phone]  2. I will go to [facility] and apply for Medical Assistance benefit  3. I will apply for [employer]    2/28/2020: Patient stated that he was able to complete a [employer] application and send it in. Waiting to hear response.    3/30/2020: Has not heard back from [employer] yet. [facility] provided the number for [employer] Customer Service to call and inquire about his application (Ph: [phone]).        Financial Wellbeing " (pt-stated)   50%     Goal Statement: I need help paying for my rent  Date Goal set: 2/14/2020  Barriers: Feet have been hurting too much to get back to the [facility]   Strengths: Patient is motivated to getting assistancce  Date to Achieve By: 7/30/2020  Patient expressed understanding of goal: Yes  Action steps to achieve this goal:  1. I will fill out application for emergency assistance  2. I will bring my application in to the [facility]  3. I will obtain emergency assistance, if found eligible    2/28/2020: Patient stated that he is still working on his emergency assistance application and had more questions about it. [facility] provided additional phone numbers to him for rent assistance    3/30/2020: Went to [facility]. Submitted documentation that was needed. Got rent assistance through another agency for March. Reapplied for assistance in April through [facility]        Psychosocial (pt-stated)        Goal Statement: I can't afford to pay my phone bill  Date Goal set: 6/1/2020  Barriers: Patient's friend cannot help him pay his phone bill   Strengths: Patient is willing to work with Care Coordination  Date to Achieve By: 7/1/2020  Patient expressed understanding of goal: Yes  Action steps to achieve this goal:  1. I will apply for a free phone through the [place] [employer] Program- Resource provided by [facility]  2. I will have my phone bill expenses covered by this program, or via friend, if able to help               Intervention/Education provided during outreach: Open ended questions     Outreach Frequency: 2 weeks    Plan:   No further follow up needed this month.   Care Coordinator will follow up in 1 month    BING Garcia  Primary Care Clinic- Social Work Care Coordinator  [facility]  Ph: [phone]  6/1/2020 1:31 PM

## 2020-06-05 ENCOUNTER — TELEPHONE (OUTPATIENT)
Dept: PALLIATIVE MEDICINE | Facility: CLINIC | Age: 45
End: 2020-06-05

## 2020-06-05 ENCOUNTER — VIRTUAL VISIT (OUTPATIENT)
Dept: PALLIATIVE MEDICINE | Facility: CLINIC | Age: 45
End: 2020-06-05
Payer: COMMERCIAL

## 2020-06-05 DIAGNOSIS — M25.512 CHRONIC PAIN OF BOTH SHOULDERS: ICD-10-CM

## 2020-06-05 DIAGNOSIS — G62.0 DRUG-INDUCED PERIPHERAL NEUROPATHY (H): ICD-10-CM

## 2020-06-05 DIAGNOSIS — M79.2 NEUROPATHIC PAIN: ICD-10-CM

## 2020-06-05 DIAGNOSIS — G47.00 INSOMNIA, UNSPECIFIED TYPE: ICD-10-CM

## 2020-06-05 DIAGNOSIS — M25.511 CHRONIC PAIN OF BOTH SHOULDERS: ICD-10-CM

## 2020-06-05 DIAGNOSIS — G89.29 CHRONIC PAIN OF BOTH SHOULDERS: ICD-10-CM

## 2020-06-05 DIAGNOSIS — M79.672 BILATERAL FOOT PAIN: ICD-10-CM

## 2020-06-05 DIAGNOSIS — M79.671 BILATERAL FOOT PAIN: ICD-10-CM

## 2020-06-05 PROCEDURE — 99214 OFFICE O/P EST MOD 30 MIN: CPT | Mod: 95 | Performed by: NURSE PRACTITIONER

## 2020-06-05 RX ORDER — ACETAMINOPHEN 325 MG/1
650 TABLET ORAL EVERY 6 HOURS PRN
Qty: 180 TABLET | Refills: 3 | Status: SHIPPED | OUTPATIENT
Start: 2020-06-05 | End: 2020-10-13

## 2020-06-05 RX ORDER — NORTRIPTYLINE HCL 10 MG
10-20 CAPSULE ORAL AT BEDTIME
Qty: 60 CAPSULE | Refills: 1 | Status: SHIPPED | OUTPATIENT
Start: 2020-06-05 | End: 2020-08-12

## 2020-06-05 RX ORDER — DULOXETIN HYDROCHLORIDE 60 MG/1
60 CAPSULE, DELAYED RELEASE ORAL DAILY
Qty: 90 CAPSULE | Refills: 1 | Status: SHIPPED | OUTPATIENT
Start: 2020-06-05 | End: 2020-11-13

## 2020-06-05 RX ORDER — DULOXETIN HYDROCHLORIDE 30 MG/1
30 CAPSULE, DELAYED RELEASE ORAL DAILY
Qty: 90 CAPSULE | Refills: 1 | Status: SHIPPED | OUTPATIENT
Start: 2020-06-05 | End: 2021-02-02

## 2020-06-05 RX ORDER — GABAPENTIN 300 MG/1
900 CAPSULE ORAL 3 TIMES DAILY
Qty: 270 CAPSULE | Refills: 1 | Status: SHIPPED | OUTPATIENT
Start: 2020-06-05 | End: 2020-06-23

## 2020-06-05 ASSESSMENT — PAIN SCALES - GENERAL: PAINLEVEL: WORST PAIN (10)

## 2020-06-05 NOTE — PROGRESS NOTES
The patient has been notified of following:     This telephone visit will be conducted via a call between you and your provider. We have found that certain health care needs can be provided without the need for a physical exam.  This service lets us provide the care you need with a phone conversation.  If a prescription is necessary we can send it directly to your pharmacy.  If lab work is needed we can place an order for that and you can then stop by our lab to have the test done at a later time. This is a billable service but we do not know the cost at this time.     Patient has given verbal consent for Telephone visit?  Yes  Nimco Patel CMA ([facility]    6/5/2020    Chaim Norman is a 44 year old male who is being evaluated via a billable telephone visit.        Chief complaint: bilateral neuropathic foot pain, bilateral shoulder pain    Interval history:  Chaim Norman is a 44 year old male is known to me for   Neuropathic pain  Bilateral foot pain  Chronic pain of both shoulders  Insomnia, unspecified site   PMHx includes: None  PSHx includes: None.    Recommendations/plan at the last visit on 4/28/2020 included:  1. Physical Therapy:  None at present time  2. Clinical Health Psychologist: not at present time  3. Diagnostic Studies:  none  4. Medication Management:    1. Continue cymbalta 60mg every day  2. continue nortriptyline to 20mg at bedtime if needed   3. Continue gabapentin  4. Start naltrexone 4.5mg/day for neuropathic pain. Advised NOT to take this with oxycodone  5. Further procedures recommended: none  6. Recommendations to PCP: see above  7. Follow up: 4 weeks          Since his last visit, Chaim Norman reports:  Interval history 6/5/2020  -he has continued foot and hand pain  -he has all over body pain as well.   -he also notes he is feeling a bit out of breath for the past week. Denies any fever or cough.   -he was not able to begin the Naltrexone medication  "as he is not currently working right now,   -he says he has not been able to get any unemployment benefits, encouraged him to call the [place] Unemployment office.      Interval history 4/28/2020  -he is sleeping a bit better, but still suffering with neuropathic pain in his feet.  Discussed option of trying compounded naltrexone that has been shown to be helpful for neuropathic pain.       On 3/31/2020   he is starting to sleep better on the nortriptyline  - he feels that Cymbalta has been somewhat helpful for pain, tolerating well.   -neuropathic pain remains in legs and feet  -still has shoulder pain    At this point, the patient's participation with our multidisciplinary team includes:  The patient has been compliant with the program.  PT - none at present time  Health Psych - none at present time      Pain scores:  Pain intensity on average is 10 on a scale of 0-10.    Range is 8-10/10.   Pain right now is 10/10.   Pain is described as \"sharp and numb .\"    Pain is constant in nature    Current pain relevant medications:   -Tylenol 650mg Q 6 hours PRN pain (barely helpful-8 tablets/day)  -gabapentin 300mg capsules, take 900mg TID (somewhat helpful)  -oxycodone 5mg BID PRN severe pain (helpful temporarily)  -voltaren gel PRN (helpful)  -Cymbalta 60mg every day (helpful for pain and mood)  -nortriptyline 20mg at HS (taking 20mg at bedtime, helpful)           Other pertinent medications:  -Sitruro 200mg  -Seromycin 250mg  -Myambutol 1,200mg  -melatonin 2mg HS      Previous medication treatments included:  OPIATES:Oxycodone (helpful temporarily)  NSAIDS: None  MUSCLE RELAXANTS: None  ANTI-MIGRAINE MEDS: None  ANTI-DEPRESSANTS: Cymbalta (helpful), nortriptyline (helpful)  SLEEP AIDS: None  ANTI-CONVULSANTS: Gabapentin (helpful temporarily)  TOPICALS: Lidocaine ointment (not helpful)  Other meds: Acetaminophen (not helpful)        Other treatments have included:  Chaim Norman has not been seen at a pain clinic in the past.  "   PT: none  Chiropractic care: none  Acupuncture: none  TENs Unit: none     Injections: none          Side Effects: no side effect  Patient is using the medication as prescribed: YES    Medications:  Current Outpatient Medications   Medication Sig Dispense Refill     acetaminophen (TYLENOL) 325 MG tablet Take 2 tablets (650 mg) by mouth every 6 hours as needed for mild pain 180 tablet 3     albuterol (PROVENTIL HFA) 108 (90 Base) MCG/ACT inhaler Inhale 2 puffs into the lungs every 6 hours as needed for shortness of breath / dyspnea or wheezing 8.5 g 3     amLODIPine (NORVASC) 5 MG tablet Take 2 tablets (10 mg) by mouth daily 180 tablet 3     bedaquiline (SITRURO) 100 MG tablet Take 200 mg by mouth Every Mon, Wed, Fri Morning       COMPOUNDED NON-CONTROLLED SUBSTANCE (CMPD RX) - PHARMACY TO MIX COMPOUNDED MEDICATION Take one capsule containing 4.5mg of naltrexone by mouth per day 135 mg 1     cycloSERINE (SEROMYCIN) 250 MG capsule Take 250 mg by mouth 2 times daily       diclofenac (VOLTAREN) 1 % topical gel Place 2 g onto the skin 4 times daily as needed for moderate pain Of both shoulders 1 Tube 3     DULoxetine 60 MG PO capsule Take 1 capsule (60 mg) by mouth daily 30 capsule 3     ethambutol (MYAMBUTOL) 400 MG tablet Take 1,200 mg by mouth daily       fluticasone-salmeterol (ADVAIR) 100-50 MCG/DOSE inhaler Inhale 1 puff into the lungs every 12 hours 1 Inhaler 2     gabapentin (NEURONTIN) 300 MG capsule Take 3 capsules (900 mg) by mouth 3 times daily 270 capsule 1     levofloxacin (LEVAQUIN) 500 MG tablet Take 750 mg by mouth daily       melatonin 3 MG tablet Take 1 tablet (3 mg) by mouth nightly as needed for sleep 30 tablet 3     nortriptyline (PAMELOR) 10 MG capsule Take 1-2 capsules (10-20 mg) by mouth At Bedtime For pain and insomnia 60 capsule 1     oxyCODONE (ROXICODONE) 5 MG tablet Take 1 tablet (5 mg) by mouth 2 times daily as needed for severe pain 14 tablet 0     potassium chloride ER (KLOR-CON M) 20  MEQ CR tablet Take 1 tablet (20 mEq) by mouth daily 30 tablet 3     pyrazinamide 500 MG tablet Take 1,500 mg by mouth daily         Medical History: any changes in medical history since they were last seen? No    Social History:   Home situation: Single and lives with son  Occupation/Schooling: Not presently working  Tobacco use: Non smoker  Alcohol use: None  Drug use: None  History of chemical dependency treatment: None    Is patient a current smoker or tobacco user?  no  If yes, was cessation counseling offered?  na      Review of Systems:    The 14 system ROS was reviewed with patient and is positive for:  Constitutional: fever/chills, fatigue, weight gain, weight loss  Eyes/Head: headache, dizziness  ENT: ringing in ears  Allergy/Immune: allergies  Skin: itching, rash, hives  Hematologic: easy bruising  Respiratory: cough, wheezing, shortness of breath  Cardiovascular: swelling in feet, fainting, palpitations, chest pain  GI: abdominal pain, nausea, vomiting, diarrhea, constipation  Endocrine: steroid use  Musculoskeletal:  joint pain, arthritis, stiffness, gout, back pain, neck pain  Urinary: frequency, urgency, incontinence, hesitancy  Neurologic: weakness, numbness/tingling, seizure, stroke, memory loss  Mental health: depression, anxiety, stress, suicidal ideation      Physical Exam:  Vital signs: There were no vitals taken for this visit.    Behavioral observations:  Awake, alert, cooperative      [place] Prescription Monitoring Program:  Reviewed [facility] 6/5/2020- no concerning fills. Not on opiates  Marleny WARE RN CNP, FNP  [facility]  [place] location          Assessment:   1. Drug induced peripheral neuropathy  2. Neuropathic pain  3. Bilateral foot pain  4. Chronic pain of both shoulder  5.  insomnia  6. PMHx includes: None listed  7. PSHx includes: None      Plan:   1. Physical Therapy:  None at present time  2. Clinical Health Psychologist: not at present  time  3. Diagnostic Studies:  none  4. Medication Management:    1. increase cymbalta 90mg every day  2. continue nortriptyline to 20mg at bedtime if needed   3. Continue gabapentin  5. Further procedures recommended: none  6. Recommendations to PCP: see above  7. Follow up: 6 weeks    Phone call duration: 25 minutes      Marleny WARE RN CNP, FNP  [facility]  [facility]

## 2020-06-05 NOTE — TELEPHONE ENCOUNTER
LVM to schedule 6 week virtual visit, prefer video with doximity if possible     Cathy DEL REAL    [facility]

## 2020-06-09 ENCOUNTER — TELEPHONE (OUTPATIENT)
Dept: CARDIOLOGY | Facility: CLINIC | Age: 45
End: 2020-06-09

## 2020-06-09 ENCOUNTER — PATIENT OUTREACH (OUTPATIENT)
Dept: CARE COORDINATION | Facility: CLINIC | Age: 45
End: 2020-06-09

## 2020-06-09 ASSESSMENT — ACTIVITIES OF DAILY LIVING (ADL): DEPENDENT_IADLS:: INDEPENDENT

## 2020-06-09 NOTE — TELEPHONE ENCOUNTER
"Verified with patient that the increased SOB and increased LE edema began around the time he had his virtual visit with our PA. Danika Mckeon PA-C on 5/20/20.    patient does use O2 @ noc, but never during the day, so we discussed him trying it during the day as well to see if it helps.  He will have to be tested later to see if he physically needs it based on the test results, but this is just for comfort today.  patient was able to complete full sentences during our conversation and advised me he still does not have a scale because he is not working and cannot afford one. We discussed that he had previously been prescribed Lasix, but it \"didn't work\" that's why we stopped it.    Advised Chaim that due to his significant edema and increased SOB, I highly recommend he go to the [facility] ER.  Patient does have an appt with Pulmonary, but not for another 2 weeks and he clearly needs to be evaluated and worked up.  patient agreed to plan and stated \"I will go tomorrow then\".  Advised him again not to wait and go tonight, but if he will not go until tomorrow he should try using the Oxygen now and elevate his legs.  Patient verbalized understanding, agreed with plan and denied any further questions. Pat Story RN on 6/9/2020 at 3:38 PM    Sent Dr. Chauhan a staff msg as an update.     he sits too long or stands too long his legs begin to hurt because they become so swollen.  "

## 2020-06-09 NOTE — TELEPHONE ENCOUNTER
Health Call Center    Phone Message    May a detailed message be left on voicemail: yes     Reason for Call: Symptoms or Concerns     If patient has red-flag symptoms, warm transfer to triage line    Current symptom or concern: Swollen feet and obstructed breath     Symptoms have been present for:  3 week(s)    Has patient previously been seen for this? No    Are there any new or worsening symptoms? Yes: Chaim says his feet have been getting more swollen over the past three weeks. Chaim would like a call back from the care team to discuss what he should do. Please give Chaim a call back at your earliest convenience.      Action Taken: Message routed to:  [facility]  Cardio    Travel Screening: Not Applicable

## 2020-06-09 NOTE — PROGRESS NOTES
Clinic Care Coordination Contact    Follow Up Progress Note      Assessment: When writer contacted patient for follow up he informed writer he has had feet swelling that has been progressively worsening over the past 2-3 weeks along with shortness of breath that has been worsening.  Patient is afebrile.  RN CC had a conference call immediately to cardiology department due to report of worsening symptoms.  Noted, patient was seen via virtual visit 5/27/20 by Danika Mckeon PA-C and referred patient to pulmonology.       Intervention/Education provided during outreach: RN CC involved cardiology provider department due to worsening swelling and shortness of breath symptoms.     Outreach Frequency: 2 weeks    Plan:   RN CC will defer triage of symptoms to cardiology.  Care Coordinator will follow up in 1-2 weeks.    Melissa Behl BSN, RN, PHN, CCM  Primary Care Clinical RN Care Coordinator  [facility]   [phone]

## 2020-06-10 NOTE — TELEPHONE ENCOUNTER
Closing encounter. Reviewed with patient in his virtual visit with me on 6/5/2020.     Marleny WARE RN CNP, FNP  [facility] Pain Management [facility]

## 2020-06-12 ENCOUNTER — PATIENT OUTREACH (OUTPATIENT)
Dept: CARE COORDINATION | Facility: CLINIC | Age: 45
End: 2020-06-12

## 2020-06-12 NOTE — PROGRESS NOTES
Clinic Care Coordination Contact  [facility]/Voicemail       Clinical Data: Care Coordinator Outreach  Outreach attempted x 1.  Left message on patient's voicemail with call back information and requested return call.  Plan: Care Coordinator will try to reach patient again in 5-10 business days.    Melissa Behl BSN, RN, PHN, [facility]  Primary Care Clinical RN Care Coordinator  [facility]   [phone]

## 2020-06-19 ENCOUNTER — VIRTUAL VISIT (OUTPATIENT)
Dept: FAMILY MEDICINE | Facility: CLINIC | Age: 45
End: 2020-06-19
Payer: COMMERCIAL

## 2020-06-19 ENCOUNTER — TELEPHONE (OUTPATIENT)
Dept: FAMILY MEDICINE | Facility: CLINIC | Age: 45
End: 2020-06-19

## 2020-06-19 DIAGNOSIS — R07.1 CHEST PAIN ON BREATHING: Primary | ICD-10-CM

## 2020-06-19 DIAGNOSIS — A15.0 PULMONARY TUBERCULOSIS: ICD-10-CM

## 2020-06-19 DIAGNOSIS — I27.20 PULMONARY HYPERTENSION (H): ICD-10-CM

## 2020-06-19 PROCEDURE — 99213 OFFICE O/P EST LOW 20 MIN: CPT | Mod: 95 | Performed by: NURSE PRACTITIONER

## 2020-06-19 NOTE — TELEPHONE ENCOUNTER
Spoke with patient and he has been having pain all over his body the last three days. His chest hurts when he breathes deeply in and out and it mostly happens in the morning for the last 3 days. Also c/o knee pain. Patient was scheduled for appointment 06/19/20. Denies fever, SOB, trauma, fever, nausea, vomiting, pain radiating to jaw or arms.         Tien Sheth RN, BSN, PHN

## 2020-06-19 NOTE — PATIENT INSTRUCTIONS
Patient Education     Uncertain Causes of Chest Pain    Chest pain can happen for a number of reasons. Sometimes the cause can't be determined. If your condition does not seem serious, and your pain does not appear to be coming from your heart, your healthcare provider may recommend watching it closely. Sometimes the signs of a serious problem take more time to appear. Many problems not related to your heart can cause chest pain. These include:    Musculoskeletal. Costochondritis is an inflammation of the tissues around the ribs that can occur from trauma or overuse injuries, or a strain of the muscles of the chest wall    Respiratory. Pneumonia, collapsed lung (pneumothorax), or inflammation of the lining of the chest and lungs (pleurisy)    Gastrointestinal. Esophageal reflux, heartburn, ulcers, or gallbladder disease    Anxiety and panic disorders    Nerve compression and inflammation    Rare miscellaneous problems such as aortic aneurysm (a swelling of the large artery coming out of the heart) or pulmonary embolism (a blood clot in the lungs)  Home care  After your visit, follow these recommendations:    Rest today and avoid strenuous activity.    Take any prescribed medicine as directed.    Be aware of any recurrent chest pain and notice any changes  Follow-up care  Follow up with your healthcare provider if you do not start to feel better within 24 hours, or as advised.  Call 911  Call 911 if any of these occur:    A change in the type of pain: if it feels different, becomes more severe, lasts longer, or begins to spread into your shoulder, arm, neck, jaw or back    Shortness of breath or increased pain with breathing    Weakness, dizziness, or fainting    Rapid heart beat    Crushing sensation in your chest  When to seek medical advice  Call your healthcare provider right away if any of the following occur:    Cough with dark colored sputum (phlegm) or blood    Fever of 100.4 F (38 C) or higher, or as  directed by your healthcare provider    Swelling, pain or redness in one leg  Date Last Reviewed: 5/1/2018 2000-2019 [employer]. [address] [place] [address], [place] [address]. All rights reserved. This information is not intended as a substitute for professional medical care. Always follow your healthcare professional's instructions.

## 2020-06-19 NOTE — TELEPHONE ENCOUNTER
.Reason for call:  Other   Patient called regarding (reason for call): RED FLAG  Additional comments: CHEST PAIN/ SURGERY    Phone number to reach patient:  Home number on file [phone] (home)    Best Time:  anytime    Can we leave a detailed message on this number?  YES    Travel screening: Negative

## 2020-06-19 NOTE — PROGRESS NOTES
"Chaim Norman is a 44 year old male who is being evaluated via a billable telephone visit.      The patient has been notified of following:     \"This telephone visit will be conducted via a call between you and your physician/provider. We have found that certain health care needs can be provided without the need for a physical exam.  This service lets us provide the care you need with a short phone conversation.  If a prescription is necessary we can send it directly to your pharmacy.  If lab work is needed we can place an order for that and you can then stop by our lab to have the test done at a later time.    Telephone visits are billed at different rates depending on your insurance coverage. During this emergency period, for some insurers they may be billed the same as an in-person visit.  Please reach out to your insurance provider with any questions.    If during the course of the call the physician/provider feels a telephone visit is not appropriate, you will not be charged for this service.\"    Patient has given verbal consent for Telephone visit?  Yes    What phone number would you like to be contacted at? [phone]    How would you like to obtain your AVS? Mail a copy    Subjective     Chaim Norman is a 44 year old male who presents via phone visit today for the following health issues:    HPI  Chest Pain      Onset: 3-4 days     Description (location/character/radiation/duration): chest pains \"across chest\" , when waking up from sleep. He complains of chest heaviness, can't breathe well\" in the am but symptoms improve as the day progresses. No fever, chills, known exposure to COVID-19 (has been socially distancing) no radiating pain to shoulder, neck, back, no shortness of breath, wheezing, cough, palapitations, no nausea,vomiting, diarrhea, constipation. No change in activity level.  He does complain of some swelling in his LE bilaterally    He he is followed by [facility] ID for MDR pullmonary TB- currrently on " Levaquin, cycloserine, Ethambutol, and Pyrazinamide by [facility], last visit 6/12/2020.  He has  Peripheral neuropathy (comlication of Linezolid)-is followed by  Pain mangement and currently taking Pamelor, Cymbalta, and Gabapentin.    He also takes Advair and Albuterol for SHORTNESS OF BREATH/COPD, severe lung destruction from TB.      Intensity:  mild -comes and goes    Accompanying signs and symptoms:        Shortness of breath: YES-with acttivity (not signifiantly changed from prior visits)       Sweating: no        Nausea/vomitting: no        Palpitations: no        Other (fevers/chills/cough/heartburn/lightheadedness): no     History (similar episodes/previous evaluation): None    Precipitating or alleviating factors:       Worse with exertion: no        Worse with breathing: no        Related to eating: no        Better with burping: no     Therapies tried and outcome: Albuterol helps a little.      Patient Active Problem List   Diagnosis     History of TB (tuberculosis)     Weight loss     Pulmonary nodules     Late latent syphilis     Iatrogenic pneumothorax     Patient's intentional underdosing of medication regimen due to financial hardship     Anemia of chronic disease     Benign prostatic hyperplasia, unspecified whether lower urinary tract symptoms present     COPD (chronic obstructive pulmonary disease) (H)     Hepatitis B core antibody positive     Neutropenia (H)     Pulmonary hypertension (H)     SOB (shortness of breath)     Status post coronary angiogram     Past Surgical History:   Procedure Laterality Date     CV PULMONARY ANGIOGRAM N/A 5/8/2020    Procedure: Pulmonary Angiogram;  Surgeon: Keenan Perez MD;  Location:  HEART CARDIAC CATH LAB     CV RIGHT HEART CATH N/A 5/8/2020    Procedure: CV RIGHT HEART CATH;  Surgeon: Keenan Perez MD;  Location:  HEART CARDIAC CATH LAB     ENDOSCOPY         Social History     Tobacco Use     Smoking status: Never Smoker     Smokeless tobacco:  Never Used   Substance Use Topics     Alcohol use: No     Family History   Family history unknown: Yes         Current Outpatient Medications   Medication Sig Dispense Refill     albuterol (PROVENTIL HFA) 108 (90 Base) MCG/ACT inhaler Inhale 2 puffs into the lungs every 6 hours as needed for shortness of breath / dyspnea or wheezing 8.5 g 3     amLODIPine (NORVASC) 5 MG tablet Take 2 tablets (10 mg) by mouth daily 180 tablet 3     bedaquiline (SITRURO) 100 MG tablet Take 200 mg by mouth Every Mon, Wed, Fri Morning       COMPOUNDED NON-CONTROLLED SUBSTANCE (CMPD RX) - PHARMACY TO MIX COMPOUNDED MEDICATION Take one capsule containing 4.5mg of naltrexone by mouth per day 135 mg 1     cycloSERINE (SEROMYCIN) 250 MG capsule Take 250 mg by mouth 2 times daily       diclofenac (VOLTAREN) 1 % topical gel Place 2 g onto the skin 4 times daily as needed for moderate pain Of both shoulders 1 Tube 3     DULoxetine (CYMBALTA) 60 MG capsule Take 1 capsule (60 mg) by mouth daily Take with 30mg to equal 90mg/day. 3 month script 90 capsule 1     ethambutol (MYAMBUTOL) 400 MG tablet Take 1,200 mg by mouth daily       fluticasone-salmeterol (ADVAIR) 100-50 MCG/DOSE inhaler Inhale 1 puff into the lungs every 12 hours 1 Inhaler 2     gabapentin (NEURONTIN) 300 MG capsule Take 3 capsules (900 mg) by mouth 3 times daily 270 capsule 1     levofloxacin (LEVAQUIN) 500 MG tablet Take 750 mg by mouth daily       melatonin 3 MG tablet Take 1 tablet (3 mg) by mouth nightly as needed for sleep 30 tablet 3     nortriptyline (PAMELOR) 10 MG capsule Take 1-2 capsules (10-20 mg) by mouth At Bedtime For pain and insomnia 60 capsule 1     oxyCODONE (ROXICODONE) 5 MG tablet Take 1 tablet (5 mg) by mouth 2 times daily as needed for severe pain 14 tablet 0     potassium chloride ER (KLOR-CON M) 20 MEQ CR tablet Take 1 tablet (20 mEq) by mouth daily 30 tablet 3     pyrazinamide 500 MG tablet Take 1,500 mg by mouth daily       acetaminophen (TYLENOL) 325 MG  tablet Take 2 tablets (650 mg) by mouth every 6 hours as needed for mild pain (Patient not taking: Reported on 6/19/2020) 180 tablet 3     DULoxetine (CYMBALTA) 30 MG capsule Take 1 capsule (30 mg) by mouth daily Take with 60mg to equal 90mg/day. 3 month script 90 capsule 1     BP Readings from Last 3 Encounters:   05/08/20 (!) 130/97   04/21/20 (!) 133/102   02/18/20 (!) 136/99    Wt Readings from Last 3 Encounters:   04/21/20 83.1 kg (183 lb 1.6 oz)   02/11/20 71.7 kg (158 lb)   02/03/20 70.8 kg (156 lb)                    Reviewed and updated as needed this visit by Provider         Review of Systems   Constitutional, HEENT, cardiovascular, pulmonary, gi and gu systems are negative, except as otherwise noted.       Objective   Reported vitals:  There were no vitals taken for this visit.   healthy, alert, no distress and cooperative  PSYCH: Alert and oriented times 3; coherent speech, normal   rate and volume, able to articulate logical thoughts, able   to abstract reason, no tangential thoughts, no hallucinations   or delusions  His affect is normal and pleasant  RESP: No cough, no audible wheezing, able to talk in full sentences  Remainder of exam unable to be completed due to telephone visits    Diagnostic Test Results:  Labs reviewed in Epic  none         Assessment/Plan:  1. Chest pain on breathing  Needs to be seen in clinic, UC or ER for EKG and cardiopulmonary evaluation. Patient will call for appt.  Currently, his symptoms are well controlled, with Tylenol.    2. Pulmonary tuberculosis  [place] [facility]    3. Pulmonary hypertension (H)  Patient complains of LE edema- could be due to  10 mg dose of Amlodipine but he was having swellinbg prior to recent dose increase as well.  Continue Amlodipine, needs to be seen in office.      No follow-ups on file.      Phone call duration:  17  minutes    CHAZ Klein CNP

## 2020-06-23 DIAGNOSIS — G62.0 DRUG-INDUCED PERIPHERAL NEUROPATHY (H): ICD-10-CM

## 2020-06-23 RX ORDER — GABAPENTIN 300 MG/1
900 CAPSULE ORAL 3 TIMES DAILY
Qty: 270 CAPSULE | Refills: 1 | Status: SHIPPED | OUTPATIENT
Start: 2020-06-23 | End: 2020-08-21

## 2020-06-23 NOTE — TELEPHONE ENCOUNTER
Received fax request from [facility] pharmacy requesting refill(s) for gabapentin (NEURONTIN) 300 MG capsule    Last refilled on 05/24/20    Pt last seen on 06/05/20-virtual visit  Next appt scheduled for : none    Will facilitate refill.

## 2020-06-23 NOTE — TELEPHONE ENCOUNTER
Signed Prescriptions:                        Disp   Refills    gabapentin (NEURONTIN) 300 MG capsule      270 ca*1        Sig: Take 3 capsules (900 mg) by mouth 3 times daily  Authorizing Provider: MICHELLE CHILDERS, RN CNP, FNP  [facility] Pain Management [facility]

## 2020-06-25 ENCOUNTER — PATIENT OUTREACH (OUTPATIENT)
Dept: CARE COORDINATION | Facility: CLINIC | Age: 45
End: 2020-06-25

## 2020-06-25 ASSESSMENT — ACTIVITIES OF DAILY LIVING (ADL): DEPENDENT_IADLS:: INDEPENDENT

## 2020-06-25 NOTE — PROGRESS NOTES
Clinic Care Coordination Contact    Follow Up Progress Note      Assessment: RN CC spoke with patient for follow up.  Patient reports ongoing BLE edema.  He does not have a scale to weigh himself daily.  Patient denies any further episodes of chest pain.  Patient reports shortness of breath with moderate exertion when climbing stairs.  Patient wears his oxygen at night at 2 LPM.  Patient reports chronic pain in his BLE is slightly improved 7-8/10.  He was able to  and take naltrexone as prescribed.  Patient has future pulmonology appointment 7/1/20.  Noted patient was instructed to seek emergency care 6/9/20 by cardiology and 6/19/20, however, patient did not follow through with recommendations.  RN CC spoke with [facility] paramedic Ladi who will initiate referral process with PCP.  Patient agreeable to community paramedic referral.  Patient has been able to drive his car.  He has not heard back from [employer].  Patient requests assistance in paying for electricity, water and rent.  RN CC will notify [facility] CC.    Goals addressed this encounter:   Goals Addressed                 This Visit's Progress      #3 Pain Management (pt-stated)   50%     Goal Statement: I will have suitable pain relief to perform activities of daily living.  Date Goal set: 2/19/2020  Date to Achieve By: 10/1/2020  Patient expressed understanding of goal: Yes  Action steps to achieve this goal:  1. I will take medications as prescribed to maintain adequate pain control/relief.  2. I will utilize non-pharmaceutical measures to help with pain control/relief.  3. I will maintain routine follow up and contact with my clinic care team.  4. I will call my pharmacy to request refills of my medications before they run out.        1. Monitoring (pt-stated)        Goal Statement: I will weigh myself daily.  Date Goal set: 6/25/2020   Barriers: does not have a scale, cost  Strengths: enrolled in care coordination, referral to community  paramedic  Date to Achieve By: 9/25/20  Patient expressed understanding of goal: yes  Action steps to achieve this goal:  1. I will weigh myself daily once a scale is provided by community paramedic.  2. I will call my cardiologist for weight gain of >2 lbs in 1 day or >5 lbs in 1 week.          2. Medical (pt-stated)        Goal Statement: I will participate in the Community Paramedic program.  Date Goal set: 6/25/2020   Barriers: unknown  Strengths: engaged  Date to Achieve By: 9/25/20  Patient expressed understanding of goal: yes  Action steps to achieve this goal:  1. I will accept Community Paramedic visit within the next 7 days.  2. I will participate in Community Paramedic visits.          4. Transportation (pt-stated)        Goal Statement: I need help getting transportation.  Date Goal set: 1/15/2020   Date to Achieve By: 7/30/2020  Patient expressed understanding of goal: yes  Action steps to achieve this goal:  1. I will call [employer] [phone]  2. I will go to [facility] and apply for Medical Assistance benefit  3. I will apply for [employer]    2/28/2020: Patient stated that he was able to complete a [employer] application and send it in. Waiting to hear response.    3/30/2020: Has not heard back from [employer] yet. [facility] provided the number for [employer] Customer Service to call and inquire about his application (Ph: [phone]).        COMPLETED: Financial Wellbeing (pt-stated)        Goal Statement: I can't afford my Naltrexone prescription  Date Goal set: 6/1/2020  Barriers: Patient unable to fill his prescription d/t cost  Strengths: Patient is willing to work with Care Coordination  Date to Achieve By: 7/1/2020  Patient expressed understanding of goal: Yes  Action steps to achieve this goal:  1. I will contact [place] [employer] [place] Prescription Assistance Program  2. I will obtain assistance through this program to afford my prescription  3. I will notify [facility] if  unable to qualify for [place] Prescription Assistance               Intervention/Education provided during outreach: RN CC reviewed plan of care, advised patient to follow instructions by providers, reviewed importance of monitoring daily weight and informed patient of community paramedic program.  RN CC initiated community paramedic referral.     Outreach Frequency: monthly    Plan:   1. Patient will see pulmonology as scheduled.  2. Patient will continue to take medications as prescribed.  3. Patient will weigh himself daily once scale is obtained and will call cardiology with weight gain is outside parameters.  4. RN CC initiated community paramedic referral.  5. RN Care Coordinator will follow up in 1 month.    Melissa Behl BSN, RN, PHN, CCM  Primary Care Clinical RN Care Coordinator  [facility]   [phone]

## 2020-06-26 ENCOUNTER — PRE VISIT (OUTPATIENT)
Dept: PULMONOLOGY | Facility: CLINIC | Age: 45
End: 2020-06-26

## 2020-06-26 ENCOUNTER — TELEPHONE (OUTPATIENT)
Dept: SCHEDULING | Facility: CLINIC | Age: 45
End: 2020-06-26

## 2020-06-26 NOTE — TELEPHONE ENCOUNTER
Pt stated that he needed a medical letter for his unemployment so he can get benefits.     Please call pt if any questions come up     [phone]

## 2020-06-29 NOTE — TELEPHONE ENCOUNTER
This writer attempted to contact patient on 06/29/20      Reason for call clarify letter and left detailed message.      If patient calls back:   1st floor [facility] Care Team (MA/TC) called. Inform patient that someone from the team will contact them, document that pt called and route to care team.         Baldev Pride MA

## 2020-06-29 NOTE — TELEPHONE ENCOUNTER
Reason for Call:  Other Letter    Detailed comments: Pt calling to follow up on previous phone encounter that he would like for Nyla Florian to submit a medical letter to Pt's home address as soon as possible for he will need that for his unemployment application in order to get benefits.     Phone Number Patient can be reached at: Home number on file [phone] (home)    Best Time: anytime    Can we leave a detailed message on this number? YES    Call taken on 6/29/2020 at 9:51 AM by Devin Cuellar

## 2020-06-30 NOTE — TELEPHONE ENCOUNTER
This writer attempted to contact patient on 06/30/20      Reason for call clarify message and left message.      If patient calls back:   1st floor [facility] Care Team (MA/TC) called. Inform patient that someone from the team will contact them, document that pt called and route to care team.         Xin Gotti MA

## 2020-07-01 ENCOUNTER — VIRTUAL VISIT (OUTPATIENT)
Dept: PULMONOLOGY | Facility: CLINIC | Age: 45
End: 2020-07-01
Attending: INTERNAL MEDICINE
Payer: COMMERCIAL

## 2020-07-01 ENCOUNTER — PATIENT OUTREACH (OUTPATIENT)
Dept: CARE COORDINATION | Facility: CLINIC | Age: 45
End: 2020-07-01

## 2020-07-01 ENCOUNTER — VIRTUAL VISIT (OUTPATIENT)
Dept: FAMILY MEDICINE | Facility: CLINIC | Age: 45
End: 2020-07-01
Payer: COMMERCIAL

## 2020-07-01 DIAGNOSIS — I27.20 PULMONARY HYPERTENSION (H): ICD-10-CM

## 2020-07-01 DIAGNOSIS — R60.0 BILATERAL LOWER EXTREMITY EDEMA: ICD-10-CM

## 2020-07-01 DIAGNOSIS — R06.02 SOB (SHORTNESS OF BREATH): ICD-10-CM

## 2020-07-01 DIAGNOSIS — Z86.11 HISTORY OF TB (TUBERCULOSIS): ICD-10-CM

## 2020-07-01 DIAGNOSIS — Z86.11 HISTORY OF TB (TUBERCULOSIS): Primary | ICD-10-CM

## 2020-07-01 DIAGNOSIS — Z71.89 COORDINATION OF COMPLEX CARE: Primary | ICD-10-CM

## 2020-07-01 PROCEDURE — 99212 OFFICE O/P EST SF 10 MIN: CPT | Mod: 95 | Performed by: NURSE PRACTITIONER

## 2020-07-01 NOTE — PROGRESS NOTES
"Chaim Norman is a 44 year old male who is being evaluated via a billable telephone visit.      The patient has been notified of following:     \"This telephone visit will be conducted via a call between you and your physician/provider. We have found that certain health care needs can be provided without the need for a physical exam.  This service lets us provide the care you need with a short phone conversation.  If a prescription is necessary we can send it directly to your pharmacy.  If lab work is needed we can place an order for that and you can then stop by our lab to have the test done at a later time.    Telephone visits are billed at different rates depending on your insurance coverage. During this emergency period, for some insurers they may be billed the same as an in-person visit.  Please reach out to your insurance provider with any questions.    If during the course of the call the physician/provider feels a telephone visit is not appropriate, you will not be charged for this service.\"    Patient has given verbal consent for Telephone visit?  Yes    What phone number would you like to be contacted at? [phone]    How would you like to obtain your AVS? Mail a copy    Subjective     Chaim Norman is a 44 year old male who presents via phone visit today for the following health issues:    HPI  Discuss letter for Unemployment.      Duration: ongoing     Description (location/character/radiation): ongoing chronic pain of feet and legs, patient states he is currently being treated by lung specialist and pain clinic, and need a note saying that he is unable to work at this time. States he did request this note from his specialist, but they told him that he need to get this note from his primary provider, states waiting for lung transplant.    Intensity:  severe    Accompanying signs and symptoms: pain    History (similar episodes/previous evaluation): None    Precipitating or alleviating factors: None    Therapies " tried and outcome: MELINDA          Needs note for appeal for unemployment. He reports he still cannot work due to severe leg pain, ongoing TB treatment, shortness of breath, pulmonary HTN. He has some LE swelling and pain. Sees cardiology, pulmonology, ID, and pain clinic. Chaim reports that his ID doctor told him he can't work until done with TB treatment which will continue through the end of the year.    Patient Active Problem List   Diagnosis     History of TB (tuberculosis)     Weight loss     Pulmonary nodules     Late latent syphilis     Iatrogenic pneumothorax     Patient's intentional underdosing of medication regimen due to financial hardship     Anemia of chronic disease     Benign prostatic hyperplasia, unspecified whether lower urinary tract symptoms present     COPD (chronic obstructive pulmonary disease) (H)     Hepatitis B core antibody positive     Neutropenia (H)     Pulmonary hypertension (H)     SOB (shortness of breath)     Status post coronary angiogram     Past Surgical History:   Procedure Laterality Date     CV PULMONARY ANGIOGRAM N/A 5/8/2020    Procedure: Pulmonary Angiogram;  Surgeon: Keenan Perez MD;  [facility]:  [facility]     CV RIGHT HEART CATH N/A 5/8/2020    Procedure: CV RIGHT HEART CATH;  Surgeon: Keenan Perez MD;  Location:  [facility]     ENDOSCOPY         Social History     Tobacco Use     Smoking status: Never Smoker     Smokeless tobacco: Never Used   Substance Use Topics     Alcohol use: No     Family History   Family history unknown: Yes         Current Outpatient Medications   Medication Sig Dispense Refill     albuterol (PROVENTIL HFA) 108 (90 Base) MCG/ACT inhaler Inhale 2 puffs into the lungs every 6 hours as needed for shortness of breath / dyspnea or wheezing 8.5 g 3     amLODIPine (NORVASC) 5 MG tablet Take 2 tablets (10 mg) by mouth daily 180 tablet 3     bedaquiline (SITRURO) 100 MG tablet Take 200 mg by mouth Every Mon, Wed, Fri  Morning       COMPOUNDED NON-CONTROLLED SUBSTANCE (CMPD RX) - PHARMACY TO MIX COMPOUNDED MEDICATION Take one capsule containing 4.5mg of naltrexone by mouth per day 135 mg 1     cycloSERINE (SEROMYCIN) 250 MG capsule Take 250 mg by mouth 2 times daily       diclofenac (VOLTAREN) 1 % topical gel Place 2 g onto the skin 4 times daily as needed for moderate pain Of both shoulders 1 Tube 3     DULoxetine (CYMBALTA) 30 MG capsule Take 1 capsule (30 mg) by mouth daily Take with 60mg to equal 90mg/day. 3 month script 90 capsule 1     DULoxetine (CYMBALTA) 60 MG capsule Take 1 capsule (60 mg) by mouth daily Take with 30mg to equal 90mg/day. 3 month script 90 capsule 1     ethambutol (MYAMBUTOL) 400 MG tablet Take 1,200 mg by mouth daily       fluticasone-salmeterol (ADVAIR) 100-50 MCG/DOSE inhaler Inhale 1 puff into the lungs every 12 hours 1 Inhaler 1     gabapentin (NEURONTIN) 300 MG capsule Take 3 capsules (900 mg) by mouth 3 times daily 270 capsule 1     levofloxacin (LEVAQUIN) 500 MG tablet Take 750 mg by mouth daily       melatonin 3 MG tablet Take 1 tablet (3 mg) by mouth nightly as needed for sleep 30 tablet 3     nortriptyline (PAMELOR) 10 MG capsule Take 1-2 capsules (10-20 mg) by mouth At Bedtime For pain and insomnia 60 capsule 1     oxyCODONE (ROXICODONE) 5 MG tablet Take 1 tablet (5 mg) by mouth 2 times daily as needed for severe pain 14 tablet 0     potassium chloride ER (KLOR-CON M) 20 MEQ CR tablet Take 1 tablet (20 mEq) by mouth daily 30 tablet 3     pyrazinamide 500 MG tablet Take 1,500 mg by mouth daily       acetaminophen (TYLENOL) 325 MG tablet Take 2 tablets (650 mg) by mouth every 6 hours as needed for mild pain (Patient not taking: Reported on 6/19/2020) 180 tablet 3     No Known Allergies    Reviewed and updated as needed this visit by Provider         Review of Systems   Constitutional, HEENT, cardiovascular, pulmonary, gi and gu systems are negative, except as otherwise noted.       Objective    Reported vitals:  There were no vitals taken for this visit.   healthy, alert and no distress  PSYCH: Alert and oriented times 3; coherent speech, normal   rate and volume, able to articulate logical thoughts, able   to abstract reason, no tangential thoughts, no hallucinations   or delusions  His affect is normal  RESP: No cough, no audible wheezing, able to talk in full sentences  Remainder of exam unable to be completed due to telephone visits    Diagnostic Test Results:  Labs reviewed in Epic        Assessment/Plan:  1. History of TB (tuberculosis)  2. SOB (shortness of breath)  3. Pulmonary hypertension (H)  4. Bilateral lower extremity edema  I will write letter for unemployment appeal. He requests that the letter be sent to his home and he will attach to his appeal packet.      Return in about 1 week (around 7/8/2020), or if symptoms worsen or fail to improve.      Phone call duration:  7 minutes    CHAZ Garrett CNP

## 2020-07-01 NOTE — PROGRESS NOTES
"Chaim Norman is a 44 year old male who is being evaluated via a billable telephone visit.      The patient has been notified of following:     \"This telephone visit will be conducted via a call between you and your physician/provider. We have found that certain health care needs can be provided without the need for a physical exam.  This service lets us provide the care you need with a short phone conversation.  If a prescription is necessary we can send it directly to your pharmacy.  If lab work is needed we can place an order for that and you can then stop by our lab to have the test done at a later time.    Telephone visits are billed at different rates depending on your insurance coverage. During this emergency period, for some insurers they may be billed the same as an in-person visit.  Please reach out to your insurance provider with any questions.    If during the course of the call the physician/provider feels a telephone visit is not appropriate, you will not be charged for this service.\"    Patient has given verbal consent for Telephone visit?  Yes     What phone number would you like to be contacted at? cell    How would you like to obtain your AVS? 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 Obstructive lung disease who was referred to pulmonary for lung transplant evaluation. Patient reports that back in 2012 he was first diagnosed with pulmonary TB in [facility]. He underwent 6 month therapy, after which his sxs resolved and felt almost back to his baseline. However, he was noted to have residual damage to his lungs R>L. Then in 8/19 he developed cough, fever, weight loss and anorexia. He was admitted and work-up eventually showed MDR pulmonary TB. He was diagnosed with positive smear and later PCR testing. He was switch from RIPE therapy to Levaquin, cycloserine, ethambutol and pyrazinamide. He has " been on this therapy since 10/19 ([facility]). During last admission, he underwent ECHO which showed enlarged RV with elevated PA pressure. He was referred to pHTN clinic and subsequently underwent right heart catheterization. The RHC confirmed elevated PA pressures 53/29. His pHTN was thought to be secondary to underlying lung disease and was not started on vasodilator therapy. He was then referred to pulmonary for transplant evaluation. Today on presentation patient reports lower extremity swelling which has been progressively worsened in the past week. He also has some SOB which been stable since his discharge. He denies any recent fevers, chills, cough, fatigue, arthralgias or myalgias.     PMH:  Past Medical History:   Diagnosis Date     Anemia      Arthritis      COPD (chronic obstructive pulmonary disease) (H)      Neutropenia (H)      Pulmonary hypertension (H)      TB (tuberculosis), treated        Allergies:  No Known Allergies    Social History:  Social History     Socioeconomic History     Marital status:      Spouse name: Not on file     Number of children: Not on file     Years of education: Not on file     Highest education level: Not on file   Occupational History     Not on file   Social Needs     Financial resource strain: Not on file     Food insecurity     Worry: Not on file     Inability: Not on file     Transportation needs     Medical: Not on file     Non-medical: Not on file   Tobacco Use     Smoking status: Never Smoker     Smokeless tobacco: Never Used   Substance and Sexual Activity     Alcohol use: No     Drug use: No     Sexual activity: Yes     Partners: Female     Birth control/protection: Condom   Lifestyle     Physical activity     Days per week: Not on file     Minutes per session: Not on file     Stress: Not on file   Relationships     Social connections     Talks on phone: Not on file     Gets together: Not on file     Attends Baptism service: Not on file     Active  member of club or organization: Not on file     Attends meetings of clubs or organizations: Not on file     Relationship status: Not on file     Intimate partner violence     Fear of current or ex partner: Not on file     Emotionally abused: Not on file     Physically abused: Not on file     Forced sexual activity: Not on file   Other Topics Concern     Parent/sibling w/ CABG, MI or angioplasty before 65F 55M? Not Asked   Social History Narrative     Not on file   smoked marijuana     Medications:  Current Outpatient Medications   Medication Sig Dispense Refill     albuterol (PROVENTIL HFA) 108 (90 Base) MCG/ACT inhaler Inhale 2 puffs into the lungs every 6 hours as needed for shortness of breath / dyspnea or wheezing 8.5 g 3     amLODIPine (NORVASC) 5 MG tablet Take 2 tablets (10 mg) by mouth daily 180 tablet 3     bedaquiline (SITRURO) 100 MG tablet Take 200 mg by mouth Every Mon, Wed, Fri Morning       COMPOUNDED NON-CONTROLLED SUBSTANCE (CMPD RX) - PHARMACY TO MIX COMPOUNDED MEDICATION Take one capsule containing 4.5mg of naltrexone by mouth per day 135 mg 1     cycloSERINE (SEROMYCIN) 250 MG capsule Take 250 mg by mouth 2 times daily       diclofenac (VOLTAREN) 1 % topical gel Place 2 g onto the skin 4 times daily as needed for moderate pain Of both shoulders 1 Tube 3     DULoxetine (CYMBALTA) 30 MG capsule Take 1 capsule (30 mg) by mouth daily Take with 60mg to equal 90mg/day. 3 month script 90 capsule 1     DULoxetine (CYMBALTA) 60 MG capsule Take 1 capsule (60 mg) by mouth daily Take with 30mg to equal 90mg/day. 3 month script 90 capsule 1     ethambutol (MYAMBUTOL) 400 MG tablet Take 1,200 mg by mouth daily       fluticasone-salmeterol (ADVAIR) 100-50 MCG/DOSE inhaler Inhale 1 puff into the lungs every 12 hours 1 Inhaler 1     gabapentin (NEURONTIN) 300 MG capsule Take 3 capsules (900 mg) by mouth 3 times daily 270 capsule 1     levofloxacin (LEVAQUIN) 500 MG tablet Take 750 mg by mouth daily        melatonin 3 MG tablet Take 1 tablet (3 mg) by mouth nightly as needed for sleep 30 tablet 3     nortriptyline (PAMELOR) 10 MG capsule Take 1-2 capsules (10-20 mg) by mouth At Bedtime For pain and insomnia 60 capsule 1     oxyCODONE (ROXICODONE) 5 MG tablet Take 1 tablet (5 mg) by mouth 2 times daily as needed for severe pain 14 tablet 0     potassium chloride ER (KLOR-CON M) 20 MEQ CR tablet Take 1 tablet (20 mEq) by mouth daily 30 tablet 3     pyrazinamide 500 MG tablet Take 1,500 mg by mouth daily       acetaminophen (TYLENOL) 325 MG tablet Take 2 tablets (650 mg) by mouth every 6 hours as needed for mild pain (Patient not taking: Reported on 6/19/2020) 180 tablet 3       Family History:  Family History   Family history unknown: Yes       ROS: Complete 10 point ROS negative unless mentioned in HPI      Labs and Radiology:  CXR 6/17/19   IMPRESSION: Severe right lung fibrosis, volume loss, and cystic  changes with deviation of the mediastinum towards the right, unchanged  from 5/20/2019. Left lung fibrotic changes are also noted and probably  unchanged. While the chest appears stable, extensive chronic changes  make superimposed pneumonitis difficult to entirely exclude    PFT's:  No flowsheet data found.      Assessment and Plan:  Chaim Norman is a 44 year old male with MDR pulmonary TB, pHTN, drug induced neuropathy , ?COPD who is presenting to the clinic for Lung transplant evaluation.  Patient was first diagnosed with TB in 2012, for which he received 6 month treatment. His infections resolved, however with residual destruction of his lungs R>L. He was then diagnosed with MDR TB 8/19 and has been getting treated under DOT with Levaquin, cycloserine, ethambutol and [place]raz[place] [facility] During his last hospitalization in 2019 he underwent ECHO which showed RV dial ation/dysfucntion and pHTN. He underwent RHC which confirmed pHTN class III in setting of pulmonary TB. He has was seen in pHTN clinic, and would not  benefit from vasodilatory therapy. Given that he is young he was referred for Lung transplant evaluation. We can start the evaluation process, although he still needs to finish his TB therapy until 2/21 (per patient). He can follow up with Pulmonary in December with PFT's and CT-CHEST. Of note patient reports having lower extremity edema, will start him on diuretics.     # MDR pulmonary TB   # pHTN WHO Class III   # ? Obstructive lung disease   # Lower extremity edema     Plan   - Continue TB regimen per ID   - Referral for Lung Transplant evaluation   - Continue KARL + ICS/LABA   - Start Lasix, BMP next week   - Follow up with Pulmonary in December       staffed with Dr. Saini.  Rai Ellis MD  Pulmonary and Critical Care Fellow    Pulmonary Attending Attestation  I saw the patient with Dr. Atwood and talked with him, confirming key aspects of the history.  I personally reviewed the recent Xrays and other labs.  The fellow s note reflects our detailed discussion of the findings, assessment and plan.   His prior Chest CT shows extensive lung involvement/damage R>>L.  It is hard to know how much pleural involvement and scarring is present.  He needs to complete his course of Rx for MDR TB before he could be transplanted.  He made need a bronch to ensure no unusual organisms before he could be listed.  Dr. Atwood will discuss his case with lung transplant team to determine additional criteria to be eligible for transplant.  He should have repeat Chest CT in ~ 12/2020 and, once TB rx is done with negative sputa, PFTs.  He definitely needs flu shot this fall  Deo Saini MD

## 2020-07-01 NOTE — LETTER
"    7/1/2020         RE: Chaim Norman  [address]  [facility] [address]        Dear Colleague,    Thank you for referring your patient, Chaim Norman, to the [facility] LUNG SCIENCE [facility]. Please see a copy of my visit note below.    Chaim Norman is a 44 year old male who is being evaluated via a billable telephone visit.          Phone call duration: 30 minutes      Pulmonary Clinic Initial Visit Note    CC: \"SOB'       HPI:   44M with PMHx MDR Pulmonary TB, pHTN, ? Obstructive lung disease who was referred to pulmonary for lung transplant evaluation. Patient reports that back in 2012 he was first diagnosed with pulmonary TB in [facility]. He underwent 6 month therapy, after which his sxs resolved and felt almost back to his baseline. However, he was noted to have residual damage to his lungs R>L. Then in 8/19 he developed cough, fever, weight loss and anorexia. He was admitted and work-up eventually showed MDR pulmonary TB. He was diagnosed with positive smear and later PCR testing. He was switch from RIPE therapy to Levaquin, cycloserine, ethambutol and pyrazinamide. He has been on this therapy since 10/19 ([facility] During last admission, he underwent ECHO which showed enlarged RV with elevated PA pressure. He was referred to pHTN clinic and subsequently underwent right heart catheterization. The RHC confirmed elevated PA pressures 53/29. His pHTN was thought to be secondary to underlying lung disease and was not started on vasodilator therapy. He was then referred to pulmonary for transplant evaluation. Today on presentation patient reports lower extremity swelling which has been progressively worsened in the past week. He also has some SOB which been stable since his discharge. He denies any recent fevers, chills, cough, fatigue, arthralgias or myalgias.     PMH:  Past Medical History:   Diagnosis Date     Anemia      Arthritis      COPD (chronic obstructive pulmonary disease) (H)  "     Neutropenia (H)      Pulmonary hypertension (H)      TB (tuberculosis), treated        Allergies:  No Known Allergies    Social History:  Social History     Socioeconomic History     Marital status:      Spouse name: Not on file     Number of children: Not on file     Years of education: Not on file     Highest education level: Not on file   Occupational History     Not on file   Social Needs     Financial resource strain: Not on file     Food insecurity     Worry: Not on file     Inability: Not on file     Transportation needs     Medical: Not on file     Non-medical: Not on file   Tobacco Use     Smoking status: Never Smoker     Smokeless tobacco: Never Used   Substance and Sexual Activity     Alcohol use: No     Drug use: No     Sexual activity: Yes     Partners: Female     Birth control/protection: Condom   Lifestyle     Physical activity     Days per week: Not on file     Minutes per session: Not on file     Stress: Not on file   Relationships     Social connections     Talks on phone: Not on file     Gets together: Not on file     Attends Christian service: Not on file     Active member of club or organization: Not on file     Attends meetings of clubs or organizations: Not on file     Relationship status: Not on file     Intimate partner violence     Fear of current or ex partner: Not on file     Emotionally abused: Not on file     Physically abused: Not on file     Forced sexual activity: Not on file   Other Topics Concern     Parent/sibling w/ CABG, MI or angioplasty before 65F 55M? Not Asked   Social History Narrative     Not on file   smoked marijuana     Medications:  Current Outpatient Medications   Medication Sig Dispense Refill     albuterol (PROVENTIL HFA) 108 (90 Base) MCG/ACT inhaler Inhale 2 puffs into the lungs every 6 hours as needed for shortness of breath / dyspnea or wheezing 8.5 g 3     amLODIPine (NORVASC) 5 MG tablet Take 2 tablets (10 mg) by mouth daily 180 tablet 3      bedaquiline (SITRURO) 100 MG tablet Take 200 mg by mouth Every Mon, Wed, Fri Morning       COMPOUNDED NON-CONTROLLED SUBSTANCE (CMPD RX) - PHARMACY TO MIX COMPOUNDED MEDICATION Take one capsule containing 4.5mg of naltrexone by mouth per day 135 mg 1     cycloSERINE (SEROMYCIN) 250 MG capsule Take 250 mg by mouth 2 times daily       diclofenac (VOLTAREN) 1 % topical gel Place 2 g onto the skin 4 times daily as needed for moderate pain Of both shoulders 1 Tube 3     DULoxetine (CYMBALTA) 30 MG capsule Take 1 capsule (30 mg) by mouth daily Take with 60mg to equal 90mg/day. 3 month script 90 capsule 1     DULoxetine (CYMBALTA) 60 MG capsule Take 1 capsule (60 mg) by mouth daily Take with 30mg to equal 90mg/day. 3 month script 90 capsule 1     ethambutol (MYAMBUTOL) 400 MG tablet Take 1,200 mg by mouth daily       fluticasone-salmeterol (ADVAIR) 100-50 MCG/DOSE inhaler Inhale 1 puff into the lungs every 12 hours 1 Inhaler 1     gabapentin (NEURONTIN) 300 MG capsule Take 3 capsules (900 mg) by mouth 3 times daily 270 capsule 1     levofloxacin (LEVAQUIN) 500 MG tablet Take 750 mg by mouth daily       melatonin 3 MG tablet Take 1 tablet (3 mg) by mouth nightly as needed for sleep 30 tablet 3     nortriptyline (PAMELOR) 10 MG capsule Take 1-2 capsules (10-20 mg) by mouth At Bedtime For pain and insomnia 60 capsule 1     oxyCODONE (ROXICODONE) 5 MG tablet Take 1 tablet (5 mg) by mouth 2 times daily as needed for severe pain 14 tablet 0     potassium chloride ER (KLOR-CON M) 20 MEQ CR tablet Take 1 tablet (20 mEq) by mouth daily 30 tablet 3     pyrazinamide 500 MG tablet Take 1,500 mg by mouth daily       acetaminophen (TYLENOL) 325 MG tablet Take 2 tablets (650 mg) by mouth every 6 hours as needed for mild pain (Patient not taking: Reported on 6/19/2020) 180 tablet 3       Family History:  Family History   Family history unknown: Yes       ROS: Complete 10 point ROS negative unless mentioned in HPI      Labs and  Radiology:  CXR 6/17/19   IMPRESSION: Severe right lung fibrosis, volume loss, and cystic  changes with deviation of the mediastinum towards the right, unchanged  from 5/20/2019. Left lung fibrotic changes are also noted and probably  unchanged. While the chest appears stable, extensive chronic changes  make superimposed pneumonitis difficult to entirely exclude    PFT's:  No flowsheet data found.      Assessment and Plan:  Chaim Norman is a 44 year old male with MDR pulmonary TB, pHTN, drug induced neuropathy , ?COPD who is presenting to the clinic for Lung transplant evaluation.  Patient was first diagnosed with TB in 2012, for which he received 6 month treatment. His infections resolved, however with residual destruction of his lungs R>L. He was then diagnosed with MDR TB 8/19 and has been getting treated under DOT with Levaquin, cycloserine, [place]ambutol [place] [facility] [place] During his last hospitalization in 2019 he underwent ECHO which showed RV dial ation/dysfucntion and pHTN. He underwent RHC which confirmed pHTN class III in setting of pulmonary TB. He has was seen in pHTN clinic, and would not benefit from vasodilatory therapy. Given that he is young he was referred for Lung transplant evaluation. We can start the evaluation process, although he still needs to finish his TB therapy until 2/21 (per patient). He can follow up with Pulmonary in December with PFT's and CT-CHEST. Of note patient reports having lower extremity edema, will start him on diuretics.     # MDR pulmonary TB   # pHTN WHO Class III   # ? Obstructive lung disease   # Lower extremity edema     Plan   - Continue TB regimen per ID   - Referral for Lung Transplant evaluation   - Continue KARL + ICS/LABA   - Start Lasix, BMP next week   - Follow up with Pulmonary in December       staffed with Dr. Saini.  Rai Ellis MD  Pulmonary and Critical Care Fellow    Pulmonary Attending Attestation  I saw the patient with Dr. Atwood and talked  with him, confirming rios aspects of the history.  I personally reviewed the recent Xrays and other labs.  The fellow s note reflects our detailed discussion of the findings, assessment and plan.   His prior Chest CT shows extensive lung involvement/damage R>>L.  It is hard to know how much pleural involvement and scarring is present.  He needs to complete his course of Rx for MDR TB before he could be transplanted.  He made need a bronch to ensure no unusual organisms before he could be listed.  Dr. Atwood will discuss his case with lung transplant team to determine additional criteria to be eligible for transplant.  He should have repeat Chest CT in ~ 12/2020 and, once TB rx is done with negative sputa, PFTs.  He definitely needs flu shot this fall  Deo Saini MD

## 2020-07-01 NOTE — PATIENT INSTRUCTIONS
At [facility], we strive to deliver an exceptional experience to you, every time we see you.  If you receive a survey in the mail, please send us back your thoughts. We really do value your feedback.    Based on your medical history, these are the current health maintenance/preventive care services that you are due for (some may have been done at this visit.)  Health Maintenance Due   Topic Date Due     PREVENTIVE CARE VISIT  1973     SPIROMETRY  1973     URINE DRUG SCREEN  1973     COPD ACTION PLAN  1973         Suggested websites for health information:  Www.[employer]gl[employer] : Up to date and easily searchable information on multiple topics.  Www."[employer].gov : medication info, interactive tutorials, watch real surgeries online  Www.familydoctor.org : good info from the Academy of Family Physicians  Www.cdc.gov : public health info, travel advisories, epidemics (H1N1)  Www.aap.org : children's health info, normal development, vaccinations  Www.health.[facility].[place].us : [place] dept of health, public health issues in [place], N1N1    Your care team:                            Family Medicine Internal Medicine   MD Tunde Hernandez MD Shantel Branch-Fleming, MD Katya Georgiev PA-C Nam Ho, MD Pediatrics   MINNA Ramsey, FLOYD Mcclellan APRN CNP   MD Nel Evans MD Deborah Mielke, MD Kim Thein, APRN CNP      Clinic hours: Monday - Thursday 7 am-7 pm; Fridays 7 am-5 pm.   Urgent care: Monday - Friday 11 am-9 pm; Saturday and Sunday 9 am-5 pm.  Pharmacy : Monday -Thursday 8 am-8 pm; Friday 8 am-6 pm; Saturday and Sunday 9 am-5 pm.     Clinic: [phone]   Pharmacy: [phone]

## 2020-07-01 NOTE — PATIENT INSTRUCTIONS
We will start you on diuretic medication to help decrease swelling of your legs. Please continue to take you inhalers. We will get you evaluated for Lung transplant. Please follow up in pulmonary clinic in December with repeat CT-CHEST and breathing test.

## 2020-07-01 NOTE — PROGRESS NOTES
"Clinic Care Coordination Contact    Follow Up Progress Note      Assessment: [facility] contacted patient, per RNCC request. Patient had expressed to RNCC that he is still needing assistance with paying bills and his rent. [facility] contacted patient. [facility] has provided patient with several different resources, all of which patient has reported have either not worked out, or only worked one time. Patient stated that his citizenship status should be changing in the next month, so he is hopeful some more options for assistance will open up. Patient stated that he has transportation now, so that goal is complete. Patient also reported that he tried the cell phone resource [facility] provided him, but he \"didn't hear anything back\". [facility] encouraged patient to reach out to that resource again. In terms of rent, [facility] found a website for patient to review that has a few different resource options he can try. Patient would like this information emailed to him at [email or web address]. No PHI will be sent with this email, only the resource.     Goals addressed this encounter:   Goals Addressed                 This Visit's Progress       Patient Stated      COMPLETED: 4. Transportation (pt-stated)   100%     Goal Statement: I need help getting transportation.  Date Goal set: 1/15/2020   Date to Achieve By: 7/30/2020  Patient expressed understanding of goal: yes  Action steps to achieve this goal:  1. I will call [employer] [phone]  2. I will go to [facility] and apply for Medical Assistance benefit  3. I will apply for [employer]    2/28/2020: Patient stated that he was able to complete a [employer] application and send it in. Waiting to hear response.    3/30/2020: Has not heard back from [employer] yet. [facility] provided the number for [employer] Customer Service to call and inquire about his application (Ph: [phone]).        Financial Wellbeing (pt-stated)   30%     Goal Statement: I need help " paying for my rent  Date Goal set: 2/14/2020  Barriers: Feet have been hurting too much to get back to the [facility]   Strengths: Patient is motivated to getting assistancce  Date to Achieve By: 7/30/2020  Patient expressed understanding of goal: Yes  Action steps to achieve this goal:  1. I will fill out application for emergency assistance  2. I will bring my application in to the [facility]  3. I will obtain emergency assistance, if found eligible    2/28/2020: Patient stated that he is still working on his emergency assistance application and had more questions about it. [facility] provided additional phone numbers to him for rent assistance    3/30/2020: Went to [facility]. Submitted documentation that was needed. Got rent assistance through another agency for March. Reapplied for assistance in April through [facility]        Psychosocial (pt-stated)   70%     Goal Statement: I can't afford to pay my phone bill  Date Goal set: 6/1/2020  Barriers: Patient's friend cannot help him pay his phone bill   Strengths: Patient is willing to work with Care Coordination  Date to Achieve By: 8/1/2020  Patient expressed understanding of goal: Yes  Action steps to achieve this goal:  1. I will apply for a free phone through the [place] [employer] Cell Phone Program- Resource provided by [facility]  2. I will have my phone bill expenses covered by this program, or via friend, if able to help               Intervention/Education provided during outreach: Open ended questions      Outreach Frequency: monthly    Plan:   CHW will reach out to patient in 4 weeks. [facility] will review chart in 6 weeks to monitor goal progression, per standard workflow    BING Garcia  Primary Care Clinic- Social Work Care Coordinator  [facility]  Ph: [phone]  7/1/2020 12:29 PM

## 2020-07-01 NOTE — LETTER
July 1, 2020      Chaim Norman  [address]   [facility] [address]        To Whom It May Concern,      Chaim Norman is a patient of our clinic. He suffers from several serious health issues including tuberculosis, pulmonary hypertension, shortness of breath, bilateral lower extremity edema and severe pain from the medication to treat his tuberculosis. He is oxygen dependent at night due to the severity of his lung disease. He has poor activity tolerance and cannot stand for any amount of time due to severe lung disease, shortness of breath, leg pain and swelling. He is unable to drive due to the above conditions. These conditions make it extremity difficult for him to perform activities of daily living and extremely difficult, if not impossible, to have and maintain a job.         Sincerely,        CHAZ Garrett CNP

## 2020-07-01 NOTE — TELEPHONE ENCOUNTER
Called and spoke with patient. He states he need a letter stating that he can not work at this time due to his health issues, chronic leg/feet pain and waiting for lung transplant. Need this note for unemployment.  Patient states he did request note from his lung specialist, and pain doctor but they told him he need to get this letter from his primary.  Telephone visit scheduled with provider today to discuss his request.    Xin Gotti MA

## 2020-07-02 ENCOUNTER — OFFICE VISIT (OUTPATIENT)
Dept: BEHAVIORAL HEALTH | Facility: CLINIC | Age: 45
End: 2020-07-02
Payer: COMMERCIAL

## 2020-07-02 DIAGNOSIS — J44.9 CHRONIC OBSTRUCTIVE PULMONARY DISEASE, UNSPECIFIED COPD TYPE (H): Primary | ICD-10-CM

## 2020-07-02 PROCEDURE — 99207 C COMMUNITY PARAMEDIC-PATIENT MEETS CRITERIA: CPT

## 2020-07-08 VITALS — DIASTOLIC BLOOD PRESSURE: 90 MMHG | RESPIRATION RATE: 14 BRPM | SYSTOLIC BLOOD PRESSURE: 122 MMHG | HEART RATE: 100 BPM

## 2020-07-08 NOTE — PROGRESS NOTES
Community Paramedics Initial Visit  July 2, 2020 TIME:1300    Chaim Norman is a 44 year old male being seen at home for a Community Paramedic Home visit.    Present at appointment: Patient    Primary Diagnosis: Respiratory Disorders - other    Chief Complaint   Patient presents with     Outreach       [place] Utilization:   Clinic Utilization  Difficulty keeping appointments:: No  Compliance Concerns: No  No-Show Concerns: No  No PCP office visit in Past Year: No  Utilization    Last refreshed: 7/8/2020  6:34 PM:  Hospital Admissions 1           Last refreshed: 7/8/2020  6:34 PM:  ED Visits 0           Last refreshed: 7/8/2020  6:34 PM:  No Show Count (past year) 1              Current as of: 7/8/2020  6:34 PM              Clinical Concerns:  Current Medical Concerns:  CHF, Lung Issues    Current Behavioral Concerns: None    Education Provided to patient: None     Vitals: BP (!) 122/90   Pulse 100   Resp 14   BMI: There is no height or weight on file to calculate BMI.    Pain  Pain (GOAL):: Yes  Type: Acute (<3mo)  Location of chronic pain:: legs, shoulders, hands  Radiating: Yes  Location pain radiates to: bilateral feet, knees and legs  Progression: Unchanged  Description of pain: Burning, Sharp, Numb  Chronic pain severity:: 7  Limitation of routine activities due to chronic pain:: Yes  Description: Able to do light housework  Alleviating Factors: Pain Medication, Rest  Aggravating Factors: Positioning  Health Maintenance Reviewed: Due/Overdue yes    Clinical Pathway: None    Review of Symptoms/PE    Skin: negative  Eyes: negative  Ears/Nose/Throat: negative  Respiratory: Shortness of breath and Dyspnea on exertion   Cardiovascular: negative  Gastrointestinal: negative  Genitourinary: negative  Musculoskeletal: negative  Neurologic: negative  Psychiatric: negative    Medication Management:    Medications need to be refilled:     Medications set up? No  Pill Box issued: Yes  Scale issued: No, will bring scale  to July 10th home visit    Diet/Exercise/Sleep:  Diet:: Regular  Inadequate nutrition (GOAL):: No  Tube Feeding: No  Inadequate activity/exercise (GOAL):: No  Significant changes in sleep pattern (GOAL): No    Transportation:  Transportation concerns (GOAL):: Yes  Transportation means:: None(Car currently broke down)     Psychosocial:       Core Healthy Days Survey - Healthy Days Survey - (Centers for Disease Control and Prevention - Used with Permission.)  Would you say that in general your health is: : Good  Now thinking about your physical health, which includes physical illness and injury, for how many days during the past 30 days was your physical health not good?: 30  Now thinking about your mental health, which includes stress, depression, and problems with emotions, for how many days during the past 30 days was your mental health not good?: None  During the past 30 days, for about how many days did poor physical or mental health keep you from doing your usual activities, such as self-care, work, or recreation?: None  CHDS SCORE: 30    Today's PHQ-2 Score:      Today's PHQ-9 Score: No flowsheet data found.     Today's GAD7 score: No flowsheet data found.         Financial/Insurance:   Financial/Insurance concerns (GOAL):: Yes       Resources and Interventions:  Current Resources:                 HEALTH CARE GOALS:  Goals Addressed as of 7/2/2020 at 1:30 PM                 7/2/20 7/1/20      #3 Pain Management (pt-stated)   60%      Added 2/19/20 by Charlene Gonzales, RN     Goal Statement: I will have suitable pain relief to perform activities of daily living.  Date Goal set: 2/19/2020  Date to Achieve By: 10/1/2020  Patient expressed understanding of goal: Yes  Action steps to achieve this goal:  1. I will take medications as prescribed to maintain adequate pain control/relief.  2. I will utilize non-pharmaceutical measures to help with pain control/relief.  3. I will maintain routine follow up and  contact with my clinic care team.  4. I will call my pharmacy to request refills of my medications before they run out.        1. Monitoring (pt-stated)   On Hold      Added 6/25/20 by Behl, Melissa K, RN     Goal Statement: I will weigh myself daily.  Date Goal set: 6/25/2020   Barriers: does not have a scale, cost  Strengths: enrolled in care coordination, referral to community paramedic  Date to Achieve By: 9/25/20  Patient expressed understanding of goal: yes  Action steps to achieve this goal:  1. I will weigh myself daily once a scale is provided by community paramedic.  2. I will call my cardiologist for weight gain of >2 lbs in 1 day or >5 lbs in 1 week.          2. Medical (pt-stated)   On track      Added 6/25/20 by Behl, Melissa K, RN     Goal Statement: I will participate in the Community Paramedic program.  Date Goal set: 6/25/2020   Barriers: unknown  Strengths: engaged  Date to Achieve By: 9/25/20  Patient expressed understanding of goal: yes  Action steps to achieve this goal:  1. I will accept Community Paramedic visit within the next 7 days.  2. I will participate in Community Paramedic visits.          Financial Wellbeing (pt-stated)   On track  30%    Added 2/14/20 by Robin Jansen, RANJITH     Goal Statement: I need help paying for my rent  Date Goal set: 2/14/2020  Barriers: Feet have been hurting too much to get back to the [facility]   Strengths: Patient is motivated to getting assistancce  Date to Achieve By: 7/30/2020  Patient expressed understanding of goal: Yes  Action steps to achieve this goal:  1. I will fill out application for emergency assistance  2. I will bring my application in to the [facility]  3. I will obtain emergency assistance, if found eligible    2/28/2020: Patient stated that he is still working on his emergency assistance application and had more questions about it. [facility] provided additional phone numbers to him for rent  assistance    3/30/2020: Went to [facility]. Submitted documentation that was needed. Got rent assistance through another agency for March. Reapplied for assistance in April through [facility]        Psychosocial (pt-stated)   On track  70%    Added 6/1/20 by Robin Jansen BSW     Goal Statement: I can't afford to pay my phone bill  Date Goal set: 6/1/2020  Barriers: Patient's friend cannot help him pay his phone bill   Strengths: Patient is willing to work with Care Coordination  Date to Achieve By: 8/1/2020  Patient expressed understanding of goal: Yes  Action steps to achieve this goal:  1. I will apply for a free phone through the [place] [employer] Cell Phone Program- Resource provided by [facility]  2. I will have my phone bill expenses covered by this program, or via friend, if able to help            Patient Active Problem List   Diagnosis     History of TB (tuberculosis)     Weight loss     Pulmonary nodules     Late latent syphilis     Iatrogenic pneumothorax     Patient's intentional underdosing of medication regimen due to financial hardship     Anemia of chronic disease     Benign prostatic hyperplasia, unspecified whether lower urinary tract symptoms present     COPD (chronic obstructive pulmonary disease) (H)     Hepatitis B core antibody positive     Neutropenia (H)     Pulmonary hypertension (H)     SOB (shortness of breath)     Status post coronary angiogram         Current Outpatient Medications:      acetaminophen (TYLENOL) 325 MG tablet, Take 2 tablets (650 mg) by mouth every 6 hours as needed for mild pain, Disp: 180 tablet, Rfl: 3     albuterol (PROVENTIL HFA) 108 (90 Base) MCG/ACT inhaler, Inhale 2 puffs into the lungs every 6 hours as needed for shortness of breath / dyspnea or wheezing, Disp: 8.5 g, Rfl: 3     amLODIPine (NORVASC) 5 MG tablet, Take 2 tablets (10 mg) by mouth daily, Disp: 180 tablet, Rfl: 3     cycloSERINE (SEROMYCIN) 250 MG capsule, Take 250  mg by mouth 2 times daily, Disp: , Rfl:      DULoxetine (CYMBALTA) 30 MG capsule, Take 1 capsule (30 mg) by mouth daily Take with 60mg to equal 90mg/day. 3 month script, Disp: 90 capsule, Rfl: 1     DULoxetine (CYMBALTA) 60 MG capsule, Take 1 capsule (60 mg) by mouth daily Take with 30mg to equal 90mg/day. 3 month script, Disp: 90 capsule, Rfl: 1     ethambutol (MYAMBUTOL) 400 MG tablet, Take 1,200 mg by mouth daily, Disp: , Rfl:      fluticasone-salmeterol (ADVAIR) 100-50 MCG/DOSE inhaler, Inhale 1 puff into the lungs every 12 hours, Disp: 1 Inhaler, Rfl: 1     gabapentin (NEURONTIN) 300 MG capsule, Take 3 capsules (900 mg) by mouth 3 times daily, Disp: 270 capsule, Rfl: 1     levofloxacin (LEVAQUIN) 500 MG tablet, Take 750 mg by mouth daily, Disp: , Rfl:      melatonin 3 MG tablet, Take 1 tablet (3 mg) by mouth nightly as needed for sleep, Disp: 30 tablet, Rfl: 3     nortriptyline (PAMELOR) 10 MG capsule, Take 1-2 capsules (10-20 mg) by mouth At Bedtime For pain and insomnia, Disp: 60 capsule, Rfl: 1     pyrazinamide 500 MG tablet, Take 1,500 mg by mouth daily, Disp: , Rfl:      bedaquiline (SITRURO) 100 MG tablet, Take 200 mg by mouth Every Mon, Wed, Fri Morning, Disp: , Rfl:      COMPOUNDED NON-CONTROLLED SUBSTANCE (CMPD RX) - PHARMACY TO MIX COMPOUNDED MEDICATION, Take one capsule containing 4.5mg of naltrexone by mouth per day, Disp: 135 mg, Rfl: 1     diclofenac (VOLTAREN) 1 % topical gel, Place 2 g onto the skin 4 times daily as needed for moderate pain Of both shoulders (Patient not taking: Reported on 7/2/2020), Disp: 1 Tube, Rfl: 3     oxyCODONE (ROXICODONE) 5 MG tablet, Take 1 tablet (5 mg) by mouth 2 times daily as needed for severe pain (Patient not taking: Reported on 7/2/2020), Disp: 14 tablet, Rfl: 0     potassium chloride ER (KLOR-CON M) 20 MEQ CR tablet, Take 1 tablet (20 mEq) by mouth daily (Patient not taking: Reported on 7/2/2020), Disp: 30 tablet, Rfl: 3      Time spent with patient: 60    The  patient meets one or more of the following criteria:  * Has been identified by their primary care provider at risk of nursing home placement    Acute concern/Follow-up recommendations: FRANCISCO     Next CP visit scheduled: t/10/20 @ 1:30    Issues for Provider to follow up on: Community Paramedic meet with Chaim at his home. Went over Medications and talked about goals. Evaluation of lower extremities found left lower extremity to have +3 pitting edema with 10-12 second return and edema from foot to upper thigh, right extremity had +2 pitting edema from foot to upper thigh. Lung sounds are diminished billaterally. Chaim is very SOB on excertion. He does have an appt coming up for an evaluation for a lung transplant.    Provider follow up visit needed: FRANCISCO

## 2020-07-16 ENCOUNTER — OFFICE VISIT (OUTPATIENT)
Dept: BEHAVIORAL HEALTH | Facility: CLINIC | Age: 45
End: 2020-07-16
Payer: COMMERCIAL

## 2020-07-16 DIAGNOSIS — J44.9 CHRONIC OBSTRUCTIVE PULMONARY DISEASE, UNSPECIFIED COPD TYPE (H): Primary | ICD-10-CM

## 2020-07-16 PROCEDURE — 99207 C COMMUNITY PARAMEDIC - PATIENT NOT BILLABLE: CPT

## 2020-07-20 ENCOUNTER — OFFICE VISIT (OUTPATIENT)
Dept: BEHAVIORAL HEALTH | Facility: CLINIC | Age: 45
End: 2020-07-20
Payer: COMMERCIAL

## 2020-07-20 VITALS
OXYGEN SATURATION: 95 % | SYSTOLIC BLOOD PRESSURE: 124 MMHG | WEIGHT: 202.6 LBS | BODY MASS INDEX: 34.78 KG/M2 | HEART RATE: 94 BPM | DIASTOLIC BLOOD PRESSURE: 90 MMHG

## 2020-07-20 DIAGNOSIS — J44.9 CHRONIC OBSTRUCTIVE PULMONARY DISEASE, UNSPECIFIED COPD TYPE (H): Primary | ICD-10-CM

## 2020-07-20 PROCEDURE — 99207 C COMMUNITY PARAMEDIC-PATIENT MEETS CRITERIA: CPT

## 2020-07-20 ASSESSMENT — ACTIVITIES OF DAILY LIVING (ADL): DEPENDENT_IADLS:: INDEPENDENT

## 2020-07-20 NOTE — PROGRESS NOTES
Community Paramedics Follow-up Visit  July 20, 2020  TIME: 0900    Chaim Norman is a 44 year old male being seen at home for a follow-up visit.    Present at appointment: Patient    Primary Diagnosis: Respiratory Disorders - other    Chief Complaint   Patient presents with     Outreach       [place] Utilization:   Clinic Utilization  Difficulty keeping appointments:: No  Compliance Concerns: No  No-Show Concerns: No  No PCP office visit in Past Year: No  Utilization    Last refreshed: 7/20/2020  9:43 AM:  Hospital Admissions 1           Last refreshed: 7/20/2020  9:43 AM:  ED Visits 0           Last refreshed: 7/20/2020  9:43 AM:  No Show Count (past year) 2              Current as of: 7/20/2020  9:43 AM              Clinical Concerns:  Current Medical Concerns:  CHF    Current Behavioral Concerns: none    Education Provided to patient: assistance with filling out electronic forms   Medication set up? No  Pill Box issued: No  Scale issued: No  Health Maintenance Reviewed:      Clinical Pathway: None    No  Face to Face in Home / Community    Daily Weigt:   7/17  202.4  7/18 202.6  7/19 202.6    Review of Symptoms/PE    Skin: negative  Eyes: negative  Ears/Nose/Throat: negative  Respiratory: Shortness of breath- on excertion  Cardiovascular: negative  Gastrointestinal: negative  Genitourinary: negative  Musculoskeletal: negative  Neurologic: negative  Psychiatric: negative    Pain Management::  Pain (GOAL):: Yes  Type: Acute (<3mo)  Location of chronic pain:: legs, shoulders, hands  Radiating: Yes  Location pain radiates to: bilateral feet, knees and legs  Progression: Unchanged  Description of pain: Burning, Sharp, Numb  Chronic pain severity:: 7  Limitation of routine activities due to chronic pain:: Yes  Description: Able to do light housework  Alleviating Factors: Pain Medication, Rest  Aggravating Factors: Positioning    Medication Reconciliation  Medication adherence problem (GOAL):: No  Knowledgeable about  how to use meds:: Yes  Medication side effects suspected:: No    Diet/Exercise/Sleep  Diet:: Regular  Inadequate nutrition (GOAL):: No  Tube Feeding: No  Inadequate activity/exercise (GOAL):: No  Significant changes in sleep pattern (GOAL): No    Plan:     Time spent with patient: 60    The patient meets one or more of the following criteria:  * Has been identified by their primary care provider at risk of nursing home placement    Acute concern/Follow-up recommendations: TBD    Next CP visit scheduled: 2/27/20 @ 1pm    Issues for Provider to follow up on: Community Paramedic visited with Chaim today. Chaim indicates that his legs are more swollen today than normal. Left lower leg and foot have pitting edema of +2 and in some area +3, right leg is not as swollen as the left leg. Swelling goes up to just the top of the knee. Chaim says he is following the prescribed dosages as listed on his pill bottle but he does not urinate a whole lot. He feels his dosage may need to be adjusted. Chaim did admit to being on his feet yesterday more than normal cooking. Advised to not be on his feet as much and to elevate feet while he is in a sitting position. Chaim indicates he can not do this because it will cause pain in his thighs.     Assisted Chaim with a letter he received from "[employer] [facility] in regards to completed some online documentation that needed to be signed for his upcoming lung transplant. Chaim was able to get the following forms signed and sent through [employer] - Electronic Communication Form, Discuss Protected Health Information Form and Release of PHI Form. Chaim did download his [employer] annita to his phone. He needs a pass code to continue. Advised to call his Care Coordinator for additional help on the pass code.    Chaim is also going to call and follow up on his electronic financial form he completed on 7/10 to figure out the status of this form.    Provider follow up visit needed: TBRAFAT

## 2020-07-21 ENCOUNTER — PATIENT OUTREACH (OUTPATIENT)
Dept: NURSING | Facility: CLINIC | Age: 45
End: 2020-07-21
Payer: COMMERCIAL

## 2020-07-21 ASSESSMENT — ACTIVITIES OF DAILY LIVING (ADL): DEPENDENT_IADLS:: INDEPENDENT

## 2020-07-21 NOTE — PROGRESS NOTES
Clinic Care Coordination Contact  Care Team Conversations    RN CC received request from PCP to have patient seen this week due to increase in edema.  Writer assisted patient in scheduling for 7/23/20 at 8:20 am.    Melissa Behl BSN, RN, PHN, CCM  Primary Care Clinical RN Care Coordinator  [facility]   [phone]

## 2020-07-21 NOTE — PROGRESS NOTES
Clinic Care Coordination Contact    Follow Up Progress Note      Assessment: RN TINY received a call from patient to assist with reactivating his MyChart.  RN CC sent patient MyChart activation e-mail.  Patient reports chronic pain 7/10 of legs, shoulders and hands.  He is taking pain medications as prescribed.  His last pain clinic appointment was 6/5/20 and he was instructed to f/u in 6 weeks.  Patient will work on scheduling this f/u appointment.  Patient reports increasing BLE edema.  See 7/20/20 community paramedic visit for detailed physical assessment.  He denies chest pain or changes of chronic exertional shortness of breath.  He is currently on furosemide 20 mg daily.  He has been weighing himself daily since the community paramedic brought him a scale.  His weight today was 206 lbs.  He states his weight has been consistently 203-206 lbs.  He is uncertain of what the plan is for his increasing edema.  RN CC will discuss with PCP for plan.    Goals addressed this encounter:   Goals Addressed                 This Visit's Progress      1. Monitoring (pt-stated)   20%     Goal Statement: I will weigh myself daily.  Date Goal set: 6/25/2020   Barriers: does not have a scale, cost  Strengths: enrolled in care coordination, referral to community paramedic  Date to Achieve By: 9/25/20  Patient expressed understanding of goal: yes  Action steps to achieve this goal:  1. I will weigh myself daily once a scale is provided by community paramedic.  2. I will call my cardiologist for weight gain of >2 lbs in 1 day or >5 lbs in 1 week.          2. Medical (pt-stated)   10%     Goal Statement: I will participate in the Community Paramedic program.  Date Goal set: 6/25/2020   Barriers: unknown  Strengths: engaged  Date to Achieve By: 9/25/20  Patient expressed understanding of goal: yes  Action steps to achieve this goal:  1. I will accept Community Paramedic visit within the next 7 days.  2. I will participate in Community  Paramedic visits.          3. Pain Management (pt-stated)   20%     Goal Statement: I will have suitable pain relief to perform activities of daily living.  Date Goal set: 2/19/2020  Date to Achieve By: 10/1/2020  Patient expressed understanding of goal: Yes  Action steps to achieve this goal:  1. I will take medications as prescribed to maintain adequate pain control/relief.  2. I will utilize non-pharmaceutical measures to help with pain control/relief.  3. I will maintain routine follow up and contact with my clinic care team.  4. I will call my pharmacy to request refills of my medications before they run out.  5. I will schedule a pain clinic follow up appointment.             Intervention/Education provided during outreach: RN CC reviewed plan of care, RN CC reviewed last AVS from pain clinic appointment, sent patient a [employer] activation e-mail and will update PCP on patient status.     Outreach Frequency: monthly    Plan:   1. Patient will schedule a pain clinic appointment.  2. Patient will continue to weigh himself daily and call for weight gain of >2 lbs in 1 day or >5 lbs in 1 week.  3. RN CC will update PCP on patient increase in edema.  4. RN CC will follow up with patient in 1 month.    Melissa Behl BSN, RN, PHN, CCM  Primary Care Clinical RN Care Coordinator  [facility]   [phone]

## 2020-07-23 ENCOUNTER — OFFICE VISIT (OUTPATIENT)
Dept: FAMILY MEDICINE | Facility: CLINIC | Age: 45
End: 2020-07-23
Payer: COMMERCIAL

## 2020-07-23 VITALS
DIASTOLIC BLOOD PRESSURE: 87 MMHG | HEART RATE: 91 BPM | TEMPERATURE: 97.7 F | RESPIRATION RATE: 20 BRPM | BODY MASS INDEX: 35.05 KG/M2 | OXYGEN SATURATION: 98 % | WEIGHT: 204.2 LBS | SYSTOLIC BLOOD PRESSURE: 127 MMHG

## 2020-07-23 DIAGNOSIS — R06.02 SOB (SHORTNESS OF BREATH): ICD-10-CM

## 2020-07-23 DIAGNOSIS — J44.9 CHRONIC OBSTRUCTIVE PULMONARY DISEASE, UNSPECIFIED COPD TYPE (H): ICD-10-CM

## 2020-07-23 DIAGNOSIS — E66.01 MORBID OBESITY (H): ICD-10-CM

## 2020-07-23 DIAGNOSIS — R22.43 LOCALIZED SWELLING OF BOTH LOWER LEGS: Primary | ICD-10-CM

## 2020-07-23 DIAGNOSIS — I27.20 PULMONARY HYPERTENSION (H): ICD-10-CM

## 2020-07-23 DIAGNOSIS — Z86.11 HISTORY OF TB (TUBERCULOSIS): ICD-10-CM

## 2020-07-23 PROCEDURE — 99214 OFFICE O/P EST MOD 30 MIN: CPT | Performed by: NURSE PRACTITIONER

## 2020-07-23 RX ORDER — LEVOFLOXACIN 750 MG/1
750 TABLET, FILM COATED ORAL DAILY
COMMUNITY
Start: 2020-07-23 | End: 2021-08-03

## 2020-07-23 ASSESSMENT — PAIN SCALES - GENERAL: PAINLEVEL: SEVERE PAIN (7)

## 2020-07-23 NOTE — PROGRESS NOTES
Subjective     Chaim Norman is a 44 year old male who presents to clinic today for the following health issues:    HPI       Concern - Bilateral Lower Leg Swelling   Onset: started about 4 months ago    Description:   Both legs below the knee down to foot are swollen and painful    Intensity: moderate    Progression of Symptoms:  worsening    Accompanying Signs & Symptoms:  none    Previous history of similar problem:   none    Precipitating factors:   Worsened by: standing up, moving around, and sitting for prolong periods of time causes more swelling.     Alleviating factors:  Improved by: Laying down and resting legs     Therapies Tried and outcome: resting, gabapentin, tylenol, oxycodone, lasix  Not helping much.     Denies recent long travel, no fever/chills. Pain is 7/10 in both knees. Knee pain started with leg edema few months ago.  Both lower legs are numb and have been for a long time. No hx of diabetes. Eats mostly meat and fish, beans, greens. A leaf sauce, chicken at times. Not much for canned foods-tomatoes for soup. Uses added salt to most of the food he cooks. Eats rice most often and and adds salt. Drinks lots of water. No coffee, does drink soda though. Coke or pepsi regular-sometimes up to 2 a day but not daily.  Doesn't exercise due to lungs. Denies recent fall or injury. No open sores on legs.  Doesn't feel his stomach is getting more swollen from fluid.  Noted slow weight gain: was 196 lb on 6/12/2020, today is 204 lbs.     Follows with pulmonology clinic: TB scarred his lungs. Has SOB with climbing stairs or working hard. No hemoptysis. No chest pain. Needs lung transplant-is waiting to hear if he is a canidate or not. No smoking. No chewing. No drugs no alcohol. 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     Had cardiac heart cath 5/8/2020-moderate pulm HTN, left ventricular willing pressures mildly elevated, right side filling pressure mildly elevated,  normal cardiac output. Supposed to follow up with Dr. Pereira, patient feels he isn't supposed to follow up with cardiology until December?     Reviewed medications. Is taking all of them.     Recently had labs done, view care everywhere. Appears stable. No need for labs today.      Patient Active Problem List   Diagnosis     History of TB (tuberculosis)     Weight loss     Pulmonary nodules     Late latent syphilis     Iatrogenic pneumothorax     Patient's intentional underdosing of medication regimen due to financial hardship     Anemia of chronic disease     Benign prostatic hyperplasia, unspecified whether lower urinary tract symptoms present     COPD (chronic obstructive pulmonary disease) (H)     Hepatitis B core antibody positive     Neutropenia (H)     Pulmonary hypertension (H)     SOB (shortness of breath)     Status post coronary angiogram     Morbid obesity (H)     Past Surgical History:   Procedure Laterality Date     BIOPSY  2018    [facility]     CORONARY ANGIOGRAPHY ADULT ORDER  05/2020    [facility]     CV PULMONARY ANGIOGRAM N/A 5/8/2020    Procedure: Pulmonary Angiogram;  Surgeon: Keenan Perez MD;  Location:  HEART CARDIAC CATH LAB     CV RIGHT HEART CATH N/A 5/8/2020    Procedure: CV RIGHT HEART CATH;  Surgeon: Keenan Perez MD;  Location:  HEART CARDIAC CATH LAB     ENDOSCOPY       GI SURGERY  12/2019    Upper GI       Social History     Tobacco Use     Smoking status: Never Smoker     Smokeless tobacco: Never Used   Substance Use Topics     Alcohol use: No     Family History   Family history unknown: Yes         Current Outpatient Medications   Medication Sig Dispense Refill     acetaminophen (TYLENOL) 325 MG tablet Take 2 tablets (650 mg) by mouth every 6 hours as needed for mild pain 180 tablet 3     albuterol (PROVENTIL HFA) 108 (90 Base) MCG/ACT inhaler Inhale 2 puffs into the lungs every 6 hours as needed for shortness of breath / dyspnea or wheezing 8.5 g 3      amLODIPine (NORVASC) 5 MG tablet Take 2 tablets (10 mg) by mouth daily 180 tablet 3     bedaquiline (SITRURO) 100 MG tablet Take 200 mg by mouth Every Mon, Wed, Fri Morning       COMPOUNDED NON-CONTROLLED SUBSTANCE (CMPD RX) - PHARMACY TO MIX COMPOUNDED MEDICATION Take one capsule containing 4.5mg of naltrexone by mouth per day 135 mg 1     cycloSERINE (SEROMYCIN) 250 MG capsule Take 250 mg by mouth 2 times daily       diclofenac (VOLTAREN) 1 % topical gel Place 2 g onto the skin 4 times daily as needed for moderate pain Of both shoulders 1 Tube 3     DULoxetine (CYMBALTA) 30 MG capsule Take 1 capsule (30 mg) by mouth daily Take with 60mg to equal 90mg/day. 3 month script 90 capsule 1     DULoxetine (CYMBALTA) 60 MG capsule Take 1 capsule (60 mg) by mouth daily Take with 30mg to equal 90mg/day. 3 month script 90 capsule 1     ethambutol (MYAMBUTOL) 400 MG tablet Take 1,200 mg by mouth daily       fluticasone-salmeterol (ADVAIR) 100-50 MCG/DOSE inhaler Inhale 1 puff into the lungs every 12 hours 1 Inhaler 1     gabapentin (NEURONTIN) 300 MG capsule Take 3 capsules (900 mg) by mouth 3 times daily 270 capsule 1     levofloxacin (LEVAQUIN) 750 MG tablet Take 1 tablet (750 mg) by mouth daily       melatonin 3 MG tablet Take 1 tablet (3 mg) by mouth nightly as needed for sleep 30 tablet 3     nortriptyline (PAMELOR) 10 MG capsule Take 1-2 capsules (10-20 mg) by mouth At Bedtime For pain and insomnia 60 capsule 1     oxyCODONE (ROXICODONE) 5 MG tablet Take 1 tablet (5 mg) by mouth 2 times daily as needed for severe pain 14 tablet 0     potassium chloride ER (KLOR-CON M) 20 MEQ CR tablet Take 1 tablet (20 mEq) by mouth daily 30 tablet 3     pyrazinamide 500 MG tablet Take 1,500 mg by mouth daily       No Known Allergies    Reviewed and updated as needed this visit by Provider         Review of Systems   Constitutional, HEENT, cardiovascular-as above, pulmonary, gi and gu neuro as above, systems are negative, except as  otherwise noted.      Objective    /87 (BP Location: Left arm, Patient Position: Chair, Cuff Size: Adult Large)   Pulse 91   Temp 97.7  F (36.5  C) (Tympanic)   Resp 20   Wt 92.6 kg (204 lb 3.2 oz)   SpO2 98%   BMI 35.05 kg/m    Body mass index is 35.05 kg/m .  Physical Exam   GENERAL: healthy, alert and no distress  EYES: Eyes grossly normal to inspection, PERRL and conjunctivae and sclerae normal  HENT: ear canals and TM's normal, nose and mouth without ulcers or lesions  NECK: no adenopathy, no asymmetry, masses, or scars and thyroid normal to palpation  RESP: lungs left posterior clear to auscultation, right posterior scattered crackles, slightly decreased air flow. No wheezing  CV: regular rate and rhythm, normal S1 S2, no S3 or S4, no murmur, click or rub, +2 pitting peripheral edema around ankles, trace to +1 around calves. Right peripheral pulses strong, left distal pulse ALLYSSA but pedal pulse okay. Both feet warm to touch with normal coloring.   ABDOMEN: soft, nontender, no hepatosplenomegaly, no masses and bowel sounds normal. No edema noted in abdomen  MS: no gross musculoskeletal defects noted but bilateral knee pain  SKIN: no suspicious lesions or rashes  NEURO: slightly decreased overall strength and tone, mentation intact and speech normal. Numb legs from knees down. Thighs normal sensation    Diagnostic Test Results:  Labs reviewed in Epic        Assessment & Plan     1. Localized swelling of both lower legs  Unknown cause, likely related to heart/lung function. Check US-scheduled for tomorrow. If negative start diuretic. Did send message to Cardiology to give opinion, he tried lasix before but doesn't sound like it was helpful. Needs to follow up with cardiology also but unclear when?  - US Lower Extremity Venous Duplex Bilateral; Future    2. SOB (shortness of breath)/3. Pulmonary hypertension (H)/6. History of TB (tuberculosis)  Following with pulmonology, is getting considered for lung  "transplant  - levofloxacin (LEVAQUIN) 750 MG tablet; Take 1 tablet (750 mg) by mouth daily        4. Morbid obesity (H)  Trouble with exercise due to lung capacity    5. Chronic obstructive pulmonary disease, unspecified COPD type (H)  As above    Dispense:       BMI:   Estimated body mass index is 35.05 kg/m  as calculated from the following:    Height as of 7/10/20: 1.626 m (5' 4\").    Weight as of this encounter: 92.6 kg (204 lb 3.2 oz).   Weight management plan: Discussed healthy diet and exercise guidelines      Return in about 1 week (around 7/30/2020), or if symptoms worsen or fail to improve.    CHAZ Mcconnell, NP-C  [facility]    "

## 2020-07-23 NOTE — Clinical Note
Good morning! I saw Chaim today and he has on-going leg edema for the past 4 months. Noted slow weight gain of about 8 lbs since mid-June.  I am checking Thad ultrasound but also wanted to consider diuretic if it is normal. I read in some notes lasix didn't work for him. Is there a specific diuretic you want me to try? Also when is he due for follow up with you as he feels it is not until December.    Thank you,   CHAZ Mcconnell, NP-C  [facility]

## 2020-07-23 NOTE — PATIENT INSTRUCTIONS
Get Ultrasound done in the next 1-2 days, preferably before the weekend. Provider will contact you in the next day or two about a diuretic and follow up with cardiology       Decrease salt intake to 2 gm or less per day  Drink at least 8-10 glasses of water daily  Weight self daily, call if gain more than 5 lb in 3 days

## 2020-07-23 NOTE — Clinical Note
STEWART see note. Also waiting on cardiology suggestions for diuretic as lasix sounds like wasn't helpful.   Thanks,  CHAZ Mcconnell, NP-C  [facility]

## 2020-07-24 ENCOUNTER — ANCILLARY PROCEDURE (OUTPATIENT)
Dept: ULTRASOUND IMAGING | Facility: CLINIC | Age: 45
End: 2020-07-24
Attending: NURSE PRACTITIONER
Payer: COMMERCIAL

## 2020-07-24 DIAGNOSIS — R22.43 LOCALIZED SWELLING OF BOTH LOWER LEGS: ICD-10-CM

## 2020-07-24 PROCEDURE — 93970 EXTREMITY STUDY: CPT | Performed by: RADIOLOGY

## 2020-07-25 NOTE — PROGRESS NOTES
Community Paramedics Follow-up Visit  July 25, 2020  TIME: 1500     Chaim Norman is a 44 year old male being seen at home for a follow-up visit.    Present at appointment:           Chief Complaint   Patient presents with     Outreach       [place] Utilization:      Utilization    Last refreshed: 7/24/2020  9:00 AM:  Hospital Admissions 1           Last refreshed: 7/24/2020  9:00 AM:  ED Visits 0           Last refreshed: 7/24/2020  9:00 AM:  No Show Count (past year) 2              Current as of: 7/24/2020  9:00 AM              There were no vitals taken for this visit.    Clinical Concerns:  Current Medical Concerns:  CHF    Current Behavioral Concerns: none    Education Provided to patient: none  Medication set up? No  Pill Box issued: No  Scale issued: No  Health Maintenance Reviewed:      Clinical Pathway: None    No  Face to Face in Home / Community    Recent blood sugars: n/a    Review of Symptoms/PE    Skin: negative  Eyes: negative  Ears/Nose/Throat: negative  Respiratory: dyspnea on exertion  Cardiovascular: negative  Gastrointestinal: negative  Genitourinary: negative  Musculoskeletal: negative  Neurologic: negative  Psychiatric: negative    Pain Management::                 Plan:     Time spent with patient: 15    The patient meets one or more of the following criteria:  * Has been identified by their primary care provider at risk of nursing home placement    Acute concern/Follow-up recommendations: TBD     Next CP visit scheduled: 7/20/20    Issues for Provider to follow up on: Due to scheduling conflicts, Community Paramedic was unable to do a face to face visit with Chaim, phone visit was made. Chaim indicates he is doing good, no changes since last CP visit. Chaim indicates his legs still have edema but they are no different than previous CP visit. Chaim is unable to weight himself due to not having a scale. CP to bring scale at next face to face CP visit.    Provider follow up visit needed: TBD

## 2020-07-27 ENCOUNTER — OFFICE VISIT (OUTPATIENT)
Dept: BEHAVIORAL HEALTH | Facility: CLINIC | Age: 45
End: 2020-07-27
Payer: COMMERCIAL

## 2020-07-27 VITALS
OXYGEN SATURATION: 95 % | RESPIRATION RATE: 14 BRPM | HEART RATE: 100 BPM | SYSTOLIC BLOOD PRESSURE: 132 MMHG | DIASTOLIC BLOOD PRESSURE: 72 MMHG | WEIGHT: 199.2 LBS | BODY MASS INDEX: 34.19 KG/M2

## 2020-07-27 DIAGNOSIS — J44.9 CHRONIC OBSTRUCTIVE PULMONARY DISEASE, UNSPECIFIED COPD TYPE (H): Primary | ICD-10-CM

## 2020-07-27 PROCEDURE — 99207 C COMMUNITY PARAMEDIC-PATIENT MEETS CRITERIA: CPT

## 2020-07-27 ASSESSMENT — ACTIVITIES OF DAILY LIVING (ADL): DEPENDENT_IADLS:: INDEPENDENT

## 2020-07-30 ENCOUNTER — PATIENT OUTREACH (OUTPATIENT)
Dept: CARE COORDINATION | Facility: CLINIC | Age: 45
End: 2020-07-30

## 2020-07-30 NOTE — TELEPHONE ENCOUNTER
Pharmacy requesting to refill Lasix 20mg tabs.  This medication is not on the patient's list.    Pharmacy: Edin Hernandez  Phone: [phone]  Fax: [phone]

## 2020-08-07 ENCOUNTER — DOCUMENTATION ONLY (OUTPATIENT)
Dept: FAMILY MEDICINE | Facility: CLINIC | Age: 45
End: 2020-08-07

## 2020-08-07 NOTE — PROGRESS NOTES
[facility] disability paperwork completed and in TC basket on 1st floor  CHAZ Garrett CNP   08/07/20  8:38 AM

## 2020-08-07 NOTE — PROGRESS NOTES
Patient's form is faxed back to [facility] at fax # [phone] and a copy of the form is mailed to patient's home address.  Wolf Ruelas,  For 1st Floor Primary Care

## 2020-08-10 ENCOUNTER — OFFICE VISIT (OUTPATIENT)
Dept: FAMILY MEDICINE | Facility: CLINIC | Age: 45
End: 2020-08-10
Payer: COMMERCIAL

## 2020-08-10 VITALS
SYSTOLIC BLOOD PRESSURE: 129 MMHG | RESPIRATION RATE: 16 BRPM | BODY MASS INDEX: 34.15 KG/M2 | DIASTOLIC BLOOD PRESSURE: 87 MMHG | WEIGHT: 200 LBS | TEMPERATURE: 96.6 F | HEART RATE: 97 BPM | HEIGHT: 64 IN | OXYGEN SATURATION: 98 %

## 2020-08-10 DIAGNOSIS — R10.13 EPIGASTRIC PAIN: Primary | ICD-10-CM

## 2020-08-10 DIAGNOSIS — I27.20 PULMONARY HYPERTENSION (H): ICD-10-CM

## 2020-08-10 DIAGNOSIS — Z86.11 HISTORY OF TB (TUBERCULOSIS): ICD-10-CM

## 2020-08-10 PROBLEM — G62.9 PERIPHERAL NEUROPATHY: Status: ACTIVE | Noted: 2020-01-27

## 2020-08-10 PROBLEM — A15.0: Status: ACTIVE | Noted: 2020-01-21

## 2020-08-10 PROCEDURE — 99213 OFFICE O/P EST LOW 20 MIN: CPT | Performed by: FAMILY MEDICINE

## 2020-08-10 ASSESSMENT — PAIN SCALES - GENERAL: PAINLEVEL: NO PAIN (1)

## 2020-08-10 ASSESSMENT — MIFFLIN-ST. JEOR: SCORE: 1698.19

## 2020-08-10 NOTE — PROGRESS NOTES
"Subjective     Chaim Norman is a 44 year old male who presents to clinic today for the following health issues:    HPI       Abdominal Pain      Duration: 2 weeks, with last episode 3 days ago.    Description (location/character/radiation): epigastric, sharp       Associated flank pain: None    Intensity:  moderate    Accompanying signs and symptoms:        Fever/Chills: no        Gas/Bloating: no        Nausea/vomitting: no        Diarrhea: no        Dysuria or Hematuria: no     History (previous similar pain/trauma/previous testing): None. Patient states has history of lung problem/pain.    Precipitating or alleviating factors:       Pain worse with eating/BM/urination: No       Pain relieved by BM: no     Therapies tried and outcome: None    LMP:  not applicable    Pain comes and goes. No change with food or BM's.      Reviewed and updated as needed this visit by Provider  Tobacco  Allergies  Meds  Problems  Med Hx  Surg Hx  Fam Hx         Review of Systems   Constitutional, HEENT, cardiovascular, pulmonary, gi and gu systems are negative, except as otherwise noted.      Objective    /87 (BP Location: Left arm, Patient Position: Sitting, Cuff Size: Adult Large)   Pulse 97   Temp 96.6  F (35.9  C) (Tympanic)   Resp 16   Ht 1.626 m (5' 4\")   Wt 90.7 kg (200 lb)   SpO2 98%   BMI 34.33 kg/m    Body mass index is 34.33 kg/m .  Physical Exam   GENERAL: healthy, alert and no distress  NECK: no adenopathy, no asymmetry, masses, or scars and thyroid normal to palpation  RESP: lungs clear to auscultation - no rales, rhonchi or wheezes  CV: regular rate and rhythm, normal S1 S2, no S3 or S4, no murmur, click or rub, no peripheral edema and peripheral pulses strong  ABDOMEN: soft, nontender, no hepatosplenomegaly, no masses and bowel sounds normal  MS: swelling bilateral feet, non pitting  PSYCH: mentation appears normal, affect normal/bright    Diagnostic Test Results:  Labs reviewed in Epic    "     Assessment & Plan     1. Epigastric pain  Now gone - possible etiologies discussed. Continue to monitor    2. Pulmonary hypertension (H)  Possibly causing diaphragmatic irritation - continue per pulmonology    3. History of TB (tuberculosis)      4. 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    Nyla Perez MD  [facility]

## 2020-08-10 NOTE — PATIENT INSTRUCTIONS
At [facility], we strive to deliver an exceptional experience to you, every time we see you. If you receive a survey, please complete it as we do value your feedback.  If you have MyChart, you can expect to receive results automatically within 24 hours of their completion.  Your provider will send a note interpreting your results as well.   If you do not have MyChart, you should receive your results in about a week by mail.    Your care team:                            Family Medicine Internal Medicine   MD Tunde Hernandez, MD Mani Kaur MD Katya Georgiev PA-C Megan Hill, APRN CNP    Marco Florentino, MD Pediatrics   Dell Tabor, PAFloridalmaC  Sana Joshua, CNP MD Beth Platt APRN CNP   MD Nel Evans MD Deborah Mielke, MD Tanja Perla, APRN CNP  Danitza Nathan, PAHERON Rios, CNP  MD Sloane Leung MD Angela Wermerskirchen, MD      Clinic hours: Monday - Thursday 7 am-7 pm; Fridays 7 am-5 pm.   Urgent care: Monday - Friday 11 am-9 pm; Saturday and Sunday 9 am-5 pm.    Clinic: [phone]       [facility]: Monday - Thursday 8 am - 7 pm; Friday 8 am - 6 pm  [facility]: [phone]     Use www.oncare.org for 24/7 diagnosis and treatment of dozens of conditions.    Patient Education     Low-Salt Choices  Eating salt (sodium) can make your body retain too much water. Excess water makes your heart work harder. Canned, packaged, and frozen foods are easy to prepare. But they are often high in sodium. Here are some ideas for low-salt foods you can easily make yourself.    For breakfast    Fruit or 100% fruit juice    Whole-wheat bread or an English muffin. Look for sodium content on Nutrition Facts labels.    Low-fat milk or yogurt    Unsalted eggs    Shredded wheat    Corn tortillas    Unsalted steamed rice    Regular  (not instant) hot cereal, made without salt  Stay away from:    Sausage, burr, and ham    Flour tortillas    Packaged muffins, pancakes, and biscuits    Instant hot cereals    Cottage cheese    For lunch and dinner    Fresh fish, chicken, turkey, or meat--baked, broiled, or roasted without salt    Dry beans, cooked without salt    Tofu, stir-fried without salt    Unsalted fresh fruit and vegetables, or frozen or canned fruit and vegetables with no added salt  Stay away from:    Lunch or deli meat that is cured or smoked    Cheese    Tomato juice and ketchup    Canned vegetables, soups, and fish not labeled as no-salt-added or reduced sodium    Packaged gravies and sauces    Olives, pickles, and relish    Bottled salad dressings    For snacks and desserts    Yogurt    Unsalted, air-popped popcorn    Unsalted nuts or seeds  Stay away from:    Pies and cakes    Packaged dessert mixes    Pizza    Canned and packaged puddings    Pretzels, chips, crackers, and nuts--unless the label says unsalted    Date Last Reviewed: 6/1/2017 2000-2019 [employer]. [address] [place] [address], [place] [address]. All rights reserved. This information is not intended as a substitute for professional medical care. Always follow your healthcare professional's instructions.

## 2020-08-11 ENCOUNTER — OFFICE VISIT (OUTPATIENT)
Dept: BEHAVIORAL HEALTH | Facility: CLINIC | Age: 45
End: 2020-08-11
Payer: COMMERCIAL

## 2020-08-11 DIAGNOSIS — J44.9 CHRONIC OBSTRUCTIVE PULMONARY DISEASE, UNSPECIFIED COPD TYPE (H): Primary | ICD-10-CM

## 2020-08-11 PROCEDURE — 99207 C COMMUNITY PARAMEDIC-PATIENT MEETS CRITERIA: CPT

## 2020-08-12 DIAGNOSIS — M25.511 CHRONIC PAIN OF BOTH SHOULDERS: ICD-10-CM

## 2020-08-12 DIAGNOSIS — M25.512 CHRONIC PAIN OF BOTH SHOULDERS: ICD-10-CM

## 2020-08-12 DIAGNOSIS — G47.00 INSOMNIA, UNSPECIFIED TYPE: ICD-10-CM

## 2020-08-12 DIAGNOSIS — M79.2 NEUROPATHIC PAIN: ICD-10-CM

## 2020-08-12 DIAGNOSIS — M79.672 BILATERAL FOOT PAIN: ICD-10-CM

## 2020-08-12 DIAGNOSIS — M79.671 BILATERAL FOOT PAIN: ICD-10-CM

## 2020-08-12 DIAGNOSIS — G89.29 CHRONIC PAIN OF BOTH SHOULDERS: ICD-10-CM

## 2020-08-12 RX ORDER — NORTRIPTYLINE HCL 10 MG
10-20 CAPSULE ORAL AT BEDTIME
Qty: 60 CAPSULE | Refills: 1 | Status: SHIPPED | OUTPATIENT
Start: 2020-08-12 | End: 2020-09-30

## 2020-08-12 NOTE — TELEPHONE ENCOUNTER
Rhonda Atwood MD Hagerty, Marjean, GIANNA Isbell,     I don't know why they are asking me for refill request. I had ordered his lasix once for short term use. If he requires it chronically should get through PCP. I called his pharmacy last week, they said did not see any refill request.     Thanks   Rhonda

## 2020-08-12 NOTE — TELEPHONE ENCOUNTER
Received fax request from   [id number] - [place], [place] - [address] [place]  [address] [address]  Phone: [phone] Fax: [phone]       pharmacy requesting refill(s) for nortriptyline (PAMELOR) 10 MG capsule    Last refilled on 07/13/2020    Pt last seen on 06/05/2020  Next appt scheduled for none    Will facilitate refill.      Patsy Khan MA  [facility] [place]

## 2020-08-13 ENCOUNTER — PATIENT OUTREACH (OUTPATIENT)
Dept: NURSING | Facility: CLINIC | Age: 45
End: 2020-08-13
Payer: COMMERCIAL

## 2020-08-13 NOTE — PROGRESS NOTES
Clinic Care Coordination Contact  Community Health Worker Follow Up    Goals:   Goals Addressed as of 8/13/2020 at 4:10 PM                 Today    7/27/20      1. Monitoring (pt-stated)   100%  100%    Added 6/25/20 by Behl, Melissa K, RN     Goal Statement: I will weigh myself daily.  Date Goal set: 6/25/2020   Barriers: does not have a scale, cost  Strengths: enrolled in care coordination, referral to community paramedic  Date to Achieve By: 9/25/20  Patient expressed understanding of goal: yes  Action steps to achieve this goal:  1. I will weigh myself daily once a scale is provided by community paramedic.  2. I will call my cardiologist for weight gain of >2 lbs in 1 day or >5 lbs in 1 week.          2. Medical (pt-stated)   100%  100%    Added 6/25/20 by Behl, Melissa K, RN     Goal Statement: I will participate in the Community Paramedic program.  Date Goal set: 6/25/2020   Barriers: unknown  Strengths: engaged  Date to Achieve By: 9/25/20  Patient expressed understanding of goal: yes  Action steps to achieve this goal:  1. I will accept Community Paramedic visit within the next 7 days.  2. I will participate in Community Paramedic visits.          3. Pain Management (pt-stated)   50%  30%    Added 2/19/20 by Charlene Gonzales RN     Goal Statement: I will have suitable pain relief to perform activities of daily living.  Date Goal set: 2/19/2020  Date to Achieve By: 10/1/2020  Patient expressed understanding of goal: Yes  Action steps to achieve this goal:  1. I will take medications as prescribed to maintain adequate pain control/relief.  2. I will utilize non-pharmaceutical measures to help with pain control/relief.  3. I will maintain routine follow up and contact with my clinic care team.  4. I will call my pharmacy to request refills of my medications before they run out.  5. I will schedule a pain clinic follow up appointment.        Financial Wellbeing (pt-stated)   50%  20%    Added 2/14/20 by  Robin Jansen, RANJITH     Goal Statement: I need help paying for my rent  Date Goal set: 2/14/2020  Barriers: Feet have been hurting too much to get back to the [facility]   Strengths: Patient is motivated to getting assistancce  Date to Achieve By: 7/30/2020  Patient expressed understanding of goal: Yes  Action steps to achieve this goal:  1. I will fill out application for emergency assistance  2. I will bring my application in to the [facility]  3. I will obtain emergency assistance, if found eligible    2/28/2020: Patient stated that he is still working on his emergency assistance application and had more questions about it. [facility] provided additional phone numbers to him for rent assistance    3/30/2020: Went to [facility]. Submitted documentation that was needed. Got rent assistance through another agency for March. Reapplied for assistance in April through [facility]        Psychosocial (pt-stated)   20%  20%    Added 6/1/20 by Robin Jansen BSW     Goal Statement: I can't afford to pay my phone bill  Date Goal set: 6/1/2020  Barriers: Patient's friend cannot help him pay his phone bill   Strengths: Patient is willing to work with Care Coordination  Date to Achieve By: 8/1/2020  Patient expressed understanding of goal: Yes  Action steps to achieve this goal:  1. I will apply for a free phone through the [place] [employer] Cell [employer] Program- Resource provided by [facility]  2. I will have my phone bill expenses covered by this program, or via friend, if able to help            CHW  spoke with patient he said he has being weighing his self every. He is taking medication as prescribed control the pain. He has not call to schedule his pain clinic appointment. He has not made appointment with PCP. He is using the pharmacy to refill medication before he  runs out.      Patient has turned in application for emergency assistance  and is waiting for  response. Patient has not reached out to [facility] [employer] for a free cell phone and to have phone bill taken care of.    Community Paramedic is still coming into the home. Patient said he is pleased with her. She is doing a wonderful job..    Intervention and Education during outreach:     CHW Plan: will follow up next month to see if patient has worked on other goals.    Elena BARAJAS Community Health Worker  Clinic Care Coordination  [facility]  Phone: [phone]

## 2020-08-17 ENCOUNTER — TELEPHONE (OUTPATIENT)
Dept: FAMILY MEDICINE | Facility: CLINIC | Age: 45
End: 2020-08-17

## 2020-08-17 VITALS
SYSTOLIC BLOOD PRESSURE: 132 MMHG | DIASTOLIC BLOOD PRESSURE: 72 MMHG | RESPIRATION RATE: 11 BRPM | WEIGHT: 201.4 LBS | BODY MASS INDEX: 34.57 KG/M2 | HEART RATE: 100 BPM | OXYGEN SATURATION: 95 %

## 2020-08-17 NOTE — TELEPHONE ENCOUNTER
Patient will need a visit with PCP, for further discussion of what is needed for letter, etc.   Can be done with a virtual visit.  Please return call to patient and inform needs visit and assist in scheduling, if able.    Steffanie Worthington RN  [facility]

## 2020-08-17 NOTE — PROGRESS NOTES
Community Paramedics Follow-up Visit  August 11th, 2020  TIME: 1200    Chaim Norman is a 44 year old male being seen at home for a follow-up visit.    Present at appointment:           Chief Complaint   Patient presents with     Outreach       [place] Utilization:      Utilization    Last refreshed: 8/13/2020  4:30 PM:  Hospital Admissions 1           Last refreshed: 8/13/2020  4:30 PM:  ED Visits 0           Last refreshed: 8/13/2020  4:30 PM:  No Show Count (past year) 1              Current as of: 8/13/2020  4:30 PM              /72   Pulse 100   Resp 11   Wt 91.4 kg (201 lb 6.4 oz)   SpO2 95%   BMI 34.57 kg/m      Clinical Concerns:  Review of Symptoms/PE    Skin: negative  Eyes: negative  Ears/Nose/Throat: negative  Respiratory: Shortness of breath and dyspnea on exertion  Cardiovascular: lower extremity edema  Gastrointestinal: negative  Genitourinary: negative  Musculoskeletal: negative  Neurologic: negative  Psychiatric: negative    Time spent with patient: 90    The patient meets one or more of the following criteria:  * Has received hospital emergency department services three or more times in four consecutive months within a twelve month period    Acute concern/Follow-up recommendations: TBD    Next CP visit scheduled: 8/18/20     Issues for Provider to follow up on: Community Paramedic visited with patient today and assisted patient with filling out his Social Security Unemployment papers. Patient also has questions about starting the paperwork or where to go to start applying for his and his son's [place] citizenship. Advised patient CP could help him look into it.    Pt indicates nothing new has recently changed with his weight or his SOB. Pt was unable to find his piece of paper that he had been recording his weight on. He cleaned his apartment over the weekend including the kitchen table and misplaced the piece of paper. He indicated it had not changed much. His weight on 8/10 was 200.0 and  today is 201.4 he is taking his Lasix and medications as prescribed.    Edema in the feet have not changed. These are the same as well. Pitting edema greater in the left foot than the right foot. Lung sounds diminished bilaterally. Breathing is about the same. Still gets SOB when walking long distances or exerting himself. No wounds, weeping or redness found. Both legs are warm to the touch.     Provider follow up visit needed: TBD

## 2020-08-17 NOTE — TELEPHONE ENCOUNTER
Reason for Call:  Other letter    Detailed comments: Patient is requesting proof of disability condition and inability to work.  Needs assistance for rent and other things.  Difficult to understand patient so unsure of all the details.    Fax to [phone] ([facility] and [facility] (Eligibility for Work Support)  Phone:  [phone]    Phone Number Patient can be reached at: Cell number on file:    Telephone Information:   Mobile [phone]       Best Time: Any    Can we leave a detailed message on this number? YES    Call taken on 8/17/2020 at 8:45 AM by William Bahena

## 2020-08-18 ENCOUNTER — OFFICE VISIT (OUTPATIENT)
Dept: BEHAVIORAL HEALTH | Facility: CLINIC | Age: 45
End: 2020-08-18
Payer: COMMERCIAL

## 2020-08-18 ENCOUNTER — PATIENT OUTREACH (OUTPATIENT)
Dept: CARE COORDINATION | Facility: CLINIC | Age: 45
End: 2020-08-18

## 2020-08-18 VITALS
DIASTOLIC BLOOD PRESSURE: 98 MMHG | SYSTOLIC BLOOD PRESSURE: 122 MMHG | TEMPERATURE: 98 F | RESPIRATION RATE: 12 BRPM | HEART RATE: 89 BPM | OXYGEN SATURATION: 95 %

## 2020-08-18 DIAGNOSIS — J44.9 CHRONIC OBSTRUCTIVE PULMONARY DISEASE, UNSPECIFIED COPD TYPE (H): Primary | ICD-10-CM

## 2020-08-18 PROCEDURE — 99207 C COMMUNITY PARAMEDIC-PATIENT MEETS CRITERIA: CPT

## 2020-08-18 ASSESSMENT — ACTIVITIES OF DAILY LIVING (ADL)
DEPENDENT_IADLS:: INDEPENDENT
DEPENDENT_IADLS:: INDEPENDENT

## 2020-08-18 NOTE — TELEPHONE ENCOUNTER
Pt scheduled tele visit 08/24/20 with Nyla Florian NP.  Did not want to schedule with different provider    SIOBHAN Laughlin.

## 2020-08-18 NOTE — PROGRESS NOTES
Community Paramedics Follow-up Visit  August 18, 2020  TIME: 1200    Chaim Norman is a 44 year old male being seen at home for a follow-up visit.    Present at appointment: Patient    Primary Diagnosis: Respiratory Disorders - other    Chief Complaint   Patient presents with     Outreach       [place] Utilization:   Clinic Utilization  Difficulty keeping appointments:: No  Compliance Concerns: No  No-Show Concerns: No  No PCP office visit in Past Year: No  Utilization    Last refreshed: 8/18/2020 10:47 AM:  Hospital Admissions 1           Last refreshed: 8/18/2020 10:47 AM:  ED Visits 0           Last refreshed: 8/18/2020 10:47 AM:  No Show Count (past year) 1              Current as of: 8/18/2020 10:47 AM              There were no vitals taken for this visit.    Clinical Concerns:  Current Medical Concerns:  CHF    Current Behavioral Concerns: none    Education Provided to patient: none   Medication set up? No  Pill Box issued: No  Scale issued: No  Health Maintenance Reviewed: Due/Overdue yes    Review of Symptoms/PE    Skin: negative  Eyes: negative  Ears/Nose/Throat: negative  Respiratory: No shortness of breath. but dyspnea on exertion  Cardiovascular: lower extremity edema  Gastrointestinal: negative  Genitourinary: negative  Musculoskeletal: negative  Neurologic: negative  Psychiatric: negative    Pain Management::  Pain (GOAL):: Yes  Type: Acute (<3mo)  Location of chronic pain:: legs, shoulders, hands  Radiating: Yes  Location pain radiates to: bilateral feet, knees and legs  Progression: Unchanged  Description of pain: Burning, Sharp, Numb  Chronic pain severity:: 7  Limitation of routine activities due to chronic pain:: Yes  Description: Able to do light housework  Alleviating Factors: Pain Medication, Rest  Aggravating Factors: Positioning    Medication Reconciliation  Medication adherence problem (GOAL):: No  Knowledgeable about how to use meds:: Yes  Medication side effects suspected::  No    Diet/Exercise/Sleep  Diet:: Regular  Inadequate nutrition (GOAL):: No  Tube Feeding: No  Inadequate activity/exercise (GOAL):: No  Significant changes in sleep pattern (GOAL): No    Time spent with patient: 60    The patient meets one or more of the following criteria:  * Requires services to prevent readmission to a nursing home or hospital    Acute concern/Follow-up recommendations: TBD    Next CP visit scheduled: 8/25/20    Issues for Provider to follow up on: Community Paramedic visited with Chaim today in his home. Chaim indicated his legs hurt a little more due to getting and wearing his compression sockings. Swelling in both legs are down. Chaim indicates he is able lie down on the ground and has a blow up cushion to elevate his lower extremities. He states this does make his legs feel much better. Breathing has worsened. Advised Chaim, if he is able to, to keep his A/C on to keep the air dry in his apartment. Today the air was very humid in his apartment. He was doing a lot of cooking on the stove.    Today lungs sounds are clear on the left and somewhat clear upper right and diminished on the left    Assisted Chaim with completing financial assistance forms through [employer] and was able to get these submitted, Chaim has not yet gotten any financial assistance though [facility] or [employer]. He is concerned about being evicted from his apartment as his August rent has not be paid yet.    Chaim's weights are as follows:  8/12 200.1  8/13 200.0  8/14 199.8 This is the day he started wearing compression socks  8/15 199.2  8/16 199.2  8/17 199.2  8/18 199.8    Provider follow up visit needed: 8/24/20 @ 1:20

## 2020-08-18 NOTE — PROGRESS NOTES
Clinic Care Coordination Contact    Situation: Patient chart reviewed by  care coordinator.    Background: financial concerns multiple medical  Issues.     Assessment: Chart review indicates pt is happy with Community Paramedic Program. They are meeting with patient today. Paperwork appears to have been submitted to the [facility].   Case discussed with Melissa Behl RN CC.    RN CC, Community Paramedic, and  CHW are following.  Due to multiple team members being involved, decision to do chart review at this time Team members will  notify of any SW needs.     Plan/Recommendations: Pt. will continue to work with Community Paramedic and CHW SWCC will plan chart review in 6 weeks for goal progression.. CHW  To  Follow per  Standard workflow.       BING Ley   [facility] Primary [facility]   Care Coordination  [facility]  8/18/2020 10:14 AM

## 2020-08-21 DIAGNOSIS — G62.0 DRUG-INDUCED PERIPHERAL NEUROPATHY (H): ICD-10-CM

## 2020-08-21 RX ORDER — GABAPENTIN 300 MG/1
900 CAPSULE ORAL 3 TIMES DAILY
Qty: 270 CAPSULE | Refills: 1 | Status: SHIPPED | OUTPATIENT
Start: 2020-08-21 | End: 2020-10-26

## 2020-08-21 NOTE — TELEPHONE ENCOUNTER
Signed Prescriptions:                        Disp   Refills    gabapentin (NEURONTIN) 300 MG capsule      270 ca*1        Sig: Take 3 capsules (900 mg) by mouth 3 times daily  Authorizing Provider: MICHELLE CHILDERS, RN CNP, FNP  [facility] Pain Management [facility]

## 2020-08-21 NOTE — TELEPHONE ENCOUNTER
Received fax from pharmacy requesting refill(s) for gabapentin (NEURONTIN) 300 MG capsule     Last refilled on 7/22/2020    Pt last seen on 6/5/2020  Next appt scheduled for none    E-prescribe to:    [id number] - [place], [place] - [address] AT [facility]     Will facilitate refill.

## 2020-08-24 ENCOUNTER — VIRTUAL VISIT (OUTPATIENT)
Dept: FAMILY MEDICINE | Facility: CLINIC | Age: 45
End: 2020-08-24
Payer: COMMERCIAL

## 2020-08-24 DIAGNOSIS — Z02.89 ENCOUNTER FOR COMPLETION OF FORM WITH PATIENT: Primary | ICD-10-CM

## 2020-08-24 DIAGNOSIS — G62.9 PERIPHERAL POLYNEUROPATHY: ICD-10-CM

## 2020-08-24 DIAGNOSIS — A15.0: ICD-10-CM

## 2020-08-24 DIAGNOSIS — I27.20 PULMONARY HYPERTENSION (H): ICD-10-CM

## 2020-08-24 DIAGNOSIS — R06.02 SOB (SHORTNESS OF BREATH): ICD-10-CM

## 2020-08-24 PROCEDURE — 99207 ZZC NO BILLABLE SERVICE THIS VISIT: CPT | Performed by: NURSE PRACTITIONER

## 2020-08-24 NOTE — PROGRESS NOTES
"Chaim Norman is a 44 year old male who is being evaluated via a billable telephone visit.      The patient has been notified of following:     \"This telephone visit will be conducted via a call between you and your physician/provider. We have found that certain health care needs can be provided without the need for a physical exam.  This service lets us provide the care you need with a short phone conversation.  If a prescription is necessary we can send it directly to your pharmacy.  If lab work is needed we can place an order for that and you can then stop by our lab to have the test done at a later time.    Telephone visits are billed at different rates depending on your insurance coverage. During this emergency period, for some insurers they may be billed the same as an in-person visit.  Please reach out to your insurance provider with any questions.    If during the course of the call the physician/provider feels a telephone visit is not appropriate, you will not be charged for this service.\"    Patient has given verbal consent for Telephone visit?  Yes    What phone number would you like to be contacted at? [phone]    How would you like to obtain your AVS? MyChart    Subjective     Chaim Norman is a 44 year old male who presents via phone visit today for the following health issues:    HPI  1. Letter from doctor for assistance from the county due to being sick. Out of work since 8/2019.    Symptoms: feet pain-unable to walk 200 feet,  SOB      He was out of work due to TB, pulmonary hypertension and peripheral neuropathy as well as shortness of breath. He has been seen several times for this.   He had 2 long hospitalizations in the last year as well.    When done, please send to [facility]  [address]  [place] [address]  Fax [phone]  Phone [phone]    Review of Systems   Constitutional, HEENT, cardiovascular, pulmonary, gi and gu systems are negative, except as " otherwise noted.       Objective          Vitals:  No vitals were obtained today due to virtual visit.    healthy, alert and no distress  PSYCH: Alert and oriented times 3; coherent speech, normal   rate and volume, able to articulate logical thoughts, able   to abstract reason, no tangential thoughts, no hallucinations   or delusions  His affect is normal  RESP: No cough, no audible wheezing, able to talk in full sentences  Remainder of exam unable to be completed due to telephone visits            Assessment/Plan:    Assessment & Plan   Problem List Items Addressed This Visit        Nervous and Auditory    Peripheral neuropathy       Respiratory    SOB (shortness of breath)    Tuberculosis of lung with cavitation, 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         Return in about 4 weeks (around 9/21/2020), or if symptoms worsen or fail to improve.    CHAZ Garrett [facility]    Phone call duration:  6 minutes

## 2020-08-24 NOTE — LETTER
August 26, 2020      Chaim Norman  [address]   [place] [address]        To Whom It May Concern,      Chaim Norman is a patient of our clinic. He has been out of work for the last year due to severe medication conditions requiring frequent medical care and even 2 hospitalizations which were several months long. He was treated for tuberculosis in the last year and now suffers from pulmonary hypertension and severe peripheral neuropathy as well as shortness of breath. He continues to have frequent clinic appointments and with his ongoing medical conditions, is unable to work due to severe symptoms.           Sincerely,        CHAZ Garrett CNP

## 2020-08-24 NOTE — PATIENT INSTRUCTIONS
At [facility], we strive to deliver an exceptional experience to you, every time we see you. If you receive a survey, please complete it as we do value your feedback.  If you have MyChart, you can expect to receive results automatically within 24 hours of their completion.  Your provider will send a note interpreting your results as well.   If you do not have MyChart, you should receive your results in about a week by mail.    Your care team:                            Family Medicine Internal Medicine   MD Tunde Hernandez MD Shantel Branch-Fleming, MD Srinivasa Vaka, MD Katya Georgiev PA-C Megan Hill, APRN CNP    Marco Florentino, MD Pediatrics   Dell Tabor, PAFloridalmaC  Sana Joshua, CNP MD Beth Platt APRN CNP   MD Nel Evans MD Deborah Mielke, MD Tanja Perla, APRN CNP  Danitza Nathan, PAFloridalmaC  Kelsey Rios, CNP  MD Sloane Leung MD Angela Wermerskirchen, MD      Clinic hours: Monday - Thursday 7 am-7 pm; Fridays 7 am-5 pm.   Urgent care: Monday - Friday 11 am-9 pm; Saturday and Sunday 9 am-5 pm.    Clinic: [phone]       [facility]: Monday - Thursday 8 am - 7 pm; Friday 8 am - 6 pm  [facility]: [phone]     Use www.[email or web address].org for 24/7 diagnosis and treatment of dozens of conditions.

## 2020-08-25 ENCOUNTER — OFFICE VISIT (OUTPATIENT)
Dept: BEHAVIORAL HEALTH | Facility: CLINIC | Age: 45
End: 2020-08-25
Payer: COMMERCIAL

## 2020-08-25 DIAGNOSIS — J44.9 CHRONIC OBSTRUCTIVE PULMONARY DISEASE, UNSPECIFIED COPD TYPE (H): Primary | ICD-10-CM

## 2020-08-25 PROCEDURE — 99207 C COMMUNITY PARAMEDIC-PATIENT MEETS CRITERIA: CPT

## 2020-08-26 ENCOUNTER — PATIENT OUTREACH (OUTPATIENT)
Dept: CARE COORDINATION | Facility: CLINIC | Age: 45
End: 2020-08-26

## 2020-08-26 VITALS
HEART RATE: 94 BPM | TEMPERATURE: 98.1 F | WEIGHT: 196.6 LBS | OXYGEN SATURATION: 95 % | DIASTOLIC BLOOD PRESSURE: 68 MMHG | RESPIRATION RATE: 12 BRPM | SYSTOLIC BLOOD PRESSURE: 110 MMHG | BODY MASS INDEX: 33.75 KG/M2

## 2020-08-26 ASSESSMENT — ACTIVITIES OF DAILY LIVING (ADL): DEPENDENT_IADLS:: INDEPENDENT

## 2020-08-26 NOTE — PROGRESS NOTES
Clinic Care Coordination Contact  Clinic Care Coordination Contact     Situation: Patient chart reviewed by care coordinator.     Background: RN CC's initial assessment and enrollment to Care Coordination was 1/15/20.   Patient centered goals were developed with participation from patient.  RN CC handed patient off to CHW for continued outreach every 30 days.      Assessment: CHW has been in contact with patient monthly. Patient has made some progress to goals.  Patient is due for updated Complex Care Plan.  Patient is successfully weighing himself daily since the Community Paramedic provided him with a scale.  He has been attending Community Paramedic Visits weekly.  Patient working with pain clinic on pain management.  Chart review demonstrates appropriate refill requests of his medications.  Patient is in need of completing a health care directive.  Will have CHW work on this with patient to determine if patient would like this to be a goal.  Patient continues to work with SW/CP on financial/psychosocial resources.     Plan/Recommendations: CHW will involve RN CC as needed or if patient is ready to move to maintenance.  RN CC will continue to monitor progress to goals and CHW outreaches every 6 weeks.     Complex Care Plan updated and mailed to patient: yes    Melissa Behl BSN, RN, PHN, CCM  Primary Care Clinical RN Care Coordinator  [facility]   [phone]

## 2020-08-26 NOTE — LETTER
[facility] CARE COORDINATION  [address] [address]   August 26, 2020        Chaim Syed  [address]  [place] [address]          Dear Patel Rucker is an updated Complex Care Plan for your continued enrollment in Care Coordination. Please let us know if you have additional questions, concerns, or goals that we can assist with.    Sincerely,    Melissa Behl BSN, RN, PHN, CCM  Primary Care Clinical RN Care Coordinator  [facility]   [phone]          [facility]  Complex Care Plan  About Me:    Patient Name:  Chaim Syed    YOB: 1975  Age:         44 year old   [place] [employer] MRN:    [id number] Telephone Information:  Home Phone [phone]   Mobile [phone]       Address:  [address]  [place] [address] Email address:  [email or web address]      Emergency Contact(s)    Name Relationship Lgl Grd Work Phone Home Phone Mobile Phone   1. SARAH SYED Son   [phone]    2. DECLINED, PER * Other   [phone]            Primary language:  English     needed? No   [employer]:  [phone] op. 1  Other communication barriers: None  Preferred Method of Communication:  Mail  Current living arrangement: I live in a private home with family(Lives with son (18 years old))  Mobility Status/ Medical Equipment: Independent    Health Maintenance  Health Maintenance Reviewed: Due/Overdue   Health Maintenance Due   Topic Date Due     PREVENTIVE CARE VISIT  1975     SPIROMETRY  1975     URINE DRUG SCREEN  1975     COPD ACTION PLAN  1973     INFLUENZA VACCINE (1) 09/01/2020        My Access Plan  Medical Emergency 911   Primary Clinic Line [facility] - [phone]   24 Hour Appointment Line [phone] or  [phone]) (toll-free)   24 Hour Nurse Line [phone]  (toll-free)   Preferred Urgent Care [facility] [place], [phone]   Preferred [facility] [place]  [phone]   Preferred Pharmacy [id number] - [place], [place] - [address] AT [facility] [place]     Behavioral Health Crisis Line The National Suicide Prevention Lifeline at 1-195.765.9734 or 911             My Care Team Members  Patient Care Team       Relationship Specialty Notifications Start End    Nyla Florian APRN CNP PCP - General Nurse Practitioner  9/25/18     Phone: [phone] Fax: [phone]         [address] [place] [address]    Nyla Florian APRN CNP Assigned PCP   10/25/18     Phone: [phone] Fax: [phone]         [address] [place] [address]    Behl, Melissa K, RN Clinic Care Coordinator Primary Care - CC Admissions 1/14/20     Phone: [phone] Fax: [phone]        Tania Frias, RN Specialty Care Coordinator Cardiology Admissions 1/28/20     Pulmonary HTN    Phone: [phone] Pager: [phone]        Rosa Malcolm, RN Specialty Care Coordinator Cardiology Admissions 1/28/20     Pulmonary HTN    Phone: [phone] Pager: [phone] Fax: [phone]       Pat Story, RN Specialty Care Coordinator Cardiology Admissions 1/28/20     Pulmonary HTN    Phone: [phone] Pager: [phone] Fax: [phone]       Robin Jansen BSW Lead Care Coordinator Primary Care - CC Admissions 2/14/20     Dawn Kendrick MD MD Internal Medicine  5/27/20     Phone: [phone] Fax: [phone] [address] [facility] [address]    Elena Robert MA Community Health Worker Primary Care - CC Admissions 7/1/20     Sayda Tobias, EMT Community Paramedic  Admissions 7/2/20     Phone: [phone]         Rod Chauhan MD Hospitalist Cardiology  7/7/20     Phone: [phone] Pager: [phone] Fax: [phone] [address]  [facility] [address]    Andrea Timmons MD MD Infectious Diseases  7/7/20     Rhonda Atwood MD Resident Student in organized health care education/training program  7/7/20     Phone: [phone] Fax: [phone]         [address] [facility] [address] [facility] [address]    Lisa Loredo NOELLE Clinic Care Coordinator Primary Care - CC Admissions 8/18/20             My Care Plans  Self Management and Treatment Plan  Goals and (Comments)  Goals        General    1. Medical (pt-stated)     Notes - Note created  6/25/2020 12:36 PM by Behl, Melissa K, RN    Goal Statement: I will participate in the Community Paramedic program.  Date Goal set: 6/25/2020   Barriers: unknown  Strengths: engaged  Date to Achieve By: 9/25/20  Patient expressed understanding of goal: yes  Action steps to achieve this goal:  1. I will accept Community Paramedic visit within the next 7 days.  2. I will participate in Community Paramedic visits.        2. Pain Management (pt-stated)     Notes - Note edited  7/21/2020 12:53 PM by Behl, Melissa K, RN    Goal Statement: I will have suitable pain relief to perform activities of daily living.  Date Goal set: 2/19/2020  Date to Achieve By: 10/1/2020  Patient expressed understanding of goal: Yes  Action steps to achieve this goal:  1. I will take medications as prescribed to maintain adequate pain control/relief.  2. I will utilize non-pharmaceutical measures to help with pain control/relief.  3. I will maintain routine follow up and contact with my clinic care team.  4. I will call my pharmacy to request refills of my medications before they run out.  5. I will schedule a pain clinic follow up appointment.      3. Financial Wellbeing (pt-stated)     Notes - Note edited  6/1/2020  1:17 PM by Robin Jansen BSW    Goal Statement: I need help paying for my rent  Date Goal set: 2/14/2020  Barriers: Feet have been hurting too much to get back to the [facility]   Strengths:  Patient is motivated to getting assistancce  Date to Achieve By: 7/30/2020  Patient expressed understanding of goal: Yes  Action steps to achieve this goal:  1. I will fill out application for emergency assistance  2. I will bring my application in to the [facility]  3. I will obtain emergency assistance, if found eligible    2/28/2020: Patient stated that he is still working on his emergency assistance application and had more questions about it. [facility] provided additional phone numbers to him for rent assistance    3/30/2020: Went to [facility]. Submitted documentation that was needed. Got rent assistance through another agency for March. Reapplied for assistance in April through [facility]      4. Psychosocial (pt-stated)     Notes - Note edited  7/1/2020 12:21 PM by Robin Jansen BSW    Goal Statement: I can't afford to pay my phone bill  Date Goal set: 6/1/2020  Barriers: Patient's friend cannot help him pay his phone bill   Strengths: Patient is willing to work with Care Coordination  Date to Achieve By: 8/1/2020  Patient expressed understanding of goal: Yes  Action steps to achieve this goal:  1. I will apply for a free phone through the [place] [employer] Program- Resource provided by [facility]  2. I will have my phone bill expenses covered by this program, or via friend, if able to help               Action Plans on File:                       Advance Care Plans/Directives Type:        My Medical and Care Information  Problem List   Patient Active Problem List   Diagnosis     History of TB (tuberculosis)     Weight loss     Pulmonary nodules     Late latent syphilis     Iatrogenic pneumothorax     Patient's intentional underdosing of medication regimen due to financial hardship     Anemia of chronic disease     Benign prostatic hyperplasia, unspecified whether lower urinary tract symptoms present     COPD (chronic obstructive pulmonary disease)  (H)     Hepatitis B core antibody positive     Neutropenia (H)     Pulmonary hypertension (H)     SOB (shortness of breath)     Status post coronary angiogram     Morbid obesity (H)     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    Care Coordination Start Date: 1/15/2020   Frequency of Care Coordination: monthly   Form Last Updated: 08/26/2020

## 2020-08-27 NOTE — PROGRESS NOTES
Community Paramedics Follow-up Visit  August 25, 2020  TIME: 1500    Chaim Norman is a 44 year old male being seen at home for a follow-up visit.    Present at appointment:  patient       Chief Complaint   Patient presents with     Outreach       [place] Utilization:      Utilization    Last refreshed: 8/26/2020 10:33 PM:  Hospital Admissions 1           Last refreshed: 8/26/2020 10:33 PM:  ED Visits 0           Last refreshed: 8/26/2020 10:33 PM:  No Show Count (past year) 1              Current as of: 8/26/2020 10:33 PM              /68   Pulse 94   Temp 98.1  F (36.7  C)   Resp 12   Wt 89.2 kg (196 lb 9.6 oz)   SpO2 95%   BMI 33.75 kg/m      Clinical Concerns:  Current Medical Concerns:  SOB    Current Behavioral Concerns: none    Education Provided to patient: financial help   Medication set up? No  Pill Box issued: No  Scale issued: No  Health Maintenance Reviewed:      Clinical Pathway: None    No  Face to Face in Home / Community    Daily weights:  8/19 199.4  8/20 201.0  8/21 201.0  8/22 199.8  8/23 200.0  8/24 Missed  8/25 196.6    Review of Symptoms/PE    Skin: negative  Eyes: negative  Ears/Nose/Throat: negative  Respiratory: Shortness of breath and Dyspnea on exertion  Cardiovascular: dyspnea on exertion, lower extremity edema and exercise intolerance  Gastrointestinal: negative  Genitourinary: negative  Musculoskeletal: negative  Neurologic: negative  Psychiatric: negative    Time spent with patient: 90    The patient meets one or more of the following criteria:  * Has been identified by their primary care provider at risk of nursing home placement    Acute concern/Follow-up recommendations: follow tx plan    Next CP visit scheduled: 9/1/20    Issues for Provider to follow up on: Community Paramedic visited with Chaim in his home. Chaim indicates he is feeling good and his SOB his doing very well. He is wearing his compression stockings which has kept his lower extremity edema minimal and he  has no longer ha edema above his knees into his thighs. Chaim also explains that when he does wear his stockings he does experience more pain in his legs and feet however, he lays down on the floor in his living room and has an inflatable leg rest that he can prop both of his legs up on and this helps with his breathing, edema and his pain. Upon exam on his lower extremities he has less than +1 pitting edema in both lower extremities, no wounds, no drainage and no pain. Lung sounds are diminished on the right, clear on the left. Chaim indicates he is able to walk around his apartment without getting SOB as he use too.    Continued to work with Chaim on his financial situation. Chaim received notification back from [facility] that he his denied MA due to his immigration status and he is also denied any monthly income due to his immigration status. CP reached out to Care Coordinator for additional information on the process of applying for permanent [place] Citizenship/Naturalization Application. CP will also put in a referral to set Chaim up to meet with a Financial person.    Provider follow up visit needed: FRANCISCO

## 2020-09-01 ENCOUNTER — PATIENT OUTREACH (OUTPATIENT)
Dept: CARE COORDINATION | Facility: CLINIC | Age: 45
End: 2020-09-01

## 2020-09-01 ENCOUNTER — OFFICE VISIT (OUTPATIENT)
Dept: BEHAVIORAL HEALTH | Facility: CLINIC | Age: 45
End: 2020-09-01
Payer: COMMERCIAL

## 2020-09-01 DIAGNOSIS — J44.9 CHRONIC OBSTRUCTIVE PULMONARY DISEASE, UNSPECIFIED COPD TYPE (H): Primary | ICD-10-CM

## 2020-09-01 PROCEDURE — 99207 C COMMUNITY PARAMEDIC-PATIENT MEETS CRITERIA: CPT

## 2020-09-01 ASSESSMENT — ACTIVITIES OF DAILY LIVING (ADL): DEPENDENT_IADLS:: INDEPENDENT

## 2020-09-01 NOTE — PROGRESS NOTES
Clinic Care Coordination Contact  [facility]/Voicemail [facility]   Referral Source: Care Team  Calling to discuss resources for hep with paperwork     Clinical Data: Care Coordinator Outreach  Outreach attempted x 1.  Left message on patient's voicemail with call back information and requested return call.  Plan:Care Coordinator will try to reach patient again in 1-2 business days.      BING Ley   [facility] Primary Care   Care Coordination  [facility]  9/1/2020 10:43 AM

## 2020-09-03 ENCOUNTER — PATIENT OUTREACH (OUTPATIENT)
Dept: CARE COORDINATION | Facility: CLINIC | Age: 45
End: 2020-09-03

## 2020-09-03 SDOH — ECONOMIC STABILITY: FOOD INSECURITY: WITHIN THE PAST 12 MONTHS, YOU WORRIED THAT YOUR FOOD WOULD RUN OUT BEFORE YOU GOT MONEY TO BUY MORE.: SOMETIMES TRUE

## 2020-09-03 SDOH — ECONOMIC STABILITY: TRANSPORTATION INSECURITY
IN THE PAST 12 MONTHS, HAS THE LACK OF TRANSPORTATION KEPT YOU FROM MEDICAL APPOINTMENTS OR FROM GETTING MEDICATIONS?: NO

## 2020-09-03 SDOH — ECONOMIC STABILITY: TRANSPORTATION INSECURITY
IN THE PAST 12 MONTHS, HAS LACK OF TRANSPORTATION KEPT YOU FROM MEETINGS, WORK, OR FROM GETTING THINGS NEEDED FOR DAILY LIVING?: NO

## 2020-09-03 SDOH — ECONOMIC STABILITY: INCOME INSECURITY: HOW HARD IS IT FOR YOU TO PAY FOR THE VERY BASICS LIKE FOOD, HOUSING, MEDICAL CARE, AND HEATING?: HARD

## 2020-09-03 ASSESSMENT — ACTIVITIES OF DAILY LIVING (ADL): DEPENDENT_IADLS:: INDEPENDENT

## 2020-09-03 NOTE — PROGRESS NOTES
Clinic Care Coordination Contact    Follow Up Progress Note      Assessment:  Pt continues to need help applying for Naturalization.  Has been in  [place] for 5 years and it is time to apply. Limited support.  Pt feels he can contact resources provided.     Goals addressed this encounter:   Goals Addressed    None         Intervention/Education provided during outreach: SW provided  Resources:    [facility]  Center. [phone]  [facility] [phone]  [facility] [phone]   [phone]  [facility] [phone]     Pt said he visited the library and it is closed due to COVID 19.  SW called and it is open  Notified pt. of this  Does not have a computer. Pt will call     Outreach Frequency: monthly    Plan: Pt will continue to work with Community Paramedic. Will call resources provided to see if help is available.  PT. will call library to schedule appt to use the computer.CHW to reach out in 2 weeks and notify me of needs    Care Coordinator will follow up in 1 month.       BING Ley   [facility] Primary Care   Care Coordination  [facility]  9/3/2020 9:28 AM

## 2020-09-09 VITALS
DIASTOLIC BLOOD PRESSURE: 84 MMHG | TEMPERATURE: 98.1 F | OXYGEN SATURATION: 92 % | WEIGHT: 198.2 LBS | RESPIRATION RATE: 14 BRPM | SYSTOLIC BLOOD PRESSURE: 110 MMHG | BODY MASS INDEX: 34.02 KG/M2 | HEART RATE: 108 BPM

## 2020-09-09 NOTE — PROGRESS NOTES
Community Paramedics Follow-up Visit  September 1, 2020  TIME: 1500    Chaim Norman is a 45 year old male being seen at home for a follow-up visit.    Present at appointment:  patient     Chief Complaint   Patient presents with     Outreach       [place] Utilization:      Utilization    Last refreshed: 9/8/2020  1:52 AM:  Hospital Admissions 1           Last refreshed: 9/8/2020  1:52 AM:  ED Visits 0           Last refreshed: 9/8/2020  1:52 AM:  No Show Count (past year) 1              Current as of: 9/8/2020  1:52 AM              /84   Pulse 108   Temp 98.1  F (36.7  C)   Resp 14   Wt 89.9 kg (198 lb 3.2 oz)   SpO2 92%   BMI 34.02 kg/m      Clinical Concerns:  Current Medical Concerns:  CHF    Current Behavioral Concerns: None    Education Provided to patient: helping with paperwork [employer] assistance programs   Medication set up? No  Pill Box issued: No  Scale issued: No  Health Maintenance Reviewed:      Clinical Pathway: None    No  Face to Face in Home / Community    Review of Symptoms/PE    Skin: negative  Eyes: negative  Ears/Nose/Throat: negative  Respiratory: Shortness of breath and Dyspnea on exertion  Cardiovascular: lower extremity edema  Gastrointestinal: negative  Genitourinary: negative  Musculoskeletal: negative and muscular weakness  Neurologic: negative  Psychiatric: negative    Time spent with patient: 90    The patient meets one or more of the following criteria:  * Has received hospital emergency department services three or more times in four consecutive months within a twelve month period    Acute concern/Follow-up recommendations: follow tx plan    Next CP visit scheduled: 9/8/2020    Issues for Provider to follow up on: Community Paramedic visited with Chaim in his apartment. Chaim states he is feeling good and edema is down in both legs with the compression socks. Chaim main concern is his financial issues and getting his paperwork started for his Naturalization to the [place] as he has received paperwork indicating that he was denied MA due to his immigration status. Chaim will have been in the [place] under a green card for 5 years on November 17th, 2015.    Advised Chaim that CP is looking for more resources that can help Chaim with his financials and Immigration status. Explained to Chaim that CP's are educated in the medical field and unsure how much more we will be able to help him with his current situation. Emailed Melissa Behl who has outreached to others that are more appropriate for this kind of situation.    Examination of lower legs found mild pitting edema to lower extremities, no pain, no open wounds and no reddened skin. Lungs sounds are clear on the left and diminished on the right. Listed below are Chaim's daily weights:    Sept 1 198.2  Aug 31 199.6  30 199,2  29 199.4  28 200.0  27 197.4  26 196.2      Provider follow up visit needed: TBD

## 2020-09-15 ENCOUNTER — PATIENT OUTREACH (OUTPATIENT)
Dept: BEHAVIORAL HEALTH | Facility: CLINIC | Age: 45
End: 2020-09-15

## 2020-09-16 NOTE — PROGRESS NOTES
Community Paramedic called and talked with Chaim after Chaim called wondering if CP was going to visit him today. Due to CP scheduling issues CP was not able to do a home visit today.     Chaim indicates he is doing ok. He is not having any increased SOB but remains SOB on excertion. He states his lower extremities are painful but he is able to keep the edema down with his compression socks and keeping his legs elevated. His weight has remained between 200 and 198.     Chaim is getting help with his paperwork and Naturalization paperwork and seems content with the progress of getting this completed.

## 2020-09-17 ENCOUNTER — PATIENT OUTREACH (OUTPATIENT)
Dept: NURSING | Facility: CLINIC | Age: 45
End: 2020-09-17
Payer: COMMERCIAL

## 2020-09-17 NOTE — TELEPHONE ENCOUNTER
Reason for call:  Patient reporting a symptom    Symptom or request: Patient has been having chest pain. He said it come and goes.    Duration (how long have symptoms been present): not sure at this time.    Have you been treated for this before? No    Additional comments: N/A    Phone Number patient can be reached at:  Cell number on file:    Telephone Information:   Mobile [phone]       Best Time:  Any    Can we leave a detailed message on this number:  YES    Call taken on 9/17/2020 at 4:13 PM by Elena Robert MA

## 2020-09-17 NOTE — PROGRESS NOTES
Clinic Care Coordination Contact  Community Health Worker Follow Up    Goals:   Goals Addressed as of 9/17/2020 at 3:51 PM                 Today    9/3/20      3. Financial Wellbeing (pt-stated)   60%  60%    Added 2/14/20 by Robin Jansen BSW     Goal Statement: I need help paying for my rent  Date Goal set: 2/14/2020  Barriers: Feet have been hurting too much to get back to the [facility]   Strengths: Patient is motivated to getting assistancce  Date to Achieve By: 7/30/2020  Patient expressed understanding of goal: Yes  Action steps to achieve this goal:  1. I will fill out application for emergency assistance  2. I will bring my application in to the [facility]  3. I will obtain emergency assistance, if found eligible    2/28/2020: Patient stated that he is still working on his emergency assistance application and had more questions about it. [facility] provided additional phone numbers to him for rent assistance    3/30/2020: Went to [facility]. Submitted documentation that was needed. Got rent assistance through another agency for March. Reapplied for assistance in April through [facility]        4. Psychosocial (pt-stated)   80%  80%    Added 6/1/20 by Robin Jansen BSW     Goal Statement: I can't afford to pay my phone bill  Date Goal set: 6/1/2020  Barriers: Patient's friend cannot help him pay his phone bill   Strengths: Patient is willing to work with Care Coordination  Date to Achieve By: 8/1/2020  Patient expressed understanding of goal: Yes  Action steps to achieve this goal:  1. I will apply for a free phone through the [place] [employer] Program- Resource provided by [facility]  2. I will have my phone bill expenses covered by this program, or via friend, if able to help          Psychosocial (pt-stated)   0%      Added 9/3/20 by Lisa Loredo LSW     Goal Statement: I need help applying for my Naturalization  Date  Goal set: 9/3/2020   Barriers: COVID 19  some offices closed   Strengths: feels capable of calling resources  Date to Achieve By: Nov 1st   Patient expressed understanding of goal: yes  Action steps to achieve this goal:  1. I will call resources provided for help with application  2. I will schedule an appt at the [employer] to use the computer            CHW spoke with patient. He said he feet were numb and he has been having chest pain it comes and goes.Patient has filled out application for emergency assistance and is waiting to hear back to see if he is approved.    Patient applied for fee cell phone . Patient said he just got a notification about the application. CHW helped patient make an appointment with his PCP for next week and also sent message to triage nurse regarding chest pain.    Intervention and Education during outreach:     CHW Plan: CHW will follow up patient next month to see if he approved for emergency assistance. CHW will follow up about asking patient to fill out a HCD.    Elena BARAJAS Community Health Worker  Clinic Care Coordination  [facility] : Br[facility] Lino [facility] Snowden  Phone: [phone]

## 2020-09-18 NOTE — TELEPHONE ENCOUNTER
"Patient reports having chest pressure episodes, about 3-4 episodes per week. This started 2-3 weeks ago. His last episode was one week ago. He feels SOB when he is tense, or \"under pressure.\" He says his breathing troubles have been for about one year. He has COPD. He is taking his advair inhaler once a day. I did read the directions for use to be one puff, every 12 hours therefore, he will now start to do this as he had been taking it once a day. Patient denies any symptoms at this moment. Denies fever, cough, headache, weakness, sweating, SOB, and chest pressure. He reports his feet have been numb for past 6 months.     He does have an appointment already scheduled with Nyla Florian for this coming Tuesday, 9/22. We discussed if he has a return of chest pressure/SOB he should not wait for appointment, but seek care immediately in UC or ER. He can also call our clinic 24-7 and be put through to a triage nurse if he has any symptom questions/concerns. Patient verbalizes understanding.     Pat Parada RN  [facility]    "

## 2020-09-22 ENCOUNTER — ALLIED HEALTH/NURSE VISIT (OUTPATIENT)
Dept: BEHAVIORAL HEALTH | Facility: CLINIC | Age: 45
End: 2020-09-22
Payer: COMMERCIAL

## 2020-09-22 ENCOUNTER — OFFICE VISIT (OUTPATIENT)
Dept: FAMILY MEDICINE | Facility: CLINIC | Age: 45
End: 2020-09-22
Payer: COMMERCIAL

## 2020-09-22 VITALS
OXYGEN SATURATION: 92 % | HEART RATE: 101 BPM | TEMPERATURE: 98.8 F | SYSTOLIC BLOOD PRESSURE: 118 MMHG | RESPIRATION RATE: 12 BRPM | DIASTOLIC BLOOD PRESSURE: 79 MMHG

## 2020-09-22 VITALS
TEMPERATURE: 98 F | OXYGEN SATURATION: 93 % | DIASTOLIC BLOOD PRESSURE: 86 MMHG | WEIGHT: 197 LBS | HEART RATE: 99 BPM | SYSTOLIC BLOOD PRESSURE: 123 MMHG | BODY MASS INDEX: 33.81 KG/M2

## 2020-09-22 DIAGNOSIS — R10.13 EPIGASTRIC PAIN: Primary | ICD-10-CM

## 2020-09-22 DIAGNOSIS — J44.9 CHRONIC OBSTRUCTIVE PULMONARY DISEASE, UNSPECIFIED COPD TYPE (H): Primary | ICD-10-CM

## 2020-09-22 DIAGNOSIS — Z86.11 HISTORY OF TB (TUBERCULOSIS): ICD-10-CM

## 2020-09-22 PROCEDURE — 99213 OFFICE O/P EST LOW 20 MIN: CPT | Performed by: NURSE PRACTITIONER

## 2020-09-22 PROCEDURE — 99207: CPT

## 2020-09-22 ASSESSMENT — ACTIVITIES OF DAILY LIVING (ADL): DEPENDENT_IADLS:: INDEPENDENT

## 2020-09-22 NOTE — TELEPHONE ENCOUNTER
furosemide (LASIX) 20 MG tablet       NOT ON MEDICATION LIST    Last Office Visit : 4-  Future Office visit:  none    Routing refill request to provider for review/approval because:  Drug not active on patient's medication list.      Kathleen M Doege RN

## 2020-09-22 NOTE — PROGRESS NOTES
Subjective     Chaim Norman is a 45 year old male who presents to clinic today for the following health issues:    HPI         Onset/Duration: 2 weeks but on and off for few months  Description:   Location: epigastric area  Character: sharp  Radiation: None but numbness in feet   Duration: couple minutes   Intensity: moderate  Progression of Symptoms: intermittent  Accompanying Signs & Symptoms:  Shortness of breath: YES - ongoing  Sweating: no  Nausea/vomiting: no  Lightheadedness: no  Palpitations: no  Fever/Chills: no  Cough: YES - ongoing           Heartburn: no  History:   Family history of heart disease: no  Tobacco use: no  Previous similar symptoms: YES  Precipitating factors:   Worse with exertion: no  Worse with deep breaths: no           Related to eating: no           Better with burping: no  Therapies tried and outcome: none     Had labs 9/4 - CMP and CBC  Last occurred 1 week ago  Saw Dr. Jorge 8/10 for same symptoms   Food doesn't usually affect  Pain doesn't radiate  Comes on and lasts for a little while and then goes away  Gets a dry cough sometimes  Hx TB - currently undergoing treatment still  Shortness of breath unchanged  Gets pain with coughing and sometimes at rest  Coughs so hard tears will roll down from left eye  Doesn't get pain or discomfort with exertion   No fever  Normal taste and smell  Not exposed to others  Normal BMs - stool 2-3 times per day    Feet continue to be numb  Had swelling but better with compression stockings  Doesn't think he's still taking B6 (last ID note says he should still be)        Review of Systems   Constitutional, HEENT, cardiovascular, pulmonary, gi and gu systems are negative, except as otherwise noted.      Objective    /86 (BP Location: Right arm, Patient Position: Chair, Cuff Size: Adult Large)   Pulse 99   Temp 98  F (36.7  C) (Oral)   Wt 89.4 kg (197 lb)   SpO2 93%   BMI 33.81 kg/m    Body mass index is 33.81 kg/m .  Physical Exam    GENERAL: healthy, alert and no distress  NECK: no adenopathy  RESP: lungs clear to auscultation - no rales, rhonchi or wheezes  CV: RRR  MS: no gross musculoskeletal defects noted, wearing compression stockings, DP 2+, normal, CMS intact  PSYCH: mentation appears normal, affect normal/bright            Assessment & Plan     Epigastric pain  Had labs on 9/4 and they were unremarkable. Pain not present x1 week now. Will get h pylori testing and start omeprazole if negative. Encouraged him to be evaluated when patient is present. emergency department precautions discussed.   - Helicobacter pylori Antigen Stool; Future    History of TB (tuberculosis)  Followed by ID. ID note from 9/4 states he's taking B6 which patient states he doesn't think he is. He will check home medication and let me know. Could be contributing to foot symptoms.         See Patient Instructions    Check your medication bottles when you get home - you should be taking vitamin B6 (pyridoxine) 100 mg daily    Return stool for h pylori testing  If normal, I will add omeprazole 20 mg daily  If pain returns, please call nurse line for triage    Return in about 4 weeks (around 10/20/2020).     Return precautions discussed, including when to seek urgent/emergent care.    Patient verbalizes understanding and agrees with plan of care. Patient stable for discharge.      CHAZ [facility]    This visit took place during the COVID 19 global pandemic.   PPE worn during the visit included: surgical mask and face shield by me and mask by patient

## 2020-09-22 NOTE — PATIENT INSTRUCTIONS
Check your medication bottles when you get home - you should be taking vitamin B6 (pyridoxine) 100 mg daily    Return stool for h pylori testing  If normal, I will add omeprazole 20 mg daily  If pain returns, please call nurse line for triage

## 2020-09-22 NOTE — PROGRESS NOTES
Community Paramedics Follow-up Visit  September 22, 2020  TIME: 1000    hCaim Norman is a 45 year old male being seen at home for a follow-up visit.    Present at appointment: Patient    Primary Diagnosis: Respiratory Disorders - other    Chief Complaint   Patient presents with     Outreach       [place] Utilization:   Clinic Utilization  Difficulty keeping appointments:: No  Compliance Concerns: No  No-Show Concerns: No  No PCP office visit in Past Year: No  Utilization    Last refreshed: 10/1/2020 11:50 AM: Hospital Admissions 1           Last refreshed: 10/1/2020 11:50 AM: ED Visits 0           Last refreshed: 10/1/2020 11:50 AM: No Show Count (past year) 1              Current as of: 10/1/2020 11:50 AM              /79   Pulse 101   Temp 98.8  F (37.1  C)   Resp 12   SpO2 92%     Clinical Concerns:  Current Medical Concerns:  CHF, Lung Transplant needed    Current Behavioral Concerns: none    Education Provided to patient: none   Medication set up? No  Pill Box issued: No  Scale issued: No  Health Maintenance Reviewed: Due/Overdue yes    Clinical Pathway: None    No  Face to Face in Home / Community    Review of Symptoms/PE    Skin: negative  Eyes: negative  Ears/Nose/Throat: negative  Respiratory: Shortness of breath and Dyspnea on exertion   Cardiovascular: lower extremity edema  Gastrointestinal: negative  Genitourinary: negative  Musculoskeletal: negative  Neurologic: negative  Psychiatric: negative    Pain Management::       Medication Reconciliation  Medication adherence problem (GOAL):: No  Knowledgeable about how to use meds:: Yes  Medication side effects suspected:: No    Diet/Exercise/Sleep  Diet:: Regular  Inadequate nutrition (GOAL):: No  Tube Feeding: No  Inadequate activity/exercise (GOAL):: No  Significant changes in sleep pattern (GOAL): No    Plan:     Time spent with patient: 75     The patient meets one or more of the following criteria:  * Has received hospital emergency department  services three or more times in four consecutive months within a twelve month period    Acute concern/Follow-up recommendations: follow treatment plan    Next CP visit scheduled: 10/06/20    Issues for Provider to follow up on: Community Paramedic visited with Chaim at his apartment. Chaim states he his doing well. Lower edema is minimal in both lower extremities and no pain. Chaim continues to wear the compression socks and tries to keep his legs elevated when he can.    Today's visit consisted of assisting Chaim in filling out his [employer] Assistance paperwork along with another form Chaim had some questions on. Chaim states he also is working with [employer] to get his Naturalization paperwork done too.    Provider follow up visit needed: TBD    Daily Weight:  9/2 198.8  9/3 197.6  9/4 199.4  9/5 199.8  9/6 200.0  9/7 199.6  9/8 199.6  9/9 199.8  9/10 199.9  9/11 199.4  9/12 199.4  9.13 200.0  9/14 200.9  9/15 200.1  9/16 200.4  9/17 201.4  9/18 201.2  9/19 199.6  9/20 199.4  9/21 199.2  9/22 197.0

## 2020-09-23 DIAGNOSIS — A15.0: Primary | ICD-10-CM

## 2020-09-23 RX ORDER — MULTIVITAMIN WITH IRON
100 TABLET ORAL DAILY
Qty: 90 TABLET | Refills: 0 | Status: SHIPPED | OUTPATIENT
Start: 2020-09-23 | End: 2020-12-01

## 2020-09-23 RX ORDER — FUROSEMIDE 20 MG
20 TABLET ORAL DAILY
OUTPATIENT
Start: 2020-09-23

## 2020-09-23 NOTE — TELEPHONE ENCOUNTER
Per note dated 8/12/20;    I don't know why they are asking me for refill request. I had ordered his lasix once for short term use. If he requires it chronically should get through PCP. I called his pharmacy last week, they said did not see any refill request.     Thanks

## 2020-09-24 ENCOUNTER — PATIENT OUTREACH (OUTPATIENT)
Dept: CARE COORDINATION | Facility: CLINIC | Age: 45
End: 2020-09-24

## 2020-09-24 ASSESSMENT — ACTIVITIES OF DAILY LIVING (ADL): DEPENDENT_IADLS:: INDEPENDENT

## 2020-09-24 NOTE — PROGRESS NOTES
Clinic Care Coordination Contact    SW Follow Up Progress Note      Assessment: Spoke with pt. He spoke with the county about applying for Emergency Assistance. States he has all the information he needs and will go tomorrow.  He did call some of the resources I provided. [facility] only helps if you have already applied. He needs $280 to work with the  Center. He plans to borrow the $ and meet with them for assistance. Pt did not identify any additional needs at this time.     Goals addressed this encounter:   Goals Addressed                 This Visit's Progress      3. Financial Wellbeing (pt-stated)   70%     Goal Statement: I need help paying for my rent  Date Goal set: 2/14/2020  Barriers: Feet have been hurting too much to get back to the [facility]   Strengths: Patient is motivated to getting assistancce  Date to Achieve By: 7/30/2020  Patient expressed understanding of goal: Yes  Action steps to achieve this goal:  1. I will fill out application for emergency assistance  2. I will bring my application in to the [facility]  3. I will obtain emergency assistance, if found eligible    2/28/2020: Patient stated that he is still working on his emergency assistance application and had more questions about it. [facility] provided additional phone numbers to him for rent assistance    3/30/2020: Went to [facility]. Submitted documentation that was needed. Got rent assistance through another agency for March. Reapplied for assistance in April through [facility]        Psychosocial (pt-stated)   20%     Goal Statement: I need help applying for my Naturalization  Date Goal set: 9/3/2020   Barriers: COVID 19  some offices closed   Strengths: feels capable of calling resources  Date to Achieve By: Nov 1st   Patient expressed understanding of goal: yes  Action steps to achieve this goal:  1. I will call  resources provided for help with application  2. I will schedule an appt at the library to use the computer               Intervention/Education provided during outreach: review of resources . Update goals     Outreach Frequency: monthly    Plan: Pt will continue to work with RN CC and Comm. Paramedic.They will notify SW of any needs.  Will complete Emergency Assistance process and help with Naturalization application..   MIGUEL CC will plan a chart review in 6 weeks.         BING Ley   [facility] Primary Care   Care Coordination  [facility]  9/24/2020 12:31 PM

## 2020-09-30 DIAGNOSIS — M25.512 CHRONIC PAIN OF BOTH SHOULDERS: ICD-10-CM

## 2020-09-30 DIAGNOSIS — G89.29 CHRONIC PAIN OF BOTH SHOULDERS: ICD-10-CM

## 2020-09-30 DIAGNOSIS — G47.00 INSOMNIA, UNSPECIFIED TYPE: ICD-10-CM

## 2020-09-30 DIAGNOSIS — M79.671 BILATERAL FOOT PAIN: ICD-10-CM

## 2020-09-30 DIAGNOSIS — M25.511 CHRONIC PAIN OF BOTH SHOULDERS: ICD-10-CM

## 2020-09-30 DIAGNOSIS — M79.672 BILATERAL FOOT PAIN: ICD-10-CM

## 2020-09-30 DIAGNOSIS — M79.2 NEUROPATHIC PAIN: ICD-10-CM

## 2020-09-30 RX ORDER — NORTRIPTYLINE HCL 10 MG
10-20 CAPSULE ORAL AT BEDTIME
Qty: 60 CAPSULE | Refills: 1 | Status: SHIPPED | OUTPATIENT
Start: 2020-09-30 | End: 2020-12-16

## 2020-09-30 NOTE — TELEPHONE ENCOUNTER
Received fax request from    [id number] - [place], [place] - [address] [place]  [address] [address]  Phone: [phone] Fax: [phone]       pharmacy requesting refill(s) for nortriptyline (PAMELOR) 10 MG capsule     Last refilled on 09/03/20    Pt last seen on 06/05/20    No future appointments scheduled at this time    Will facilitate refill.    Perla Winn [facility]  [place]

## 2020-09-30 NOTE — TELEPHONE ENCOUNTER
Signed Prescriptions:                        Disp   Refills    nortriptyline (PAMELOR) 10 MG capsule      60 cap*1        Sig: Take 1-2 capsules (10-20 mg) by mouth At Bedtime For           pain and insomnia  Authorizing Provider: MICHELLE CHILDERS, RN CNP, FNP  [facility]  [facility]

## 2020-10-05 PROBLEM — Z16.35 RESISTANCE TO MULTIPLE ANTIMICROBIAL DRUGS: Status: ACTIVE | Noted: 2020-01-21

## 2020-10-06 ENCOUNTER — ALLIED HEALTH/NURSE VISIT (OUTPATIENT)
Dept: BEHAVIORAL HEALTH | Facility: CLINIC | Age: 45
End: 2020-10-06
Payer: COMMERCIAL

## 2020-10-06 ENCOUNTER — TELEPHONE (OUTPATIENT)
Dept: FAMILY MEDICINE | Facility: CLINIC | Age: 45
End: 2020-10-06

## 2020-10-06 VITALS
HEART RATE: 96 BPM | SYSTOLIC BLOOD PRESSURE: 128 MMHG | RESPIRATION RATE: 12 BRPM | TEMPERATURE: 98.6 F | DIASTOLIC BLOOD PRESSURE: 98 MMHG | OXYGEN SATURATION: 94 %

## 2020-10-06 DIAGNOSIS — J44.9 CHRONIC OBSTRUCTIVE PULMONARY DISEASE, UNSPECIFIED COPD TYPE (H): Primary | ICD-10-CM

## 2020-10-06 PROCEDURE — 99207 PR NO CHARGE NURSE ONLY: CPT

## 2020-10-06 ASSESSMENT — ACTIVITIES OF DAILY LIVING (ADL): DEPENDENT_IADLS:: INDEPENDENT

## 2020-10-06 NOTE — TELEPHONE ENCOUNTER
This writer attempted to contact patient on 10/06/20      Reason for call discuss forms and left message.      If patient calls back:   Registered Nurse called. Follow Triage Call workflow        Kami Rosenthal RN

## 2020-10-06 NOTE — TELEPHONE ENCOUNTER
Chaim contacted. He states he will drop off the rental assistance forms he needs provider to fill out. He plans to drop off these forms tomorrow. I confirmed the [facility] address with patient.     Pat Parada RN  [facility]

## 2020-10-06 NOTE — PROGRESS NOTES
Community Paramedics Follow-up Visit  October 6, 2020  TIME: 1000    Chaim Norman is a 45 year old male being seen at home for a follow-up visit.    Present at appointment: Patient    Primary Diagnosis: Respiratory Disorders - other    Chief Complaint   Patient presents with     Outreach       [place] Utilization:   Clinic Utilization  Difficulty keeping appointments:: No  Compliance Concerns: No  No-Show Concerns: No  No PCP office visit in Past Year: No  Utilization    Last refreshed: 10/6/2020 11:38 AM: Hospital Admissions 1           Last refreshed: 10/6/2020 11:38 AM: ED Visits 0           Last refreshed: 10/6/2020 11:38 AM: No Show Count (past year) 1              Current as of: 10/6/2020 11:38 AM              BP (!) 128/98   Pulse 96   Temp 98.6  F (37  C)   Resp 12   SpO2 94%      Current weights:    9/23 204.0  9/24 201.8  9/25 203.0  9/26 202.0  9/27 202.0  9/28 202.4  9/30 202.6  10/01 201.8  10/03 202.6  10/04 202.4  10/05 201.8  10/06 199.6    Clinical Concerns:  Current Medical Concerns:  CHF, lung transplant recipent    Current Behavioral Concerns: none     Education Provided to patient: none   Medication set up? No  Pill Box issued: No  Scale issued: No  Health Maintenance Reviewed: No Due/Overdue yes    Clinical Pathway: None    No  Face to Face in Home / Community    Review of Symptoms/PE    Skin: negative  Eyes: negative  Ears/Nose/Throat: negative  Respiratory: No shortness of breath, dyspnea on excertion  Cardiovascular: negative  Gastrointestinal: negative  Genitourinary: negative  Musculoskeletal: negative  Neurologic: negative  Psychiatric: negative       Medication Reconciliation  Medication adherence problem (GOAL):: No  Knowledgeable about how to use meds:: Yes  Medication side effects suspected:: No    Diet/Exercise/Sleep  Diet:: Regular  Inadequate nutrition (GOAL):: No  Tube Feeding: No  Inadequate activity/exercise (GOAL):: No  Significant changes in sleep pattern (GOAL):  No    Plan:     Time spent with patient: 120    The patient meets one or more of the following criteria:  * Has received hospital emergency department services three or more times in four consecutive months within a twelve month period    Acute concern/Follow-up recommendations: follow treatment plan    Next CP visit scheduled: 10/20/20    Issues for Provider to follow up on: Community Paramedic visited with Chaim today at his apartment. Chaim states he is doing ok. Chaim does well with his breathing when he is at rest, but when he is walking up stairs he gets SOB quickly and needs to rest. Chaim continues to use oxygen at night and lays on the floor with his legs elevated. Chaim has very minimal edema in his right lower extremity and no edema in his left lower extremity. The last time he wore his compression socks with 2 days ago. His feet are warm to the touch and no longer hot since he stopped taking his B6 last week. His feet do not hurt upon palpation but are painful even after stopping the B6    Assisted Chaim with filling out the [employer] application and advised him as to what he needed to photocopy and send with his application. Chaim also indicated that his application for his [place] Citizenship is completed and submitted.    Provider follow up visit needed: FRANCISCO

## 2020-10-06 NOTE — TELEPHONE ENCOUNTER
Reason for call:  Other   Patient called regarding (reason for call): call back  Additional comments: pancho bustos. forms    Phone number to reach Gwen [phone]    Patient told Gwen you wouldn't fill out his forms, she is calling to see if you are aware    Can we leave a detailed message on this number?  YES    Travel screening: Not Applicable

## 2020-10-06 NOTE — TELEPHONE ENCOUNTER
Gwen called and explained they have not seen patient since January. Patient called them saying his primary care provider would not fill out rental assistance forms so she told the patient to drop the forms off to them. However, patient has not done this. She is thinking there is a miscommunication/laguage barrier. She is asking us to contact patient regarding these forms and coordinate a drop off to this clinic.     Pat Parada RN  [facility]

## 2020-10-07 ENCOUNTER — PATIENT OUTREACH (OUTPATIENT)
Dept: CARE COORDINATION | Facility: CLINIC | Age: 45
End: 2020-10-07

## 2020-10-07 NOTE — TELEPHONE ENCOUNTER
..What type of form? Patient assistance  What day did you drop off your forms? 10/07/2020  Is there a due date?  (7-10 business day to compete forms)   How would you like to receive these forms? Please mail to [address], [place] [address]  Which clinic was the form dropped off at? [place]    What is the best number to contact you? Cell [phone]  What time works best to contact you with in 4 hrs? any  Is it okay to leave a message? Yes    Lalo Wagner

## 2020-10-07 NOTE — PROGRESS NOTES
Clinic Care Coordination Contact  Clinic Care Coordination Contact     Situation: Patient chart reviewed by care coordinator.     Background: RN CC's initial assessment and enrollment to Care Coordination was 1/5/20.   Patient centered goals were developed with participation from patient.  RN CC handed patient off to CHW for continued outreach every 30 days.      Assessment: CHW has been in contact with patient monthly. Patient has made some progress to goals.  Patient had reported episodes of feet numbness and chest pain at last CHW outreach, which was appropriate escalated to triage.  Patient was evaluated by PCP on 9/22/20 and was instructed to complete H.Pylori stool testing to evaluate his pain.  Per chart review, this has not been completed.  RN CC will update CHW and Community Paramedic that patient needs to complete this.  He will be due for PCP follow up appointment around 10/20/20.  Patient is not due for updated Complex Care Plan.     Plan/Recommendations: CHW will involve RN CC as needed or if patient is ready to move to maintenance.  RN CC will continue to monitor progress to goals and CHW outreaches every 6 weeks.     Melissa Behl BSN, RN, PHN, [facility]  Primary Care Clinical RN Care Coordinator  [facility]   [phone]

## 2020-10-12 ENCOUNTER — TELEPHONE (OUTPATIENT)
Dept: PALLIATIVE MEDICINE | Facility: CLINIC | Age: 45
End: 2020-10-12

## 2020-10-12 DIAGNOSIS — G62.0 DRUG-INDUCED PERIPHERAL NEUROPATHY (H): ICD-10-CM

## 2020-10-12 NOTE — TELEPHONE ENCOUNTER
Pending Prescriptions:                       Disp   Refills    acetaminophen (TYLENOL) 325 MG tablet     180 ta*3            Sig: Take 2 tablets (650 mg) by mouth every 6 hours as           needed for mild pain    Last refill 0921/2020  Last office visit 6/5/2020  Next appointment None     Destin Menjivar MA

## 2020-10-13 RX ORDER — ACETAMINOPHEN 325 MG/1
650 TABLET ORAL EVERY 6 HOURS PRN
Qty: 180 TABLET | Refills: 3 | Status: SHIPPED | OUTPATIENT
Start: 2020-10-13 | End: 2021-01-29

## 2020-10-13 NOTE — TELEPHONE ENCOUNTER
Signed Prescriptions:                        Disp   Refills    acetaminophen (TYLENOL) 325 MG tablet      180 ta*3        Sig: Take 2 tablets (650 mg) by mouth every 6 hours as           needed for mild pain  Authorizing Provider: MICHELLE CHILDERS, RN CNP, FNP  [facility]  [facility]

## 2020-10-16 NOTE — TELEPHONE ENCOUNTER
The form that was given to me is not for rental assistance.  It looks like it's a patient assistance program for [employer] for medications that I don't see on his med list.  The provider listed on the form is Dr. Ophelia Broderick at [facility]  He should give the form to that provider  Form placed in 1st floor TC basket on 2nd floor

## 2020-10-20 ENCOUNTER — ALLIED HEALTH/NURSE VISIT (OUTPATIENT)
Dept: BEHAVIORAL HEALTH | Facility: CLINIC | Age: 45
End: 2020-10-20
Payer: COMMERCIAL

## 2020-10-20 VITALS
SYSTOLIC BLOOD PRESSURE: 122 MMHG | RESPIRATION RATE: 12 BRPM | TEMPERATURE: 98.6 F | OXYGEN SATURATION: 95 % | DIASTOLIC BLOOD PRESSURE: 98 MMHG | HEART RATE: 94 BPM

## 2020-10-20 DIAGNOSIS — J44.9 CHRONIC OBSTRUCTIVE PULMONARY DISEASE, UNSPECIFIED COPD TYPE (H): Primary | ICD-10-CM

## 2020-10-20 PROCEDURE — 99207 PR COMMUNITY PARAMEDIC-PATIENT MEETS CRITERIA: CPT

## 2020-10-20 ASSESSMENT — ACTIVITIES OF DAILY LIVING (ADL): DEPENDENT_IADLS:: INDEPENDENT

## 2020-10-20 NOTE — PROGRESS NOTES
Community Paramedics Follow-up Visit  October 20, 2020  TIME: 1000    Chaim Norman is a 45 year old male being seen at home for a follow-up visit.    Present at appointment: Patient, Care Team Member         Chief Complaint   Patient presents with     Outreach       [place] Utilization:   Clinic Utilization  Difficulty keeping appointments:: No  Compliance Concerns: No  No-Show Concerns: No  No PCP office visit in Past Year: No  Utilization    Last refreshed: 10/28/2020  5:44 PM: Hospital Admissions 1           Last refreshed: 10/28/2020  5:44 PM: ED Visits 0           Last refreshed: 10/28/2020  5:44 PM: No Show Count (past year) 1              Current as of: 10/28/2020  5:44 PM              BP (!) 122/98   Pulse 94   Temp 98.6  F (37  C)   Resp 12   SpO2 95%     Past weights:  10/7 202.8  10/8 202.6  10/9 203.4  10/10 199.6  10/11 199.2  10/12 201.6  10/13 202.4  10/14 202.0  10/15 200.8  10/16 202.0  10/17 202.0  10/18 201.2  10/19 201.2  10/20 199.8    Clinical Concerns:  Current Medical Concerns:  CHF    Current Behavioral Concerns: none    Education Provided to patient: none   Medication set up? No  Pill Box issued: No  Scale issued: No  Health Maintenance Reviewed:      Clinical Pathway: None    No  Face to Face in Home / Community    Review of Symptoms/PE    Skin: negative  Eyes: negative  Ears/Nose/Throat: negative  Respiratory: Shortness of breath and Dyspnea on exertion  Cardiovascular: dyspnea on exertion  Gastrointestinal: negative  Genitourinary: negative  Musculoskeletal: negative  Neurologic: negative  Psychiatric: negative    Pain Management::       Medication Reconciliation  Medication adherence problem (GOAL):: No  Knowledgeable about how to use meds:: Yes  Medication side effects suspected:: No    Diet/Exercise/Sleep  Diet:: Regular  Inadequate nutrition (GOAL):: No  Tube Feeding: No  Inadequate activity/exercise (GOAL):: Yes  Significant changes in sleep pattern (GOAL): No    Time spent  with patient: 60    The patient meets one or more of the following criteria:  * Has received hospital emergency department services three or more times in four consecutive months within a twelve month period    Acute concern/Follow-up recommendations: follow treatment plan    Next CP visit scheduled: 11/3/20 @ 10    Issues for Provider to follow up on: Community Paramedic visited with Chaim in his home. Chaim is doing remarkably well. Both lower extremities have little not no edema, his weight is maintaining well and he has no pain in either extremity.     Chaim has all his paperwork submitted for his [employer] and is now waiting to hear back from them. He has submitted all his applications for social security and [facility]. Community Paramedic went through some of his papers recently received and all of them were notifications of his benefits etc. Advised Chaim to file away.     Provider follow up visit needed: 11/3/20

## 2020-10-21 ENCOUNTER — PATIENT OUTREACH (OUTPATIENT)
Dept: NURSING | Facility: CLINIC | Age: 45
End: 2020-10-21
Payer: COMMERCIAL

## 2020-10-21 NOTE — PROGRESS NOTES
Clinic Care Coordination Contact  Community Health Worker Follow Up    Goals:   Goals Addressed as of 10/21/2020 at 2:59 PM                 Today    10/6/20      2. Pain Management (pt-stated)   70%  70%    Added 2/19/20 by Charlene Gonzales, RN     Goal Statement: I will have suitable pain relief to perform activities of daily living.  Date Goal set: 2/19/2020  Date to Achieve By: 10/1/2020  Patient expressed understanding of goal: Yes  Action steps to achieve this goal:  1. I will take medications as prescribed to maintain adequate pain control/relief.  2. I will utilize non-pharmaceutical measures to help with pain control/relief.  3. I will maintain routine follow up and contact with my clinic care team.  4. I will call my pharmacy to request refills of my medications before they run out.  5. I will schedule a pain clinic follow up appointment.        3. Financial Wellbeing (pt-stated)   20%  20%    Added 2/14/20 by Robin Jansen BSW     Goal Statement: I need help paying for my rent  Date Goal set: 2/14/2020  Barriers: Feet have been hurting too much to get back to the [facility]   Strengths: Patient is motivated to getting assistancce  Date to Achieve By: 7/30/2020  Patient expressed understanding of goal: Yes  Action steps to achieve this goal:  1. I will fill out application for emergency assistance  2. I will bring my application in to the [facility]  3. I will obtain emergency assistance, if found eligible    2/28/2020: Patient stated that he is still working on his emergency assistance application and had more questions about it. [facility] additional phone numbers to him for rent assistance    3/30/2020: Went to [facility]. Submitted documentation that was needed. Got rent assistance through another agency for March. Reapplied for assistance in April through [facility]        4. Psychosocial (pt-stated)   10%  10%     Added 6/1/20 by Robin Jansen BSW     Goal Statement: I can't afford to pay my phone bill  Date Goal set: 6/1/2020  Barriers: Patient's friend cannot help him pay his phone bill   Strengths: Patient is willing to work with Care Coordination  Date to Achieve By: 8/1/2020  Patient expressed understanding of goal: Yes  Action steps to achieve this goal:  1. I will apply for a free phone through the [place] [employer] Cell Phone Program- Resource provided by [facility]  2. I will have my phone bill expenses covered by this program, or via friend, if able to help            CHW spoke with patient he said he feet ache every time its cold. He said completed his stool sample, but did not have a way to get it to the sample to the clinic so its no good.    CHW helped patient make follow up appointment with PCP it will be November 3, 2020. Patient said he needs help paying for rent has not taken  application to [facility]. CHW said you have turn in the application.    Patient currently paying his own phone bill. He does not need help. Community paramedic still coming into home.      Intervention and Education during outreach: call clinic with non-emergent issues.     CHW Plan: will follow up an about a month.    Elena BARAJAS Community Health Worker  Clinic Care Coordination  [facility] [place] [facility] [place]  Phone: [phone]

## 2020-10-25 DIAGNOSIS — J44.9 CHRONIC OBSTRUCTIVE PULMONARY DISEASE, UNSPECIFIED COPD TYPE (H): ICD-10-CM

## 2020-10-26 DIAGNOSIS — G62.0 DRUG-INDUCED PERIPHERAL NEUROPATHY (H): ICD-10-CM

## 2020-10-26 RX ORDER — GABAPENTIN 300 MG/1
900 CAPSULE ORAL 3 TIMES DAILY
Qty: 270 CAPSULE | Refills: 1 | Status: SHIPPED | OUTPATIENT
Start: 2020-10-26 | End: 2020-12-28

## 2020-10-26 NOTE — TELEPHONE ENCOUNTER
Pending Prescriptions:                       Disp   Refills    gabapentin (NEURONTIN) 300 MG capsule     270 ca*1            Sig: Take 3 capsules (900 mg) by mouth 3 times daily    Last refill 09/21/2020  [place] office visit 6/5/2020  Next appointment None.    Destin Menjivar MA

## 2020-10-27 NOTE — TELEPHONE ENCOUNTER
Routing refill request to provider for review/approval because:  Patient is noted to have a steroid inhaler documented on list so request fails protocol. Requires provider review.

## 2020-10-27 NOTE — TELEPHONE ENCOUNTER
Signed Prescriptions:                        Disp   Refills    gabapentin (NEURONTIN) 300 MG capsule      270 ca*1        Sig: Take 3 capsules (900 mg) by mouth 3 times daily  Authorizing Provider: MICHELLE CHILDERS, RN CNP, FNP  [facility]

## 2020-11-03 ENCOUNTER — TELEPHONE (OUTPATIENT)
Dept: BEHAVIORAL HEALTH | Facility: CLINIC | Age: 45
End: 2020-11-03

## 2020-11-03 ENCOUNTER — ALLIED HEALTH/NURSE VISIT (OUTPATIENT)
Dept: BEHAVIORAL HEALTH | Facility: CLINIC | Age: 45
End: 2020-11-03
Payer: COMMERCIAL

## 2020-11-03 DIAGNOSIS — J44.9 CHRONIC OBSTRUCTIVE PULMONARY DISEASE, UNSPECIFIED COPD TYPE (H): Primary | ICD-10-CM

## 2020-11-03 PROCEDURE — 99207 PR COMMUNITY PARAMEDIC - PATIENT NOT BILLABLE: CPT

## 2020-11-03 ASSESSMENT — ACTIVITIES OF DAILY LIVING (ADL): DEPENDENT_IADLS:: INDEPENDENT

## 2020-11-03 NOTE — PROGRESS NOTES
Community Paramedic called patient via phone instead of a face to face encounter. Patient indicated that he had been to see his PCP at 8am this morning as a follow up for previous chest pain. Due to no longer having chest pain he finished his appointment and was sent home.    Patient denies having chest pain, SOB or any other pain during phone visit with CP. Patient states his lower extremites have very little edema and he is feeling very good. He states that his breathing has even improved. Patients recents weights are:    10/22 203.2  10/23 199.6  10/24 202.2  10/25 203.4  10/26 201.8  10/27 202.2  10/28 202.4  10/29 203.8  10/30 202.8  10/31 203.8  11/01 202.8  11/02 200.8  11/03 203.6

## 2020-11-05 ENCOUNTER — PATIENT OUTREACH (OUTPATIENT)
Dept: CARE COORDINATION | Facility: CLINIC | Age: 45
End: 2020-11-05

## 2020-11-05 NOTE — PROGRESS NOTES
Clinic Care Coordination Contact    Situation: Patient chart reviewed by care coordinator.    Background: Patient is enrolled in Care Coordination. Patient is being followed by CHW, SWCC and RNCC    Assessment: 6 week chart review complete. CHW spoke with patient on 10/21. Patient continues to have pain in his feet. He reported that he was able to pay for his phone bill this past month, but continues to have issues paying for his rent. SWCC, Julia, provided patient with a few resource options. Per her note on 9/24, patient planned to borrow money to work with the  Center. Unclear if this took place. CC will request an update on this during the next CHW call.     Plan/Recommendations: SWCC will review chart in 6 weeks, per standard workflow. CHW plans to reach out to patient in one month.    BING Garcia  Primary Care Clinic- Social Work Care Coordinator  [facility] [place]  Ph: [phone]  11/5/2020 10:01 AM

## 2020-11-11 ENCOUNTER — ALLIED HEALTH/NURSE VISIT (OUTPATIENT)
Dept: BEHAVIORAL HEALTH | Facility: CLINIC | Age: 45
End: 2020-11-11
Payer: COMMERCIAL

## 2020-11-11 VITALS
DIASTOLIC BLOOD PRESSURE: 82 MMHG | BODY MASS INDEX: 34.5 KG/M2 | SYSTOLIC BLOOD PRESSURE: 122 MMHG | TEMPERATURE: 99.9 F | HEART RATE: 100 BPM | RESPIRATION RATE: 12 BRPM | OXYGEN SATURATION: 95 % | WEIGHT: 201 LBS

## 2020-11-11 DIAGNOSIS — J44.9 CHRONIC OBSTRUCTIVE PULMONARY DISEASE, UNSPECIFIED COPD TYPE (H): Primary | ICD-10-CM

## 2020-11-11 PROCEDURE — 99207 PR COMMUNITY PARAMEDIC-PATIENT MEETS CRITERIA: CPT

## 2020-11-11 ASSESSMENT — ACTIVITIES OF DAILY LIVING (ADL): DEPENDENT_IADLS:: INDEPENDENT

## 2020-11-13 DIAGNOSIS — G89.29 CHRONIC PAIN OF BOTH SHOULDERS: ICD-10-CM

## 2020-11-13 DIAGNOSIS — M79.671 BILATERAL FOOT PAIN: ICD-10-CM

## 2020-11-13 DIAGNOSIS — M25.512 CHRONIC PAIN OF BOTH SHOULDERS: ICD-10-CM

## 2020-11-13 DIAGNOSIS — M79.2 NEUROPATHIC PAIN: ICD-10-CM

## 2020-11-13 DIAGNOSIS — M79.672 BILATERAL FOOT PAIN: ICD-10-CM

## 2020-11-13 DIAGNOSIS — M25.511 CHRONIC PAIN OF BOTH SHOULDERS: ICD-10-CM

## 2020-11-13 RX ORDER — DULOXETIN HYDROCHLORIDE 60 MG/1
60 CAPSULE, DELAYED RELEASE ORAL DAILY
Qty: 90 CAPSULE | Refills: 1 | Status: SHIPPED | OUTPATIENT
Start: 2020-11-13 | End: 2021-02-26

## 2020-11-13 NOTE — TELEPHONE ENCOUNTER
Fax received from: [id number] - [address]  Refill authorization requested      Drug:DULoxetine (CYMBALTA) 60 MG capsule    Qty: 90  Last filled 11/13/2020  Last seen: 6/5/2020  Next appointment None     Drug: nortriptyline (PAMELOR) 10 MG capsule  Qty: 60  Last filled 11/02/2020    Destin Menjivar, CMA

## 2020-11-14 NOTE — TELEPHONE ENCOUNTER
Signed Prescriptions:                        Disp   Refills    DULoxetine (CYMBALTA) 60 MG capsule        90 cap*1        Sig: Take 1 capsule (60 mg) by mouth daily Take with 30mg           to equal 90mg/day. 3 month script  Authorizing Provider: MICHELLE CHILDERS, RN CNP, FNP  [facility]  [facility]

## 2020-11-17 ENCOUNTER — PATIENT OUTREACH (OUTPATIENT)
Dept: CARE COORDINATION | Facility: CLINIC | Age: 45
End: 2020-11-17

## 2020-11-17 NOTE — PROGRESS NOTES
Clinic Care Coordination Contact  Clinic Care Coordination Contact     Situation: Patient chart reviewed by care coordinator.     Background: rn CC's initial assessment and enrollment to Care Coordination was 1/5/20.   Patient centered goals were developed with participation from patient.  rn CC handed patient off to CHW for continued outreach every 30 days.      Assessment: CHW has been in contact with patient monthly. Patient has made little progress to goals.  Patient had a no show for his scheduled 11/3/20 PCP visit.  Patient will need to reschedule this appointment and redo his stool test.    Patient is not due for updated Complex Care Plan.     Plan/Recommendations: CHW will involve RN TINY as needed or if patient is ready to move to maintenance.  RN CC will continue to monitor progress to goals and CHW outreaches every 6 weeks.     Melissa Behl BSN, RN, PHN, CCM  Primary Care Clinical RN Care Coordinator  [facility]   [phone]

## 2020-11-23 ENCOUNTER — PATIENT OUTREACH (OUTPATIENT)
Dept: NURSING | Facility: CLINIC | Age: 45
End: 2020-11-23
Payer: COMMERCIAL

## 2020-11-23 NOTE — PROGRESS NOTES
Clinic Care Coordination Contact  Community Health Worker Follow Up    Goals:   Goals Addressed as of 11/23/2020 at 2:12 PM                 Today    10/21/20      2. Pain Management (pt-stated)   70%  70%    Added 2/19/20 by Charlene Gonzales, RN     Goal Statement: I will have suitable pain relief to perform activities of daily living.  Date Goal set: 2/19/2020  Date to Achieve By: 1/1/2021  Patient expressed understanding of goal: Yes  Action steps to achieve this goal:  1. I will take medications as prescribed to maintain adequate pain control/relief.  2. I will utilize non-pharmaceutical measures to help with pain control/relief.  3. I will maintain routine follow up and contact with my clinic care team.  4. I will call my pharmacy to request refills of my medications before they run out.  5. I will schedule a pain clinic follow up appointment.        3. Financial Wellbeing (pt-stated)   20%  20%    Added 2/14/20 by Robin Jansen BSW     Goal Statement: I need help paying for my rent  Date Goal set: 2/14/2020  Barriers: Feet have been hurting too much to get back to the [facility]   Strengths: Patient is motivated to getting assistancce  Date to Achieve By: 7/30/2020  Patient expressed understanding of goal: Yes  Action steps to achieve this goal:  1. I will fill out application for emergency assistance  2. I will bring my application in to the [facility]  3. I will obtain emergency assistance, if found eligible    2/28/2020: Patient stated that he is still working on his emergency assistance application and had more questions about it. [facility] provided additional phone numbers to him for rent assistance    3/30/2020: Went to [facility]. Submitted documentation that was needed. Got rent assistance through another agency for March. Reapplied for assistance in April through [facility]        4. Psychosocial (pt-stated)   10%  10%     Added 6/1/20 by Robin Jansen BSW     Goal Statement: I can't afford to pay my phone bill  Date Goal set: 6/1/2020  Barriers: Patient's friend cannot help him pay his phone bill   Strengths: Patient is willing to work with Care Coordination  Date to Achieve By: 8/1/2020  Patient expressed understanding of goal: Yes  Action steps to achieve this goal:  1. I will apply for a free phone through the [place] [employer] Cell Phone Program- Resource provided by [facility]  2. I will have my phone bill expenses covered by this program, or via friend, if able to help            CHW spoke with patient he said he was doing good. CHW asked did he need help rescheduling his appointment with PCP. He said he went to the appointment. CHW said it was no show. He wanted to reschedule appointment.    He said he did speak with and had an appointment with  [facility] citizenship. He said he hasn't heard back from them. He said he is still working on getting rental assistance.     Intervention and Education during outreach: CHW helped patient reschedule appointment for PCP.     CHW Plan: will follow up an month to see if application was completed for rental assistance.    Elena BARAJAS Community Health Worker  Clinic Care Coordination  [facility] [place] [facility] [place]  Phone: [phone]

## 2020-11-24 NOTE — PROGRESS NOTES
Community Paramedics Follow-up Visit  November 11, 2020  TIME: 1000    Chaim Norman is a 45 year old male being seen at home for a follow-up visit.    Present at appointment: Patient, Care Team Member         Chief Complaint   Patient presents with     Outreach       [place] Utilization:   Clinic Utilization  Difficulty keeping appointments:: No  Compliance Concerns: No  No-Show Concerns: No  No PCP office visit in Past Year: No  Utilization    Last refreshed: 11/23/2020  4:28 PM: Hospital Admissions 1           Last refreshed: 11/23/2020  4:28 PM: ED Visits 0           Last refreshed: 11/23/2020  4:28 PM: No Show Count (past year) 2              Current as of: 11/23/2020  4:28 PM              /82   Pulse 100   Temp 99.9  F (37.7  C)   Resp 12   Wt 91.2 kg (201 lb)   SpO2 95%   BMI 34.50 kg/m      Clinical Concerns:  Current Medical Concerns:  CHF    Current Behavioral Concerns: none    Education Provided to patient: none   Medication set up? No  Pill Box issued: No  Scale issued: No  Health Maintenance Reviewed: Not assessed    Clinical Pathway: None    No  Face to Face in Home / Community    Recent Weights:  11/4 203.0  11/5 201.4  11/6 202.4  11/7 202.6  11/8 206.0  11/9 202.4  11/10 201.0    Review of Symptoms/PE    Skin: negative  Eyes: negative  Ears/Nose/Throat: negative  Respiratory: Shortness of breath and dyspnea on exertion  Cardiovascular: dyspnea on exertion, minimal lower extremity edema  Gastrointestinal: negative  Genitourinary: negative  Musculoskeletal: negative  Neurologic: negative  Psychiatric: negative    Pain Management::  Limitation of routine activities due to chronic pain:: Yes    Medication Reconciliation  Medication adherence problem (GOAL):: No  Knowledgeable about how to use meds:: Yes  Medication side effects suspected:: No    Diet/Exercise/Sleep  Diet:: Regular  Inadequate nutrition (GOAL):: No  Tube Feeding: No  Inadequate activity/exercise (GOAL):: Yes  Significant  changes in sleep pattern (GOAL): No    Time spent with patient: 60    The patient meets one or more of the following criteria:  * Has received hospital emergency department services three or more times in four consecutive months within a twelve month period    Acute concern/Follow-up recommendations: follow treatment plan    Next CP visit scheduled: 11/25/20    Issues for Provider to follow up on: Community Paramedic visited Chaim at his apartment. Chaim is doing very well and denies increased SOB or swelling of lower extremities. His main concern today is filing for divorce from his wife who lives in [place]. Community Paramedic will print off the froms that Chaim needs and drop them off at next visit. Chaim talked with a  who gave him the information and the divorce form numbers that he needs to complete.     Provider follow up visit needed: FRANCISCO

## 2020-11-25 ENCOUNTER — ALLIED HEALTH/NURSE VISIT (OUTPATIENT)
Dept: BEHAVIORAL HEALTH | Facility: CLINIC | Age: 45
End: 2020-11-25
Payer: COMMERCIAL

## 2020-11-25 VITALS
TEMPERATURE: 97.5 F | DIASTOLIC BLOOD PRESSURE: 92 MMHG | SYSTOLIC BLOOD PRESSURE: 124 MMHG | WEIGHT: 200.4 LBS | HEART RATE: 94 BPM | RESPIRATION RATE: 12 BRPM | OXYGEN SATURATION: 94 % | BODY MASS INDEX: 34.4 KG/M2

## 2020-11-25 DIAGNOSIS — J44.9 CHRONIC OBSTRUCTIVE PULMONARY DISEASE, UNSPECIFIED COPD TYPE (H): Primary | ICD-10-CM

## 2020-11-25 PROCEDURE — 99207 PR COMMUNITY PARAMEDIC-PATIENT MEETS CRITERIA: CPT

## 2020-11-25 ASSESSMENT — ACTIVITIES OF DAILY LIVING (ADL): DEPENDENT_IADLS:: INDEPENDENT

## 2020-11-25 NOTE — PROGRESS NOTES
Community Paramedics Follow-up Visit  November 25, 2020  TIME: 1000    Chaim Norman is a 45 year old male being seen at home for a follow-up visit.    Present at appointment: Patient, Care Team Member         Chief Complaint   Patient presents with     Outreach       [place] Utilization:   Clinic Utilization  Difficulty keeping appointments:: No  Compliance Concerns: No  No-Show Concerns: No  No PCP office visit in Past Year: No  Utilization    Last refreshed: 11/25/2020  1:37 PM: Hospital Admissions 1           Last refreshed: 11/25/2020  1:37 PM: ED Visits 0           Last refreshed: 11/25/2020  1:37 PM: No Show Count (past year) 2              Current as of: 11/25/2020  1:37 PM              BP (!) 124/92   Pulse 94   Temp 97.5  F (36.4  C)   Resp 12   Wt 90.9 kg (200 lb 6.4 oz)   SpO2 94%   BMI 34.40 kg/m      Clinical Concerns:  Current Medical Concerns:  CHF    Current Behavioral Concerns: none    Education Provided to patient: none   Medication set up? No  Pill Box issued: No  Scale issued: No  Health Maintenance Reviewed: Not assessed    Clinical Pathway: None    No  Face to Face in Home / Community    Last two weeks of patient weight:  11/12 201.2  11/13 202.8  11/14 203.0  11/15 203.4  11/16 202.0  11/17 202.2  11/18 204.8  11/19 201.4  11/20 200.0  11/21 200.6  11/22 201.4  11/23 202.8  11/24 201.6    Review of Symptoms/PE    Skin: negative  Eyes: negative  Ears/Nose/Throat: negative  Respiratory: Shortness of breath and Dyspnea on exertion  Cardiovascular: dyspnea on exertion and very mild lower extremity edema  Gastrointestinal: negative  Genitourinary: negative  Musculoskeletal: negative  Neurologic: numbness or tingling of feet  Psychiatric: negative    Pain Management::       Medication Reconciliation  Knowledgeable about how to use meds:: Yes  Medication side effects suspected:: No    Diet/Exercise/Sleep  Diet:: No added salt  Inadequate nutrition (GOAL):: No  Tube Feeding: No  Inadequate  activity/exercise (GOAL):: Yes  Significant changes in sleep pattern (GOAL): No     Time spent with patient: 90    The patient meets one or more of the following criteria:  * Has received hospital emergency department services three or more times in four consecutive months within a twelve month period    Acute concern/Follow-up recommendations: follow treatment plan    Next CP visit scheduled: 12/10/20    Issues for Provider to follow up on: Community Paramedic visited with Chaim in his apartment today. Chaim is doing well. He states his breathing is doing well as long as he does not exert himself. He did indicate that his lower extremities were bothering him yesterday, mainly numbness and tinglingly in his feet so he started to wear his compression socks which does help. Examination of his lower extremities found very little edema, no pain and no open wounds. His feet are warm to the touch with good CMS.     Chaim received multiple letters from [facility] in reference to [facility] not billing [facility] correctly for many of his past 2020 medical appointments so all of the claims were denied. Advised Chaim to hold on to the notices as they are informational notices only.     Chaim needed help filling out his divorce papers, assisted Chaim in completing some of these forms.     Chaim advised to call and follow up with the  center for assistance in paying his rent. He does not have a number to follow up with. Community Paramedic sent Chaim a few different numbers for [facility] Community Emergency Assistance Program via text message.    Provider follow up visit needed: 12/01/20

## 2020-11-27 ENCOUNTER — PATIENT OUTREACH (OUTPATIENT)
Dept: CARE COORDINATION | Facility: CLINIC | Age: 45
End: 2020-11-27

## 2020-12-01 ENCOUNTER — OFFICE VISIT (OUTPATIENT)
Dept: FAMILY MEDICINE | Facility: CLINIC | Age: 45
End: 2020-12-01
Payer: COMMERCIAL

## 2020-12-01 VITALS
SYSTOLIC BLOOD PRESSURE: 122 MMHG | DIASTOLIC BLOOD PRESSURE: 84 MMHG | HEART RATE: 130 BPM | WEIGHT: 204.6 LBS | HEIGHT: 64 IN | TEMPERATURE: 98.2 F | OXYGEN SATURATION: 95 % | BODY MASS INDEX: 34.93 KG/M2

## 2020-12-01 DIAGNOSIS — M79.89 FOOT SWELLING: ICD-10-CM

## 2020-12-01 DIAGNOSIS — G62.9 PERIPHERAL POLYNEUROPATHY: Primary | ICD-10-CM

## 2020-12-01 DIAGNOSIS — I27.20 PULMONARY HYPERTENSION (H): ICD-10-CM

## 2020-12-01 PROCEDURE — 99214 OFFICE O/P EST MOD 30 MIN: CPT | Performed by: NURSE PRACTITIONER

## 2020-12-01 ASSESSMENT — PAIN SCALES - GENERAL: PAINLEVEL: EXTREME PAIN (8)

## 2020-12-01 ASSESSMENT — MIFFLIN-ST. JEOR: SCORE: 1714.06

## 2020-12-01 NOTE — PROGRESS NOTES
Subjective     Chaim Norman is a 45 year old male who presents to clinic today for the following health issues:    HPI         Musculoskeletal problem/pain  Onset/Duration: 1 year   Description  Location: foot - bilateral  Joint Swelling: YES  Redness: no  Pain: YES  Warmth: no  Intensity:  severe  Progression of Symptoms:  worsening  Accompanying signs and symptoms:   Fevers: no  Numbness/tingling/weakness: YES, numbness  History  Trauma to the area: no  Recent illness:  no  Previous similar problem: no  Previous evaluation:  YES  Precipitating or alleviating factors:  Aggravating factors include: standing and walking  Therapies tried and outcome: ice, hot soaks       Continues to have intermittent pain and swelling in feet  Hurts worse when he walks for a while  Had LE US in July and was negative  Compression stockings help - recently put them on again  Pain is to entire foot  Numb and tingling   He stopped B6 because made the pain worse  On gabapentin and cymbalta which do help quite a bit  Not working due to TB and ongoing complications including pulmonary hypertension  Has only 1 pillow at home and it's pretty flat  Unable to elevate on a couch because had to sell it to pay his rent    Needs help with citizenship paperwork - community paramedic is helping. Chaim voices his appreciation for all of their help  Unable to get social security and food stamps because he's not a citizen  Cannot get unemployment because previous employer wrote something that makes him not qualify. I have previously written letter for this issue. He asked if I'd write a letter for appeal to his appeal. I asked him to get the info that needs to be included and I am happy to do so.     Needs new O2 tubing for his oxygen that he uses each night        Review of Systems   Constitutional, HEENT, cardiovascular, pulmonary, GI, , musculoskeletal, neuro, skin, endocrine and psych systems are negative, except as otherwise noted.      Objective   "  /84   Pulse 130   Temp 98.2  F (36.8  C) (Oral)   Ht 1.626 m (5' 4\")   Wt 92.8 kg (204 lb 9.6 oz)   SpO2 95%   BMI 35.12 kg/m    Body mass index is 35.12 kg/m .  Physical Exam   GENERAL: healthy, alert and no distress  RESP: lungs clear to auscultation - no rales, rhonchi or wheezes  CV: RRR  MS: no gross musculoskeletal defects noted, no edema. Normal DP and PT pulses, no trophic changes or ulcerative lesions and decreased sensation throughout foot. No swelling today. Wearing compression stockings initially  SKIN: no suspicious lesions or rashes  NEURO: Normal strength and tone, mentation intact and speech normal  PSYCH: mentation appears normal, affect normal/bright              Assessment & Plan     Peripheral polyneuropathy  Ongoing issue since on treatment for TB. He should be finishing up his therapy in the month. Discussed importance of daily foot exam at home and follow up with any lesions.    Foot swelling  Improved with compression stockings.     Pulmonary hypertension (H)  Patient requesting new O2 tubing and nasal cannula for nighttime oxygen. Will forward order to TC to send to medical supply [employer].   - Miscellaneous Order for DME - ONLY FOR DME        See Patient Instructions    Return in about 3 months (around 3/1/2021), or if symptoms worsen or fail to improve.    CHAZ Garrett CNP  [facility]    This visit took place during the COVID 19 global pandemic.   PPE worn during the visit included: surgical mask and face shield by me and mask by patient       "

## 2020-12-01 NOTE — Clinical Note
Please provide DME order for new oxygen tubing to patient so he can take to medical supply store for new tubing. Or fax it to medical supply?

## 2020-12-01 NOTE — PATIENT INSTRUCTIONS
Schedule with pulmonology clinic [phone]  I will let you know what I find out about the oxygen tubing and foam pad to elevate your legs

## 2020-12-03 ENCOUNTER — PATIENT OUTREACH (OUTPATIENT)
Dept: NURSING | Facility: CLINIC | Age: 45
End: 2020-12-03
Payer: COMMERCIAL

## 2020-12-03 NOTE — PROGRESS NOTES
"Clinic Care Coordination Contact    Follow Up Progress Note      Assessment: [facility] received notification from provider that it would be beneficial for patient to elevate his legs and feet to help reduce swelling. Patient told provider that he no longer has a couch to use for elevating his legs/feet. SWCC contacted [facility] [employer] to ask general questions about [employer] covering a wedge pillow, or other leg elevating equipment/supplies. [facility] was told that [employer] generally does not cover that type of DME.    CC contacted [facility] Customer Service to ask specifics on DME/Wedge Pillow coverage. Wait time exceeded 20 minutes. SWCC unable to wait on hold that long.    CC contacted patient to discuss things further. CC updated him on the information provided by DME company, as well as notified him of her attempt at calling insurance. SWCC encouraged him to contact his insurance. CC stated that the wait times are long, but it would be worth trying to see if they would provide coverage for a wedge pillow. Patient said \"That's your job to do for me so I'm not going to do that\". CC explained that the wait time is too long for her to be able to sit on hold and encouraged patient, again, to contact [facility] [employer] Service on his own. He said \"You always tell me you can't do these things for me. I need you to do them\". It was explained that [facility] provides resources for patient to empower him to accomplish tasks independently and CC will step in and help, when needed. Patient said he would call. Customer service number was provided to patient. [facility] also suggested folding some blankets and stacking them on top of each other, then laying his his bed with his fee/legs propped up. CC also suggested doing the same thing, but using his dining room chairs and stacked blankets. [employer] and [employer] were discussed too.     Goals addressed this encounter:   Goals Addressed                 This Visit's " Progress       Patient Stated      3. Financial Wellbeing (pt-stated)   20%     Goal Statement: I need help paying for my rent  Date Goal set: 2/14/2020  Barriers: Feet have been hurting too much to get back to the [facility]   Strengths: Patient is motivated to getting assistancce  Date to Achieve By: 7/30/2020  Patient expressed understanding of goal: Yes  Action steps to achieve this goal:  1. I will fill out application for emergency assistance  2. I will bring my application in to the [facility]  3. I will obtain emergency assistance, if found eligible    2/28/2020: Patient stated that he is still working on his emergency assistance application and had more questions about it. [facility] provided additional phone numbers to him for rent assistance    3/30/2020: Went to [facility]. Submitted documentation that was needed. Got rent assistance through another agency for March. Reapplied for assistance in April through [facility]        4. Psychosocial (pt-stated)   10%     Goal Statement: I can't afford to pay my phone bill  Date Goal set: 6/1/2020  Barriers: Patient's friend cannot help him pay his phone bill   Strengths: Patient is willing to work with Care Coordination  Date to Achieve By: 8/1/2020  Patient expressed understanding of goal: Yes  Action steps to achieve this goal:  1. I will apply for a free phone through the [place] [employer] Cell [employer] Program- Resource provided by [facility]  2. I will have my phone bill expenses covered by this program, or via friend, if able to help          Other (pt-stated)        Goal Statement: I need to elevate my feet/legs  Date Goal set: 12/3/2020  Barriers: Patient reports not having a couch to use for elevating feet/legs, Patient has a significantly fixed income  Strengths: Patient is enrolled in Care Coordination  Date to Achieve By: 1/3/2020  Patient expressed understanding of goal: Yes  Action  steps to achieve this goal:  1. I will contact my customer service number on the back of my [employer] card  2. I will ask if a wedge pillow is covered by my insurance  3. I will notify SWCC/CHW if it is covered and they will assist me in obtaining a wedge pillow through a [employer] company          Psychosocial (pt-stated)   On track     Goal Statement: I need help applying for my Naturalization  Date Goal set: 9/3/2020   Barriers: COVID 19  some offices closed   Strengths: feels capable of calling resources  Date to Achieve By: Nov 1st   Patient expressed understanding of goal: yes  Action steps to achieve this goal:  1. I will call resources provided for help with application  2. I will schedule an appt at the library to use the computer               Intervention/Education provided during outreach: Open ended questions. Discussed alternative options to propping feet and legs up     Outreach Frequency: monthly    Plan:   CHW will follow up with patient around 12/23 and review goals. SWCC will review chart in 6 weeks.    BING Garcia  Primary Care Clinic- Social Work Care Coordinator  [facility]  Ph: [phone]  12/3/2020 2:02 PM

## 2020-12-04 NOTE — PROGRESS NOTES
DME RX for oxygen tubing and nasal cannula  Mailed to pt's home address to take to medical supply store

## 2020-12-10 ENCOUNTER — ALLIED HEALTH/NURSE VISIT (OUTPATIENT)
Dept: BEHAVIORAL HEALTH | Facility: CLINIC | Age: 45
End: 2020-12-10
Payer: COMMERCIAL

## 2020-12-10 VITALS
RESPIRATION RATE: 12 BRPM | HEART RATE: 88 BPM | WEIGHT: 201.6 LBS | SYSTOLIC BLOOD PRESSURE: 120 MMHG | BODY MASS INDEX: 34.6 KG/M2 | DIASTOLIC BLOOD PRESSURE: 90 MMHG | TEMPERATURE: 98.6 F | OXYGEN SATURATION: 94 %

## 2020-12-10 DIAGNOSIS — J44.9 CHRONIC OBSTRUCTIVE PULMONARY DISEASE, UNSPECIFIED COPD TYPE (H): Primary | ICD-10-CM

## 2020-12-10 PROCEDURE — 99207 PR COMMUNITY PARAMEDIC-PATIENT MEETS CRITERIA: CPT

## 2020-12-10 ASSESSMENT — ACTIVITIES OF DAILY LIVING (ADL): DEPENDENT_IADLS:: INDEPENDENT

## 2020-12-11 NOTE — PROGRESS NOTES
Community Paramedics Follow-up Visit  December 10, 2020  TIME: 1000    Chaim Norman is a 45 year old male being seen at home for a follow-up visit.    Present at appointment: Patient, Care Team Member         Chief Complaint   Patient presents with     Outreach       [place] Utilization:   Clinic Utilization  Difficulty keeping appointments:: No  Compliance Concerns: No  No-Show Concerns: No  No PCP office visit in Past Year: No  Utilization    Last refreshed: 12/10/2020 12:40 PM: Hospital Admissions 1           Last refreshed: 12/10/2020 12:40 PM: ED Visits 0           Last refreshed: 12/10/2020 12:40 PM: No Show Count (past year) 2              Current as of: 12/10/2020 12:40 PM              BP (!) 120/90   Pulse 88   Temp 98.6  F (37  C)   Resp 12   Wt 91.4 kg (201 lb 9.6 oz)   SpO2 94%   BMI 34.60 kg/m      Clinical Concerns:  Current Medical Concerns:  CHF    Current Behavioral Concerns: none    Education Provided to patient: none   Medication set up? No  Pill Box issued: No  Scale issued: No  Health Maintenance Reviewed: Not assessed    Clinical Pathway: None    No  Face to Face in Home / Community    Recent weights:    Review of Symptoms/PE    Skin: negative  Eyes: negative  Ears/Nose/Throat: negative  Respiratory: Shortness of breath and Dyspnea on exertion  Cardiovascular: dyspnea on exertion and lower extremity edema  Gastrointestinal: negative  Genitourinary: negative  Musculoskeletal: negative  Neurologic: numbness or tingling of feet  Psychiatric: negative    Medication Reconciliation  Knowledgeable about how to use meds:: Yes  Medication side effects suspected:: No    Diet/Exercise/Sleep  Diet:: No added salt  Inadequate nutrition (GOAL):: No  Tube Feeding: No  Inadequate activity/exercise (GOAL):: Yes  Significant changes in sleep pattern (GOAL): No    Time spent with patient: 90    The patient meets one or more of the following criteria:  * Has received hospital emergency department services  three or more times in four consecutive months within a twelve month period    Acute concern/Follow-up recommendations: follow current treatment plan    Next CP visit scheduled: 12/23/20    Issues for Provider to follow up on: Community Paramedic visited with Chaim in his apartment. Chaim is doing well. He has some pitting edema to lower extremities. He last had his compression socks on yesterday. He has no pain in his feet and is able to feel writer touch his feet. Chaim is wondering when he is suppose to go into the Dr next at the [facility]. Community Paramedic will look into his chart and get back to him. Chaim states his breathing is doing ok. With no exertion he has no SOB, minimal exertion causes SOB.    Chaim had mail and papers from [facility] he wanted Community Paramedic to look through. His also wanted some assistance with his returned naturalization paperwork.    Provider follow up visit needed: FRANCISCO

## 2020-12-15 DIAGNOSIS — G89.29 CHRONIC PAIN OF BOTH SHOULDERS: ICD-10-CM

## 2020-12-15 DIAGNOSIS — M25.511 CHRONIC PAIN OF BOTH SHOULDERS: ICD-10-CM

## 2020-12-15 DIAGNOSIS — M79.672 BILATERAL FOOT PAIN: ICD-10-CM

## 2020-12-15 DIAGNOSIS — M79.671 BILATERAL FOOT PAIN: ICD-10-CM

## 2020-12-15 DIAGNOSIS — M25.512 CHRONIC PAIN OF BOTH SHOULDERS: ICD-10-CM

## 2020-12-15 DIAGNOSIS — G47.00 INSOMNIA, UNSPECIFIED TYPE: ICD-10-CM

## 2020-12-15 DIAGNOSIS — M79.2 NEUROPATHIC PAIN: ICD-10-CM

## 2020-12-15 NOTE — TELEPHONE ENCOUNTER
Received fax from pharmacy requesting refill(s) for nortriptyline (PAMELOR) 10 MG capsule     Last refilled on 11/02/20    Pt last seen on 06/05/20  Next appt scheduled for none    E-prescribe to:    [id number] - [place], [place] - [address] AT [address]     Will facilitate refill.    Patsy Khan MA  [facility] [place]

## 2020-12-16 RX ORDER — NORTRIPTYLINE HCL 10 MG
10-20 CAPSULE ORAL AT BEDTIME
Qty: 60 CAPSULE | Refills: 1 | Status: SHIPPED | OUTPATIENT
Start: 2020-12-16 | End: 2021-02-15

## 2020-12-16 NOTE — TELEPHONE ENCOUNTER
Signed Prescriptions:                        Disp   Refills    nortriptyline (PAMELOR) 10 MG capsule      60 cap*1        Sig: Take 1-2 capsules (10-20 mg) by mouth At Bedtime For           pain and insomnia.. schedule appt prior to next           refill  Authorizing Provider: MICHELLE CHILDERS, RN CNP, FNP  [facility]  [facility]

## 2020-12-23 ENCOUNTER — ALLIED HEALTH/NURSE VISIT (OUTPATIENT)
Dept: BEHAVIORAL HEALTH | Facility: CLINIC | Age: 45
End: 2020-12-23
Payer: COMMERCIAL

## 2020-12-23 VITALS
OXYGEN SATURATION: 92 % | HEART RATE: 99 BPM | DIASTOLIC BLOOD PRESSURE: 82 MMHG | TEMPERATURE: 97.9 F | WEIGHT: 202.2 LBS | SYSTOLIC BLOOD PRESSURE: 124 MMHG | BODY MASS INDEX: 34.71 KG/M2 | RESPIRATION RATE: 12 BRPM

## 2020-12-23 DIAGNOSIS — J44.9 CHRONIC OBSTRUCTIVE PULMONARY DISEASE, UNSPECIFIED COPD TYPE (H): Primary | ICD-10-CM

## 2020-12-23 PROCEDURE — 99207 PR COMMUNITY PARAMEDIC-PATIENT MEETS CRITERIA: CPT

## 2020-12-23 ASSESSMENT — ACTIVITIES OF DAILY LIVING (ADL): DEPENDENT_IADLS:: INDEPENDENT

## 2020-12-23 NOTE — PROGRESS NOTES
Community Paramedics Follow-up Visit  December 23, 2020  TIME: 0900    Chaim Norman is a 45 year old male being seen at home for a follow-up visit.    Present at appointment: Patient, Care Team Member         Chief Complaint   Patient presents with     Outreach       [place] Utilization:   Clinic Utilization  Difficulty keeping appointments:: No  Compliance Concerns: No  No-Show Concerns: No  No PCP office visit in Past Year: No  Utilization    Last refreshed: 12/23/2020 10:19 AM: Hospital Admissions 1           Last refreshed: 12/23/2020 10:19 AM: ED Visits 0           Last refreshed: 12/23/2020 10:19 AM: No Show Count (past year) 2              Current as of: 12/23/2020 10:19 AM              /82   Pulse 99   Temp 97.9  F (36.6  C)   Resp 12   Wt 91.7 kg (202 lb 3.2 oz)   SpO2 92%   BMI 34.71 kg/m      Clinical Concerns:  Current Medical Concerns:  CHF    Current Behavioral Concerns: None    Education Provided to patient: None   Medication set up? No  Pill Box issued: No  Scale issued: No  Health Maintenance Reviewed: Not assessed    Clinical Pathway: None    No  Face to Face in Home / Community    Recent weights:  12/11 201.0  12/12 203.4  12/13 202.6  12/14 202.2  12/15 202.2  12/16 201.4  12/17 201.2  12/18 201.1  12/19 202.6  12/20 202.4  12/21 202.2  12/22 202.4  12/23 202.2    Review of Symptoms/PE    Skin: negative  Eyes: negative  Ears/Nose/Throat: negative  Respiratory: Shortness of breath and Dyspnea on exertion  Cardiovascular: dyspnea on exertion, exercise intolerance  Gastrointestinal: negative  Genitourinary: negative  Musculoskeletal: negative  Neurologic: negative  Psychiatric: negative     Medication Reconciliation  Knowledgeable about how to use meds:: Yes  Medication side effects suspected:: No    Diet/Exercise/Sleep  Diet:: No added salt  Inadequate nutrition (GOAL):: No  Tube Feeding: No  Inadequate activity/exercise (GOAL):: Yes  Significant changes in sleep pattern (GOAL):  No    Plan: .    Time spent with patient: 45    The patient meets one or more of the following criteria:  * Has received hospital emergency department services three or more times in four consecutive months within a twelve month period    Acute concern/Follow-up recommendations: follow current treatment plan    Next CP visit scheduled: 01/06/21    Issues for Provider to follow up on: Community Paramedic visited with Chaim in his apartment today. Chaim indicated he is doing well however he feels his oxygen levels are low. [facility] Paramedic placed Chaim on a pulse oximetry for approx. 5 minutes and his ranges where between 92-93%. Chaim is normally at 94-95%. Chaim is unsure why is oxygen is running low. He says that he does not feel sick and he does not have a fever. CP checked his tempature upon arrival and it was 97.9. Lung sounds are diminished on right and clear on the left    Lower extremities have no pitting edema. No numbness or tingling of the feet or toes.    [facility] Paramedic also assisted Chaim with putting his Naturalization paperwork together with the letter that was sent to him from [facility] dated August 26, 2020. Will re-mail this paperwork with the requested additional paperwork.    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

## 2020-12-28 ENCOUNTER — PATIENT OUTREACH (OUTPATIENT)
Dept: NURSING | Facility: CLINIC | Age: 45
End: 2020-12-28
Payer: COMMERCIAL

## 2020-12-28 DIAGNOSIS — G62.0 DRUG-INDUCED PERIPHERAL NEUROPATHY (H): ICD-10-CM

## 2020-12-28 RX ORDER — GABAPENTIN 300 MG/1
900 CAPSULE ORAL 3 TIMES DAILY
Qty: 270 CAPSULE | Refills: 1 | Status: SHIPPED | OUTPATIENT
Start: 2020-12-28 | End: 2021-02-24

## 2020-12-28 NOTE — TELEPHONE ENCOUNTER
Received fax request from   [id number] - [place], [place] - [address] AT [facility] [address]  [address] [address]  Phone: [phone] Fax: [phone]     pharmacy requesting refill(s) for gabapentin (NEURONTIN) 300 MG capsule    Last refilled on 11/29/20    Pt last seen on 06/05/20    No future office/virtual visit scheduled at this time    Will facilitate refill.    Perla Winn [facility]  [place]

## 2020-12-28 NOTE — PROGRESS NOTES
Clinic Care Coordination Contact  Community Health Worker Follow Up    Goals:   Goals Addressed as of 12/28/2020 at 1:58 PM                 Today    12/10/20      2. Pain Management (pt-stated)   80%  70%    Added 2/19/20 by Charlene Gonzales, RN     Goal Statement: I will have suitable pain relief to perform activities of daily living.  Date Goal set: 2/19/2020  Date to Achieve By: 1/1/2021  Patient expressed understanding of goal: Yes  Action steps to achieve this goal:  1. I will take medications as prescribed to maintain adequate pain control/relief.  2. I will utilize non-pharmaceutical measures to help with pain control/relief.  3. I will maintain routine follow up and contact with my clinic care team.  4. I will call my pharmacy to request refills of my medications before they run out.  5. I will schedule a pain clinic follow up appointment.        3. Financial Wellbeing (pt-stated)   20%  20%    Added 2/14/20 by Robin Jansen BSW     Goal Statement: I need help paying for my rent  Date Goal set: 2/14/2020  Barriers: Feet have been hurting too much to get back to the [facility]   Strengths: Patient is motivated to getting assistancce  Date to Achieve By: 7/30/2020  Patient expressed understanding of goal: Yes  Action steps to achieve this goal:  1. I will fill out application for emergency assistance  2. I will bring my application in to the [facility]  3. I will obtain emergency assistance, if found eligible    2/28/2020: Patient stated that he is still working on his emergency assistance application and had more questions about it. [facility] provided additional phone numbers to him for rent assistance    3/30/2020: Went to [facility]. Submitted documentation that was needed. Got rent assistance through another agency for March. Reapplied for assistance in April through [facility]        Other (pt-stated)   10%  10%    Added  12/3/20 by Robin Jansen BSW     Goal Statement: I need to elevate my feet/legs  Date Goal set: 12/3/2020  Barriers: Patient reports not having a couch to use for elevating feet/legs, Patient has a significantly fixed income  Strengths: Patient is enrolled in Care Coordination  Date to Achieve By: 1/3/2020  Patient expressed understanding of goal: Yes  Action steps to achieve this goal:  1. I will contact my customer service number on the back of my [facility] [employer] card  2. I will ask if a wedge pillow is covered by my insurance  3. I will notify [facility]/CHW if it is covered and they will assist me in obtaining a wedge pillow through a [employer] company             CHW spoke with patient he said he is doing good.He said he is remembering to order medications before he completely runs out. He said he is behind on his rent. CHW was going to give him he phone number for rental assistance [phone].    Patient did not have pen CHW left information on his voicemail. Patient is elevating his feet/legs. He has not called [facility] about a pillow. He said he has been using a comforter to elevate feet/legs.    CHW asked did patient have any other question or resources at this time. Patient did not have any concerns.     Intervention and Education during outreach: CHW gave patient rental assistance [phone]     CHW Plan: Will call patient an a month.    Elena BARAJAS Community Health Worker  Clinic Care Coordination  [facility] : [place], [place] & [place]  Phone: [phone]

## 2020-12-28 NOTE — TELEPHONE ENCOUNTER
Signed Prescriptions:                        Disp   Refills    gabapentin (NEURONTIN) 300 MG capsule      270 ca*1        Sig: Take 3 capsules (900 mg) by mouth 3 times daily  Authorizing Provider: MICHELLE CHILDERS, RN CNP, FNP  [facility] Pain Management [facility]

## 2021-01-06 ENCOUNTER — ALLIED HEALTH/NURSE VISIT (OUTPATIENT)
Dept: BEHAVIORAL HEALTH | Facility: CLINIC | Age: 46
End: 2021-01-06
Payer: COMMERCIAL

## 2021-01-06 VITALS
OXYGEN SATURATION: 93 % | HEART RATE: 94 BPM | DIASTOLIC BLOOD PRESSURE: 90 MMHG | SYSTOLIC BLOOD PRESSURE: 124 MMHG | RESPIRATION RATE: 12 BRPM | TEMPERATURE: 99.4 F

## 2021-01-06 DIAGNOSIS — J44.9 CHRONIC OBSTRUCTIVE PULMONARY DISEASE, UNSPECIFIED COPD TYPE (H): Primary | ICD-10-CM

## 2021-01-06 PROCEDURE — 99207 PR COMMUNITY PARAMEDIC-PATIENT MEETS CRITERIA: CPT

## 2021-01-06 ASSESSMENT — ACTIVITIES OF DAILY LIVING (ADL): DEPENDENT_IADLS:: INDEPENDENT

## 2021-01-06 NOTE — PROGRESS NOTES
Community Paramedics Follow-up Visit  January 6, 2021  TIME: 1000    Chaim Norman is a 45 year old male being seen at home for a follow-up visit.    Present at appointment: Patient, Care Team Member         Chief Complaint   Patient presents with     Outreach       [place] Utilization:   Clinic Utilization  Difficulty keeping appointments:: No  Compliance Concerns: No  No-Show Concerns: No  No PCP office visit in Past Year: No  Utilization    Last refreshed: 1/7/2021  7:25 AM: Hospital Admissions 1           Last refreshed: 1/7/2021  7:25 AM: ED Visits 0           Last refreshed: 1/7/2021  7:25 AM: No Show Count (past year) 2              Current as of: 1/7/2021  7:25 AM              BP (!) 124/90   Pulse 94   Temp 99.4  F (37.4  C)   Resp 12   SpO2 93%     Clinical Concerns:  Current Medical Concerns:  CHF    Current Behavioral Concerns: none    Education Provided to patient: none   Medication set up? No  Pill Box issued: No  Scale issued: No  Health Maintenance Reviewed: Due/Overdue no    Clinical Pathway: None    No  Face to Face in Home / Community    Recent weights:  12/23 202/0  12/24 202.0  12/25 199.4  12/26 200.6  12/27 202.8  12/28 202.6  12/29 204.6  12/30 202.0  12/31 204.8  1/1/21 205.4  1/2 204.6  1/3 204.6  1/4 205.0  1/5 203.0    Review of Symptoms/PE    Skin: negative  Eyes: negative  Ears/Nose/Throat: negative  Respiratory: Shortness of breath and Dyspnea on exertion  Cardiovascular: dyspnea on exertion and lower extremity edema  Gastrointestinal: negative  Genitourinary: negative  Musculoskeletal: negative  Neurologic: negative  Psychiatric: negative    Pain Management::       Medication Reconciliation  Knowledgeable about how to use meds:: Yes  Medication side effects suspected:: No    Diet/Exercise/Sleep  Diet:: No added salt  Inadequate nutrition (GOAL):: No  Tube Feeding: No  Inadequate activity/exercise (GOAL):: Yes  Significant changes in sleep pattern (GOAL): No    Time spent with  patient: 60    The patient meets one or more of the following criteria:  * Has received hospital emergency department services three or more times in four consecutive months within a twelve month period    Acute concern/Follow-up recommendations: follow current treatment plan    Next CP visit scheduled: 1/20/21     Issues for Provider to follow up on: Community Paramedic visited with Chaim in his apartment today. Chaim is doing well and states his breathing is doing better. Edema to lower extremities is very minimal and his feet have no edema. He denies pain and states his feet are somewhat numb but he can feel writer touch his skin.     Chaim is very anxious and wants to get out of his apartment and get a job and provide for his family, advised Chaim he needs to be patient and let his lungs heal, he understands but he states it has been two years that he has been dealing with his sickness.     Chaim had no paperwork to go over, he received an increase in financial assistance as of the beginning of the year and stated he was able to pay for his rent in full and on time and was happy about that. He is currently waiting for his naturalization paperwork that was mailed so he can become a citizen of the [place].     Provider follow up visit needed: FRANCISCO

## 2021-01-15 ENCOUNTER — PATIENT OUTREACH (OUTPATIENT)
Dept: CARE COORDINATION | Facility: CLINIC | Age: 46
End: 2021-01-15

## 2021-01-15 NOTE — PROGRESS NOTES
Clinic Care Coordination Contact    Situation: Patient chart reviewed by care coordinator.    Background: Patient is enrolled in Care Coordination. CHW, Community Paramedic and [facility] are all involved.    Assessment: CHW spoke with patient on 12/28. CHW provided patient with the number for rental assistance. He has not called his insurance regarding getting a pillow to elevate his feet and legs. He did confirm with her that has been doing elevating them though. Community Paramedic visited with patient on 1/6. Per documentation, breathing is doing better. Edema to lower extremities is very minimal and his feet have no edema. Community Paramedic also noted that patient has had an increase in financial assistance as of the beginning fo the year and he has been able to pay for his rent in full and on time. Continues to wait on his naturalization paperwork that was mailed. Complex care plan is due, so [facility] is having that sent in the mail.    Plan/Recommendations: CC will review chart in 6 weeks, per standard workflow. CHW will follow up with patient in one month from most recent outreach    BING Garcia  Primary Care Clinic- Social Work Care Coordinator  [facility]  Ph: [phone]  1/15/2021 1:58 PM

## 2021-01-15 NOTE — LETTER
[facility]  Complex Care Plan  About Me:    Patient Name:  Chaim Norman    YOB: 1975  Age:         45 year old   [facility] MRN:    [id number] Telephone Information:  Home Phone [phone]   Mobile [phone]       Address:  [address]  [place] [address] Email address:  [email or web address]      Emergency Contact(s)    Name Relationship Lgl Grd Work Phone Home Phone Mobile Phone   1. SARAH NORMAN Son   [phone]    2. DECLINED, PER * Other   [phone]            Primary language:  English     needed? No   [facility]:  [phone] op. 1  Other communication barriers:    Preferred Method of Communication:  Mail  Current living arrangement:    Mobility Status/ Medical Equipment:      Health Maintenance  Health Maintenance Reviewed:    Health Maintenance Due   Topic Date Due     PREVENTIVE CARE VISIT  1975     SPIROMETRY  1975     URINE DRUG SCREEN  1975     COPD ACTION PLAN  1975     PHQ-2  01/01/2021         My Access Plan  Medical Emergency 911   Primary Clinic Line [facility] - [phone]   24 Hour Appointment Line [phone] or  [phone]) (toll-free)   24 Hour Nurse Line [phone] (toll-free)   Preferred Urgent Care     Preferred Hospital     Preferred Pharmacy [id number] [address] [facility] [address] [facility]     Behavioral Health Crisis Line The National Suicide Prevention Lifeline at 1-662.211.9235 or 911             My Care Team Members  Patient Care Team       Relationship Specialty Notifications Start End    Nyla Florian APRN CNP PCP - General Nurse Practitioner  9/25/18     Phone: [phone] Fax: [phone]         [address] [place] [address]    Nyla Florian APRN CNP Assigned PCP   10/25/18     Phone: [phone] Fax: [phone]         [address] [place]  [address]    Tania Frias, RN Specialty Care Coordinator Cardiology Admissions 1/28/20     Pulmonary HTN    Phone: [phone] Pager: [phone]        Rosa Malcolm, RN Specialty Care Coordinator Cardiology Admissions 1/28/20     Pulmonary HTN    Phone: [phone] Pager: [phone] Fax: [phone]       Pat Story, RN Specialty Care Coordinator Cardiology Admissions 1/28/20     Pulmonary HTN    Phone: [phone] Pager: [phone] Fax: [phone]       Robin Jansen, ELLIOTTW Lead Care Coordinator Primary Care - CC Admissions 2/14/20     Dawn Kendrick MD MD Internal Medicine  5/27/20     Phone: [phone] Fax: [phone]         [address] [place] [address]    Elena Robert MA Community Health Worker Primary Care - CC Admissions 7/1/20     Sayda Tobias, EMT Community Paramedic  Admissions 7/2/20     Phone: [phone]         Rod Chauhan MD Hospitalist Cardiology  7/7/20     Phone: [phone] Pager: [phone] Fax: [phone]        [address] [facility] [address]    Andrea Timmons MD MD Infectious Diseases  7/7/20      [address] [facility] [address] [facility] [address]    Rhonda Atwood MD Resident Student in organized health care education/training program  7/7/20     Phone: [phone] Fax: [phone]         [address] [place] [address] [address] [facility] [address]            My Care Plans  Self Management and Treatment Plan  Goals and (Comments)  Goals        General    1. Medical (pt-stated)     Notes - Note created  6/25/2020 12:36 PM by Behl, Melissa K, GIANNA    Goal Statement: I will participate in the Community Paramedic program.  Date Goal set: 6/25/2020   Barriers: unknown  Strengths: engaged  Date to Achieve By: 9/25/20  Patient expressed understanding of goal: yes  Action steps to achieve this goal:  1. I will accept Community Paramedic visit within the next 7 days.  2. I will participate in Community Paramedic visits.        2. Pain Management  (pt-stated)     Notes - Note edited  11/17/2020  3:31 PM by Behl, Melissa K, RN    Goal Statement: I will have suitable pain relief to perform activities of daily living.  Date Goal set: 2/19/2020  Date to Achieve By: 1/1/2021  Patient expressed understanding of goal: Yes  Action steps to achieve this goal:  1. I will take medications as prescribed to maintain adequate pain control/relief.  2. I will utilize non-pharmaceutical measures to help with pain control/relief.  3. I will maintain routine follow up and contact with my clinic care team.  4. I will call my pharmacy to request refills of my medications before they run out.  5. I will schedule a pain clinic follow up appointment.      3. Financial Wellbeing (pt-stated)     Notes - Note edited  6/1/2020  1:17 PM by Robin Jansen BSW    Goal Statement: I need help paying for my rent  Date Goal set: 2/14/2020  Barriers: [facility]   Strengths: Patient is motivated to getting assistancce  Date to Achieve By: 7/30/2020  Patient expressed understanding of goal: Yes  Action steps to achieve this goal:  1. I will fill out application for emergency assistance  2. [facility]  3. I will obtain emergency assistance, if found eligible    2/28/2020: Patient stated that he is still working on his emergency assistance application and had more questions about it. [facility] provided additional phone numbers to him for rent assistance    3/30/2020: [facility] documentation that was needed. Got rent assistance through another agency for March. Reapplied for assistance [facility] April [facility]      4. Psychosocial (pt-stated)     Notes - Note edited  7/1/2020 12:21 PM by Robin Jansen BSW    Goal Statement: I can't afford to pay my phone bill  Date Goal set: 6/1/2020  Barriers: Patient's  friend cannot help him pay his phone bill   Strengths: Patient is willing to work with Care Coordination  Date to Achieve By: 8/1/2020  Patient expressed understanding of goal: Yes  Action steps to achieve this goal:  1. I will apply for a free phone through the [place] Lifeline Cell Phone Program- Resource provided by [facility]  2. I will have my phone bill expenses covered by this program, or via friend, if able to help        Other (pt-stated)     Notes - Note edited  12/3/2020  1:56 PM by Robin Jansen BSW    Goal Statement: I need to elevate my feet/legs  Date Goal set: 12/3/2020  Barriers: Patient reports not having a couch to use for elevating feet/legs, Patient has a significantly fixed income  Strengths: Patient is enrolled in Care Coordination  Date to Achieve By: 1/3/2020  Patient expressed understanding of goal: Yes  Action steps to achieve this goal:  1. I will contact my customer service number on the back of my [facility] M[facility]are card  2. I will ask if a wedge pillow is covered by my insurance  3. I will notify [facility]/W if it is covered and they will assist me in obtaining a wedge pillow through a [employer] company        Psychosocial (pt-stated)     Notes - Note created  9/3/2020  9:47 AM by Lisa Loredo LSW    Goal Statement: I need help applying for my Naturalization  Date Goal set: 9/3/2020   Barriers: COVID 19  some offices closed   Strengths: feels capable of calling resources  Date to Achieve By: Nov 1st   Patient expressed understanding of goal: yes  Action steps to achieve this goal:  1. I will call resources provided for help with application  2. I will schedule an appt at the [facility] to use the computer               Action Plans on File:                       Advance Care Plans/Directives Type:        My Medical and Care Information  Problem List   Patient Active Problem List   Diagnosis     History of TB (tuberculosis)     Weight loss     Pulmonary nodules     Late latent syphilis     Iatrogenic  pneumothorax     Patient's intentional underdosing of medication regimen due to financial hardship     Anemia of chronic disease     Benign prostatic hyperplasia, unspecified whether lower urinary tract symptoms present     COPD (chronic obstructive pulmonary disease) (H)     Hepatitis B core antibody positive     Neutropenia (H)     Pulmonary hypertension (H)     SOB (shortness of breath)     Status post coronary angiogram     Morbid obesity (H)     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    Care Coordination Start Date: No linked episodes   Frequency of Care Coordination:     Form Last Updated: 01/15/2021

## 2021-01-19 ENCOUNTER — PATIENT OUTREACH (OUTPATIENT)
Dept: BEHAVIORAL HEALTH | Facility: CLINIC | Age: 46
End: 2021-01-19
Payer: COMMERCIAL

## 2021-01-19 DIAGNOSIS — J44.9 CHRONIC OBSTRUCTIVE PULMONARY DISEASE, UNSPECIFIED COPD TYPE (H): Primary | ICD-10-CM

## 2021-01-19 NOTE — PROGRESS NOTES
Community Paramedic called Chaim to cancel appointment of 1/20 due to unforseen circumstances. Chaim indicates that he is doing well, his breathing is doing good and his weight has not changed significantly. His legs/feet have no pain and he feels there is no swelling in either his legs or feet. He has not received any paperwork back for his naturalization citizenship yet and his wife will return the end of the month to sign the paperwork he is waiting for her to sign. He denies any other paperwork that he needed Community Paramedic to go over with him.

## 2021-01-28 ENCOUNTER — ALLIED HEALTH/NURSE VISIT (OUTPATIENT)
Dept: BEHAVIORAL HEALTH | Facility: CLINIC | Age: 46
End: 2021-01-28
Payer: COMMERCIAL

## 2021-01-28 DIAGNOSIS — J44.9 CHRONIC OBSTRUCTIVE PULMONARY DISEASE, UNSPECIFIED COPD TYPE (H): Primary | ICD-10-CM

## 2021-01-28 PROCEDURE — 99207 PR COMMUNITY PARAMEDIC-PATIENT MEETS CRITERIA: CPT

## 2021-01-28 ASSESSMENT — ACTIVITIES OF DAILY LIVING (ADL): DEPENDENT_IADLS:: INDEPENDENT

## 2021-01-29 ENCOUNTER — PATIENT OUTREACH (OUTPATIENT)
Dept: NURSING | Facility: CLINIC | Age: 46
End: 2021-01-29
Payer: COMMERCIAL

## 2021-01-29 DIAGNOSIS — G62.0 DRUG-INDUCED PERIPHERAL NEUROPATHY (H): ICD-10-CM

## 2021-01-29 DIAGNOSIS — M79.671 BILATERAL FOOT PAIN: ICD-10-CM

## 2021-01-29 DIAGNOSIS — M79.2 NEUROPATHIC PAIN: ICD-10-CM

## 2021-01-29 DIAGNOSIS — M25.511 CHRONIC PAIN OF BOTH SHOULDERS: ICD-10-CM

## 2021-01-29 DIAGNOSIS — G89.29 CHRONIC PAIN OF BOTH SHOULDERS: ICD-10-CM

## 2021-01-29 DIAGNOSIS — M79.672 BILATERAL FOOT PAIN: ICD-10-CM

## 2021-01-29 DIAGNOSIS — M25.512 CHRONIC PAIN OF BOTH SHOULDERS: ICD-10-CM

## 2021-01-29 RX ORDER — ACETAMINOPHEN 325 MG/1
650 TABLET ORAL EVERY 6 HOURS PRN
Qty: 180 TABLET | Refills: 2 | Status: SHIPPED | OUTPATIENT
Start: 2021-01-29 | End: 2021-02-26

## 2021-01-29 RX ORDER — DULOXETIN HYDROCHLORIDE 60 MG/1
60 CAPSULE, DELAYED RELEASE ORAL DAILY
Qty: 90 CAPSULE | Refills: 1 | OUTPATIENT
Start: 2021-01-29

## 2021-01-29 NOTE — TELEPHONE ENCOUNTER
Reason for Call:  Medication or medication refill:    Do you use a [facility]?  Name of the pharmacy and phone number for the current request:  [facility] - [phone] on [address]    Name of the medication requested: DULoxetine (CYMBALTA) 60 MG capsule,DULoxetine (CYMBALTA) 30 MG capsule,acetaminophen (TYLENOL) 325 MG tablet    Other request: none    Can we leave a detailed message on this number? YES    Phone number patient can be reached at: Home number on file [phone] (home) or Work number on file:  There is no work phone number on file.    Best Time: asap    Call taken on 1/29/2021 at 8:52 AM by Tonya Tejada

## 2021-01-29 NOTE — TELEPHONE ENCOUNTER
"Routing refill request to provider for review/approval because:  Failed protocol.  No future appointment scheduled. Please advise. Thank you. Jennifer Coker R.N.  Requested Prescriptions   Pending Prescriptions Disp Refills    DULoxetine (CYMBALTA) 30 MG capsule 90 capsule 1     Sig: Take 1 capsule (30 mg) by mouth daily Take with 60mg to equal 90mg/day. 3 month script       Serotonin-Norepinephrine Reuptake Inhibitors  Failed - 1/29/2021 10:28 AM        Failed - Blood pressure under 140/90 in past 12 months     BP Readings from Last 3 Encounters:   01/06/21 (!) 124/90   12/23/20 124/82   12/10/20 (!) 120/90                 Passed - Recent (12 mo) or future (30 days) visit within the authorizing provider's specialty     Patient has had an office visit with the authorizing provider or a provider within the authorizing providers department within the previous 12 mos or has a future within next 30 days. See \"Patient Info\" tab in inbasket, or \"Choose Columns\" in Meds & Orders section of the refill encounter.              Passed - Medication is active on med list        Passed - Patient is age 18 or older         Signed Prescriptions Disp Refills    acetaminophen (TYLENOL) 325 MG tablet 180 tablet 2     Sig: Take 2 tablets (650 mg) by mouth every 6 hours as needed for mild pain       Analgesics (Non-Narcotic Tylenol and ASA Only) Passed - 1/29/2021 10:28 AM        Passed - Recent (12 mo) or future (30 days) visit within the authorizing provider's specialty     Patient has had an office visit with the authorizing provider or a provider within the authorizing providers department within the previous 12 mos or has a future within next 30 days. See \"Patient Info\" tab in inbasket, or \"Choose Columns\" in Meds & Orders section of the refill encounter.              Passed - Patient is 7 months old or older     If patient is a peds patient of the age 7 mos -12 years, ok to refill using weight-based dosing.     If >3g daily and/or " "sig is not \"prn\", check for liver enzymes. If normal in the last year, ok to refill.  If not, refer to the provider.          Passed - Medication is active on med list         Refused Prescriptions Disp Refills    DULoxetine (CYMBALTA) 60 MG capsule 90 capsule 1     Sig: Take 1 capsule (60 mg) by mouth daily Take with 30mg to equal 90mg/day. 3 month script       Serotonin-Norepinephrine Reuptake Inhibitors  Failed - 1/29/2021 10:28 AM        Failed - Blood pressure under 140/90 in past 12 months     BP Readings from Last 3 Encounters:   01/06/21 (!) 124/90   12/23/20 124/82   12/10/20 (!) 120/90                 Passed - Recent (12 mo) or future (30 days) visit within the authorizing provider's specialty     Patient has had an office visit with the authorizing provider or a provider within the authorizing providers department within the previous 12 mos or has a future within next 30 days. See \"Patient Info\" tab in inbasket, or \"Choose Columns\" in Meds & Orders section of the refill encounter.              Passed - Medication is active on med list        Passed - Patient is age 18 or older                   "

## 2021-01-29 NOTE — PROGRESS NOTES
Clinic Care Coordination Contact  Community Health Worker Follow Up    Goals:   Goals Addressed as of 1/29/2021 at 11:39 AM                 Today    1/6/21      2. Pain Management (pt-stated)   80%  70%    Added 2/19/20 by Charlene Gonzales, RN     Goal Statement: I will have suitable pain relief to perform activities of daily living.  Date Goal set: 2/19/2020  Date to Achieve By: 1/1/2021  Patient expressed understanding of goal: Yes  Action steps to achieve this goal:  1. I will take medications as prescribed to maintain adequate pain control/relief.  2. I will utilize non-pharmaceutical measures to help with pain control/relief.  3. I will maintain routine follow up and contact with my clinic care team.  4. I will call my pharmacy to request refills of my medications before they run out.  5. I will schedule a pain clinic follow up appointment.        Psychosocial (pt-stated)   10%  On track    Added 9/3/20 by Lisa Loredo LSW     Goal Statement: I need help applying for my Naturalization  Date Goal set: 9/3/2020   Barriers: COVID 19  some offices closed   Strengths: feels capable of calling resources  Date to Achieve By: 04/01/21  Patient expressed understanding of goal: yes  Action steps to achieve this goal:  1. I will call resources provided for help with application  2. I will schedule an appt at the [facility] to use the computer            CHW spoke with patient he said he is doing good, he said he is elevating  his legs. He continues to take medications as prescribed. He said he is remembering to order refills before medication are completely gone. He said he is still waiting for naturalization form to come  in the mail.    CHW asked was there any other questions or concerns he has at this time he said no.     Intervention and Education during outreach: CHW advised patient to call clinic with any non-emergent concerns    CHW Plan: will follow up an a month     Elena BARAJAS [facility]  Worker  Clinic Care Coordination  [facility] : [place], [place] & [place]  Phone: [phone]

## 2021-02-01 ENCOUNTER — TELEPHONE (OUTPATIENT)
Dept: TRANSPLANT | Facility: CLINIC | Age: 46
End: 2021-02-01

## 2021-02-01 NOTE — TELEPHONE ENCOUNTER
Returned patient call. Discussed criteria for lung transplant.     Reviewed concerns brought up with patient during lung transplant intake process. Requested status update pm issues.     Weight: Patient's BMI in July during intake 34.0 (202lbs)  Patient current BMI 34.0 (weight 202lbs).  Noted patient would need to work on losing weight to be considered a lung transplant candidate. Patient goal weight 170-175lbs).     Social Support: Patient reported he did not have social support at time if intake call. Patient noted he continues to have no social support. Patient noted his family other than his son (19 y/o) all live in [place] [facility].     Plan made for patient to continue to work on weight loss and discuss with friends and son the possibility of providing social support.     Plan made to close patient's referral at this time but patient aware if he was able to establish social support and lose weight his referral could be re-opened.

## 2021-02-01 NOTE — TELEPHONE ENCOUNTER
Patient Call would like to be scheduled with cardiology (for lung eval)       Call back needed? Yes    Return Call Needed  Same as documented in contacts section  When to return call?: Same day: [facility]

## 2021-02-02 RX ORDER — DULOXETIN HYDROCHLORIDE 30 MG/1
30 CAPSULE, DELAYED RELEASE ORAL DAILY
Qty: 90 CAPSULE | Refills: 0 | Status: SHIPPED | OUTPATIENT
Start: 2021-02-02 | End: 2021-02-26

## 2021-02-02 NOTE — TELEPHONE ENCOUNTER
Signed Prescriptions:                        Disp   Refills    DULoxetine (CYMBALTA) 30 MG capsule        90 cap*0        Sig: Take 1 capsule (30 mg) by mouth daily Take with 60mg           to equal 90mg/day. 3 month script  Authorizing Provider: MARLENY CHILDERS    acetaminophen (TYLENOL) 325 MG tablet      180 ta*2        Sig: Take 2 tablets (650 mg) by mouth every 6 hours as           needed for mild pain  Authorizing Provider: GEORGE NDIAYE  Ordering User: MARIA C YORK    Refused Prescriptions:                       Disp   Refills    DULoxetine (CYMBALTA) 60 MG capsule        90 cap*1        Sig: Take 1 capsule (60 mg) by mouth daily Take with 30mg           to equal 90mg/day. 3 month script  Refused By: MARIA C YORK  Reason for Refusal: Duplicate  Reason for Refusal Comment: DULoxetine (CYMBALTA) 60 MG [id number]    Marleny WARE, RN CNP, FNP  [facility]

## 2021-02-04 VITALS
OXYGEN SATURATION: 95 % | DIASTOLIC BLOOD PRESSURE: 82 MMHG | SYSTOLIC BLOOD PRESSURE: 122 MMHG | HEART RATE: 97 BPM | TEMPERATURE: 98.6 F | RESPIRATION RATE: 12 BRPM

## 2021-02-04 NOTE — PROGRESS NOTES
Community Paramedics Follow-up Visit  January 28, 2021  TIME: 1500    Chaim Norman is a 45 year old male being seen at home for a follow-up visit.    Present at appointment: Patient, Care Team Member         Chief Complaint   Patient presents with     Outreach       [place] Utilization:   Clinic Utilization  Difficulty keeping appointments:: No  Compliance Concerns: No  No-Show Concerns: No  No PCP office visit in Past Year: No  Utilization    Last refreshed: 2/2/2021  2:02 PM: Hospital Admissions 1           Last refreshed: 2/2/2021  2:02 PM: ED Visits 0           Last refreshed: 2/2/2021  2:02 PM: No Show Count (past year) 3              Current as of: 2/2/2021  2:02 PM              /82   Pulse 97   Temp 98.6  F (37  C)   Resp 12   SpO2 95%     Clinical Concerns:  Current Medical Concerns:  CHF    Current Behavioral Concerns: None    Education Provided to patient: None   Medication set up? No  Pill Box issued: No  Scale issued: No  Health Maintenance Reviewed: Due/Overdue Yes    Clinical Pathway: None    No  Face to Face in Home / Community    Recent weights:  1/6 203.6  1/18 204.0  1/7 204.0  1/19 205.8  1/8 204.0  1/20 205.4  1/9 206.4  1/21 205.8  1/10 204.4  1/22 205.2  1/11 204.4  1/23 203.0  1/12 204.4  1/24 201.8  1/13 204.2  1/25 205.0  1/14 202.4  1/26 204.0  1/15 202.0  1/27 204.1  1/16 204.4  1/28 205.0  1/17 204.4    Review of Symptoms/PE    Skin: negative  Eyes: negative  Ears/Nose/Throat: negative  Respiratory: Shortness of breath and Dyspnea on exertion  Cardiovascular: dyspnea on exertion  Gastrointestinal: negative  Genitourinary: negative  Musculoskeletal: negative  Neurologic: negative  Psychiatric: negative    Pain Management::       Medication Reconciliation  Knowledgeable about how to use meds:: Yes  Medication side effects suspected:: No    Diet/Exercise/Sleep  Diet:: No added salt  Inadequate nutrition (GOAL):: No  Tube Feeding: No  Inadequate activity/exercise (GOAL)::  Yes  Significant changes in sleep pattern (GOAL): No    Time spent with patient: 60    The patient meets one or more of the following criteria:  * Has received hospital emergency department services three or more times in four consecutive months within a twelve month period    Acute concern/Follow-up recommendations: follow current treatment plan    Next CP visit scheduled: 2/9/21    Issues for Provider to follow up on: Community Paramedic visited Chaim in his apartment. Chaim indicates he is not doing so well as his feet are hurting him again. His feet are very hot to the touch. Upon palpation there is no pain and he has no pitting edema to either foot or lower extremity. He last wore his compression socks the prior day of writers visit.     Chaim indicates has not been taking his Duloxetine 30mg. Advised Chaim to refill his Duloxetine 30mg and 60mg as both had refills on them and that he needs to take them as prescribed by his Dr. Chaim agreed and stated he would get them filled the next day.     Chaim had a few pieces of mail that he had writer look at. Everything was informational. He is still waiting for his wife to return from [facility] so she can sign the papers he sent to her and he has not heard anything back from the Naturalization department yet on his becoming a [place] citizen.     Provider follow up visit needed: 1/29/21

## 2021-02-09 ENCOUNTER — ALLIED HEALTH/NURSE VISIT (OUTPATIENT)
Dept: BEHAVIORAL HEALTH | Facility: CLINIC | Age: 46
End: 2021-02-09
Payer: COMMERCIAL

## 2021-02-09 DIAGNOSIS — R06.02 SOB (SHORTNESS OF BREATH): ICD-10-CM

## 2021-02-09 DIAGNOSIS — J44.9 CHRONIC OBSTRUCTIVE PULMONARY DISEASE, UNSPECIFIED COPD TYPE (H): Primary | ICD-10-CM

## 2021-02-09 PROCEDURE — 99207 PR COMMUNITY PARAMEDIC-PATIENT MEETS CRITERIA: CPT

## 2021-02-09 ASSESSMENT — ACTIVITIES OF DAILY LIVING (ADL): DEPENDENT_IADLS:: INDEPENDENT

## 2021-02-15 VITALS
DIASTOLIC BLOOD PRESSURE: 88 MMHG | RESPIRATION RATE: 12 BRPM | HEART RATE: 90 BPM | SYSTOLIC BLOOD PRESSURE: 122 MMHG | TEMPERATURE: 98.3 F | WEIGHT: 201.8 LBS | OXYGEN SATURATION: 94 % | BODY MASS INDEX: 34.64 KG/M2

## 2021-02-15 DIAGNOSIS — G89.29 CHRONIC PAIN OF BOTH SHOULDERS: ICD-10-CM

## 2021-02-15 DIAGNOSIS — M25.511 CHRONIC PAIN OF BOTH SHOULDERS: ICD-10-CM

## 2021-02-15 DIAGNOSIS — G47.00 INSOMNIA, UNSPECIFIED TYPE: ICD-10-CM

## 2021-02-15 DIAGNOSIS — M79.2 NEUROPATHIC PAIN: ICD-10-CM

## 2021-02-15 DIAGNOSIS — M25.512 CHRONIC PAIN OF BOTH SHOULDERS: ICD-10-CM

## 2021-02-15 DIAGNOSIS — M79.671 BILATERAL FOOT PAIN: ICD-10-CM

## 2021-02-15 DIAGNOSIS — M79.672 BILATERAL FOOT PAIN: ICD-10-CM

## 2021-02-15 RX ORDER — NORTRIPTYLINE HCL 10 MG
10-20 CAPSULE ORAL AT BEDTIME
Qty: 60 CAPSULE | Refills: 1 | Status: SHIPPED | OUTPATIENT
Start: 2021-02-15 | End: 2021-04-23

## 2021-02-15 NOTE — TELEPHONE ENCOUNTER
Received fax from pharmacy requesting refill(s) for nortriptyline (PAMELOR) 10 MG capsule     Last refilled on 01/17/21    Pt last seen on 06/05/20  Next appt scheduled for None    E-prescribe to:  [id number] - [place], [place] - [address] AT [facility]     Will facilitate refill.      Patsy Khan MA  [facility] [place]

## 2021-02-15 NOTE — TELEPHONE ENCOUNTER
Signed Prescriptions:                        Disp   Refills    nortriptyline (PAMELOR) 10 MG capsule      60 cap*1        Sig: Take 1-2 capsules (10-20 mg) by mouth At Bedtime For           pain and insomnia.. schedule appt prior to next           refill  Authorizing Provider: MICHELLE CHILDERS, RN CNP, FNP  [facility]  [facility]

## 2021-02-16 ENCOUNTER — ALLIED HEALTH/NURSE VISIT (OUTPATIENT)
Dept: BEHAVIORAL HEALTH | Facility: CLINIC | Age: 46
End: 2021-02-16
Payer: COMMERCIAL

## 2021-02-16 DIAGNOSIS — R06.02 SOB (SHORTNESS OF BREATH): ICD-10-CM

## 2021-02-16 DIAGNOSIS — J44.9 CHRONIC OBSTRUCTIVE PULMONARY DISEASE, UNSPECIFIED COPD TYPE (H): Primary | ICD-10-CM

## 2021-02-16 ASSESSMENT — ACTIVITIES OF DAILY LIVING (ADL): DEPENDENT_IADLS:: INDEPENDENT

## 2021-02-22 DIAGNOSIS — G62.0 DRUG-INDUCED PERIPHERAL NEUROPATHY (H): ICD-10-CM

## 2021-02-22 DIAGNOSIS — I10 HYPERTENSION GOAL BP (BLOOD PRESSURE) < 140/80: ICD-10-CM

## 2021-02-22 DIAGNOSIS — M79.2 NEUROPATHIC PAIN: ICD-10-CM

## 2021-02-22 DIAGNOSIS — G89.29 CHRONIC PAIN OF BOTH SHOULDERS: ICD-10-CM

## 2021-02-22 DIAGNOSIS — M79.672 BILATERAL FOOT PAIN: ICD-10-CM

## 2021-02-22 DIAGNOSIS — M25.511 CHRONIC PAIN OF BOTH SHOULDERS: ICD-10-CM

## 2021-02-22 DIAGNOSIS — M25.512 CHRONIC PAIN OF BOTH SHOULDERS: ICD-10-CM

## 2021-02-22 DIAGNOSIS — M79.671 BILATERAL FOOT PAIN: ICD-10-CM

## 2021-02-23 RX ORDER — GABAPENTIN 300 MG/1
900 CAPSULE ORAL 3 TIMES DAILY
Qty: 270 CAPSULE | Refills: 1 | OUTPATIENT
Start: 2021-02-23

## 2021-02-23 RX ORDER — AMLODIPINE BESYLATE 5 MG/1
10 TABLET ORAL DAILY
Qty: 180 TABLET | Refills: 3 | OUTPATIENT
Start: 2021-02-23

## 2021-02-23 RX ORDER — DULOXETIN HYDROCHLORIDE 60 MG/1
60 CAPSULE, DELAYED RELEASE ORAL DAILY
Qty: 90 CAPSULE | Refills: 1 | OUTPATIENT
Start: 2021-02-23

## 2021-02-23 RX ORDER — ACETAMINOPHEN 325 MG/1
650 TABLET ORAL EVERY 6 HOURS PRN
Qty: 180 TABLET | Refills: 2 | OUTPATIENT
Start: 2021-02-23

## 2021-02-24 VITALS
OXYGEN SATURATION: 96 % | HEART RATE: 89 BPM | TEMPERATURE: 98.5 F | WEIGHT: 202.4 LBS | BODY MASS INDEX: 34.74 KG/M2 | DIASTOLIC BLOOD PRESSURE: 68 MMHG | SYSTOLIC BLOOD PRESSURE: 124 MMHG | RESPIRATION RATE: 12 BRPM

## 2021-02-24 DIAGNOSIS — I10 HYPERTENSION GOAL BP (BLOOD PRESSURE) < 140/80: ICD-10-CM

## 2021-02-24 DIAGNOSIS — G62.0 DRUG-INDUCED PERIPHERAL NEUROPATHY (H): ICD-10-CM

## 2021-02-24 RX ORDER — AMLODIPINE BESYLATE 5 MG/1
10 TABLET ORAL DAILY
Qty: 180 TABLET | Refills: 3 | OUTPATIENT
Start: 2021-02-24

## 2021-02-24 RX ORDER — GABAPENTIN 300 MG/1
900 CAPSULE ORAL 3 TIMES DAILY
Qty: 270 CAPSULE | Refills: 1 | Status: SHIPPED | OUTPATIENT
Start: 2021-02-24 | End: 2021-04-27

## 2021-02-24 NOTE — PROGRESS NOTES
Community Paramedics Follow-up Visit  February 16, 2021  TIME: 2021    Chaim Norman is a 45 year old male being seen at home for a follow-up visit.    Present at appointment: Patient, Care Team Member         Chief Complaint   Patient presents with     Outreach       [place] Utilization:   Clinic Utilization  Difficulty keeping appointments:: No  Compliance Concerns: No  No-Show Concerns: No  No PCP office visit in Past Year: No  Utilization    Last refreshed: 2/24/2021  3:46 PM: Hospital Admissions 1           Last refreshed: 2/24/2021  3:46 PM: ED Visits 0           Last refreshed: 2/24/2021  3:46 PM: No Show Count (past year) 3              Current as of: 2/24/2021  3:46 PM              /68   Pulse 89   Temp 98.5  F (36.9  C)   Resp 12   Wt 91.8 kg (202 lb 6.4 oz)   SpO2 96%   BMI 34.74 kg/m      Clinical Concerns:  Current Medical Concerns:  COPD    Current Behavioral Concerns: NONE    Education Provided to patient: NONE   Medication set up? No  Pill Box issued: No  Scale issued: No  Health Maintenance Reviewed: Not assessed    Clinical Pathway: None    No  Face to Face in Home / Community    Recent weights:  2/10 201.0  2/11 200.0  2/12 201.8  2/13 202.4  2/14 202.4  2/15 201.2  2/16 202.4    Review of Symptoms/PE    Skin: negative  Eyes: negative  Ears/Nose/Throat: negative  Respiratory: Dyspnea on exertion and No shortness of breath  Cardiovascular: dyspnea on exertion, lower extremity edema and exercise intolerance  Gastrointestinal: negative  Genitourinary: negative  Musculoskeletal: bilateral foot pain  Neurologic: numbness or tingling of feet  Psychiatric: negative      Medication Reconciliation  Knowledgeable about how to use meds:: Yes  Medication side effects suspected:: No    Diet/Exercise/Sleep  Diet:: No added salt  Inadequate nutrition (GOAL):: No  Tube Feeding: No  Inadequate activity/exercise (GOAL):: Yes  Significant changes in sleep pattern (GOAL): No    Plan:     Time spent with  patient: 90    The patient meets one or more of the following criteria:  * Has received hospital emergency department services three or more times in four consecutive months within a twelve month period    Acute concern/Follow-up recommendations: follow current treatment plan    Next CP visit scheduled: 3/2/2021    Issues for Provider to follow up on: Community Paramedic visited with Chaim in his home. Chaim is doing well and feeling good. Chaim indicates he has been watching what he has been eating, but has not been able to lose any weight.   Went through some of Chaim's papers along with his son's driving ticket. Chaim stated he needed help getting his son signed up for a driving test. Assisted Chaim in setting that up.   Examination of both lower extremities found minimal pitting edema. Chaim complains of some numbness in both feet. Feet are warm to the touch.     Provider follow up visit needed: 2/24/21

## 2021-02-24 NOTE — TELEPHONE ENCOUNTER
Called pt and he is now aware of his scripts at the pharmacy and he will call his pain clinic for med refills.

## 2021-02-24 NOTE — TELEPHONE ENCOUNTER
He should have refills for all of these at the pharmacy. Also, medications from pain clinic should be refilled through pain clinic at this time.

## 2021-02-24 NOTE — TELEPHONE ENCOUNTER
Received fax from pharmacy requesting refill(s) for gabapentin (NEURONTIN) 300 MG capsule     Last refilled on 01/29/21    Pt last seen on 01/29/21  Next appt scheduled for 02/26/21    E-prescribe to:  [id number] - [place], [place] - [address] AT [facility]     Will facilitate refill.      Patsy Khan MA  [facility]

## 2021-02-25 NOTE — PROGRESS NOTES
Chaim is a 45 year old who is being evaluated via a billable telephone visit.      What phone number would you like to be contacted at? [phone]  How would you like to obtain your AVS? Mail a copy   LINO Brown [facility]    2/26/2021    Chaim Norman is a 44 year old male who is being evaluated via a billable telephone visit.        Chief complaint:   bilateral neuropathic foot pain    Interval history:  Chaim Norman is a 45 year old male is known to me for   Neuropathic pain  Bilateral foot pain  Chronic pain of both shoulders  Insomnia, unspecified site   PMHx includes: None  PSHx includes: None.        Recommendations/plan at the last visit on 6/5/2020 included:  1. Physical Therapy:  None at present time  2. Clinical Health Psychologist: not at present time  3. Diagnostic Studies:  none  4. Medication Management:    1. increase cymbalta 90mg every day  2. continue nortriptyline to 20mg at bedtime if needed   3. Continue gabapentin  5. Further procedures recommended: none  6. Recommendations to PCP: see above  7. Follow up: 6 weeks          Since his last visit, Chaim Norman reports:    Interval history February 26, 2021  -he continues to have pain in his feet.   -he states that pain in the feet is the same, they are very numb and feels like it is pulling.   -his hands and shoulders are feeling better.   -he feels that the Cymbalta was somewhat helpful in reducing his pain (increased it to 90mg/day). He denies any side effects with that dosage.       Interval history 6/5/2020  -he has continued foot and hand pain  -he has all over body pain as well.   -he also notes he is feeling a bit out of breath for the past week. Denies any fever or cough.   -he was not able to begin the Naltrexone medication as he is not currently working right now,   -he says he has not been able to get any unemployment benefits, encouraged him to call the [place] Unemployment office.      Interval  "history 4/28/2020  -he is sleeping a bit better, but still suffering with neuropathic pain in his feet.  Discussed option of trying compounded naltrexone that has been shown to be helpful for neuropathic pain.       On 3/31/2020   he is starting to sleep better on the nortriptyline  - he feels that Cymbalta has been somewhat helpful for pain, tolerating well.   -neuropathic pain remains in legs and feet  -still has shoulder pain    At this point, the patient's participation with our multidisciplinary team includes:  The patient has been compliant with the program.  PT - none at present time  Health Psych - none at present time      Pain scores:  Pain intensity on average is 7 on a scale of 0-10.    Range is 7-9/10.   Pain right now is 8/10.   Pain is described as \"numb and pulling.\"    Pain is constant in nature    Current pain relevant medications:   -Tylenol 650mg Q 6 hours PRN pain (barely helpful-8 tablets/day)  -gabapentin 300mg capsules, take 900mg TID (somewhat helpful)  -oxycodone 5mg BID PRN severe pain (helpful temporarily)  -voltaren gel PRN (helpful)  -Cymbalta 90mg every day (helpful for pain and mood)  -nortriptyline 20mg at HS (taking 20mg at bedtime, helpful)           Other pertinent medications:  -Sitruro 200mg  -Seromycin 250mg  -Myambutol 1,200mg  -melatonin 2mg HS      Previous medication treatments included:  OPIATES:Oxycodone (helpful temporarily)  NSAIDS: None  MUSCLE RELAXANTS: None  ANTI-MIGRAINE MEDS: None  ANTI-DEPRESSANTS: Cymbalta (helpful), nortriptyline (helpful)  SLEEP AIDS: None  ANTI-CONVULSANTS: Gabapentin (helpful temporarily)  TOPICALS: Lidocaine ointment (not helpful)  Other meds: Acetaminophen (not helpful)        Other treatments have included:  Chaim Norman has not been seen at a pain clinic in the past.    PT: none  Chiropractic care: none  Acupuncture: none  TENs Unit: none     Injections: none          Side Effects: no side effect  Patient is using the medication as " prescribed: YES    Medications:  Current Outpatient Medications   Medication Sig Dispense Refill     acetaminophen (TYLENOL) 325 MG tablet Take 2 tablets (650 mg) by mouth every 6 hours as needed for mild pain 180 tablet 2     albuterol (PROVENTIL HFA) 108 (90 Base) MCG/ACT inhaler Inhale 2 puffs into the lungs every 6 hours as needed for shortness of breath / dyspnea or wheezing 8.5 g 3     amLODIPine (NORVASC) 5 MG tablet Take 2 tablets (10 mg) by mouth daily 180 tablet 3     bedaquiline (SITRURO) 100 MG tablet Take 200 mg by mouth Every Mon, Wed, Fri Morning       COMPOUNDED NON-CONTROLLED SUBSTANCE (CMPD RX) - PHARMACY TO MIX COMPOUNDED MEDICATION Take one capsule containing 4.5mg of naltrexone by mouth per day 135 mg 1     diclofenac (VOLTAREN) 1 % topical gel Place 2 g onto the skin 4 times daily as needed for moderate pain Of both shoulders 1 Tube 3     DULoxetine (CYMBALTA) 30 MG capsule Take 1 capsule (30 mg) by mouth daily Take with 60mg to equal 90mg/day. 3 month script 90 capsule 0     DULoxetine (CYMBALTA) 60 MG capsule Take 1 capsule (60 mg) by mouth daily Take with 30mg to equal 90mg/day. 3 month script 90 capsule 1     fluticasone-salmeterol (ADVAIR) 100-50 MCG/DOSE inhaler Inhale 1 puff into the lungs every 12 hours 1 Inhaler 1     gabapentin (NEURONTIN) 300 MG capsule Take 3 capsules (900 mg) by mouth 3 times daily 270 capsule 1     levofloxacin (LEVAQUIN) 750 MG tablet Take 1 tablet (750 mg) by mouth daily       melatonin 3 MG tablet Take 1 tablet (3 mg) by mouth nightly as needed for sleep 30 tablet 3     nortriptyline (PAMELOR) 10 MG capsule Take 1-2 capsules (10-20 mg) by mouth At Bedtime For pain and insomnia.. schedule appt prior to next refill 60 capsule 1     oxyCODONE (ROXICODONE) 5 MG tablet Take 1 tablet (5 mg) by mouth 2 times daily as needed for severe pain 14 tablet 0     potassium chloride ER (KLOR-CON M) 20 MEQ CR tablet Take 1 tablet (20 mEq) by mouth daily 30 tablet 3        Medical History: any changes in medical history since they were last seen? No    Social History:   Home situation: Single and lives with son  Occupation/Schooling: Not presently working  Tobacco use: Non smoker  Alcohol use: None  Drug use: None  History of chemical dependency treatment: None    Is patient a current smoker or tobacco user?  no  If yes, was cessation counseling offered?  na      Review of Systems:    The 14 system ROS was reviewed with patient and is positive for:  Constitutional: fever/chills, fatigue, weight gain, weight loss (intentional)  Eyes/Head: headache, dizziness  ENT: ringing in ears  Allergy/Immune: allergies  Skin: itching, rash, hives  Hematologic: easy bruising  Respiratory: cough, wheezing, shortness of breath  Cardiovascular: swelling in feet, fainting, palpitations, chest pain  GI: abdominal pain, nausea, vomiting, diarrhea, constipation  Endocrine: steroid use  Musculoskeletal:  joint pain, arthritis, stiffness, gout, back pain, neck pain  Urinary: frequency, urgency, incontinence, hesitancy  Neurologic: weakness, numbness/tingling, seizure, stroke, memory loss  Mental health: depression, anxiety, stress, suicidal ideation      Physical Exam:  Vital signs: There were no vitals taken for this visit.    Behavioral observations:  Awake, alert. Cooperative.   Pulm: respirations easy and unlabored. Able to speak in full sentences without SOB or cough noted.            [place] Pres[place]:  [place]ed [place]  2/26/2021- no concerning fills. Not on opiates  Marleny WARE, RN CNP, FNP  [facility]  [place] location          Assessment:   1. Drug induced peripheral neuropathy  2. Neuropathic pain  3. Bilateral foot pain    4.  insomnia  5. PMHx includes: None listed  6. PSHx includes: None      Plan:   1. Physical Therapy:  None at present time  2. Clinical Health Psychologist: not at present time  3. Diagnostic Studies:   None  4. Medication Management:    1. Continue cymbalta 90mg every day  2. continue nortriptyline to 20mg at bedtime if needed   3. Continue gabapentin  5. Further procedures recommended: none  6. Recommendations to PCP: see above  7. Follow up: 4-6 months, in-person or telephone, this is your choice. Please call [phone] to make your follow-up appointment with me.     Phone call duration: 13 minutes    BILLING TIME DOCUMENTATION:   The total TIME spent on this patient on the day of the appointment was 18 minutes.      TOTAL TIME includes:   Time spent preparing to see the patient: 1 minutes (reviewing records and tests)  Time spend face to face with the patient: 13 minutes via telephone due to COVID-19 viral threat.   Time spent ordering tests, medications, procedures and referrals: 0 minutes  Time spent Referring and communicating with other healthcare professionals: 0 minutes  Documenting clinical information in Epic: 4 minutes        Marleny WARE, RN CNP, FNP  [facility]  [facility]

## 2021-02-26 ENCOUNTER — VIRTUAL VISIT (OUTPATIENT)
Dept: PALLIATIVE MEDICINE | Facility: CLINIC | Age: 46
End: 2021-02-26
Payer: COMMERCIAL

## 2021-02-26 DIAGNOSIS — G62.0 DRUG-INDUCED PERIPHERAL NEUROPATHY (H): ICD-10-CM

## 2021-02-26 DIAGNOSIS — M79.672 BILATERAL FOOT PAIN: ICD-10-CM

## 2021-02-26 DIAGNOSIS — M79.671 BILATERAL FOOT PAIN: ICD-10-CM

## 2021-02-26 DIAGNOSIS — M79.2 NEUROPATHIC PAIN: ICD-10-CM

## 2021-02-26 PROCEDURE — 99213 OFFICE O/P EST LOW 20 MIN: CPT | Mod: 95 | Performed by: NURSE PRACTITIONER

## 2021-02-26 RX ORDER — ACETAMINOPHEN 325 MG/1
650 TABLET ORAL EVERY 6 HOURS PRN
Qty: 180 TABLET | Refills: 2 | Status: SHIPPED | OUTPATIENT
Start: 2021-02-26 | End: 2022-03-03

## 2021-02-26 RX ORDER — DULOXETIN HYDROCHLORIDE 60 MG/1
60 CAPSULE, DELAYED RELEASE ORAL DAILY
Qty: 90 CAPSULE | Refills: 1 | Status: SHIPPED | OUTPATIENT
Start: 2021-02-26 | End: 2021-08-31

## 2021-02-26 RX ORDER — DULOXETIN HYDROCHLORIDE 30 MG/1
30 CAPSULE, DELAYED RELEASE ORAL DAILY
Qty: 90 CAPSULE | Refills: 0 | Status: SHIPPED | OUTPATIENT
Start: 2021-02-26 | End: 2021-05-03

## 2021-02-26 NOTE — PATIENT INSTRUCTIONS
Plan:   1. Physical Therapy:  None at present time  2. Clinical Health Psychologist: not at present time  3. Diagnostic Studies:  None  4. Medication Management:    1. Continue cymbalta 90mg every day  2. continue nortriptyline to 20mg at bedtime if needed   3. Continue gabapentin  5. Further procedures recommended: none  6. Recommendations to PCP: see above  7. Follow up: 4-6 months, in-person or telephone, this is your choice. Please call [phone] to make your follow-up appointment with me.     ----------------------------------------------------------------  Clinic Number:  [phone]     Call with any questions about your care and for scheduling assistance.     Calls are returned Monday through Friday between 8 AM and 4:30 PM. We usually get back to you within 2 business days depending on the issue/request.    If we are prescribing your medications:    For opioid medication refills, call the clinic or send a Xola message 7 days in advance.  Please include:    Name of requested medication    Name of the pharmacy.    For non-opioid medications, call your pharmacy directly to request a refill. Please allow 3-4 days to be processed.     Per [place] State Law:    All controlled substance prescriptions must be filled within 30 days of being written.      For those controlled substances allowing refills, pickup must occur within 30 days of last fill.      We believe regular attendance is key to your success in our program!      Any time you are unable to keep your appointment we ask that you call us at least 24 hours in advance to cancel.This will allow us to offer the appointment time to another patient.     Multiple missed appointments may lead to dismissal from the clinic.

## 2021-03-01 ENCOUNTER — PATIENT OUTREACH (OUTPATIENT)
Dept: CARE COORDINATION | Facility: CLINIC | Age: 46
End: 2021-03-01

## 2021-03-01 NOTE — PROGRESS NOTES
Clinic Care Coordination Contact    Situation: Patient chart reviewed by care coordinator.    Background: Patient is enrolled in Care Coordination. CHW, Community Paramedic, and SWCC are following.     Assessment: CHW spoke with patient on 1/29. Patient confirmed that he has been elevating his legs and taking all of his medications, as prescribed. Patient continues to wait for his naturalization forms to come in the mail.     Plan/Recommendations: CHW plans to make an outreach to patient one month from most recent follow up call. SWCC will review chart in 6 weeks, per standard workflow    BING Garcia  Primary Care Clinic- Social Work Care Coordinator  [facility]  Ph: [phone]  3/1/2021 9:40 AM

## 2021-03-02 ENCOUNTER — PATIENT OUTREACH (OUTPATIENT)
Dept: NURSING | Facility: CLINIC | Age: 46
End: 2021-03-02
Payer: COMMERCIAL

## 2021-03-02 NOTE — PROGRESS NOTES
Clinic Care Coordination Contact  Community Health Worker Follow Up    Goals:   Goals Addressed as of 3/2/2021 at 2:16 PM                 Today    2/16/21      2. Pain Management (pt-stated)   90%  80%    Added 2/19/20 by Charlene Gonzales, RN     Goal Statement: I will have suitable pain relief to perform activities of daily living.  Date Goal set: 2/19/2020  Date to Achieve By: 05/01/21  Patient expressed understanding of goal: Yes  Action steps to achieve this goal:  1. I will take medications as prescribed to maintain adequate pain control/relief.  2. I will utilize non-pharmaceutical measures to help with pain control/relief.  3. I will maintain routine follow up and contact with my clinic care team.  4. I will call my pharmacy to request refills of my medications before they run out.  5. I will schedule a pain clinic follow up appointment.        3. Financial Wellbeing (pt-stated)     40%    Added 2/14/20 by Robin Jansen BSW     Goal Statement: I need help paying for my rent  Date Goal set: 2/14/2020  Barriers: Feet have been hurting too much to get back to the [facility]   Strengths: Patient is motivated to getting assistancce  Date to Achieve By: 06/01/21  Patient expressed understanding of goal: Yes  Action steps to achieve this goal:  1. I will fill out application for emergency assistance  2. I will bring my application in to the [facility]  3. I will obtain emergency assistance, if found eligible    2/28/2020: Patient stated that he is still working on his emergency assistance application and had more questions about it. [facility] provided additional phone numbers to him for rent assistance    3/30/2020: Went to [facility]. Submitted documentation that was needed. Got rent assistance through another agency for March. Reapplied for assistance in April through [facility]        Other (pt-stated)   0%  On track    Added  12/3/20 by Robin Jansen BSW     Goal Statement: I need to elevate my feet/legs  Date Goal set: 12/3/2020  Barriers: Patient reports not having a couch to use for elevating feet/legs, Patient has a significantly fixed income  Strengths: Patient is enrolled in Care Coordination  Date to Achieve By: 1/3/2020  Patient expressed understanding of goal: Yes  Action steps to achieve this goal:  1. I will contact my customer service number on the back of my [facility] [employer] card  2. I will ask if a wedge pillow is covered by my insurance  3. I will notify [facility] if it is covered and they will assist me in obtaining a wedge pillow through a [employer] company          Psychosocial (pt-stated)   50%  On track    Added 9/3/20 by Lisa Loredo LSW     Goal Statement: I need help applying for my Naturalization  Date Goal set: 9/3/2020   Barriers: COVID 19  some offices closed   Strengths: feels capable of calling resources  Date to Achieve By: 04/01/21  Patient expressed understanding of goal: yes  Action steps to achieve this goal:  1. I will call resources provided for help with application  2. I will schedule an appt at the [employer] to use the computer            CHW spoke with patient he said he is not good. He said his feet have been hurting him really bad. CHW asked patient has he made an appointment? He said he had an appointment with pain clinic last Friday. CHW asked what did they tell him today if he felt like medication wasn't working and his feet continues to hurt?    He said they told them to call back. CHW suggested once we are done talking to call and leave a message for pain clinic or make another appointment. Patient said he will do. CHW asked has patient received his Naturalization papers in the mail.     He said no he received a letter from [employer] saying they have information from a previous screen and will use that information. CHW asked patient did he ever inquire about getting a wedge pillow thru his  medical insurance. He said no he does not need that anymore. He has something else now.     Patient said he has been denied unemployment benefits due to the job he worked 6 weeks for said he abandon the job. He said he actually went in to the hospital for 4 months and 2 weeks. He said he let his job know what was going on and no they are saying this.    CHW advised patient he would have to call unemployment benefits and see if there is a way to appeal the denial of benefits.  CHW gave patient phone number [phone]. Patient said he has no money. He said his son paid his rent for hi. CHW advised patient to call the count to inquire about rental assistance. He said he will do.    Intervention and Education during outreach: CHW gave patient phone number for Unemployment benefits [phone]    CHW Plan: CHW will outreach next month.    Elena BARAJAS Community Health Worker  Clinic Care Coordination  [facility] : [place], [place] & [place]  Phone: [phone]

## 2021-03-04 ENCOUNTER — ALLIED HEALTH/NURSE VISIT (OUTPATIENT)
Dept: OTHER | Facility: CLINIC | Age: 46
End: 2021-03-04
Payer: COMMERCIAL

## 2021-03-04 VITALS
SYSTOLIC BLOOD PRESSURE: 112 MMHG | OXYGEN SATURATION: 95 % | BODY MASS INDEX: 34.33 KG/M2 | DIASTOLIC BLOOD PRESSURE: 70 MMHG | HEART RATE: 90 BPM | TEMPERATURE: 98 F | RESPIRATION RATE: 12 BRPM | WEIGHT: 200 LBS

## 2021-03-04 DIAGNOSIS — I27.20 PULMONARY HYPERTENSION (H): ICD-10-CM

## 2021-03-04 DIAGNOSIS — J44.9 CHRONIC OBSTRUCTIVE PULMONARY DISEASE, UNSPECIFIED COPD TYPE (H): ICD-10-CM

## 2021-03-04 DIAGNOSIS — Z86.11 HISTORY OF TB (TUBERCULOSIS): Primary | ICD-10-CM

## 2021-03-04 PROCEDURE — 99207 PR COMMUNITY PARAMEDIC-PATIENT MEETS CRITERIA: CPT

## 2021-03-04 ASSESSMENT — ACTIVITIES OF DAILY LIVING (ADL): DEPENDENT_IADLS:: INDEPENDENT

## 2021-03-04 NOTE — PROGRESS NOTES
Community Paramedics Follow-up Visit  March 4, 2021  TIME: 1200    Chaim Norman is a 45 year old male being seen at home for a follow-up visit.    Present at appointment: Patient, Care Team Member    Primary Diagnosis: Psychosocial    Chief Complaint   Patient presents with     Outreach       [place] Utilization:   Clinic Utilization  Difficulty keeping appointments:: No  Compliance Concerns: No  No-Show Concerns: No  No PCP office visit in Past Year: No  Utilization    Last refreshed: 3/2/2021  2:42 PM: Hospital Admissions 1           Last refreshed: 3/2/2021  2:42 PM: ED Visits 0           Last refreshed: 3/2/2021  2:42 PM: No Show Count (past year) 4              Current as of: 3/2/2021  2:42 PM              There were no vitals taken for this visit.    Clinical Concerns:  Current Medical Concerns:  COPD    Current Behavioral Concerns: None    Education Provided to patient: None   Medication set up? No  Pill Box issued: No  Scale issued: No  Health Maintenance Reviewed: Due/Overdue     Clinical Pathway: None    No  Face to Face in Home / Community    Recent weights:    Review of Symptoms/PE    Skin: negative  Eyes: negative  Ears/Nose/Throat: negative  Respiratory: Shortness of breath and Dyspnea on exertion  Cardiovascular: negative  Gastrointestinal: negative  Genitourinary: negative  Musculoskeletal: foot pain  Neurologic: negative  Psychiatric: negative    Pain Management::       Medication Reconciliation  Knowledgeable about how to use meds:: Yes  Medication side effects suspected:: No    Diet/Exercise/Sleep  Diet:: No added salt  Inadequate nutrition (GOAL):: No  Tube Feeding: No  Inadequate activity/exercise (GOAL):: Yes  Significant changes in sleep pattern (GOAL): No    Plan:     Time spent with patient: 90    The patient meets one or more of the following criteria:  * Has received hospital emergency department services three or more times in four consecutive months within a twelve month period    Acute  concern/Follow-up recommendations: follow current treatment plan    Next CP visit scheduled: 3/10/21    Issues for Provider to follow up on: Community Paramedic visited with Chaim in his apartment today. Chaim indicates his pain are very painful. He states he called the pain clinic and they did not change his medications. Advised Chaim to try to stay off them and elevate them as much as possible and if they continue to worsen to call the pain clinic back. No edema noted to both legs or feet. Chaim states he last wore his compression socks yesterday.  Lung sounds are clear bilaterally, No SOB while sitting  Assisted Chaim with filling out his Unemployment Insurance via the Website. Chaim account showed that he could claim for the last 4 weeks. Unemployment did request a form be completed by his PCP due to filing for Unemployment as a result of illness. Sent letter via email to Nyla Florian to be completed.  No new information from Naturalization office.     Provider follow up visit needed: FRANCISCO

## 2021-03-05 NOTE — PROGRESS NOTES
Unemployment insurance medical statement completed and faxed back to [phone] via [employer]

## 2021-03-10 ENCOUNTER — ALLIED HEALTH/NURSE VISIT (OUTPATIENT)
Dept: OTHER | Facility: CLINIC | Age: 46
End: 2021-03-10
Payer: COMMERCIAL

## 2021-03-10 VITALS
RESPIRATION RATE: 12 BRPM | HEART RATE: 98 BPM | SYSTOLIC BLOOD PRESSURE: 118 MMHG | BODY MASS INDEX: 34.4 KG/M2 | OXYGEN SATURATION: 92 % | DIASTOLIC BLOOD PRESSURE: 84 MMHG | TEMPERATURE: 98.4 F | WEIGHT: 200.4 LBS

## 2021-03-10 DIAGNOSIS — Z86.11 HISTORY OF TB (TUBERCULOSIS): Primary | ICD-10-CM

## 2021-03-10 DIAGNOSIS — I27.20 PULMONARY HYPERTENSION (H): ICD-10-CM

## 2021-03-10 DIAGNOSIS — I10 HYPERTENSION GOAL BP (BLOOD PRESSURE) < 140/80: ICD-10-CM

## 2021-03-10 DIAGNOSIS — J44.9 CHRONIC OBSTRUCTIVE PULMONARY DISEASE, UNSPECIFIED COPD TYPE (H): ICD-10-CM

## 2021-03-10 PROCEDURE — 99207 PR COMMUNITY PARAMEDIC-PATIENT MEETS CRITERIA: CPT

## 2021-03-10 RX ORDER — AMLODIPINE BESYLATE 5 MG/1
10 TABLET ORAL DAILY
Qty: 180 TABLET | Refills: 3 | OUTPATIENT
Start: 2021-03-10

## 2021-03-10 ASSESSMENT — ACTIVITIES OF DAILY LIVING (ADL): DEPENDENT_IADLS:: INDEPENDENT

## 2021-03-10 NOTE — PROGRESS NOTES
Community Paramedics Follow-up Visit  March 10, 2021  TIME: 1400    Chaim Norman is a 45 year old male being seen at home for a follow-up visit.    Present at appointment: Patient, Care Team Member    Primary Diagnosis: Psychosocial    Chief Complaint   Patient presents with     Outreach       [place] Utilization:   Clinic Utilization  Difficulty keeping appointments:: No  Compliance Concerns: No  No-Show Concerns: No  No PCP office visit in Past Year: No  Utilization    Last refreshed: 3/10/2021  2:16 PM: Hospital Admissions 1           Last refreshed: 3/10/2021  2:16 PM: ED Visits 0           Last refreshed: 3/10/2021  2:16 PM: No Show Count (past year) 4              Current as of: 3/10/2021  2:16 PM              /84   Pulse 98   Temp 98.4  F (36.9  C)   Resp 12   Wt 90.9 kg (200 lb 6.4 oz)   SpO2 92%   BMI 34.40 kg/m      Clinical Concerns:  Current Medical Concerns:  COPD    Current Behavioral Concerns: None    Education Provided to patient: None   Medication set up? No  Pill Box issued: No  Scale issued: No  Flu Shot given: No  Lab draw or specimen collection: No  Food box issued: No  Collaborative visit with PCP: No    Health Maintenance Reviewed: Due/Overdue     Clinical Pathway: None    No  Face to Face in Home / Community     Recent weights:   3/5/21 - 200.0  3/6/21 - 200.6  3/7/21 - 199.6  3/8/21 - 199.4  3/9/21 - 200.4    Review of Symptoms/PE    Skin: negative  Eyes: negative  Ears/Nose/Throat: negative  Respiratory: Shortness of breath and Dyspnea on exertion  Cardiovascular: exercise intolerance  Gastrointestinal: negative  Genitourinary: negative  Musculoskeletal: foot pain  Neurologic: numbness or tingling of feet  Psychiatric: negative    Pain Management::       Medication Reconciliation  Knowledgeable about how to use meds:: Yes  Medication side effects suspected:: No    Diet/Exercise/Sleep  Diet:: No added salt  Inadequate nutrition (GOAL):: No  Tube Feeding: No  Inadequate  activity/exercise (GOAL):: Yes  Significant changes in sleep pattern (GOAL): No    Plan:     Time spent with patient: 75    The patient meets one or more of the following criteria:  * Has received hospital emergency department services three or more times in four consecutive months within a twelve month period    Acute concern/Follow-up recommendations: follow current treatment plans    Next CP visit scheduled: 3/24/21    Issues for Provider to follow up on: Community Paramedic visited with Chaim in his home. Chaim states he is doing good and his breathing is doing good as well. Pt has numbness in both feet, no pain upon palpation. Minimal edema in both legs and feet. Advised Chaim to put his compression socks on and wear them during the day and not at night.   Assisted Chaim with his [place] unemployment and helped with getting his [place] DL renewed.    Provider follow up visit needed: FRANCISCO

## 2021-03-30 ENCOUNTER — ALLIED HEALTH/NURSE VISIT (OUTPATIENT)
Dept: OTHER | Facility: CLINIC | Age: 46
End: 2021-03-30
Payer: COMMERCIAL

## 2021-03-30 ENCOUNTER — TELEPHONE (OUTPATIENT)
Dept: PALLIATIVE MEDICINE | Facility: CLINIC | Age: 46
End: 2021-03-30

## 2021-03-30 DIAGNOSIS — J44.9 CHRONIC OBSTRUCTIVE PULMONARY DISEASE, UNSPECIFIED COPD TYPE (H): Primary | ICD-10-CM

## 2021-03-30 DIAGNOSIS — I27.20 PULMONARY HYPERTENSION (H): ICD-10-CM

## 2021-03-30 PROCEDURE — 99207 PR COMMUNITY PARAMEDIC-PATIENT MEETS CRITERIA: CPT

## 2021-03-30 ASSESSMENT — ACTIVITIES OF DAILY LIVING (ADL): DEPENDENT_IADLS:: INDEPENDENT

## 2021-03-30 NOTE — TELEPHONE ENCOUNTER
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    Important Lab Values: ...  Rationale: ...    Insurance Name:   Coverage information:     Subscriber: [id number] OSMANI SYED     Rel to sub: 01 - Self     Member ID: [id number]     Payor: [address] [place] [address] Ph: [phone]     Benefit plan: [address] [place] [address] Ph: [phone]     Group number: [id number]     Member effective dates: from 01/01/20          Pharmacy Information (if different than what is on RX)  Name:  ...  Phone:  ...

## 2021-03-31 NOTE — TELEPHONE ENCOUNTER
Prior Authorization Not Needed per Insurance    Medication: acetaminophen (TYLENOL) 325 MG tablet--NO PA NEEDED  Insurance Company: [employer] - Phone [phone] Fax [phone]  Expected CoPay:      Pharmacy Filling the Rx: [id number] - [place], [place] - [address] AT [facility]  Pharmacy Notified: Yes  Patient Notified: Yes **Instructed pharmacy to notify patient when script is ready to /ship.**    Per insurance rep, med is covered but pharmacy needs to process a different NDC.  Contacted pharmacy, they will get it straightened out and will contact help desk if they need help finding correct NDC.

## 2021-04-01 VITALS
SYSTOLIC BLOOD PRESSURE: 118 MMHG | HEART RATE: 92 BPM | RESPIRATION RATE: 12 BRPM | OXYGEN SATURATION: 95 % | DIASTOLIC BLOOD PRESSURE: 80 MMHG | TEMPERATURE: 98.3 F

## 2021-04-01 NOTE — PROGRESS NOTES
Community Paramedics Follow-up Visit  March 30th, 2021  TIME: 10:00    Chaim Norman is a 45 year old male being seen at home for a follow-up visit.    Present at appointment: Patient, Care Team Member    Primary Diagnosis: Psychosocial    Chief Complaint   Patient presents with     Outreach       [place] Utilization:   Clinic Utilization  Difficulty keeping appointments:: No  Compliance Concerns: No  No-Show Concerns: No  No PCP office visit in Past Year: No  Utilization    Last refreshed: 4/1/2021  2:52 PM: Hospital Admissions 1           Last refreshed: 4/1/2021  2:52 PM: ED Visits 0           Last refreshed: 4/1/2021  2:52 PM: No Show Count (past year) 4              Current as of: 4/1/2021  2:52 PM              /80   Pulse 92   Temp 98.3  F (36.8  C)   Resp 12   SpO2 95%     Clinical Concerns:  Current Medical Concerns:  COPD    Current Behavioral Concerns: None    Education Provided to patient: none   Medication set up? No  Pill Box issued: No  Scale issued: No  Flu Shot given: No  Lab draw or specimen collection: No  Food box issued: No  Collaborative visit with PCP: No    Health Maintenance Reviewed: Not assessed    Clinical Pathway: None    No  Face to Face in Home / Community    Review of Symptoms/PE    Skin: negative  Eyes: negative  Ears/Nose/Throat: negative  Respiratory: Shortness of breath and Dyspnea on exertion  Cardiovascular: exercise intolerance  Gastrointestinal: negative  Genitourinary: negative  Musculoskeletal: foot pain and numbness  Neurologic: numbness or tingling of feet  Psychiatric: negative       Medication Reconciliation  Knowledgeable about how to use meds:: Yes  Medication side effects suspected:: No    Diet/Exercise/Sleep  Diet:: No added salt  Inadequate nutrition (GOAL):: No  Tube Feeding: No  Inadequate activity/exercise (GOAL):: Yes  Significant changes in sleep pattern (GOAL): No    Plan:     Time spent with patient: 60    The patient meets one or more of the  following criteria:  * Has received hospital emergency department services three or more times in four consecutive months within a twelve month period    Acute concern/Follow-up recommendations: follow current treatment plan    Next CP visit scheduled: 4/13/21    Issues for Provider to follow up on: Community Paramedic visited with Chaim in his apartment and helped with his unemployment papers. Chaim observed to be more SOB today than in past visits. Chaim indicated that he had been cooking and was on his feet longer than he should have been. Lower extremities had pitting edema +1. Explained to Chaim he should be wearing his compression socks if he is going to be standing for any length of time. Chaim also stated he had some swelling above his knee caps. Chaim states both of his feet have numbness. Feet are warm to the touch.     Provider follow up visit needed: FRANCISCO

## 2021-04-05 ENCOUNTER — PATIENT OUTREACH (OUTPATIENT)
Dept: NURSING | Facility: CLINIC | Age: 46
End: 2021-04-05
Payer: COMMERCIAL

## 2021-04-05 NOTE — PROGRESS NOTES
Clinic Care Coordination Contact  Community Health Worker Follow Up    CHW asked patient how he was doing he said not good. He said feet hurt and was swollen. CHW asked patient if he was wearing his compression socks during the day and not at night. He said he was wearing then during the day and they are helping with swelling but his feet hurt.    Patient wanted to make an appointment with PCP. CHW did not get chance to address his goals. Patient said he did fill out forms for Unemployment. CHW helped patient schedule appointment with PCP on April 13, 21 he did not want to see another provider sooner.    CHW advised patient to call clinic if swelling or pain gets worse.       Intervention and Education during outreach: CHW helped patient make appointment with PCP.    CHW Plan: CHW will call in 1 month.    Elena BARAJAS Community Health Worker  Clinic Care Coordination  [facility] : [place], [place] & [place]  Phone: [phone]

## 2021-04-13 ENCOUNTER — OFFICE VISIT (OUTPATIENT)
Dept: FAMILY MEDICINE | Facility: CLINIC | Age: 46
End: 2021-04-13
Payer: COMMERCIAL

## 2021-04-13 ENCOUNTER — PATIENT OUTREACH (OUTPATIENT)
Dept: CARE COORDINATION | Facility: CLINIC | Age: 46
End: 2021-04-13

## 2021-04-13 VITALS
HEIGHT: 64 IN | DIASTOLIC BLOOD PRESSURE: 89 MMHG | OXYGEN SATURATION: 95 % | WEIGHT: 201.4 LBS | BODY MASS INDEX: 34.38 KG/M2 | TEMPERATURE: 98.2 F | HEART RATE: 89 BPM | SYSTOLIC BLOOD PRESSURE: 134 MMHG

## 2021-04-13 DIAGNOSIS — R25.2 LEG CRAMPING: Primary | ICD-10-CM

## 2021-04-13 DIAGNOSIS — Z86.11 HISTORY OF TB (TUBERCULOSIS): ICD-10-CM

## 2021-04-13 DIAGNOSIS — R06.02 SHORTNESS OF BREATH: ICD-10-CM

## 2021-04-13 DIAGNOSIS — J44.9 CHRONIC OBSTRUCTIVE PULMONARY DISEASE, UNSPECIFIED COPD TYPE (H): Primary | ICD-10-CM

## 2021-04-13 DIAGNOSIS — J44.9 CHRONIC OBSTRUCTIVE PULMONARY DISEASE, UNSPECIFIED COPD TYPE (H): ICD-10-CM

## 2021-04-13 DIAGNOSIS — R63.5 WEIGHT GAIN: ICD-10-CM

## 2021-04-13 DIAGNOSIS — I27.20 PULMONARY HYPERTENSION (H): ICD-10-CM

## 2021-04-13 LAB
ANION GAP SERPL CALCULATED.3IONS-SCNC: 3 MMOL/L (ref 3–14)
BUN SERPL-MCNC: 10 MG/DL (ref 7–30)
CALCIUM SERPL-MCNC: 9.3 MG/DL (ref 8.5–10.1)
CHLORIDE SERPL-SCNC: 103 MMOL/L (ref 94–109)
CO2 SERPL-SCNC: 28 MMOL/L (ref 20–32)
CREAT SERPL-MCNC: 0.94 MG/DL (ref 0.66–1.25)
GFR SERPL CREATININE-BSD FRML MDRD: >90 ML/MIN/{1.73_M2}
GLUCOSE SERPL-MCNC: 101 MG/DL (ref 70–99)
MAGNESIUM SERPL-MCNC: 2.2 MG/DL (ref 1.6–2.3)
POTASSIUM SERPL-SCNC: 4.2 MMOL/L (ref 3.4–5.3)
SODIUM SERPL-SCNC: 134 MMOL/L (ref 133–144)

## 2021-04-13 PROCEDURE — 80048 BASIC METABOLIC PNL TOTAL CA: CPT | Performed by: NURSE PRACTITIONER

## 2021-04-13 PROCEDURE — 99214 OFFICE O/P EST MOD 30 MIN: CPT | Performed by: NURSE PRACTITIONER

## 2021-04-13 PROCEDURE — 36415 COLL VENOUS BLD VENIPUNCTURE: CPT | Performed by: NURSE PRACTITIONER

## 2021-04-13 PROCEDURE — 83735 ASSAY OF MAGNESIUM: CPT | Performed by: NURSE PRACTITIONER

## 2021-04-13 RX ORDER — ACETAMINOPHEN 325 MG/1
650 TABLET ORAL EVERY 6 HOURS PRN
Qty: 180 TABLET | Refills: 2 | Status: CANCELLED | OUTPATIENT
Start: 2021-04-13

## 2021-04-13 RX ORDER — ALBUTEROL SULFATE 90 UG/1
2 AEROSOL, METERED RESPIRATORY (INHALATION) EVERY 6 HOURS PRN
Qty: 8.5 G | Refills: 3 | Status: SHIPPED | OUTPATIENT
Start: 2021-04-13 | End: 2021-08-03

## 2021-04-13 RX ORDER — CYCLOSERINE 250 1/1
CAPSULE ORAL
COMMUNITY
Start: 2021-04-12 | End: 2021-08-03

## 2021-04-13 RX ORDER — ETHAMBUTOL HYDROCHLORIDE 400 MG/1
TABLET, FILM COATED ORAL
COMMUNITY
Start: 2021-04-12 | End: 2022-04-14

## 2021-04-13 RX ORDER — GABAPENTIN 300 MG/1
900 CAPSULE ORAL 3 TIMES DAILY
Qty: 270 CAPSULE | Refills: 1 | Status: CANCELLED | OUTPATIENT
Start: 2021-04-13

## 2021-04-13 RX ORDER — DULOXETIN HYDROCHLORIDE 60 MG/1
60 CAPSULE, DELAYED RELEASE ORAL DAILY
Qty: 90 CAPSULE | Refills: 1 | Status: CANCELLED | OUTPATIENT
Start: 2021-04-13

## 2021-04-13 RX ORDER — DULOXETIN HYDROCHLORIDE 30 MG/1
30 CAPSULE, DELAYED RELEASE ORAL DAILY
Qty: 90 CAPSULE | Refills: 0 | Status: CANCELLED | OUTPATIENT
Start: 2021-04-13

## 2021-04-13 RX ORDER — LEVOFLOXACIN 750 MG/1
750 TABLET, FILM COATED ORAL DAILY
Status: CANCELLED | OUTPATIENT
Start: 2021-04-13

## 2021-04-13 ASSESSMENT — MIFFLIN-ST. JEOR: SCORE: 1699.54

## 2021-04-13 ASSESSMENT — PAIN SCALES - GENERAL: PAINLEVEL: SEVERE PAIN (7)

## 2021-04-13 ASSESSMENT — ACTIVITIES OF DAILY LIVING (ADL): DEPENDENT_IADLS:: INDEPENDENT

## 2021-04-13 NOTE — PROGRESS NOTES
"    Assessment & Plan     Leg cramping  Will get labs and US.  - Basic metabolic panel  (Ca, Cl, CO2, Creat, Gluc, K, Na, BUN)  - Magnesium  - US Lower Extremity Venous Duplex Bilateral; Future    History of TB (tuberculosis)  Patient reports that ID told him treatment until July.     Shortness of breath  Refilled. Doing well currently with shortness of breath at baseline.  - albuterol (PROVENTIL HFA) 108 (90 Base) MCG/ACT inhaler; Inhale 2 puffs into the lungs every 6 hours as needed for shortness of breath / dyspnea or wheezing    Weight gain  Patient states he needs to lose about 30 lb before being considered for transplant. In speaking with him regarding his diet, it seems he needs some education about good food choices and timing.  - NUTRITION REFERRAL    Chronic obstructive pulmonary disease, unspecified COPD type (H)  Refilled.  - fluticasone-salmeterol (ADVAIR) 100-50 MCG/DOSE inhaler; Inhale 1 puff into the lungs every 12 hours         BMI:   Estimated body mass index is 34.57 kg/m  as calculated from the following:    Height as of this encounter: 1.626 m (5' 4\").    Weight as of this encounter: 91.4 kg (201 lb 6.4 oz).   Weight management plan: Discussed healthy diet and exercise guidelines    See Patient Instructions    Return in about 3 months (around 7/13/2021).     The benefits, risks and potential side effects were discussed in detail. Black box warnings discussed as relevant. All patient questions were answered. The patient was instructed to follow up immediately if any adverse reactions develop.    Return precautions discussed, including when to seek urgent/emergent care.    Patient verbalizes understanding and agrees with plan of care. Patient stable for discharge.      CHAZ Garrett CNP  [facility]    Slava Rucker is a 45 year old who presents for the following health issues     HPI     Musculoskeletal problem/pain  Onset/Duration: >2 " "months  Description  Location: legs - bilateral  Joint Swelling: YES- bilateral feet  Redness: no  Pain: YES- patient also complains of legs feeling heavy mainly in the mornings.   Warmth: YES, bilateral feet  Intensity:  severe  Progression of Symptoms:  worsening  Accompanying signs and symptoms:   Fevers: no  Numbness/tingling/weakness: YES- bilateral feet  History  Trauma to the area: no  Recent illness:  no  Previous similar problem: YES  Previous evaluation:  YES  Precipitating or alleviating factors:  Aggravating factors include: none  Therapies tried and outcome: nothing    Bilateral legs cramping and heavy - different than baseline  Feet continue to be numb - baseline  Sitting in chair makes left leg more crampy  No back pain. No loss of bowel or bladder.   Legs more achy than feet    Trying to lose weight but stuck around 198-200 lb  Reducing calories - used to eat 3 times per day. Now eating twice daily  Cut back on rice  Yesterday ate bread with mayonnaise and then in the evening ate cornbread    Shortness of breath at baseline    Frustrated with difficulty maintaining erection. Has a new partner.    We reviewed his medications today. There are some discrepancies - will ask community paramedic to review them with him on Thursday. He should have run out of potassium, Advair, and oxycodone last year however he states he's still taking them. Also, his [facility] med list has Breo Ellipta and ours has Advair.     Continues to go to pain clinic and feels like the current regimen is helping    Seeing [place] disease at [facility]. States he's on antibiotics until July    Review of Systems   Constitutional, HEENT, cardiovascular, pulmonary, GI, , musculoskeletal, neuro, skin, endocrine and psych systems are negative, except as otherwise noted.      Objective    /89 (BP Location: Left arm, Patient Position: Sitting, Cuff Size: Adult Large)   Pulse 89   Temp 98.2  F (36.8  C) (Oral)   Ht 1.626 m (5' 4\")   Wt " 91.4 kg (201 lb 6.4 oz)   SpO2 95%   BMI 34.57 kg/m    Body mass index is 34.57 kg/m .  Physical Exam   GENERAL: healthy, alert and no distress  RESP: lungs clear to auscultation - no rales, rhonchi or wheezes  CV: RRR  MS: no gross musculoskeletal defects noted, wearing compression stockings, 1-2+ edema, PD and DP pulses 2+, normal. Negative homans.  PSYCH: mentation appears normal, affect normal/bright    Results for orders placed or performed in visit on 04/13/21 (from the past 24 hour(s))   Basic metabolic panel  (Ca, Cl, CO2, Creat, Gluc, K, Na, BUN)   Result Value Ref Range    Sodium 134 133 - 144 mmol/L    Potassium 4.2 3.4 - 5.3 mmol/L    Chloride 103 94 - 109 mmol/L    Carbon Dioxide PENDING 20 - 32 mmol/L    Anion Gap PENDING 3 - 14 mmol/L    Glucose PENDING 70 - 99 mg/dL    Urea Nitrogen PENDING 7 - 30 mg/dL    Creatinine PENDING 0.66 - 1.25 mg/dL    GFR Estimate PENDING >60 mL/min/[1.73_m2]    GFR Estimate If Black PENDING >60 mL/min/[1.73_m2]    Calcium PENDING 8.5 - 10.1 mg/dL

## 2021-04-13 NOTE — RESULT ENCOUNTER NOTE
Please send letter:    Chaim,  Your lab results were normal. Please let us know if you have any questions.  Thank you for allowing us to participate in your care.  CHAZ Garrett CNP

## 2021-04-13 NOTE — Clinical Note
Chaim Valencia told me you'll be visiting him on Thursday. Could you check his medications against his medication list? See my note for details on specifics but I'm worried he's not taking everything on his med list again.  Thanks!  CHAZ Garrett CNP

## 2021-04-13 NOTE — PATIENT INSTRUCTIONS
Ask pharmacy to refill your acetaminophen  Schedule ultrasound and with dietician  Labs today  Refill sent for the Advair - take daily  Refill sent for albuterol - use as needed for wheezing or shortness of breath      Patient Education    At [facility], we strive to deliver an exceptional experience to you, every time we see you. If you receive a survey, please complete it as we do value your feedback.  If you have MyChart, you can expect to receive results automatically within 24 hours of their completion.  Your provider will send a note interpreting your results as well.   If you do not have MyChart, you should receive your results in about a week by mail.    Your care team:                            Family Medicine Internal Medicine   MD Tunde Hernandez MD Shantel Branch-Fleming, MD Srinivasa Vaka, MD Katya Belousova, PA-C  Nyla Florian, APRN CNP    Marco Florentino MD Pediatrics   Dell Tabor, PA-C  Sana Joshua, CNP MD Beth Platt APRN CNP   MD Nel Evans MD Deborah Mielke, MD Kim Thein, APRN CNP      Clinic hours: Monday - Thursday 7 am-6 pm; Fridays 7 am-5 pm.   Urgent care: Monday - Friday 10 am- 8 pm; Saturday and Sunday 9 am-5 pm.    Clinic: [phone]       [facility]: Monday - Thursday 8 am - 7 pm; Friday 8 am - 6 pm  [facility]: [phone]     Use [email or web address] for 24/7 diagnosis and treatment of dozens of conditions.
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
## 2021-04-14 ENCOUNTER — ANCILLARY PROCEDURE (OUTPATIENT)
Dept: ULTRASOUND IMAGING | Facility: CLINIC | Age: 46
End: 2021-04-14
Attending: NURSE PRACTITIONER
Payer: COMMERCIAL

## 2021-04-14 ENCOUNTER — TELEPHONE (OUTPATIENT)
Dept: FAMILY MEDICINE | Facility: CLINIC | Age: 46
End: 2021-04-14

## 2021-04-14 ENCOUNTER — PATIENT OUTREACH (OUTPATIENT)
Dept: CARE COORDINATION | Facility: CLINIC | Age: 46
End: 2021-04-14

## 2021-04-14 DIAGNOSIS — R25.2 LEG CRAMPING: ICD-10-CM

## 2021-04-14 NOTE — PROGRESS NOTES
Clinic Care Coordination Contact    Situation: Patient chart reviewed by care coordinator.    Background: Patient is enrolled in Care Coordination. CHW, SWCC and Community Paramedic are involved.    Assessment: CHW spoke with patient on 4/5. Patient stated that his feet hurt and are swollen. He has been wearing his compression socks during the day, which he reports has helped with the swelling, but not the pain. Patient filled out forms for unemployment. Patient had an appt with PCP on 4/13. Community Paramedic talked with patient on the phone on 4/13. Has not received information from the Naturalization office yet. His unemployment compensation has not been received either. Community Paramedic advised that patient call the unemployment office and find out what else they need.     Plan/Recommendations: CHW plans to contact patient in one month. SWCC will review chart in 6 weeks, per standard workflow, unless involvement is requested sooner.     BING Garcia  Primary Care Clinic- Social Work Care Coordinator  [facility] and [facility]  Ph: [phone]  4/14/2021 1:19 PM

## 2021-04-14 NOTE — TELEPHONE ENCOUNTER
This writer attempted to contact Chaim on 04/14/21    Reason for call results and left message to return call.    When patient calls back, please Relay message Read below from provider, (read verbatim) .        Rod Hawthorne

## 2021-04-14 NOTE — TELEPHONE ENCOUNTER
----- Message from Massiel Tovar sent at 4/14/2021  2:34 PM CDT -----    ----- Message -----  From: Nyla Florian APRN CNP  Sent: 4/14/2021   1:07 PM CDT  To:  1st Floor Primary Care    Please call patient and let him know US is normal - no blood clot. Please continue cares as discussed during visit.

## 2021-04-21 ENCOUNTER — TELEPHONE (OUTPATIENT)
Dept: CARE COORDINATION | Facility: CLINIC | Age: 46
End: 2021-04-21
Payer: COMMERCIAL

## 2021-04-21 DIAGNOSIS — J44.9 CHRONIC OBSTRUCTIVE PULMONARY DISEASE, UNSPECIFIED COPD TYPE (H): Primary | ICD-10-CM

## 2021-04-21 DIAGNOSIS — I27.20 PULMONARY HYPERTENSION (H): ICD-10-CM

## 2021-04-21 PROCEDURE — 99207 PR NO BILLABLE SERVICE THIS VISIT: CPT
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
## 2021-04-23 DIAGNOSIS — M79.671 BILATERAL FOOT PAIN: ICD-10-CM

## 2021-04-23 DIAGNOSIS — M79.2 NEUROPATHIC PAIN: ICD-10-CM

## 2021-04-23 DIAGNOSIS — M25.512 CHRONIC PAIN OF BOTH SHOULDERS: ICD-10-CM

## 2021-04-23 DIAGNOSIS — M79.672 BILATERAL FOOT PAIN: ICD-10-CM

## 2021-04-23 DIAGNOSIS — M25.511 CHRONIC PAIN OF BOTH SHOULDERS: ICD-10-CM

## 2021-04-23 DIAGNOSIS — G47.00 INSOMNIA, UNSPECIFIED TYPE: ICD-10-CM

## 2021-04-23 DIAGNOSIS — G89.29 CHRONIC PAIN OF BOTH SHOULDERS: ICD-10-CM

## 2021-04-23 RX ORDER — NORTRIPTYLINE HCL 10 MG
10-20 CAPSULE ORAL AT BEDTIME
Qty: 60 CAPSULE | Refills: 3 | Status: SHIPPED | OUTPATIENT
Start: 2021-04-23 | End: 2021-08-23

## 2021-04-23 NOTE — TELEPHONE ENCOUNTER
Signed Prescriptions:                        Disp   Refills    nortriptyline (PAMELOR) 10 MG capsule      60 cap*3        Sig: Take 1-2 capsules (10-20 mg) by mouth At Bedtime For           pain and insomnia..  Authorizing Provider: MICHELLE CHILDERS, RN CNP, FNP  [facility]  [facility] [place]

## 2021-04-23 NOTE — TELEPHONE ENCOUNTER
Received fax request from [facility] pharmacy requesting refill(s) for nortriptyline (PAMELOR) 10 MG capsule    Last refilled on 03/12/21    Pt last seen on 02/26/21  Next appt scheduled for : none    Will facilitate refill.

## 2021-04-27 ENCOUNTER — TELEPHONE (OUTPATIENT)
Dept: PALLIATIVE MEDICINE | Facility: CLINIC | Age: 46
End: 2021-04-27

## 2021-04-27 DIAGNOSIS — G62.0 DRUG-INDUCED PERIPHERAL NEUROPATHY (H): ICD-10-CM

## 2021-04-27 RX ORDER — GABAPENTIN 300 MG/1
900 CAPSULE ORAL 3 TIMES DAILY
Qty: 270 CAPSULE | Refills: 1 | Status: SHIPPED | OUTPATIENT
Start: 2021-04-27 | End: 2021-07-02

## 2021-04-27 NOTE — TELEPHONE ENCOUNTER
Pending Prescriptions:                       Disp   Refills    gabapentin (NEURONTIN) 300 MG capsule     270 ca*1            Sig: Take 3 capsules (900 mg) by mouth 3 times daily    Last filled 4/6/2021  Last seen: 2/26/2021  Next appointment None       Destin Menjivar MA

## 2021-04-27 NOTE — TELEPHONE ENCOUNTER
Reason for call:  Medication   If this is a refill request, has the caller requested the refill from the pharmacy already? No  Will the patient be using a [facility]? No  Name of the pharmacy and phone number for the current request: [id number] - [place], [place] - [address] AT [address]    Name of the medication requested: DULoxetine (CYMBALTA) 30 MG capsule----------DULoxetine (CYMBALTA) 60 MG capsule    Other request:     Phone number to reach patient:  Home number on file [phone] (home)    Can we leave a detailed message on this number?  YES    Travel screening: Not Applicable         Jacobo KURTZ    [facility] [place]

## 2021-04-27 NOTE — TELEPHONE ENCOUNTER
Signed Prescriptions:                        Disp   Refills    gabapentin (NEURONTIN) 300 MG capsule      270 ca*1        Sig: Take 3 capsules (900 mg) by mouth 3 times daily  Authorizing Provider: MICHELLE CHILDERS, RN CNP, FNP  [facility]

## 2021-05-03 DIAGNOSIS — M79.671 BILATERAL FOOT PAIN: ICD-10-CM

## 2021-05-03 DIAGNOSIS — M79.2 NEUROPATHIC PAIN: ICD-10-CM

## 2021-05-03 DIAGNOSIS — M79.672 BILATERAL FOOT PAIN: ICD-10-CM

## 2021-05-03 RX ORDER — DULOXETIN HYDROCHLORIDE 30 MG/1
30 CAPSULE, DELAYED RELEASE ORAL DAILY
Qty: 90 CAPSULE | Refills: 0 | Status: SHIPPED | OUTPATIENT
Start: 2021-05-03 | End: 2021-08-03

## 2021-05-03 NOTE — TELEPHONE ENCOUNTER
Received fax from pharmacy requesting refill(s) for DULoxetine (CYMBALTA) 30 MG capsule     Last refilled on 02/02/21    Pt last seen on 02/26/21  Next appt scheduled for None    E-prescribe to:  [id number] - [place], [place] - [address] AT [address]     Will facilitate refill.      Patsy Khan MA  [facility]

## 2021-05-04 ENCOUNTER — HOSPITAL ENCOUNTER (OUTPATIENT)
Dept: NUTRITION | Facility: CLINIC | Age: 46
Discharge: HOME | End: 2021-05-04
Attending: NURSE PRACTITIONER | Admitting: NURSE PRACTITIONER
Payer: COMMERCIAL

## 2021-05-04 PROCEDURE — 97802 MEDICAL NUTRITION INDIV IN: CPT | Mod: TEL | Performed by: DIETITIAN, REGISTERED

## 2021-05-04 NOTE — PROGRESS NOTES
"[facility]  Medical Nutrition Therapy    Visit Type: Initial Assessment    Chaim Norman, 45 year old referred by Nyla Florian APRN CNP for MNT related to Weight Gain        The patient has been notified of following:      \"This telephone visit will be conducted via a call between you and your physician/provider. We have found that certain health care needs can be provided without the need for a physical exam.  This service lets us provide the care you need with a short phone conversation.     Telephone visits are billed at different rates depending on your insurance coverage. During this emergency period, for some insurers they may be billed the same as an in-person visit.  Please reach out to your insurance provider with any questions.     If during the course of the call the physician/provider feels a telephone visit is not appropriate, you will not be charged for this service.\"     Patient has given verbal consent for Telephone visit?  Yes     What phone number would you like to be contacted at? [phone]     Phone call duration: 60 min    Nutrition Assessment:  Anthropometrics  Height:   Ht Readings from Last 1 Encounters:   04/13/21 1.626 m (5' 4\")         BMI:  35.7   Weight Status:  Obesity Grade II BMI 35-39.9   Weight:   Wt Readings from Last 1 Encounters:   04/13/21 91.4 kg (201 lb 6.4 oz)       UBW: 178 lb (last time he was this wt was 2019)      IBW:  130 lb (59 kg) IBW %: 155%        Weight History:   Wt Readings from Last 20 Encounters:   04/13/21 91.4 kg (201 lb 6.4 oz)   03/10/21 90.9 kg (200 lb 6.4 oz)   03/04/21 90.7 kg (200 lb)   02/16/21 91.8 kg (202 lb 6.4 oz)   02/09/21 91.5 kg (201 lb 12.8 oz)   12/23/20 91.7 kg (202 lb 3.2 oz)   12/10/20 91.4 kg (201 lb 9.6 oz)   12/01/20 92.8 kg (204 lb 9.6 oz)   11/25/20 90.9 kg (200 lb 6.4 oz)   11/11/20 91.2 kg (201 lb)   09/22/20 89.4 kg (197 lb)   09/01/20 89.9 kg (198 lb 3.2 oz)   08/25/20 89.2 kg (196 lb 9.6 oz)   08/11/20 " 91.4 kg (201 lb 6.4 oz)   08/10/20 90.7 kg (200 lb)   20 90.4 kg (199 lb 3.2 oz)   20 92.6 kg (204 lb 3.2 oz)   20 91.9 kg (202 lb 9.6 oz)   07/10/20 91.5 kg (201 lb 12.8 oz)   20 88.9 kg (196 lb)   -Pt's wt has been stable over the past 6 months.     Goal Weight:   -Would like to be 170-175 lb    Nutrition History    PMH:   -Hx of tuberculosis, iatrogenic pneumothorax, anemia, COPD, neutropenia, HTN,   -Pt reports that he has been feeling like oxygen is dropping (on 2 L per day)  -Would like to lose wt. Hasn't tried anything to lose wt yet.     Labs:   Hgb: 13.0 (L)- 21  Ma.2 (WNL)  Non-fasting  (H)- 21    Meds:   Current Outpatient Medications   Medication Instructions     acetaminophen (TYLENOL) 650 mg, Oral, EVERY 6 HOURS PRN     albuterol (PROVENTIL HFA) 108 (90 Base) MCG/ACT inhaler 2 puffs, Inhalation, EVERY 6 HOURS PRN     amLODIPine (NORVASC) 10 mg, Oral, DAILY     bedaquiline (SITRURO) 200 mg, Oral, EVERY  AND FRIDAY MORNING     COMPOUNDED NON-CONTROLLED SUBSTANCE (CMPD RX) - PHARMACY TO MIX COMPOUNDED MEDICATION Take one capsule containing 4.5mg of naltrexone by mouth per day     cycloSERINE (SEROMYCIN) 250 MG capsule No dose, route, or frequency recorded.     diclofenac (VOLTAREN) 2 g, Transdermal, 4 TIMES DAILY PRN, Of both shoulders     DULoxetine (CYMBALTA) 60 mg, Oral, DAILY, Take with 30mg to equal 90mg/day. 3 month script     DULoxetine (CYMBALTA) 30 mg, Oral, DAILY, Take with 60mg to equal 90mg/day. 3 month script     ethambutol (MYAMBUTOL) 400 MG tablet No dose, route, or frequency recorded.     fluticasone-salmeterol (ADVAIR) 100-50 MCG/DOSE inhaler 1 puff, Inhalation, EVERY 12 HOURS     gabapentin (NEURONTIN) 900 mg, Oral, 3 TIMES DAILY     levofloxacin (LEVAQUIN) 750 mg, Oral, DAILY     melatonin 3 mg, Oral, AT BEDTIME PRN     nortriptyline (PAMELOR) 10-20 mg, Oral, AT BEDTIME, For pain and insomnia..     oxyCODONE (ROXICODONE) 5 mg,  "Oral, 2 TIMES DAILY PRN     potassium chloride ER (KLOR-CON M) 20 MEQ CR tablet 20 mEq, Oral, DAILY       Supplements: reviewed      Social/Environmental:   -average sleep per night: Sleeps well.   -level of stress: Yes, misses his kids (in [place], hasn't seen them in 6 years). Not working, which stresses him.       Food Record  Breakfast: 9:00 am- Bread, eggs, Coke bottle 20 oz. \"Bread or so\" (honey wheat) with mayonnaise or eggs OR cream of wheat w/powdered milk  Lunch: 2:00-3:00 pm- rice white 2-3 spoons, meat, fish, chicken (palm of hand), greens cooked with oil and mix with meat/fish/chicken with spices, OJ (1 tall glass)  Dinner: 8:00 pm- bread with ojeda, Coke or juice, sausage pork (little bit) OR rice OR cream of wheat   Snacks: mixed nuts (2 handfuls) OR Cheetos (2 handfuls), apple, oranges, pears avocado   Beverages: Coke, OJ, [employer] drink, milk 2% or powdered milk, water (1/2-1 gallon per day)  -take out hardly ever      Nutritional Details:   -Food intolerances: Chinese food (nausea)  -GI concerns: Nausea  -Appetite: good  -Pace of eating: normal   -Role of cooking: own cooking  -Role of food shopping: [employer], [employer], own shopping      Physical Activity:  -Not able to walk d/t breathing difficulty      ASSESSED MALNUTRITION STATUS  % Weight Loss:  None noted  % Intake:  No decreased intake noted  Subcutaneous Fat Loss:  Unable to determine  Loss of Muscle Mass:  Unable to determine  Fluid Retention:  None noted    Malnutrition Diagnosis:  Patient does not meet two of the above criteria necessary for diagnosing malnutrition      Nutrition Diagnosis:  Excessive energy intake related to food and nutrition knowledge deficit as evidenced by usual dietary intake excessive in simple sugar, BMI 35.7, and report of not knowing healthy food choices.         Nutrition Intervention:  Nutrition Prescription Summary: General Healthy Nutrition for Weight Loss    Nutrition Education " (Content):  -Discussed -Educated pt on using MyPlate for meal planning and discussed portion sizes   -Discussed recommended and not recommended foods (choosing WGs, lean meats, low-fat dairy, fresh fruits & veggies, unsat fats, and limiting high-sodium and refined sugar foods)  -Educated pt on metabolism and used the fire analogy; talked about the importance of consuming consistent meals/snacks throughout the day   -Discussed healthy snack options- protein+complex CHO  -Educated pt on reading food labels  -Encouraged pt to stop drinking soda and juice  -Explained ways to season without salt. Highly recommended stopping consumption of Cheetos, boxed mixes, and other high sodium foods  -Encouraged pt to increase daily PA- include cardiovascular activities w/ultimate goal of 60 min per day    Provided the following handouts: Weight Loss Tips, General Healthy Nutrition, Snacks, Recipes      Nutrition Prescription: Macronutrient and Micronutrient details   Dosing weight: 67 kg (adj BW)  Energy: 6547-5800 kcals/day (25-30 Kcal/Kg)    Justification:  (obese) Protein: 67-80 g Pro/day (1-1.2 g pro/Kg)    Justification:  (obesity guidelines )   Fluid: 2498-9652 mL/day   (1 mL/Kcal)     Justification:  (obese)   Fiber:  38 g/day            Vitamin and Mineral Supplements: MTV+M       Patient Engagement:   Assessed learning needs and learning preference: Yes.  Teaching Method(s) used: Explanation    Nutrition Education (Application):  a)  Discussed current eating plans / recommended alternative food choices    b)  Patient verbalizes understanding of diet by repeating back information learned     Anticipate >70% compliance   Stage of Change:  preparation      Nutrition Goals:  1) [place] cassava and plantains each once per week   2) Stop drinking soda and juice   3)     Nutrition Follow Up / Monitoring:   Weight, PO intake, PA      Nutrition Recommendations:  Patient to follow-up with RD in 4 weeks.  Patient has RD contact  information to call/email if needed.      Start time: 8:45  End time: 9:45    Total Time Duration: 60 min      Marilyn Moya RD, AYE  Clinical Dietitian  [facility]: [phone]  [facility]: [phone]

## 2021-05-04 NOTE — TELEPHONE ENCOUNTER
Signed Prescriptions:                        Disp   Refills    DULoxetine (CYMBALTA) 30 MG capsule        90 cap*0        Sig: Take 1 capsule (30 mg) by mouth daily Take with 60mg           to equal 90mg/day. 3 month script  Authorizing Provider: MCIHELLE CHILDERS, RN CNP, FNP  [facility]  [facility] [place]

## 2021-05-05 ENCOUNTER — NURSE TRIAGE (OUTPATIENT)
Dept: FAMILY MEDICINE | Facility: CLINIC | Age: 46
End: 2021-05-05

## 2021-05-05 NOTE — TELEPHONE ENCOUNTER
"Patient calling with left sided chest pain. When stretching can feel the pain go through to back. Both arm feels weak. Hand trimmers \"from medication.\" Pain has been ongoing for the past few days, worsening by the day. States pain is 7/10.     1. LOCATION: \"Where does it hurt?\"        Left side of chest  2. RADIATION: \"Does the pain go anywhere else?\" (e.g., into neck, jaw, arms, back)      Through to back  3. ONSET: \"When did the chest pain begin?\" (Minutes, hours or days)       Couple days ago  4. PATTERN \"Does the pain come and go, or has it been constant since it started?\"  \"Does it get worse with exertion?\"       Constant. Worse every day. Pain is the same at rest or exertion  5. DURATION: \"How long does it last\" (e.g., seconds, minutes, hours)      Constant  6. SEVERITY: \"How bad is the pain?\"  (e.g., Scale 1-10; mild, moderate, or severe)     - MILD (1-3): doesn't interfere with normal activities      - MODERATE (4-7): interferes with normal activities or awakens from sleep     - SEVERE (8-10): excruciating pain, unable to do any normal activities        7/10  7. CARDIAC RISK FACTORS: \"Do you have any history of heart problems or risk factors for heart disease?\" (e.g., prior heart attack, angina; high blood pressure, diabetes, being overweight, high cholesterol, smoking, or strong family history of heart disease)      Pulmonary hypertension  8. PULMONARY RISK FACTORS: \"Do you have any history of lung disease?\"  (e.g., blood clots in lung, asthma, emphysema, birth control pills)      TB, COPD  9. CAUSE: \"What do you think is causing the chest pain?\"      No idea  10. OTHER SYMPTOMS: \"Do you have any other symptoms?\" (e.g., dizziness, nausea, vomiting, sweating, fever, difficulty breathing, cough)        When going upstairs needs to catch breath    GO TO ED/UCC NOW (OR TO OFFICE WITH PCP APPROVAL):                          * After using nurse judgment regarding the most appropriate site, tell the caller:  Go to " [place] [facility].  Leave NOW.    * TRIAGER CAUTION: In selecting the most appropriate care site, you must consider both the severity of the patient's symptoms AND what resources are available at that care site.  * ED: Patients who may need surgery, sound seriously ill or may be unstable need to be sent to an ED. Likewise, so do most patients with complex medical problems and serious symptoms.  * UCC:  Some UCCs can manage patients who are stable and have less serious symptoms (e.g., minor illnesses and injuries). The triager must know the UCC capabilities before sending a patient there. If unsure, call ahead.  * OFFICE: If patient sounds stable and not seriously ill, consult PCP (or follow your office policy) to see if patient can be seen NOW in office.    Advised patient to be evaluated in ER and to go now. Patient states he does not have a car and will go tomorrow. Writer asked if he can call a friend to get him into ER as writer does not advise waiting. Patient states he will call a friend and see about getting a ride. Continued to ask if he can not get a ride if it's ok to wait until tomorrow. Writer continued to say that he needs to be evaluated today and we could call an ambulance to pick him up. Patient states he will call a friend and believes he can get into ER today.     Advised patient if chest pain becomes worse and starts having SOB then he needs to call 911. Patient states understanding.     GIANNA King/[facility]        Additional Information    Negative: Severe difficulty breathing (e.g., struggling for each breath, speaks in single words)    Negative: Passed out (i.e., fainted, collapsed and was not responding)    Negative: Chest pain lasting longer than 5 minutes and ANY of the following:* Over 50 years old* Over 30 years old and at least one cardiac risk factor (i.e., high blood pressure, diabetes, high cholesterol, obesity, smoker or strong family history of heart  disease)* Pain is crushing, pressure-like, or heavy * Took nitroglycerin and chest pain was not relieved* History of heart disease (i.e., angina, heart attack, bypass surgery, angioplasty, CHF)    Negative: Visible sweat on face or sweat dripping down face    Negative: Sounds like a life-threatening emergency to the triager    Negative: Followed an injury to chest    Negative: SEVERE chest pain    Negative: Pain also present in shoulder(s) or arm(s) or jaw    Negative: Difficulty breathing    Negative: Cocaine use within last 3 days    Negative: History of prior 'blood clot' in leg or lungs (i.e., deep vein thrombosis, pulmonary embolism)    Negative: Recent illness requiring prolonged bed rest (i.e., immobilization)    Negative: Hip or leg fracture in past 2 months (e.g, or had cast on leg or ankle)    Negative: Major surgery in the past month    Negative: Recent long-distance travel with prolonged time in car, bus, plane, or train (i.e., within past 2 weeks; 6 or more hours duration)    Negative: Heart beating irregularly or very rapidly    Chest pain lasting longer than 5 minutes    Protocols used: CHEST PAIN-A-OH

## 2021-05-06 ENCOUNTER — ALLIED HEALTH/NURSE VISIT (OUTPATIENT)
Dept: OTHER | Facility: CLINIC | Age: 46
End: 2021-05-06
Payer: COMMERCIAL

## 2021-05-06 VITALS
OXYGEN SATURATION: 95 % | RESPIRATION RATE: 12 BRPM | SYSTOLIC BLOOD PRESSURE: 118 MMHG | HEART RATE: 105 BPM | DIASTOLIC BLOOD PRESSURE: 80 MMHG | TEMPERATURE: 98.2 F

## 2021-05-06 DIAGNOSIS — J44.9 CHRONIC OBSTRUCTIVE PULMONARY DISEASE, UNSPECIFIED COPD TYPE (H): Primary | ICD-10-CM

## 2021-05-06 DIAGNOSIS — I27.20 PULMONARY HYPERTENSION (H): ICD-10-CM

## 2021-05-06 PROCEDURE — 99207 PR COMMUNITY PARAMEDIC-PATIENT MEETS CRITERIA: CPT

## 2021-05-06 ASSESSMENT — ACTIVITIES OF DAILY LIVING (ADL): DEPENDENT_IADLS:: INDEPENDENT

## 2021-05-06 NOTE — PROGRESS NOTES
Community Paramedics Follow-up Visit  May 6, 2021  TIME: 1000    Chaim Norman is a 45 year old male being seen at home for a follow-up visit.    Present at appointment: Patient, Care Team Member    Primary Diagnosis: Psychosocial    Chief Complaint   Patient presents with     Outreach       [place] Utilization:   Clinic Utilization  Difficulty keeping appointments:: No  Compliance Concerns: No  No-Show Concerns: No  No PCP office visit in Past Year: No  Utilization    Last refreshed: 5/5/2021 10:55 AM: Hospital Admissions 1           Last refreshed: 5/5/2021 10:55 AM: ED Visits 0           Last refreshed: 5/5/2021 10:55 AM: No Show Count (past year) 4              Current as of: 5/5/2021 10:55 AM              /80   Pulse 105   Temp 98.2  F (36.8  C)   Resp 12   SpO2 95%     Clinical Concerns:  Current Medical Concerns:  COPD    Current Behavioral Concerns: help with dieting    Education Provided to patient: dieting tips   Medication set up? No  Pill Box issued: No  Scale issued: No  Flu Shot given: No  Lab draw or specimen collection: No  Food box issued: No  Collaborative visit with PCP: No    Health Maintenance Reviewed: Not assessed    Clinical Pathway: None    No  Face to Face in Home / Community    Recent weights:  4/23 199  4/25 199.8  4/26 200.1  4/27 198.2  4/28 198  4/29 200.0  4/30 199.1  5/1 199.0  5/2 198.2  5/3 198.0  5/4 199.6  5/5 198.0  5/6 198.4      Review of Symptoms/PE    Skin: negative  Eyes: negative  Ears/Nose/Throat: negative  Respiratory: Shortness of breath and Dyspnea on exertion - severe  Cardiovascular: exertional chest pain when he moves his arms and core body he states he can feel chest pain and pain in his back as well  Gastrointestinal: negative  Genitourinary: negative  Musculoskeletal: foot pain  Neurologic: negative  Psychiatric: negative    Pain Management::       Medication Reconciliation  Knowledgeable about how to use meds:: Yes  Medication side effects suspected::  No    Diet/Exercise/Sleep  Diet:: No added salt  Inadequate nutrition (GOAL):: No  Tube Feeding: No  Inadequate activity/exercise (GOAL):: Yes  Significant changes in sleep pattern (GOAL): No    Plan:     Time spent with patient: 70    The patient meets one or more of the following criteria:  * Has received hospital emergency department services three or more times in four consecutive months within a twelve month period    Acute concern/Follow-up recommendations: follow current treatment plan    Next CP visit scheduled: 5/13    Issues for Provider to follow up on: Community Paramedic visited with Chaim in his home. Chaim is doing well and states his feet are bothering him most. Some swelling found to top of each foot. No pitting edema and no pitting edema to either leg. Feet are hot to the touch. Advised to continue to keep his feet elevated. Chaim has a mattress on the floor in his living room with a rolled up comforter to support his legs.   Chaim lost his compression socks at the laundry mat. Community Paramedic to see if  will send a DME to medical store for another pair.     Assisted Chaim with his divorce papers. Ready to be sent to [facility].    Did not have enough time to go over medications - Community Paramedic to return in one week to go through Chaim's home medications.    Provider follow up visit needed: TBD

## 2021-05-07 DIAGNOSIS — R60.0 LOWER EXTREMITY EDEMA: Primary | ICD-10-CM

## 2021-05-07 NOTE — PROGRESS NOTES
DME order and demographic face sheet [facility] at fax # [phone].  Wolf Ruelas,  For 1st Floor Primary Care    Called patient is notified of DME faxed and phone number given to patient if patient does not get his DME supply.  Wolf Ruelas,  For 1st Floor Primary Care

## 2021-05-12 ENCOUNTER — PATIENT OUTREACH (OUTPATIENT)
Dept: NURSING | Facility: CLINIC | Age: 46
End: 2021-05-12
Payer: COMMERCIAL

## 2021-05-12 NOTE — PROGRESS NOTES
Clinic Care Coordination Contact  Community Health Worker Follow Up    Goals:   Goals Addressed as of 5/12/2021 at 3:05 PM                 Today    5/6/21      2. Pain Management (pt-stated)   50%  30%    Added 2/19/20 by Charlene Gonzales, RN     Goal Statement: I will have suitable pain relief to perform activities of daily living.  Date Goal set: 2/19/2020  Date to Achieve By: 05/01/21  Patient expressed understanding of goal: Yes  Action steps to achieve this goal:  1. I will take medications as prescribed to maintain adequate pain control/relief.  2. I will utilize non-pharmaceutical measures to help with pain control/relief.  3. I will maintain routine follow up and contact with my clinic care team.  4. I will call my pharmacy to request refills of my medications before they run out.  5. I will schedule a pain clinic follow up appointment.        Psychosocial (pt-stated)   100%  50%    Added 9/3/20 by Lisa Loredo LSW     Goal Statement: I need help applying for my Naturalization  Date Goal set: 9/3/2020   Barriers: COVID 19  some offices closed   Strengths: feels capable of calling resources  Date to Achieve By: 04/01/21  Patient expressed understanding of goal: yes  Action steps to achieve this goal:  1. I will call resources provided for help with application  2. I will schedule an appt at the [employer] to use the computer            CHW spoke with patient he said he is doing okay. CHW asked is patient still wearing compression stockings? He said he lost then at them at the laundry mat. CHW asked did he let his provider know so they could possibly get you a prescription for new pair? He said yes.    Patient said he has not heard back from Unemployment or about Naturalization papers.He is taking medications as prescribed for pain. He is remembering to call pharmacy about medication refills before he runs completely out.    CHW asked did patient have any new concerns or questions he said  no.    Intervention and Education during outreach: CHW advised patient to call clinic with any non-emergent concerns.    CHW Plan: CHW will call patient in 1 month.    Elena BARAJAS Community Health Worker  Clinic Care Coordination  [facility] Clinics : [place], [place] & [place]  Phone: [phone]

## 2021-05-13 ENCOUNTER — ALLIED HEALTH/NURSE VISIT (OUTPATIENT)
Dept: OTHER | Facility: CLINIC | Age: 46
End: 2021-05-13
Payer: COMMERCIAL

## 2021-05-13 DIAGNOSIS — I27.20 PULMONARY HYPERTENSION (H): ICD-10-CM

## 2021-05-13 DIAGNOSIS — J44.9 CHRONIC OBSTRUCTIVE PULMONARY DISEASE, UNSPECIFIED COPD TYPE (H): Primary | ICD-10-CM

## 2021-05-13 PROCEDURE — 99207 PR COMMUNITY PARAMEDIC-PATIENT MEETS CRITERIA: CPT

## 2021-05-13 ASSESSMENT — ACTIVITIES OF DAILY LIVING (ADL): DEPENDENT_IADLS:: INDEPENDENT

## 2021-05-13 NOTE — PROGRESS NOTES
Community Paramedics Follow-up Visit  May 13, 2021  TIME: 1000    Chaim Norman is a 45 year old male being seen at home for a follow-up visit.    Present at appointment: Patient, Care Team Member, Other    Primary Diagnosis: Psychosocial    Chief Complaint   Patient presents with     Outreach       [place] Utilization:   Clinic Utilization  Difficulty keeping appointments:: No  Compliance Concerns: No  No-Show Concerns: No  No PCP office visit in Past Year: No  Utilization    Last refreshed: 5/12/2021  3:38 PM: Hospital Admissions 0           Last refreshed: 5/12/2021  3:38 PM: ED Visits 0           Last refreshed: 5/12/2021  3:38 PM: No Show Count (past year) 4              Current as of: 5/12/2021  3:38 PM              There were no vitals taken for this visit.    Clinical Concerns:  Current Medical Concerns:  COPD    Current Behavioral Concerns: None    Education Provided to patient: talked about low oxygen saturations and the effects on the brain   Medication set up? No  Pill Box issued: No  Scale issued: No  Flu Shot given: No  Lab draw or specimen collection: No  Food box issued: No  Collaborative visit with PCP: No    Health Maintenance Reviewed: Not assessed    Clinical Pathway: None    No  Face to Face in Home / Community    Review of Symptoms/PE    Skin: negative  Eyes: negative  Ears/Nose/Throat: negative  Respiratory: Extreme shortness of breath and Dyspnea on exertion  Cardiovascular: negative  Gastrointestinal: negative  Genitourinary: negative  Musculoskeletal: foot pain  Neurologic: negative  Psychiatric: negative    Pain Management::       Medication Reconciliation  Knowledgeable about how to use meds:: Yes  Medication side effects suspected:: No    Diet/Exercise/Sleep  Diet:: No added salt  Inadequate nutrition (GOAL):: No  Tube Feeding: No  Inadequate activity/exercise (GOAL):: Yes  Significant changes in sleep pattern (GOAL): No    Plan:     Time spent with patient: 65    The patient meets one  or more of the following criteria:  * Has received hospital emergency department services three or more times in four consecutive months within a twelve month period    Acute concern/Follow-up recommendations: follow current treatment plan    Next CP visit scheduled: 5/27/21    Issues for Provider to follow up on: Community Paramedic and Community Health Worker visited with Chaim today at his home. Upon arrival Chaim had forgotten about the appointment. Ten minutes later Chaim walked up from outside. Chaim explained he had been out for a walk. Chaim was severely out of breath and had to stop and rest a few times while walking up the 3 flights of stairs. Chaim's breathing was very audible. Oxygen during his walk up the stairs was between 78-80%. It took approx.3-5 minutes of rest at the top of the stairs for his oxygen to rise to 90%. Talked about possible portable oxygen when he goes on walks -  Community Paramedic to send message to Nyla Florian CNP in regards to this.   Went through Chaim's medication's vs what is in Epic - Chaim is has two medications that are not listed, they are:    Pyrazinamide 500mg x3 once daily   Lidocaine Ointment 5% for feet - not helping    Community Paramedic assisted Chaim with finding some car insurance for his vehicle and also assisted with a notice he received from [facility] Financial Assistance.     Chaim states he is also doing well with his diet and has cut back on eating bread and rice.     Provider follow up visit needed: TBD

## 2021-05-14 DIAGNOSIS — I27.20 PULMONARY HYPERTENSION (H): ICD-10-CM

## 2021-05-14 DIAGNOSIS — R06.02 SHORTNESS OF BREATH: Primary | ICD-10-CM

## 2021-05-18 NOTE — PROGRESS NOTES
New order for oxygen with activity. See note from 5/13 from community paramedic.     He already has oxygen 2 L NC at night which he should continue.    TC - please send updated order to his current oxygen supplier.

## 2021-05-18 NOTE — PROGRESS NOTES
"MD notified    DRAIN/DEVICES: PICC JOSEE Bowen LE edema, +1.  " Called patient to find out where patient gets his oxygen. Patient states [facility]. Faxed DME order to [facility], [phone], right fax confirmed at 12:58 pm today, 5/18/2021. Placed in 's copy basket.  Miya Whitfield [facility]  2nd Floor  Primary Care

## 2021-05-25 ENCOUNTER — ALLIED HEALTH/NURSE VISIT (OUTPATIENT)
Dept: OTHER | Facility: CLINIC | Age: 46
End: 2021-05-25
Payer: COMMERCIAL

## 2021-05-25 DIAGNOSIS — I27.20 PULMONARY HYPERTENSION (H): ICD-10-CM

## 2021-05-25 DIAGNOSIS — I10 HYPERTENSION GOAL BP (BLOOD PRESSURE) < 140/80: ICD-10-CM

## 2021-05-25 DIAGNOSIS — J44.9 CHRONIC OBSTRUCTIVE PULMONARY DISEASE, UNSPECIFIED COPD TYPE (H): Primary | ICD-10-CM

## 2021-05-25 PROCEDURE — 99207 PR COMMUNITY PARAMEDIC-PATIENT MEETS CRITERIA: CPT

## 2021-05-25 ASSESSMENT — ACTIVITIES OF DAILY LIVING (ADL): DEPENDENT_IADLS:: INDEPENDENT

## 2021-05-27 RX ORDER — AMLODIPINE BESYLATE 5 MG/1
10 TABLET ORAL DAILY
Qty: 180 TABLET | Refills: 0 | Status: SHIPPED | OUTPATIENT
Start: 2021-05-27 | End: 2021-08-25

## 2021-05-27 NOTE — TELEPHONE ENCOUNTER
Medication Refill double check:    Last virtual visit was on 5/27/20 with Danika Mckeon PA-C.    Follow up was recommended for PRN.    Any additional encounters with changes to requested med? no    Authorizing provider is: Dr. Chauhan    Limited refill was approved.     Additional orders/notes: patient's PMD can refill his amlodipine going forward.      Pat Story RN on 5/27/2021 at 1:24 PM

## 2021-05-28 ENCOUNTER — PATIENT OUTREACH (OUTPATIENT)
Dept: CARE COORDINATION | Facility: CLINIC | Age: 46
End: 2021-05-28

## 2021-05-28 NOTE — PROGRESS NOTES
Clinic Care Coordination Contact    Situation: Patient chart reviewed by care coordinator.    Background: Patient is enrolled in Care Coordination. CHW, SWCC and Community Paramedic are following.     Assessment: CHW spoke with patient on 5/12. Patient stated that he recently lost his compression stockings at the laundromat. Patient reports that he will let his provider know. Patient has not heard back from unemployment yet or about his Naturalization papers. Reports taking his medications on time and as prescribed. CHW met with sariah small 5/13 and 5/25. Community paramedic helping patient find some car insurance for his vehicle. Also reviewed and assisted with a notice from [facility].     Plan/Recommendations: CHW plans to reach out to patient one month from most recent call. SWCC will review chart in 6 weeks, per standard workflow, unless involvement is requested sooner    BING Garcia  Primary Care Clinic- Social Work Care Coordinator  [facility] and [facility]andy [facility]  Ph: [phone]  5/28/2021 10:23 AM

## 2021-06-06 VITALS
HEART RATE: 92 BPM | TEMPERATURE: 97.2 F | RESPIRATION RATE: 12 BRPM | OXYGEN SATURATION: 98 % | SYSTOLIC BLOOD PRESSURE: 108 MMHG | DIASTOLIC BLOOD PRESSURE: 68 MMHG

## 2021-06-07 NOTE — PROGRESS NOTES
Community Paramedics Follow-up Visit  May 25th, 2021  TIME: 1600    Chaim Norman is a 45 year old male being seen at home for a follow-up visit.    Present at appointment: Patient, Care Team Member    Primary Diagnosis: Psychosocial    Chief Complaint   Patient presents with     Outreach       [place] Utilization:   Clinic Utilization  Difficulty keeping appointments:: No  Compliance Concerns: No  No-Show Concerns: No  No PCP office visit in Past Year: No  Utilization    Last refreshed: 5/28/2021  1:23 PM: Hospital Admissions 0           Last refreshed: 5/28/2021  1:23 PM: ED Visits 0           Last refreshed: 5/28/2021  1:23 PM: No Show Count (past year) 5              Current as of: 5/28/2021  1:23 PM              /68   Pulse 92   Temp 97.2  F (36.2  C)   Resp 12   SpO2 98%     Clinical Concerns:  Current Medical Concerns:  COPD      Current Behavioral Concerns: none     Education Provided to patient: none   Medication set up? No  Pill Box issued: No  Scale issued: No  Flu Shot given: No  Lab draw or specimen collection: No  Food box issued: No  Collaborative visit with PCP: No    Health Maintenance Reviewed:      Clinical Pathway: None    No  Face to Face in Home / Community    Recent weights:        5/7 199.1 5/8 196.4  5/9 199.0 5/10 198.0  5/11 198.0 5/12 198.4  5/13 196.6 5/14 196.2  5/15 196.0 5/16 195.8  5/17 195.6 5/18 196.2  5/19 196.5 5/20 196.0  5/21 194.0 5/22 196.2  5/23 196.9 5/24 195.0  5/25 194.6      Review of Symptoms/PE    Skin: negative  Eyes: negative  Ears/Nose/Throat: negative  Respiratory: Shortness of breath at rest and extreme Dyspnea on exertion  Cardiovascular: lower extremity edema  Gastrointestinal: negative  Genitourinary: negative  Musculoskeletal: foot pain  Neurologic: numbness or tingling of feet  Psychiatric: negative    Pain Management::     Medication Reconciliation  Knowledgeable about how to use meds:: Yes  Medication side effects suspected::  No    Diet/Exercise/Sleep  Diet:: No added salt  Inadequate nutrition (GOAL):: No  Tube Feeding: No  Inadequate activity/exercise (GOAL):: Yes  Significant changes in sleep pattern (GOAL): No    Plan:     Time spent with patient: 60    Acute concern/Follow-up recommendations: follow current treatment plan    Next CP visit scheduled: 6/8/21     Issues for Provider to follow up on: Community Paramedic visited with Chaim at his apartment. Chaim continues to struggle with his SOB, Community Paramedic to continue to help Chaim with getting a portable oxygen tank. Exam of Chaim's feet found some edema. Chaim continues to complain of tingling painful feet. Advised Chaim to continue to raise his feet whenever he can and to keep his air conditioning on when it is warm outside.  Chaim has not heard anything from his naturalization application. Chaim did received his paperwork for his divorce. Community Paramedic assisted Chaim with what he needs to do next to get copies of the decree.   Chaim continues to work on his diet and is loseing weight.       Provider follow up visit needed: TBD

## 2021-06-08 ENCOUNTER — ALLIED HEALTH/NURSE VISIT (OUTPATIENT)
Dept: OTHER | Facility: CLINIC | Age: 46
End: 2021-06-08
Payer: COMMERCIAL

## 2021-06-08 VITALS
HEART RATE: 95 BPM | TEMPERATURE: 98.1 F | OXYGEN SATURATION: 96 % | SYSTOLIC BLOOD PRESSURE: 108 MMHG | RESPIRATION RATE: 12 BRPM | DIASTOLIC BLOOD PRESSURE: 82 MMHG

## 2021-06-08 DIAGNOSIS — I27.20 PULMONARY HYPERTENSION (H): ICD-10-CM

## 2021-06-08 DIAGNOSIS — J44.9 CHRONIC OBSTRUCTIVE PULMONARY DISEASE, UNSPECIFIED COPD TYPE (H): Primary | ICD-10-CM

## 2021-06-08 PROCEDURE — 99207 PR COMMUNITY PARAMEDIC-PATIENT MEETS CRITERIA: CPT

## 2021-06-08 ASSESSMENT — ACTIVITIES OF DAILY LIVING (ADL): DEPENDENT_IADLS:: INDEPENDENT

## 2021-06-08 NOTE — PROGRESS NOTES
Community Paramedics Follow-up Visit  June 8, 2021  TIME: 1000    Chaim Norman is a 45 year old male being seen at home for a follow-up visit.    Present at appointment: Patient, Care Team Member    Primary Diagnosis: Psychosocial    Chief Complaint   Patient presents with     Outreach       [place] Utilization:   Clinic Utilization  Difficulty keeping appointments:: No  Compliance Concerns: No  No-Show Concerns: No  No PCP office visit in Past Year: No  Utilization    Last refreshed: 6/8/2021 11:03 AM: Hospital Admissions 0           Last refreshed: 6/8/2021 11:03 AM: ED Visits 0           Last refreshed: 6/8/2021 11:03 AM: No Show Count (past year) 5              Current as of: 6/8/2021 11:03 AM              /82   Pulse 95   Temp 98.1  F (36.7  C)   Resp 12   SpO2 96%     Clinical Concerns:  Current Medical Concerns:  COPD    Current Behavioral Concerns: None    Education Provided to patient: None   Medication set up? No  Pill Box issued: No  Scale issued: No  Flu Shot given: No  Lab draw or specimen collection: No  Food box issued: No  Collaborative visit with PCP: No    Health Maintenance Reviewed: Not assessed    Clinical Pathway: None    No  Face to Face in Home / Community    Recent weights:  5/26 193.6  5/27 194.4  5/28 202.4  5/29 194.2  5/30 195.2  5/31 194.1  6/1 194.0  6/2 196.1  6/3 195.4  6/4 197.5  6/5 194.4  6/6 196.0  6/7 196.0  6/8 196.0    Review of Symptoms/PE    Skin: negative  Eyes: negative  Ears/Nose/Throat: negative  Respiratory: Shortness of breath and extreme Dyspnea on exertion  Cardiovascular: dyspnea on exertion and lower extremity edema  Gastrointestinal: negative  Genitourinary: negative  Musculoskeletal: foot pain  Neurologic: negative  Psychiatric: negative    Pain Management::       Medication Reconciliation  Knowledgeable about how to use meds:: Yes  Medication side effects suspected:: No    Diet/Exercise/Sleep  Diet:: No added salt  Inadequate nutrition (GOAL)::  No  Tube Feeding: No  Inadequate activity/exercise (GOAL):: Yes  Significant changes in sleep pattern (GOAL): No    Plan:     Time spent with patient: 30     The patient meets one or more of the following criteria:  * Has received hospital emergency department services three or more times in four consecutive months within a twelve month period    Acute concern/Follow-up recommendations: follow current treatment plan    Next CP visit scheduled: 6/22/21     Issues for Provider to follow up on: Community Paramedic visited with Chaim today in his apartment. The apartment is cool and Chaim denies any increased difficulty breathing. Chaim continues to have swelling in his feet. He did just receive his compression socks and will start to wear them during the day and off at night.   Chaim has not yet received his portable oxygen tank so he is unable to go outside, especially with the heat and humidity. Community Paramedic to follow up on getting his portable oxygen tank.    Provider follow up visit needed: FRANCISCO

## 2021-06-16 ENCOUNTER — VIRTUAL VISIT (OUTPATIENT)
Dept: FAMILY MEDICINE | Facility: CLINIC | Age: 46
End: 2021-06-16
Payer: COMMERCIAL

## 2021-06-16 ENCOUNTER — PATIENT OUTREACH (OUTPATIENT)
Dept: CARE COORDINATION | Facility: CLINIC | Age: 46
End: 2021-06-16

## 2021-06-16 DIAGNOSIS — J02.9 SORE THROAT: Primary | ICD-10-CM

## 2021-06-16 PROCEDURE — 99207 PR NO CHARGE LOS: CPT | Performed by: NURSE PRACTITIONER

## 2021-06-16 NOTE — PROGRESS NOTES
"Chaim is a 45 year old who is being evaluated via a billable telephone visit.      What phone number would you like to be contacted at? [phone]  How would you like to obtain your AVS? Mail a copy    Assessment & Plan     Sore throat  I recommend clinic visit as it's difficult to determine what is going on based on his description. Ddx tooth infection, tonsil abscess, strep, other. He states he cannot get a ride until Friday or Monday and he prefers to see primary care on Monday morning. I assisted him to make an appointment for Monday morning with instructions to go to  if worsening at all. He states his symptoms are \"mild\" at this time.          See Patient Instructions    Return in about 2 days (around 6/18/2021), or if symptoms worsen or fail to improve.     Return precautions discussed, including when to seek urgent/emergent care.    Patient verbalizes understanding and agrees with plan of care. Patient stable for discharge.      CHAZ Garrett [facility]    Subjective   Chaim is a 45 year old who presents for the following health issues     HPI     Acute Illness  Acute illness concerns: sore throat  Onset/Duration: about a week  Symptoms:  Fever: no  Chills/Sweats: no  Headache (location?): no  Sinus Pressure: no  Conjunctivitis:  no  Ear Pain: no  Rhinorrhea: no  Congestion: no  Sore Throat: YES  Cough: no  Wheeze: no  Decreased Appetite: no  Nausea: no  Vomiting: no  Diarrhea: no  Dysuria/Freq.: no  Dysuria or Hematuria: no  Fatigue/Achiness: no  Sick/Strep Exposure: no  Therapies tried and outcome: None    Swelling to upper left corner behind tongue  Pain relieved a little with brushing   Pain is 7-8/10  No fever  Able to eat and drink  Painful to swallow spit but only in that spot  Not near teeth      Review of Systems   Constitutional, HEENT, cardiovascular, pulmonary, gi and gu systems are negative, except as otherwise noted.      Objective           Vitals:  No " vitals were obtained today due to virtual visit.    Physical Exam   healthy, alert and no distress  PSYCH: Alert and oriented times 3; coherent speech, normal   rate and volume, able to articulate logical thoughts, able   to abstract reason, no tangential thoughts, no hallucinations   or delusions  His affect is normal  RESP: No cough, no audible wheezing, able to talk in full sentences  Remainder of exam unable to be completed due to telephone visits                Phone call duration: 7 minutes

## 2021-06-16 NOTE — PROGRESS NOTES
Clinic Care Coordination Contact  Community Health Worker Follow Up    Goals:   Goals Addressed as of 6/16/2021 at 3:56 PM                 Today    6/8/21      3. Financial Wellbeing (pt-stated)   60%  40%    Added 2/14/20 by Robin Jansen BSW     Goal Statement: I need help paying for my rent  Date Goal set: 2/14/2020  Barriers: Feet have been hurting too much to get back to the [facility]   Strengths: Patient is motivated to getting assistancce  Date to Achieve By: 06/01/21  Patient expressed understanding of goal: Yes  Action steps to achieve this goal:  1. I will fill out application for emergency assistance  2. I will bring my application in to the [facility]  3. I will obtain emergency assistance, if found eligible    2/28/2020: Patient stated that he is still working on his emergency assistance application and had more questions about it. [facility] provided additional phone numbers to him for rent assistance    3/30/2020: Went to [facility]. Submitted documentation that was needed. Got rent assistance through another agency for March. Reapplied for assistance in April through [facility]        Psychosocial (pt-stated)     100%    Added 9/3/20 by Lisa Loredo LSW     Goal Statement: I need help applying for my Naturalization  Date Goal set: 9/3/2020   Barriers: COVID 19  some offices closed   Strengths: feels capable of calling resources  Date to Achieve By: 04/01/21  Patient expressed understanding of goal: yes  Action steps to achieve this goal:  1. I will call resources provided for help with application  2. I will schedule an appt at the library to use the computer            CHW spoke with patient he said he is doing fine. CHW asked patient did he get new compression sock he said yes he said he bought a pair on his own. He said he wear them all day and takes them off at night. He said it helping.    Patient has not  heard back from unemployment. He said he as tried to call bit gets no where. He has not heard about his Naturalization forms? He said no CHW asked can he contact them and see what the status is he asked CHW for a phone number.    CHW advised CHW will send a message  [employer] and see if there is phone number to contact them. CHW reached out to [employer] and was given information.    WW[employer],gov or  1-975.663.2602    Intervention and Education during outreach:  CHW will check with  [employer] for resources about Naturalization forms statues.    CHW Plan: CHW will call patient in 1 month.    Elena BARAJAS Community Health Worker  Clinic Care Coordination  [facility] : [place], [place] & [place]  Phone: [phone]

## 2021-06-17 ENCOUNTER — ALLIED HEALTH/NURSE VISIT (OUTPATIENT)
Dept: OTHER | Facility: CLINIC | Age: 46
End: 2021-06-17
Payer: COMMERCIAL

## 2021-06-17 VITALS
TEMPERATURE: 97.6 F | SYSTOLIC BLOOD PRESSURE: 122 MMHG | OXYGEN SATURATION: 95 % | RESPIRATION RATE: 12 BRPM | HEART RATE: 89 BPM | DIASTOLIC BLOOD PRESSURE: 98 MMHG

## 2021-06-17 DIAGNOSIS — J44.9 CHRONIC OBSTRUCTIVE PULMONARY DISEASE, UNSPECIFIED COPD TYPE (H): Primary | ICD-10-CM

## 2021-06-17 DIAGNOSIS — I27.20 PULMONARY HYPERTENSION (H): ICD-10-CM

## 2021-06-17 PROCEDURE — 99207 PR COMMUNITY PARAMEDIC-PATIENT MEETS CRITERIA: CPT

## 2021-06-17 ASSESSMENT — ACTIVITIES OF DAILY LIVING (ADL): DEPENDENT_IADLS:: INDEPENDENT

## 2021-06-17 NOTE — PROGRESS NOTES
Community Paramedics Follow-up Visit  June 17, 2021  TIME: 1200    Chaim Norman is a 45 year old male being seen at home for a follow-up visit.    Present at appointment: Patient, Care Team Member    Primary Diagnosis: Psychosocial    Chief Complaint   Patient presents with     Outreach       [place] Utilization:   Clinic Utilization  Difficulty keeping appointments:: No  Compliance Concerns: No  No-Show Concerns: No  No PCP office visit in Past Year: No  Utilization    Last refreshed: 6/17/2021  2:28 PM: Hospital Admissions 0           Last refreshed: 6/17/2021  2:28 PM: ED Visits 0           Last refreshed: 6/17/2021  2:28 PM: No Show Count (past year) 5              Current as of: 6/17/2021  2:28 PM              BP (!) 122/98   Pulse 89   Temp 97.6  F (36.4  C)   Resp 12   SpO2 95%     Clinical Concerns:  Current Medical Concerns:  CHF    Current Behavioral Concerns: None    Education Provided to patient: None   Medication set up? No  Pill Box issued: No  Scale issued: No  Flu Shot given: No  Lab draw or specimen collection: No  Food box issued: No  Collaborative visit with PCP: No    Health Maintenance Reviewed: Not assessed    Clinical Pathway: None    No  Face to Face in Home / Community    Recent weights:  6/9 197.8  6/10 195.8  6/11 195.2  6/12 196.6  6/13 195.0  6/14 195.4  6/15 195.4  6/16 195.2  6/17 194.6    Review of Symptoms/PE    Skin: negative  Eyes: negative  Ears/Nose/Throat: negative  Respiratory: Shortness of breath and Dyspnea on exertion  Cardiovascular: dyspnea on exertion  Gastrointestinal: negative  Genitourinary: negative  Musculoskeletal: negative  Neurologic: negative  Psychiatric: negative    Pain Management::       Medication Reconciliation  Knowledgeable about how to use meds:: Yes  Medication side effects suspected:: No    Diet/Exercise/Sleep  Diet:: No added salt  Inadequate nutrition (GOAL):: No  Tube Feeding: No  Inadequate activity/exercise (GOAL):: Yes  Significant changes  in sleep pattern (GOAL): No    Plan:     Time spent with patient: 90  The patient meets one or more of the following criteria:  * Has received hospital emergency department services three or more times in four consecutive months within a twelve month period    Acute concern/Follow-up recommendations: follow current treatment plan    Next CP visit scheduled: 7/1/21    Issues for Provider to follow up on: Community Paramedic visited with Chaim at his apartment. Chaim states he is doing well and his sore throat is gone. He states he is going to cancel his apartment for the 21st if he remains without a sore throat. Talked to Chaim about how his feet are feeling. He indicates they are feeling much better when he has the compression socks on. If he takes them off for a few days his feet start to hurt. Advised Chaim to continue to wear his socks during the day and take them off at night time.   [facility] Paramedic assisted Chaim with filling out paperwork for his son's school enrollment and also assisted with looking up the status of his naturalization paperwork. The web page continues to state that his paperwork is in process and to allow approx 11 months for a decision. His paperwork states he is to hear something in November 2021.   Chaim states he is doing well with his diet but says it is very hard. Since he had his last Dr ladd wear they told him he needed to loose weight he has lost approx 5 lbs.    Provider follow up visit needed: 6/21/21

## 2021-06-28 ENCOUNTER — TELEPHONE (OUTPATIENT)
Dept: FAMILY MEDICINE | Facility: CLINIC | Age: 46
End: 2021-06-28

## 2021-06-28 NOTE — TELEPHONE ENCOUNTER
I am not involved in the lung transplant process. He sees a specialist in cardiology for pulmonary hypertension that has referred him to pulmonology for consideration of lung transplant.

## 2021-06-28 NOTE — TELEPHONE ENCOUNTER
Dr. Ricky Timmons with [facility] called stating the patient will be completing his TB therapy by July 9th and also is asking how much you are involved with him in regards to his lung transplant. Please text him if you would like to discuss further and he will call you back.  Holland Lemus

## 2021-06-29 ENCOUNTER — APPOINTMENT (OUTPATIENT)
Dept: NURSING | Facility: CLINIC | Age: 46
End: 2021-06-29
Payer: COMMERCIAL

## 2021-06-29 ENCOUNTER — OFFICE VISIT (OUTPATIENT)
Dept: FAMILY MEDICINE | Facility: CLINIC | Age: 46
End: 2021-06-29
Payer: COMMERCIAL

## 2021-06-29 VITALS
WEIGHT: 197 LBS | DIASTOLIC BLOOD PRESSURE: 88 MMHG | TEMPERATURE: 98.2 F | SYSTOLIC BLOOD PRESSURE: 130 MMHG | OXYGEN SATURATION: 95 % | HEART RATE: 94 BPM | HEIGHT: 66 IN | BODY MASS INDEX: 31.66 KG/M2

## 2021-06-29 DIAGNOSIS — R00.2 PALPITATIONS: Primary | ICD-10-CM

## 2021-06-29 LAB — TSH SERPL DL<=0.005 MIU/L-ACNC: 1.01 MU/L (ref 0.4–4)

## 2021-06-29 PROCEDURE — 36415 COLL VENOUS BLD VENIPUNCTURE: CPT | Performed by: NURSE PRACTITIONER

## 2021-06-29 PROCEDURE — 84443 ASSAY THYROID STIM HORMONE: CPT | Performed by: NURSE PRACTITIONER

## 2021-06-29 PROCEDURE — 93000 ELECTROCARDIOGRAM COMPLETE: CPT | Performed by: NURSE PRACTITIONER

## 2021-06-29 PROCEDURE — 99214 OFFICE O/P EST MOD 30 MIN: CPT | Performed by: NURSE PRACTITIONER

## 2021-06-29 ASSESSMENT — PAIN SCALES - GENERAL: PAINLEVEL: SEVERE PAIN (7)

## 2021-06-29 ASSESSMENT — MIFFLIN-ST. JEOR: SCORE: 1711.34

## 2021-06-29 NOTE — PROGRESS NOTES
"    Assessment & Plan     Palpitations  EKG similar to previous in 2018 in clinic. Will get ZioPatch. emergency department precautions discussed in detail. Patient verbalized understanding.   - EKG 12-lead complete w/read - Clinics  - TSH with free T4 reflex  - Leadless EKG Monitor 3 to 7 Days; Future         See Patient Instructions    Return in about 2 weeks (around 7/13/2021), or if symptoms worsen or fail to improve.     Return precautions discussed, including when to seek urgent/emergent care.    Patient verbalizes understanding and agrees with plan of care. Patient stable for discharge.      CHAZ Garrett CNP  [facility]    Slava Rucker is a 45 year old who presents for the following health issues     HPI     Concern - palpitations  Onset: couple of weeks  Description: lasts few minutes.  Intensity: moderate  Progression of Symptoms:  same and intermittent. Not worsening  Accompanying Signs & Symptoms: None  Previous history of similar problem: no  Precipitating factors:        Worsened by: unknown  Alleviating factors:        Improved by: nothing - resolves on it's own  Therapies tried and outcome:  none     Occurred 2 times in the last month or so. Lasts a few minutes  No chest pain. Shortness of breath at baseline.   No dizziness of lightheadedness  No headaches  Had lab work on 6/25 - CMP, CBC was normal  Done with TB treatment on 7/9  Last episode was yesterday and 1 week ago    Review of Systems   Constitutional, HEENT, cardiovascular, pulmonary, gi and gu systems are negative, except as otherwise noted.      Objective    /88   Pulse 94   Temp 98.2  F (36.8  C) (Oral)   Ht 1.676 m (5' 6\")   Wt 89.4 kg (197 lb)   SpO2 95%   BMI 31.80 kg/m    Body mass index is 31.8 kg/m .  Physical Exam   GENERAL: healthy, alert and no distress  NECK: no adenopathy, no asymmetry, masses, or scars and thyroid normal to palpation  RESP: lungs clear to auscultation - no " rales, rhonchi or wheezes  CV: regular rate and rhythm, normal S1 S2, no S3 or S4, no murmur, click or rub, no peripheral edema and peripheral pulses strong  MS: no gross musculoskeletal defects noted, no edema  PSYCH: mentation appears normal, affect normal/bright    EKG - Reviewed and interpreted by me appears sinus rhythm, normal axis, normal intervals, no acute ST/T changes c/w ischemia, no LVH by voltage criteria, nonspecific T abnormality, unchanged from previous tracings  No results found for this or any previous visit (from the past 24 hour(s)).

## 2021-06-29 NOTE — RESULT ENCOUNTER NOTE
Please send letter:    Chaim,  Your thyroid lab results were normal. Please schedule your heart monitor placement. Please let us know if you have any questions.  Thank you for allowing us to participate in your care.  CHAZ Garrett CNP

## 2021-06-29 NOTE — PATIENT INSTRUCTIONS
Lab test today for thyroid (other labs were normal 4 days ago)  EKG looks similar to previous EKGs  Please schedule your leadless EKG that you'll wear for 7 days  If you have pain or shortness of breath or dizziness with the palpitations, go to the emergency department     Patient Education    At [facility], we strive to deliver an exceptional experience to you, every time we see you. If you receive a survey, please complete it as we do value your feedback.  If you have MyChart, you can expect to receive results automatically within 24 hours of their completion.  Your provider will send a note interpreting your results as well.   If you do not have MyChart, you should receive your results in about a week by mail.    Your care team:                            Family Medicine Internal Medicine   MD Tunde Hernandez MD Shantel Branch-Fleming, MD Srinivasa Vaka, MD Katya Belousova, PAFloridalmaC  Nyla Florian, APRN CNP    Marco Florentino MD Pediatrics   Dell Tabor, PAFloridalmaC  Sana Joshua, CNP MD Beth Platt APRN CNP   MD Nel Evans MD Deborah Mielke, MD Kim Thein, APRN [facility]      Clinic hours: Monday - Thursday 7 am-6 pm; Fridays 7 am-5 pm.   Urgent care: Monday - Friday 10 am- 8 pm; Saturday and Sunday 9 am-5 pm.    Clinic: [phone]       [facility]: Monday - Thursday 8 am - 7 pm; Friday 8 am - 6 pm  [facility]: [phone]     Use www.oncare.org for 24/7 diagnosis and treatment of dozens of conditions.  Patient Education     Understanding Heart Palpitations    Heart palpitations are the feeling you have when your heartbeat seems to be racing, pounding, skipping, or fluttering. Heart palpitations are most often felt in the chest. Sometimes, they may also be felt in the neck.  What causes heart palpitations?  In most cases, heart palpitations are caused by:    Stress or  anxiety    Exercise    Pregnancy    Some medicines    Caffeine    Nicotine    Alcohol    Illegal drugs, such as cocaine    Health problems, such as anemia or overactive thyroid  Many heart palpitations are harmless. But in some cases, palpitations may be caused by a problem with the heart such as an abnormal heart rhythm (arrhythmia). They may need to be managed by you and your healthcare provider or treated right away.  How are heart palpitations treated?  Treatments for heart palpitations depend on the cause. Options may include:    Managing the things that trigger your heart palpitations. This could mean:  ? Learning ways to reduce stress and anxiety  ? Staying away from caffeine, nicotine, alcohol, and illegal drugs  ? Stopping the use of certain medicines, under your doctor s guidance    Medicines, procedures, or surgery to treat an arrhythmia or other health problem that is causing your symptoms  What are possible complications of heart palpitations?  Complications of heart palpitations are rare unless they are caused by a problem such as an arrhythmia. In such cases, complications can include:    Fainting    Heart failure. This problem occurs when the heart is so weak it no longer pumps blood well.    Blood clots and stroke    Sudden cardiac arrest. This problem occurs when the heart suddenly stops beating.  When should I call my healthcare provider?  Call your healthcare provider right away if you have any of these:    Palpitations that prevent you from sleeping or otherwise affect your quality of life.    Symptoms that don t get better with treatment, or symptoms that get worse    New symptoms, such as chest pain, shortness of breath, dizziness, or fainting  Laura last reviewed this educational content on 6/1/2019 2000-2021 [employer]. All rights reserved. This information is not intended as a substitute for professional medical care. Always follow your healthcare professional's  "instructions.           Patient Education     Heart Palpitations    Palpitations are the feeling that your heart is beating hard, fast, or irregular. Some describe it as \"pounding,\" \"flip-flopping in the chest,\" or \"skipped beats.\" Palpitations may occur in someone with heart disease. But they can also occur in a healthy person.  Heart-related causes:    Heart rhythm problem (arrhythmia)    Heart valve disease    Disease of the heart muscle (cardiomyopathy)    Coronary artery disease    High blood pressure  Non-heart-related causes:    Certain medicines such as asthma inhalers and decongestants    Some herbal supplements, energy drinks and pills, and weight loss pills    Illegal stimulant drugs such as cocaine, crank, methamphetamine, PCP, bath salts, and ecstasy    Caffeine, alcohol, and tobacco    Health conditions such as thyroid disease, anemia, anxiety, and panic disorder  Sometimes the cause can't be found.  Home care  Follow these home care tips:    Don't use too much caffeine, alcohol, or tobacco, or any stimulant drugs.    Tell your doctor about any prescription or over-the-counter or herbal medicines you take.  Follow-up care    Follow up with your doctor, or as advised.    Call 911  This is the fastest and safest way to get to the emergency department. The paramedics can also start treatment on the way to the hospital, if needed.  Don't wait until your symptoms are severe to call 911. These are reasons to call 911:    Chest pain    Shortness of breath    Feeling lightheaded, faint, or dizzy, or losing consciousness    Very irregular heartbeat    Rapid heartbeat that makes you uncomfortable    Slower than usual heart rate along with symptoms    Chest pain with weakness, dizziness, heavy sweating, nausea, or vomiting    Extreme drowsiness, confusion, or weakness    Weakness of an arm or leg, or on one side of the face    Trouble with speech or vision  When to seek medical advice  Call your healthcare " provider right away if you have palpitations that last longer than normal, or are different from your past palpitations.  [employer] last reviewed this educational content on 11/1/2019 2000-2021 [employer]. All rights reserved. This information is not intended as a substitute for professional medical care. Always follow your healthcare professional's instructions.

## 2021-06-29 NOTE — PROGRESS NOTES
Called to RN triage rapid heart beat and SOB. Found patient in lobby nondistressed on cellphone, did not appear SOB, Chaim reported he has chronic lung condition so he always feel SOB, takes his own pace he says, he made appointment today because he has episode in which he can feel his heart beating rapidly, he denies chest pain or SOB during episodes, cannot associate anything that brings on or helps. Appropriate to be evaluated by PCP Nyla WARE CNP at scheduled appointment today    Danitza Wilburn RN, BSN, CMSRN  [facility]

## 2021-06-29 NOTE — TELEPHONE ENCOUNTER
This writer attempted to contact Dr. Ricky Timmons on 06/29/21      Reason for call informed message below and left detailed message.      If patient calls back:   1st floor [facility] Care Team (MA/TC) called. Inform patient that someone from the team will contact them, document that pt called and route to care team.         Baldev Pride MA

## 2021-06-29 NOTE — LETTER
June 29, 2021      Chaim BARAJAS Emerson  [address]   [place] [address]        Dear ,    We are writing to inform you of your test results.    Your thyroid lab results were normal. Please schedule your heart monitor placement. Please let us know if you have any questions.   Thank you for allowing us to participate in your care.     Resulted Orders   TSH with free T4 reflex   Result Value Ref Range    TSH 1.01 0.40 - 4.00 mU/L       If you have any questions or concerns, please call the clinic at the number listed above.       Sincerely,      CHAZ Beasley CNP

## 2021-07-01 ENCOUNTER — ALLIED HEALTH/NURSE VISIT (OUTPATIENT)
Dept: OTHER | Facility: CLINIC | Age: 46
End: 2021-07-01
Payer: COMMERCIAL

## 2021-07-01 DIAGNOSIS — I27.20 PULMONARY HYPERTENSION (H): ICD-10-CM

## 2021-07-01 DIAGNOSIS — J44.9 CHRONIC OBSTRUCTIVE PULMONARY DISEASE, UNSPECIFIED COPD TYPE (H): Primary | ICD-10-CM

## 2021-07-01 PROCEDURE — 99207 PR COMMUNITY PARAMEDIC-PATIENT MEETS CRITERIA: CPT

## 2021-07-01 ASSESSMENT — ACTIVITIES OF DAILY LIVING (ADL): DEPENDENT_IADLS:: INDEPENDENT

## 2021-07-02 DIAGNOSIS — G62.0 DRUG-INDUCED PERIPHERAL NEUROPATHY (H): ICD-10-CM

## 2021-07-02 RX ORDER — GABAPENTIN 300 MG/1
900 CAPSULE ORAL 3 TIMES DAILY
Qty: 270 CAPSULE | Refills: 1 | Status: SHIPPED | OUTPATIENT
Start: 2021-07-02 | End: 2021-12-14

## 2021-07-02 NOTE — TELEPHONE ENCOUNTER
Received fax request from [facility] pharmacy requesting refill(s) for gabapentin (NEURONTIN) 300 MG capsule    Last refilled on 06/02/21    Pt last seen on 02/26/21  Next appt scheduled for : none    Will facilitate refill.

## 2021-07-02 NOTE — TELEPHONE ENCOUNTER
Signed Prescriptions:                        Disp   Refills    gabapentin (NEURONTIN) 300 MG capsule      270 ca*1        Sig: Take 3 capsules (900 mg) by mouth 3 times daily  Authorizing Provider: MICHELLE CHILDERS, RN CNP, FNP  [facility]

## 2021-07-09 ENCOUNTER — ANCILLARY PROCEDURE (OUTPATIENT)
Dept: CARDIOLOGY | Facility: CLINIC | Age: 46
End: 2021-07-09
Attending: NURSE PRACTITIONER
Payer: COMMERCIAL

## 2021-07-09 DIAGNOSIS — R00.2 PALPITATIONS: ICD-10-CM

## 2021-07-09 PROCEDURE — 93241 XTRNL ECG REC>48HR<7D: CPT | Performed by: INTERNAL MEDICINE

## 2021-07-09 NOTE — PATIENT INSTRUCTIONS
Patient has been prescribed a ZioPatch holter for 7 days.  Patient was instructed regarding the indication, function, care and prompt return of the ZioPatch holter monitor. The monitor, with S/N [id number],  was placed on the patient with instructions regarding care of the skin, electrodes, and monitor, as well as documentation in the patient diary. Patient demonstrated understanding of this information and agreed to call [employer] with further questions or concerns.

## 2021-07-13 ENCOUNTER — PATIENT OUTREACH (OUTPATIENT)
Dept: CARE COORDINATION | Facility: CLINIC | Age: 46
End: 2021-07-13

## 2021-07-13 NOTE — PROGRESS NOTES
Clinic Care Coordination Contact    Situation: Patient chart reviewed by care coordinator.    Background: Patient is enrolled in Care Coordination. CHW and SWCC are following.     Assessment: CHW contactede ericeint on 6/16. Patient stated that he got new compression socks. Patient has been wearing them all day and takes them off at night. He reports it's been helping. Patient has not heard back from unemployment. Also has not heard about Naturalization forms. CHW contacted [facility] for phone numbers that patient could use to check on the status of the forms. [facility] provided some numbers to CHW.     Plan/Recommendations: CHW plans to contact patient in one month from most recent outreach. SWCC will review chart in 6 weeks, per standard workflow, unless involvement is requested sooner    BING Garcia  Primary Care Clinic- Social Work Care Coordinator  [facility] and [facility]andy [facility]  Ph: [phone]  7/13/2021 10:47 AM

## 2021-07-28 ENCOUNTER — PATIENT OUTREACH (OUTPATIENT)
Dept: NURSING | Facility: CLINIC | Age: 46
End: 2021-07-28
Payer: COMMERCIAL

## 2021-07-28 VITALS
RESPIRATION RATE: 14 BRPM | TEMPERATURE: 98.8 F | SYSTOLIC BLOOD PRESSURE: 108 MMHG | OXYGEN SATURATION: 92 % | WEIGHT: 195.6 LBS | HEART RATE: 95 BPM | DIASTOLIC BLOOD PRESSURE: 80 MMHG | BODY MASS INDEX: 31.57 KG/M2

## 2021-07-28 NOTE — PROGRESS NOTES
Clinic Care Coordination Contact  Community Health Worker Follow Up    Goals:   Goals Addressed as of 7/28/2021 at 3:30 PM                    Today    6/17/21       2. Pain Management (pt-stated)   60%  50%    Added 2/19/20 by Charlene Gonzales, RN      Goal Statement: I will have suitable pain relief to perform activities of daily living.  Date Goal set: 2/19/2020  Date to Achieve By: 05/01/21  Patient expressed understanding of goal: Yes  Action steps to achieve this goal:  1. I will take medications as prescribed to maintain adequate pain control/relief.  2. I will utilize non-pharmaceutical measures to help with pain control/relief.  3. I will maintain routine follow up and contact with my clinic care team.  4. I will call my pharmacy to request refills of my medications before they run out.  5. I will schedule a pain clinic follow up appointment.          CHW spoke with patient he said he was okay. He has been having pain oh his right side CHW asked would he like to schedule an appointment with his PCP? He said he already did and its next Tuesday at 10:00 am.    CHW advised him that the Community Paramedic will be out to visit him tomorrow and to male sure he lets her know. He said he will do. He has nor heard back from unemployment and no new status  on Naturalization.    Patient said he was advised by transplant that he has to lose weight. He said jhe was 200 pounds and now down to 192. CHW congratulated him on his success he said he has to get down to 175.      Intervention and Education during outreach: CHW advised patient to call with non-emergent concerns.    CHW Plan: CHW will call in 1 month     Elena BARAJAS Community Health Worker  Clinic Care Coordination  [facility] : [place], [place] & [place]  Phone: [phone]

## 2021-07-29 ENCOUNTER — ALLIED HEALTH/NURSE VISIT (OUTPATIENT)
Dept: OTHER | Facility: CLINIC | Age: 46
End: 2021-07-29
Payer: COMMERCIAL

## 2021-07-29 VITALS
BODY MASS INDEX: 30.67 KG/M2 | OXYGEN SATURATION: 99 % | DIASTOLIC BLOOD PRESSURE: 89 MMHG | TEMPERATURE: 98.5 F | RESPIRATION RATE: 14 BRPM | HEART RATE: 103 BPM | SYSTOLIC BLOOD PRESSURE: 128 MMHG | WEIGHT: 190 LBS

## 2021-07-29 DIAGNOSIS — I27.20 PULMONARY HYPERTENSION (H): ICD-10-CM

## 2021-07-29 DIAGNOSIS — J44.9 CHRONIC OBSTRUCTIVE PULMONARY DISEASE, UNSPECIFIED COPD TYPE (H): Primary | ICD-10-CM

## 2021-07-29 DIAGNOSIS — Z86.11 HISTORY OF TB (TUBERCULOSIS): ICD-10-CM

## 2021-07-29 PROCEDURE — 99207 PR COMMUNITY PARAMEDIC-PATIENT MEETS CRITERIA: CPT

## 2021-07-29 NOTE — PROGRESS NOTES
Community Paramedics Follow-up Visit  July 29, 2021  TIME: 1430    Chaim KARL Norman is a 45 year old male being seen at home for a follow-up visit.    Present at appointment: Patient, Community Paramedid          Chief Complaint   Patient presents with     Outreach       [place] Utilization:      Utilization    Hospital Admissions  0             ED Visits  0             No Show Count (past year)  5                Current as of: 7/28/2021  7:36 PM              /89   Pulse 103   Temp 98.5  F (36.9  C)   Resp 14   Wt 86.2 kg (190 lb)   SpO2 99%   BMI 30.67 kg/m      Clinical Concerns:  Current Medical Concerns:  COPD    Current Behavioral Concerns: None    Education Provided to patient: Air quality alerts and importance of staying inside when air is not good and wearing a mask when outside   Medication set up? No  Pill Box issued: No  Scale issued: No  Flu Shot given: No  Lab draw or specimen collection: No  Food box issued: No  Collaborative visit with PCP: No    Health Maintenance Reviewed:      Clinical Pathway: None    No  Face to Face in Home / Community    Review of Symptoms/PE    Skin: negative  Eyes: negative  Ears/Nose/Throat: negative  Respiratory: Shortness of breath and Dyspnea on exertion  Cardiovascular: negative, lower extremity edema and exercise intolerance  Gastrointestinal: negative  Genitourinary: negative  Musculoskeletal: foot pain improving  Neurologic: negative  Psychiatric: negative    Pain Management::     Plan:      Time spent with patient: 90    The patient meets one or more of the following criteria:  * Has received hospital emergency department services three or more times in four consecutive months within a twelve month period    Acute concern/Follow-up recommendations: Stay indoors during warm humid days and when there is an air quality alert    Next CP visit scheduled: 8/29 - phone visit    Issues for Provider to follow up on: Community Paramedic visited with Chaim at his  apartment. Chaim states he is doing well and the edema and pain in his lower extremities is improving since stopping his TB medications. He admits his knee's feel much better and he has very little edema. His feet do not hurt as much as before. Very minimal edema found around the ankles. No edema in the feet or mid lower leg areas. Chaim admits that his breathing has been doing good, he is trying to walk in the mornings. Advised Chaim to monitor the Air Quality Alerts and to not go walking outside when these are in effect, if he needs to go outside, he should wear a mask. Chaim agreed. Chaim continues to slowly lose weight. Today his weight was 190 lbs which is 10 lbs down from when he first started trying to loose weight. Once he gets to 175 lbs he can call the lung transplant Dr's again. He is extremely determined to lose the weight as he is anxious to go back to work.   Assisted Chaim with a Zoom court appearance helping to verify words that the other parties had difficulty understanding.   Advised Chaim that next appointment with be a phone appt due to the minimal need for medical assistance from Community Paramedic at this time. Chaim agreed.    Provider follow up visit needed: 8/3/21

## 2021-07-29 NOTE — PROGRESS NOTES
Community Paramedics Follow-up Visit  July 1, 2021  TIME: 1300    Chaim Norman is a 45 year old male being seen at home for a follow-up visit.    Present at appointment: Patient, Care Team Member    Primary Diagnosis: Psychosocial    Chief Complaint   Patient presents with     Outreach       [place] Utilization:   Clinic Utilization  Difficulty keeping appointments:: No  Compliance Concerns: No  No-Show Concerns: No  No PCP office visit in Past Year: No  Utilization    Hospital Admissions  0             ED Visits  0             No Show Count (past year)  5                Current as of: 7/28/2021  4:36 PM              /80   Pulse 95   Temp 98.8  F (37.1  C)   Resp 14   Wt 88.7 kg (195 lb 9.6 oz)   SpO2 92%   BMI 31.57 kg/m      Clinical Concerns:  Current Medical Concerns:  COPD    Current Behavioral Concerns: none    Education Provided to patient: none   Medication set up? No  Pill Box issued: No  Scale issued: No  Flu Shot given: No  Lab draw or specimen collection: No  Food box issued: No  Collaborative visit with PCP: No    Health Maintenance Reviewed: Not assessed    Clinical Pathway: None    No  Face to Face in Home / Community    Review of Symptoms/PE    Skin: negative  Eyes: negative  Ears/Nose/Throat: negative  Respiratory: Shortness of breath and extreme Dyspnea on exertion  Cardiovascular: lower extremity edema and exercise intolerance  Gastrointestinal: negative  Genitourinary: negative  Musculoskeletal: foot pain  Neurologic: negative  Psychiatric: negative    Pain Management::       Medication Review  Knowledgeable about how to use meds:: Yes  Medication side effects suspected:: No    Diet/Exercise/Sleep  Diet:: No added salt  Inadequate nutrition (GOAL):: No  Tube Feeding: No  Inadequate activity/exercise (GOAL):: Yes  Significant changes in sleep pattern (GOAL): No    Plan:     Time spent with patient: 60    The patient meets one or more of the following criteria:  * Has received hospital  emergency department services three or more times in four consecutive months within a twelve month period    Acute concern/Follow-up recommendations: follow current treatment plan    Next CP visit scheduled: 7/29/21    Issues for Provider to follow up on: Community Paramedic visited with Chaim. Chaim is doing well and c/o his feet and ankles swelling. He has some pitting edema in both lower extremities. Chaim states he is taking his medications as indicated.   Assisted Chaim with some of his paperwork from his insurance.     Provider follow up visit needed: 7/9/21

## 2021-08-01 NOTE — PROGRESS NOTES
Clinic Care Coordination Contact    Situation: Patient chart reviewed by care coordinator.    Background: RN CC reviewing chart from Community Paramedic visit.    Assessment: Patient doing well with Community Paramedic visits.  Has MIGUEL CC as lead and CHW following.    Plan/Recommendations: RN CC will no longer follow at this time.      Melissa Behl BSN, RN, PHN, CCM  Primary Care Clinical RN Care Coordinator  [facility]   [phone]        INTERNAL MEDICINE RESIDENCY PROGRESS NOTE     Name: Laney Lagunas   Age & Sex: 45 y o  female   MRN: [id number]  Unit/Bed#: [facility] 918-01   Encounter: [id number]  Team: SOD Team A    PATIENT INFORMATION     Name: Laney Lagunas   Age & Sex: 45 y o  female   MRN: [id number]  Hospital Stay Days: 4    ASSESSMENT/PLAN     Principal Problem:    CNS lymphoma [facility])  Active Problems:    Dysphagia    Cancer related pain    Ambulatory dysfunction      Ambulatory dysfunction  Assessment & Plan  Known residual deficits left upper extremity and lower extremity weakness secondary to CNS lymphoma    PT/OT recommended rehab    Cancer related pain  Assessment & Plan  Gabapentin 200mg hs    Oxy 2 5 PRN  Oxy 5 mg x 1 given today    Dysphagia  Assessment & Plan  Tolerating dysphagia diet     * CNS lymphoma ([facility] [address] )  Assessment & Plan  A: 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)       Direct Admit from Dr Asad Lay for in-patient chemo 3rd cycle       S/P chemotherapy on 05/13, Felecia Cydney 5/14, 7/18 6/9 MRI- Appears to be interval improvement in the overall enhancement of multifocal      intracranial lesions       P:Per Primary- received 4th session of IV high-dose methotrexate, now being monitored for clearance of methotrexate levels     DVT prophylaxis with Lovenox     Currently on Leucovorin        Hypokalemia-resolved as of 8/1/2021  Assessment & Plan  A: potassium 2 9  Likely in setting of poor p o  Intake/poor absorption in setting of chemotherapy and malnutrition  Potassium 4 2 Status post repletion  Mg 1 9  Status post repletion  Now resolved        Disposition:  Per primary team    SUBJECTIVE     Patient seen and examined  Reported worsening pain overnight but none this morning  Denies any nausea, vomiting, abdominal pain, chest pain, shortness of breath  Reports poor appetite  Update:  Nursing reported patient complained of significant pain all over her body      OBJECTIVE     Vitals: 21 0807 21 1545 21 2202 21 0600   BP: 122/77 134/98 101/68    Pulse: 99 (!) 112 104    Resp:  21 18    Temp:  99 6 °F (37 6 °C) 98 8 °F (37 1 °C)    TempSrc:       SpO2: 98% 100% 99%    Weight:    55 kg (121 lb 4 1 oz)   Height:          Temperature:   Temp (24hrs), Av 2 °F (37 3 °C), Min:98 8 °F (37 1 °C), Max:99 6 °F (37 6 °C)    Temperature: 98 8 °F (37 1 °C)  Intake & Output:  I/O       701 -  07 -  07 07 -  07    P  O  0 360     I V  (mL/kg) 3424 (63 4) 1300 (23 6)     NG/GT 50      IV Piggyback 360      Total Intake(mL/kg) 3834 (71) 1660 (30 2)     Urine (mL/kg/hr) 1200 (0 9) 3850 (2 9)     Stool  0     Total Output 1200 3850     Net +2634 -2190            Unmeasured Urine Occurrence 2 x      Unmeasured Stool Occurrence  1 x         Weights:   IBW (Ideal Body Weight): 45 5 kg    Body mass index is 23 68 kg/m²  Weight (last 2 days)     Date/Time   Weight    21 0600   55 (121 25)    21 0600   54 (119 05)    21 0600   52 1 (114 86)            Physical Exam  Vitals reviewed  Constitutional:       General: She is not in acute distress  Appearance: Normal appearance  She is not ill-appearing  HENT:      Head: Normocephalic and atraumatic  Mouth/Throat:      Mouth: Mucous membranes are moist       Pharynx: Oropharynx is clear  Eyes:      General: No scleral icterus  Extraocular Movements: Extraocular movements intact  Conjunctiva/sclera: Conjunctivae normal    Cardiovascular:      Rate and Rhythm: Normal rate and regular rhythm  Pulses: Normal pulses  Heart sounds: No murmur heard  Pulmonary:      Effort: Pulmonary effort is normal  No respiratory distress  Breath sounds: Normal breath sounds  No wheezing or rales  Abdominal:      General: Abdomen is flat  Bowel sounds are normal       Palpations: Abdomen is soft  Tenderness: There is no abdominal tenderness  Musculoskeletal:      Cervical back: Normal range of motion  Right lower leg: No edema  Left lower leg: No edema  Comments: Limited ROM of the left upper and lower extremity   Skin:     General: Skin is warm  Capillary Refill: Capillary refill takes less than 2 seconds  Neurological:      Mental Status: She is alert  Comments: A&O x2   Psychiatric:         Mood and Affect: Mood normal          Thought Content: Thought content normal        LABORATORY DATA     Labs: I have personally reviewed pertinent reports  Results from last 7 days   Lab Units 08/01/21  0446 07/30/21  0547 07/29/21  0616 07/28/21  1712   WBC Thousand/uL 4 38 5 19 7 06 6 77   HEMOGLOBIN g/dL 10 2* 9 6* 11 2* 12 3   HEMATOCRIT % 32 3* 30 4* 34 3* 37 8   PLATELETS Thousands/uL 196 176 237 264   NEUTROS PCT %  --  58 82* 52   MONOS PCT %  --  6 4 7      Results from last 7 days   Lab Units 08/01/21  0446 07/31/21  0632 07/30/21  1309 07/30/21  0547 07/29/21  0616 07/28/21  1712   POTASSIUM mmol/L 4 2 3 6 4 2 2 9* 3 4* 3 1*   CHLORIDE mmol/L 105 106 109* 107 106 109*   CO2 mmol/L 28 34* 32 32 31 29   BUN mg/dL 12 10 5 7 9 9   CREATININE mg/dL 0 32* 0 31* 0 37* 0 35* 0 50* 0 43*   CALCIUM mg/dL 9 5 9 3 8 9 8 5 9 6 9 9   ALK PHOS U/L  --   --   --  81 62 66   ALT U/L  --   --   --  32 15 12   AST U/L  --   --   --  18 12 11     Results from last 7 days   Lab Units 07/31/21  0632 07/29/21  0616   MAGNESIUM mg/dL 2 0 1 7                        IMAGING & DIAGNOSTIC TESTING     Radiology Results: I have personally reviewed pertinent reports  No results found  Other Diagnostic Testing: I have personally reviewed pertinent reports      ACTIVE MEDICATIONS     Current Facility-Administered Medications   Medication Dose Route Frequency    acetaminophen (TYLENOL) tablet 650 mg  650 mg Oral Q6H PRN    albuterol (PROVENTIL HFA,VENTOLIN HFA) inhaler 2 puff  2 puff Inhalation Q4H PRN    atorvastatin (LIPITOR) tablet 10 mg  10 mg Oral Daily With Dinner    cholecalciferol (VITAMIN D3) tablet 400 Units  400 Units Oral Daily    Diclofenac Sodium (VOLTAREN) 1 % topical gel 2 g  2 g Topical TID PRN    dicyclomine (BENTYL) capsule 10 mg  10 mg Oral 4x Daily (AC & HS)    docusate sodium (COLACE) capsule 100 mg  100 mg Oral BID    enoxaparin (LOVENOX) subcutaneous injection 40 mg  40 mg Subcutaneous Daily    folic acid (FOLVITE) tablet 1 mg  1 mg Oral Daily    gabapentin (NEURONTIN) oral solution 200 mg  200 mg Oral BID    hydrOXYzine HCL (ATARAX) tablet 25 mg  25 mg Oral Q6H PRN    insulin lispro (HumaLOG) 100 units/mL subcutaneous injection 1-5 Units  1-5 Units Subcutaneous TID AC    leucovorin (WELLCOVORIN) tablet 25 mg  25 mg Oral Q6H    LORazepam (ATIVAN) injection 0 5 mg  0 5 mg Intravenous Q6H PRN    menthol-methyl salicylate (BENGAY) 14-10 % cream   Apply externally 4x Daily PRN    ondansetron (ZOFRAN-ODT) dispersible tablet 4 mg  4 mg Oral Q6H PRN    oxyCODONE (ROXICODONE) IR tablet 2 5 mg  2 5 mg Oral Q4H PRN    polyethylene glycol (MIRALAX) packet 17 g  17 g Oral Daily PRN    senna-docusate sodium (SENOKOT S) 8 6-50 mg per tablet 1 tablet  1 tablet Oral BID    sodium chloride 0 9 % infusion  20 mL/hr Intravenous Once PRN    tamsulosin (FLOMAX) capsule 0 4 mg  0 4 mg Oral Daily With Dinner       VTE Pharmacologic Prophylaxis: Enoxaparin (Lovenox)  VTE Mechanical Prophylaxis: sequential compression device    Portions of the record may have been created with voice recognition software  Occasional wrong word or "sound a like" substitutions may have occurred due to the inherent limitations of voice recognition software    Read the chart carefully and recognize, using context, where substitutions have occurred   ==  Preeti Perez, [facility]  Internal Medicine Residency PGY-2

## 2021-08-02 ENCOUNTER — TELEPHONE (OUTPATIENT)
Dept: FAMILY MEDICINE | Facility: CLINIC | Age: 46
End: 2021-08-02

## 2021-08-02 DIAGNOSIS — M79.672 BILATERAL FOOT PAIN: ICD-10-CM

## 2021-08-02 DIAGNOSIS — M79.2 NEUROPATHIC PAIN: ICD-10-CM

## 2021-08-02 DIAGNOSIS — M79.671 BILATERAL FOOT PAIN: ICD-10-CM

## 2021-08-02 NOTE — TELEPHONE ENCOUNTER
Pending Prescriptions:                       Disp   Refills    DULoxetine (CYMBALTA) 30 MG capsule       90 cap*0            Sig: Take 1 capsule (30 mg) by mouth daily Take with           60mg to equal 90mg/day. 3 month script    Last refill 05/04/2021  Next appt Nne   Last office visit 02/26/2021    Destin Menjivar MA

## 2021-08-02 NOTE — TELEPHONE ENCOUNTER
This writer attempted to contact patient on 08/02/21      Reason for call triage and unable to leave message. Mail box is full.      If patient calls back:   Registered Nurse called. Follow Triage Call workflow        Kaitlin Hayes RN

## 2021-08-02 NOTE — TELEPHONE ENCOUNTER
"Patient is on my schedule for \"severe right side pain\" for tomorrow.  Please triage to see if appropriate to wait.  Thank you  "

## 2021-08-03 ENCOUNTER — ANCILLARY PROCEDURE (OUTPATIENT)
Dept: GENERAL RADIOLOGY | Facility: CLINIC | Age: 46
End: 2021-08-03
Attending: NURSE PRACTITIONER
Payer: COMMERCIAL

## 2021-08-03 ENCOUNTER — OFFICE VISIT (OUTPATIENT)
Dept: FAMILY MEDICINE | Facility: CLINIC | Age: 46
End: 2021-08-03
Payer: COMMERCIAL

## 2021-08-03 VITALS
HEIGHT: 66 IN | OXYGEN SATURATION: 96 % | HEART RATE: 95 BPM | TEMPERATURE: 97 F | SYSTOLIC BLOOD PRESSURE: 131 MMHG | BODY MASS INDEX: 30.7 KG/M2 | WEIGHT: 191 LBS | DIASTOLIC BLOOD PRESSURE: 75 MMHG

## 2021-08-03 DIAGNOSIS — R06.02 SHORTNESS OF BREATH: ICD-10-CM

## 2021-08-03 DIAGNOSIS — Z86.11 HISTORY OF TB (TUBERCULOSIS): ICD-10-CM

## 2021-08-03 DIAGNOSIS — R07.9 RIGHT-SIDED CHEST PAIN: ICD-10-CM

## 2021-08-03 DIAGNOSIS — J44.9 CHRONIC OBSTRUCTIVE PULMONARY DISEASE, UNSPECIFIED COPD TYPE (H): ICD-10-CM

## 2021-08-03 DIAGNOSIS — Z86.11 HISTORY OF TB (TUBERCULOSIS): Primary | ICD-10-CM

## 2021-08-03 PROCEDURE — 71046 X-RAY EXAM CHEST 2 VIEWS: CPT | Performed by: RADIOLOGY

## 2021-08-03 PROCEDURE — 99214 OFFICE O/P EST MOD 30 MIN: CPT | Performed by: NURSE PRACTITIONER

## 2021-08-03 RX ORDER — ALBUTEROL SULFATE 90 UG/1
2 AEROSOL, METERED RESPIRATORY (INHALATION) EVERY 6 HOURS PRN
Qty: 8.5 G | Refills: 3 | Status: SHIPPED | OUTPATIENT
Start: 2021-08-03 | End: 2022-04-14

## 2021-08-03 RX ORDER — DULOXETIN HYDROCHLORIDE 30 MG/1
30 CAPSULE, DELAYED RELEASE ORAL DAILY
Qty: 90 CAPSULE | Refills: 0 | Status: SHIPPED | OUTPATIENT
Start: 2021-08-03 | End: 2022-01-14

## 2021-08-03 ASSESSMENT — PAIN SCALES - GENERAL: PAINLEVEL: MODERATE PAIN (4)

## 2021-08-03 ASSESSMENT — MIFFLIN-ST. JEOR: SCORE: 1684.12

## 2021-08-03 NOTE — PROGRESS NOTES
Assessment & Plan     History of TB (tuberculosis)  Completed treatment 7/9. Needs follow up with pulmonology - was to have chest CT and PFTs and then follow up last December; however, patient thought he was supposed to finish treatment first which got extended to July 2021. Will get the requested tests and refer back to pulmonology. New orders placed as previous orders >1 year old.  - CT Chest w/o Contrast; Future  - General PFT Lab (Please always keep checked); Future  - Pulmonary Function Test; Future  - Adult Pulmonary Medicine Referral; Future  - XR Chest 2 Views; Future    Chronic obstructive pulmonary disease, unspecified COPD type (H)  Ongoing. Refilled inhalers. Discussed indications for use and appropriate dosing schedule (Advair and albuterol).  - fluticasone-salmeterol (ADVAIR) 100-50 MCG/DOSE inhaler; Inhale 1 puff into the lungs every 12 hours  - albuterol (PROVENTIL HFA) 108 (90 Base) MCG/ACT inhaler; Inhale 2 puffs into the lungs every 6 hours as needed for shortness of breath / dyspnea or wheezing  - CT Chest w/o Contrast; Future  - General PFT Lab (Please always keep checked); Future  - Pulmonary Function Test; Future  - Adult Pulmonary Medicine Referral; Future  - XR Chest 2 Views; Future    Shortness of breath  Ongoing. Refilled inhalers. Discussed indications for use and appropriate dosing schedule (Advair and albuterol). UC/ED precautions discussed.  - albuterol (PROVENTIL HFA) 108 (90 Base) MCG/ACT inhaler; Inhale 2 puffs into the lungs every 6 hours as needed for shortness of breath / dyspnea or wheezing  - CT Chest w/o Contrast; Future  - General PFT Lab (Please always keep checked); Future  - Pulmonary Function Test; Future  - Adult Pulmonary Medicine Referral; Future  - XR Chest 2 Views; Future    Right-sided chest pain  X-ray unchanged from previous. EKG last month for palpitations showed sinus rhythm. ZioPatch still pending. Symptoms not consistent with cardiac etiology. He has  recently stopped his pain medications because he thought his body should be better now that he's done with TB medicine. Discussed indications for pain medication for his chronic pain. UC/ED precautions discussed.  - XR Chest 2 Views; Future             See Patient Instructions    Return in about 2 weeks (around 8/17/2021), or if symptoms worsen or fail to improve.     The benefits, risks and potential side effects were discussed in detail. Black box warnings discussed as relevant. All patient questions were answered. The patient was instructed to follow up immediately if any adverse reactions develop.    Return precautions discussed, including when to seek urgent/emergent care.    Patient verbalizes understanding and agrees with plan of care. Patient stable for discharge.      CHAZ Garrett CNP  [facility]    Slava Rucker is a 45 year old who presents for the following health issues     HPI       Pleasant 45 year old male presents with concerns for right sided lung pain x1 month. 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 He states he feels a bit more short of breath than usual. Cough is at baseline and dry. No fevers. No known injury. Doesn't hurt to touch the area. TB medications were extended through 7/9 (was supposed to be done in 2/2021). Last saw pulmonology 7/2020. He was supposed to have chest CT and PFTs done in Dec 2020 and then follow up but he hasn't done so yet because he thought he was supposed to finish his TB treatment first. He has Advair and albuterol at home. He states he has been using them as prescribed but upon further questioning he isn't sure which is which that he's been using. No abdominal pain, nausea, vomiting. No fevers. No LE edema. Feeling otherwise well. He wants referral to talk with lung transplant team further.     Review of Systems   Constitutional, HEENT, cardiovascular, pulmonary, GI, , musculoskeletal,  "neuro, skin, endocrine and psych systems are negative, except as otherwise noted.      Objective    /75 (BP Location: Left arm, Patient Position: Sitting, Cuff Size: Adult Regular)   Pulse 95   Temp 97  F (36.1  C) (Tympanic)   Ht 1.676 m (5' 6\")   Wt 86.6 kg (191 lb)   SpO2 96%   BMI 30.83 kg/m    Body mass index is 30.83 kg/m .  Physical Exam   GENERAL: healthy, alert and no distress  NECK: no adenopathy  RESP: air movement throughout with some minor wheezing  CV: regular rate and rhythm, normal S1 S2, no S3 or S4, no murmur, click or rub, no peripheral edema and peripheral pulses strong  ABDOMEN: soft, nontender, obese abdomen  MS: no gross musculoskeletal defects noted, no edema  PSYCH: mentation appears normal, affect normal/bright    No results found for this or any previous visit (from the past 24 hour(s)).     IMPRESSION: Since June 17, 2019, there is significant volume loss and  diffuse patchy opacification and consolidation throughout the right  hemithorax. The right lung is diffusely affected to a severe degree,  though appears similar to previous exam with retraction of the cardiac  silhouette and mediastinum to the right. There are scattered reticular  interstitial opacities in the left lung, especially mid and lower left  lung, though also unchanged. No evidence of a new pleural effusion or  pneumothorax despite severe underlying chronic pulmonary findings.     SNEHAL DOBBINS MD             "

## 2021-08-03 NOTE — TELEPHONE ENCOUNTER
Signed Prescriptions:                        Disp   Refills    DULoxetine (CYMBALTA) 30 MG capsule        90 cap*0        Sig: Take 1 capsule (30 mg) by mouth daily Take with 60mg           to equal 90mg/day. 3 month script  Authorizing Provider: MICHELLE CHILDERS, RN CNP, FNP  [facility]  [facility]

## 2021-08-03 NOTE — TELEPHONE ENCOUNTER
No return call back to clinic from patient.  Just now getting chance to review message and chart and can see patient is already checked in and being seen for scheduled visit at 10:00 am today.        Steffanie Worthington RN  [facility]

## 2021-08-03 NOTE — PATIENT INSTRUCTIONS
To schedule your imaging exam at any of the [facility] Home imaging locations, please call [phone]

## 2021-08-06 NOTE — TELEPHONE ENCOUNTER
RECORDS RECEIVED FROM: internal /ce    DATE RECEIVED: 10.25.21    NOTES STATUS DETAILS   OFFICE NOTE from referring provider Nyla Ruiz APRN    OFFICE NOTE from other specialist [place] [facility] [place]      DISCHARGE SUMMARY from hospital na    DISCHARGE REPORT from the ER na    MEDICATION LIST internal     IMAGING  (NEED IMAGES AND REPORTS)     CT SCAN internal /ce   Scheduled 8.9.21  [facility],   [facility]- 12.29.19, 12.27.19, 11.30.19,    CHEST XRAY (CXR) internal /ce internal -8.3.21  [facility]   [facility]- 12.26.19,    TESTS     PULMONARY FUNCTION TESTING (PFT) internal  Scheduled- 10.25.21        Action 8.6.21 sv   Action Taken Image request sent to =  -- [facility]  CT-3.6.20  CXR- 3.6.20     --[facility]  CT-12.29.19, 12.27.19, 11.30.19,   CXR-12.26.19

## 2021-08-09 ENCOUNTER — ANCILLARY PROCEDURE (OUTPATIENT)
Dept: CT IMAGING | Facility: CLINIC | Age: 46
End: 2021-08-09
Attending: NURSE PRACTITIONER
Payer: COMMERCIAL

## 2021-08-09 DIAGNOSIS — J44.9 CHRONIC OBSTRUCTIVE PULMONARY DISEASE, UNSPECIFIED COPD TYPE (H): ICD-10-CM

## 2021-08-09 DIAGNOSIS — Z86.11 HISTORY OF TB (TUBERCULOSIS): ICD-10-CM

## 2021-08-09 DIAGNOSIS — R06.02 SHORTNESS OF BREATH: ICD-10-CM

## 2021-08-09 PROCEDURE — 71250 CT THORAX DX C-: CPT | Performed by: RADIOLOGY

## 2021-08-23 DIAGNOSIS — M79.2 NEUROPATHIC PAIN: ICD-10-CM

## 2021-08-23 DIAGNOSIS — G47.00 INSOMNIA, UNSPECIFIED TYPE: ICD-10-CM

## 2021-08-23 DIAGNOSIS — M79.672 BILATERAL FOOT PAIN: ICD-10-CM

## 2021-08-23 DIAGNOSIS — G89.29 CHRONIC PAIN OF BOTH SHOULDERS: ICD-10-CM

## 2021-08-23 DIAGNOSIS — M25.511 CHRONIC PAIN OF BOTH SHOULDERS: ICD-10-CM

## 2021-08-23 DIAGNOSIS — M79.671 BILATERAL FOOT PAIN: ICD-10-CM

## 2021-08-23 DIAGNOSIS — M25.512 CHRONIC PAIN OF BOTH SHOULDERS: ICD-10-CM

## 2021-08-23 RX ORDER — NORTRIPTYLINE HCL 10 MG
10-20 CAPSULE ORAL AT BEDTIME
Qty: 60 CAPSULE | Refills: 3 | Status: SHIPPED | OUTPATIENT
Start: 2021-08-23 | End: 2021-12-27

## 2021-08-23 NOTE — TELEPHONE ENCOUNTER
Signed Prescriptions:                        Disp   Refills    nortriptyline (PAMELOR) 10 MG capsule      60 cap*3        Sig: Take 1-2 capsules (10-20 mg) by mouth At Bedtime For           pain and insomnia..  Authorizing Provider: MICHELLE CHILDERS, RN CNP, FNP  [facility]  [facility] [place] [place]

## 2021-08-23 NOTE — TELEPHONE ENCOUNTER
Received fax from pharmacy requesting refill(s) for nortriptyline (PAMELOR) 10 MG capsule     Last refilled on 07/28/21    Pt last seen on 02/26/21  Next appt scheduled for None    E-prescribe to:  [id number] - [place], [place] - [address] AT [facility]     Will facilitate refill.      Patsy Khan MA  [facility] [place]

## 2021-08-24 DIAGNOSIS — I10 HYPERTENSION GOAL BP (BLOOD PRESSURE) < 140/80: ICD-10-CM

## 2021-08-25 RX ORDER — AMLODIPINE BESYLATE 5 MG/1
10 TABLET ORAL DAILY
Qty: 180 TABLET | Refills: 0 | Status: SHIPPED | OUTPATIENT
Start: 2021-08-25 | End: 2022-04-14

## 2021-08-25 NOTE — TELEPHONE ENCOUNTER
" Centralized Medication Refill Team note:  Send to primary Care provider per Cardiology note 5/27/21( Linda saw 5/27/20, return prn, PMD to fill amlodipine): last saw Nyla Florian 8/3/21 [facility]  Routed to BK REFILL    Per last refill/ Cardiology 5/27/21\"  \"Medication Refill double check:     Last virtual visit was on 5/27/20 with Danika Mckeon PA-C.     Follow up was recommended for PRN.     Any additional encounters with changes to requested med? no     Authorizing provider is: Dr. Chauhan     Limited refill was approved.      Additional orders/notes: patient's PMD can refill his amlodipine going forward.        Pat Story RN on 5/27/2021 at 1:24 PM\"  "

## 2021-08-27 ENCOUNTER — PATIENT OUTREACH (OUTPATIENT)
Dept: CARE COORDINATION | Facility: CLINIC | Age: 46
End: 2021-08-27

## 2021-08-27 NOTE — PROGRESS NOTES
Clinic Care Coordination Contact    Situation: Patient chart reviewed by care coordinator.    Background: Patient is enrolled in Care Coordination. CHW, SWCC and Community Paramedic are following.     Assessment: CHW spoke with patient on 7/28. Patient reported having pain on his right side. Patient had an upcoming appointment that he could address this at. Patient reports that he has never heard back from unemployment or on his status on Naturalization. Patient has been working on losing weight, as he stated this was advised by his transplant team. Community Paramedic visited patient on 7/29. Edema is improving. Feet do not hurt as much as before. Breathing is doing well. Trying to walk in the mornings.     Plan/Recommendations: CHW will reach out to patient one month from most recent outreach. SWCC will review chart in 6 weeks, per standard workflow, unless outreach is requested sooner    BING Garcia  Primary Care Clinic- Social Work Care Coordinator  [facility]  Ph: [phone]  8/27/2021 1:29 PM

## 2021-08-31 DIAGNOSIS — M79.671 BILATERAL FOOT PAIN: ICD-10-CM

## 2021-08-31 DIAGNOSIS — M79.2 NEUROPATHIC PAIN: ICD-10-CM

## 2021-08-31 DIAGNOSIS — M79.672 BILATERAL FOOT PAIN: ICD-10-CM

## 2021-08-31 RX ORDER — DULOXETIN HYDROCHLORIDE 60 MG/1
60 CAPSULE, DELAYED RELEASE ORAL DAILY
Qty: 90 CAPSULE | Refills: 1 | Status: SHIPPED | OUTPATIENT
Start: 2021-08-31 | End: 2022-04-14

## 2021-08-31 NOTE — TELEPHONE ENCOUNTER
Signed Prescriptions:                        Disp   Refills    DULoxetine (CYMBALTA) 60 MG capsule        90 cap*1        Sig: Take 1 capsule (60 mg) by mouth daily Take with 30mg           to equal 90mg/day. 3 month script  Authorizing Provider: MICHELLE CHILDERS, RN CNP, FNP  [facility]  [facility]

## 2021-08-31 NOTE — TELEPHONE ENCOUNTER
Received fax request from:  [id number]  [address]  [facility] [address]  Phone: [phone] Fax: [phone]    Pharmacy requesting refill(s) for DULoxetine (CYMBALTA) 60 MG capsule    Last refilled on 08/21/21    Pt last seen on 02/26/21    Next appt scheduled for None    Will facilitate refill.    Jadyn Crespo CMA  [facility] [place]

## 2021-09-02 ENCOUNTER — PATIENT OUTREACH (OUTPATIENT)
Dept: NURSING | Facility: CLINIC | Age: 46
End: 2021-09-02
Payer: COMMERCIAL

## 2021-09-02 NOTE — PROGRESS NOTES
Clinic Care Coordination Contact    Community Health Worker Follow Up    Care Gaps:     Health Maintenance Due   Topic Date Due     PREVENTIVE CARE VISIT  Never done     SPIROMETRY  Never done     URINE DRUG SCREEN  Never done     ADVANCE CARE PLANNING  Never done     COPD ACTION PLAN  Never done     COVID-19 Vaccine (3 - Pfizer risk 3-dose series) 05/21/2021     INFLUENZA VACCINE (1) 09/01/2021       Care Gaps Last addressed on 09/02/21 advised patient he is due for a physical.     Goals:   Goals Addressed as of 9/2/2021 at 2:36 PM                    Today    7/29/21       2. Pain Management (pt-stated)   100%  60%    Added 2/19/20 by Charlene Gonzales, RN      Goal Statement: I will have suitable pain relief to perform activities of daily living.  Date Goal set: 2/19/2020  Date to Achieve By: 05/01/21  Patient expressed understanding of goal: Yes  Action steps to achieve this goal:  1. I will take medications as prescribed to maintain adequate pain control/relief.  2. I will utilize non-pharmaceutical measures to help with pain control/relief.  3. I will maintain routine follow up and contact with my clinic care team.  4. I will call my pharmacy to request refills of my medications before they run out.  5. I will schedule a pain clinic follow up appointment.          CHW spoke with patient he said he was doing good. CHW asked did he see his PCP about his pain in right side? He said no he didn't go because the pain went away.he said he still taking medications as prescribed and does call pharmacy for refills before he is completely out.    CHW asked has patient heard abour his citizenship? He said he will be speaking with them on the 22nd of this month. He still has never heard from unemployment. Patient did not have any questions or concerns. CHW did advised patient he is due for a physical. He wanted to schedule today CHW transferred to scheduling line.    ntervention and Education during outreach: none    CHW  Plan: CHW will call patient in 1 month.    CRISTINA Mendez  Clinic Care Coordination  [facility] [place], [place] [facility]  Phone: [phone]

## 2021-09-14 ENCOUNTER — TELEPHONE (OUTPATIENT)
Dept: FAMILY MEDICINE | Facility: CLINIC | Age: 46
End: 2021-09-14

## 2021-09-14 ENCOUNTER — OFFICE VISIT (OUTPATIENT)
Dept: FAMILY MEDICINE | Facility: CLINIC | Age: 46
End: 2021-09-14
Payer: COMMERCIAL

## 2021-09-14 VITALS
BODY MASS INDEX: 30.47 KG/M2 | WEIGHT: 189.6 LBS | SYSTOLIC BLOOD PRESSURE: 123 MMHG | DIASTOLIC BLOOD PRESSURE: 83 MMHG | HEART RATE: 91 BPM | OXYGEN SATURATION: 92 % | HEIGHT: 66 IN | TEMPERATURE: 97.3 F

## 2021-09-14 DIAGNOSIS — J44.9 CHRONIC OBSTRUCTIVE PULMONARY DISEASE, UNSPECIFIED COPD TYPE (H): ICD-10-CM

## 2021-09-14 DIAGNOSIS — D63.8 ANEMIA OF CHRONIC DISEASE: ICD-10-CM

## 2021-09-14 DIAGNOSIS — Z86.11 HISTORY OF TB (TUBERCULOSIS): ICD-10-CM

## 2021-09-14 DIAGNOSIS — Z13.6 CARDIOVASCULAR SCREENING; LDL GOAL LESS THAN 130: ICD-10-CM

## 2021-09-14 DIAGNOSIS — Z00.00 ROUTINE GENERAL MEDICAL EXAMINATION AT A HEALTH CARE FACILITY: Primary | ICD-10-CM

## 2021-09-14 DIAGNOSIS — R06.02 SOB (SHORTNESS OF BREATH): ICD-10-CM

## 2021-09-14 LAB
ALBUMIN SERPL-MCNC: 4 G/DL (ref 3.4–5)
ALP SERPL-CCNC: 118 U/L (ref 40–150)
ALT SERPL W P-5'-P-CCNC: 20 U/L (ref 0–70)
ANION GAP SERPL CALCULATED.3IONS-SCNC: 4 MMOL/L (ref 3–14)
AST SERPL W P-5'-P-CCNC: 14 U/L (ref 0–45)
BASOPHILS # BLD AUTO: 0 10E3/UL (ref 0–0.2)
BASOPHILS NFR BLD AUTO: 1 %
BILIRUB SERPL-MCNC: 0.7 MG/DL (ref 0.2–1.3)
BUN SERPL-MCNC: 9 MG/DL (ref 7–30)
CALCIUM SERPL-MCNC: 9.8 MG/DL (ref 8.5–10.1)
CHLORIDE BLD-SCNC: 103 MMOL/L (ref 94–109)
CHOLEST SERPL-MCNC: 208 MG/DL
CO2 SERPL-SCNC: 31 MMOL/L (ref 20–32)
CREAT SERPL-MCNC: 1.18 MG/DL (ref 0.66–1.25)
EOSINOPHIL # BLD AUTO: 0.3 10E3/UL (ref 0–0.7)
EOSINOPHIL NFR BLD AUTO: 5 %
ERYTHROCYTE [DISTWIDTH] IN BLOOD BY AUTOMATED COUNT: 12.4 % (ref 10–15)
FASTING STATUS PATIENT QL REPORTED: YES
GFR SERPL CREATININE-BSD FRML MDRD: 73 ML/MIN/1.73M2
GLUCOSE BLD-MCNC: 102 MG/DL (ref 70–99)
HCT VFR BLD AUTO: 43 % (ref 40–53)
HDLC SERPL-MCNC: 57 MG/DL
HGB BLD-MCNC: 13.9 G/DL (ref 13.3–17.7)
IMM GRANULOCYTES # BLD: 0 10E3/UL
IMM GRANULOCYTES NFR BLD: 0 %
LDLC SERPL CALC-MCNC: 134 MG/DL
LYMPHOCYTES # BLD AUTO: 1.3 10E3/UL (ref 0.8–5.3)
LYMPHOCYTES NFR BLD AUTO: 27 %
MCH RBC QN AUTO: 30.5 PG (ref 26.5–33)
MCHC RBC AUTO-ENTMCNC: 32.3 G/DL (ref 31.5–36.5)
MCV RBC AUTO: 95 FL (ref 78–100)
MONOCYTES # BLD AUTO: 0.6 10E3/UL (ref 0–1.3)
MONOCYTES NFR BLD AUTO: 12 %
NEUTROPHILS # BLD AUTO: 2.7 10E3/UL (ref 1.6–8.3)
NEUTROPHILS NFR BLD AUTO: 55 %
NONHDLC SERPL-MCNC: 151 MG/DL
PLATELET # BLD AUTO: 234 10E3/UL (ref 150–450)
POTASSIUM BLD-SCNC: 3.3 MMOL/L (ref 3.4–5.3)
PROT SERPL-MCNC: 8.6 G/DL (ref 6.8–8.8)
RBC # BLD AUTO: 4.55 10E6/UL (ref 4.4–5.9)
SODIUM SERPL-SCNC: 138 MMOL/L (ref 133–144)
TRIGL SERPL-MCNC: 84 MG/DL
WBC # BLD AUTO: 4.9 10E3/UL (ref 4–11)

## 2021-09-14 PROCEDURE — 99396 PREV VISIT EST AGE 40-64: CPT | Performed by: NURSE PRACTITIONER

## 2021-09-14 PROCEDURE — 80061 LIPID PANEL: CPT | Performed by: NURSE PRACTITIONER

## 2021-09-14 PROCEDURE — 36415 COLL VENOUS BLD VENIPUNCTURE: CPT | Performed by: NURSE PRACTITIONER

## 2021-09-14 PROCEDURE — 85025 COMPLETE CBC W/AUTO DIFF WBC: CPT | Performed by: NURSE PRACTITIONER

## 2021-09-14 PROCEDURE — 80053 COMPREHEN METABOLIC PANEL: CPT | Performed by: NURSE PRACTITIONER

## 2021-09-14 PROCEDURE — 99213 OFFICE O/P EST LOW 20 MIN: CPT | Mod: 25 | Performed by: NURSE PRACTITIONER

## 2021-09-14 ASSESSMENT — ENCOUNTER SYMPTOMS
COUGH: 1
SHORTNESS OF BREATH: 1

## 2021-09-14 ASSESSMENT — PATIENT HEALTH QUESTIONNAIRE - PHQ9
SUM OF ALL RESPONSES TO PHQ QUESTIONS 1-9: 1
10. IF YOU CHECKED OFF ANY PROBLEMS, HOW DIFFICULT HAVE THESE PROBLEMS MADE IT FOR YOU TO DO YOUR WORK, TAKE CARE OF THINGS AT HOME, OR GET ALONG WITH OTHER PEOPLE: NOT DIFFICULT AT ALL
SUM OF ALL RESPONSES TO PHQ QUESTIONS 1-9: 1

## 2021-09-14 ASSESSMENT — PAIN SCALES - GENERAL: PAINLEVEL: NO PAIN (0)

## 2021-09-14 ASSESSMENT — MIFFLIN-ST. JEOR: SCORE: 1672.77

## 2021-09-14 NOTE — PATIENT INSTRUCTIONS
At [facility], we strive to deliver an exceptional experience to you, every time we see you. If you receive a survey, please complete it as we do value your feedback.  If you have MyChart, you can expect to receive results automatically within 24 hours of their completion.  Your provider will send a note interpreting your results as well.   If you do not have MyChart, you should receive your results in about a week by mail.    Your care team:                            Family Medicine Internal Medicine   MD Tunde Hernandez MD Shantel Branch-Fleming, MD Srinivasa Vaka, MD Katya Belousova, PAFloridalmaC  Nyla Florian, APRN CNP    Marco Florentino, MD Pediatrics   Dell Tabor, PAHEORN Joshua, CNP MD Beth Platt APRN CNP   MD Nel Evans MD Deborah Mielke, MD Tanja Perla, APRN [facility]      Clinic hours: Monday - Thursday 7 am-6 pm; Fridays 7 am-5 pm.   Urgent care: Monday - Friday 10 am- 8 pm; Saturday and Sunday 9 am-5 pm.    Clinic: [phone]       [facility]: Monday - Thursday 8 am - 7 pm; Friday 8 am - 6 pm  [facility]: [phone]     Use www.[email or web address] for 24/7 diagnosis and treatment of dozens of conditions.  Preventive Health Recommendations  Male Ages 40 to 49    Yearly exam:             See your health care provider every year in order to  o   Review health changes.   o   Discuss preventive care.    o   Review your medicines if your doctor has prescribed any.    You should be tested each year for STDs (sexually transmitted diseases) if you re at risk.     Have a cholesterol test every 5 years.     Have a colonoscopy (test for colon cancer) if someone in your family has had colon cancer or polyps before age 50.     After age 45, have a diabetes test (fasting glucose). If you are at risk for diabetes, you should have this test every 3 years.      Talk with your  health care provider about whether or not a prostate cancer screening test (PSA) is right for you.    Shots: Get a flu shot each year. Get a tetanus shot every 10 years.     Nutrition:    Eat at least 5 servings of fruits and vegetables daily.     Eat whole-grain bread, whole-wheat pasta and brown rice instead of white grains and rice.     Get adequate Calcium and Vitamin D.     Lifestyle    Exercise for at least 150 minutes a week (30 minutes a day, 5 days a week). This will help you control your weight and prevent disease.     Limit alcohol to one drink per day.     No smoking.     Wear sunscreen to prevent skin cancer.     See your dentist every six months for an exam and cleaning.

## 2021-09-14 NOTE — TELEPHONE ENCOUNTER
DME for oxygen faxed to [facility] [phone]    And disability parking application mailed to the [employer], copy placed in abstract and copy placed in tc bin

## 2021-09-14 NOTE — LETTER
September 16, 2021      Chaim Norman  [address]   [place] [address]        Dear ,    We are writing to inform you of your test results.    Your kidneys and liver are good. Your potassium is a little on the low side. It's important to eat foods high in potassium such as bananas, avocados or spinach. Your cholesterol is elevated.  I recommend watching diet: eat lots of fruits/vegetables and lean meats. Avoid processed foods. Get at least 30 minutes of exercise 4-5 days per week. It's not quite at the point of needing to start medication but we should keep an eye on it and recheck in 6-12 months. Please let us know if you have any questions.   Thank you for allowing us to participate in your care.       Resulted Orders   Comprehensive metabolic panel (BMP + Alb, Alk Phos, ALT, AST, Total. Bili, TP)   Result Value Ref Range    Sodium 138 133 - 144 mmol/L    Potassium 3.3 (L) 3.4 - 5.3 mmol/L    Chloride 103 94 - 109 mmol/L    Carbon Dioxide (CO2) 31 20 - 32 mmol/L    Anion Gap 4 3 - 14 mmol/L    Urea Nitrogen 9 7 - 30 mg/dL    Creatinine 1.18 0.66 - 1.25 mg/dL    Calcium 9.8 8.5 - 10.1 mg/dL    Glucose 102 (H) 70 - 99 mg/dL    Alkaline Phosphatase 118 40 - 150 U/L    AST 14 0 - 45 U/L    ALT 20 0 - 70 U/L    Protein Total 8.6 6.8 - 8.8 g/dL    Albumin 4.0 3.4 - 5.0 g/dL    Bilirubin Total 0.7 0.2 - 1.3 mg/dL    GFR Estimate 73 >60 mL/min/1.73m2      Comment:      As of July 11, 2021, eGFR is calculated by the CKD-EPI creatinine equation, without race adjustment. eGFR can be influenced by muscle mass, exercise, and diet. The reported eGFR is an estimation only and is only applicable if the renal function is stable.   Lipid panel reflex to direct LDL Fasting   Result Value Ref Range    Cholesterol 208 (H) <200 mg/dL    Triglycerides 84 <150 mg/dL    Direct Measure HDL 57 >=40 mg/dL    LDL Cholesterol Calculated 134 (H) <=100 mg/dL    Non HDL Cholesterol 151 (H) <130 mg/dL    Patient Fasting > 8hrs?  Yes     Narrative    Cholesterol  Desirable:  <200 mg/dL    Triglycerides  Normal:  Less than 150 mg/dL  Borderline High:  150-199 mg/dL  High:  200-499 mg/dL  Very High:  Greater than or equal to 500 mg/dL    Direct Measure HDL  Female:  Greater than or equal to 50 mg/dL   Male:  Greater than or equal to 40 mg/dL    LDL Cholesterol  Desirable:  <100mg/dL  Above Desirable:  100-129 mg/dL   Borderline High:  130-159 mg/dL   High:  160-189 mg/dL   Very High:  >= 190 mg/dL    Non HDL Cholesterol  Desirable:  130 mg/dL  Above Desirable:  130-159 mg/dL  Borderline High:  160-189 mg/dL  High:  190-219 mg/dL  Very High:  Greater than or equal to 220 mg/dL   CBC with platelets and differential   Result Value Ref Range    WBC Count 4.9 4.0 - 11.0 10e3/uL    RBC Count 4.55 4.40 - 5.90 10e6/uL    Hemoglobin 13.9 13.3 - 17.7 g/dL    Hematocrit 43.0 40.0 - 53.0 %    MCV 95 78 - 100 fL    MCH 30.5 26.5 - 33.0 pg    MCHC 32.3 31.5 - 36.5 g/dL    RDW 12.4 10.0 - 15.0 %    Platelet Count 234 150 - 450 10e3/uL    % Neutrophils 55 %    % Lymphocytes 27 %    % Monocytes 12 %    % Eosinophils 5 %    % Basophils 1 %    % Immature Granulocytes 0 %    Absolute Neutrophils 2.7 1.6 - 8.3 10e3/uL    Absolute Lymphocytes 1.3 0.8 - 5.3 10e3/uL    Absolute Monocytes 0.6 0.0 - 1.3 10e3/uL    Absolute Eosinophils 0.3 0.0 - 0.7 10e3/uL    Absolute Basophils 0.0 0.0 - 0.2 10e3/uL    Absolute Immature Granulocytes 0.0 <=0.0 10e3/uL       If you have any questions or concerns, please call the clinic at the number listed above.       Sincerely,      CHAZ Beasley CNP

## 2021-09-14 NOTE — PROGRESS NOTES
SUBJECTIVE:   CC: Chaim Norman is an 46 year old male who presents for preventative health visit.       Patient has been advised of split billing requirements and indicates understanding: Yes  Healthy Habits:     Getting at least 3 servings of Calcium per day:  Yes    Bi-annual eye exam:  NO    Dental care twice a year:  NO    Sleep apnea or symptoms of sleep apnea:  None    Diet:  Regular (no restrictions)    Frequency of exercise:  None    Taking medications regularly:  No    Barriers to taking medications:  Not applicable    Medication side effects:  None    PHQ-2 Total Score: 3    Additional concerns today:  No      Hx TB. Sees pulmonologyagain in Oct. Hoping for lung transplant. Currently only using oxygen at night but also gets short of breath with activity.  When he got to clinic today he was 88%. He came up to 93 after resting. We attempted to do 6 min walk test however he made it 2 min before de-satting to 84%. He took 5 min and several deep breaths to recover to 92%. He does not currently have portable O2 for during the day. He did not use oxygen during the test. There was no chest pain or dizziness associated with the test.  He does not currently have parking sticker for handicap parking. He is interested in getting one.         Today's PHQ-2 Score:   PHQ-2 ( 1999 Pfizer) 9/14/2021   Q1: Little interest or pleasure in doing things 3   Q2: Feeling down, depressed or hopeless 0   PHQ-2 Score 3   Q1: Little interest or pleasure in doing things Nearly every day   Q2: Feeling down, depressed or hopeless Not at all   PHQ-2 Score 3   no thoughts to harm self/others. Feels safe.    Abuse: Current or Past(Physical, Sexual or Emotional)- No  Do you feel safe in your environment? Yes    Have you ever done Advance Care Planning? (For example, a Health Directive, POLST, or a discussion with a medical provider or your loved ones about your wishes): No, advance care planning information given to patient to review.   Patient plans to discuss their wishes with loved ones or provider.      Social History     Tobacco Use     Smoking status: Never Smoker     Smokeless tobacco: Never Used   Substance Use Topics     Alcohol use: No     If you drink alcohol do you typically have >3 drinks per day or >7 drinks per week? No    Alcohol Use 9/14/2021   Prescreen: >3 drinks/day or >7 drinks/week? Not Applicable       Last PSA: No results found for: PSA    Reviewed orders with patient. Reviewed health maintenance and updated orders accordingly - Yes  Lab work is in process  Labs reviewed in EPIC  BP Readings from Last 3 Encounters:   09/14/21 123/83   08/03/21 131/75   07/29/21 128/89    Wt Readings from Last 3 Encounters:   09/14/21 86 kg (189 lb 9.6 oz)   08/03/21 86.6 kg (191 lb)   07/29/21 86.2 kg (190 lb)                  Patient Active Problem List   Diagnosis     History of TB (tuberculosis)     Weight loss     Pulmonary nodules     Late latent syphilis     Iatrogenic pneumothorax     Patient's intentional underdosing of medication regimen due to financial hardship     Anemia of chronic disease     Benign prostatic hyperplasia, unspecified whether lower urinary tract symptoms present     COPD (chronic obstructive pulmonary disease) (H)     Hepatitis B core antibody positive     Neutropenia (H)     Pulmonary hypertension (H)     SOB (shortness of breath)     Status post coronary angiogram     Morbid obesity (H)     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     Peripheral neuropathy     Tuberculosis of lung with cavitation, 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     Past Surgical History:   Procedure Laterality Date     BIOPSY  2018    Lung- [facility]     CORONARY ANGIOGRAPHY ADULT ORDER  05/2020    [facility]     CV PULMONARY ANGIOGRAM N/A 5/8/2020    Procedure: Pulmonary Angiogram;  Surgeon: Keenan Perez MD;  Location:  HEART CARDIAC CATH LAB     CV RIGHT HEART CATH MEASUREMENTS RECORDED N/A  5/8/2020    Procedure: CV RIGHT HEART CATH;  Surgeon: Keenan Perez MD;  Location:  HEART CARDIAC CATH LAB     ENDOSCOPY       GI SURGERY  12/2019    Upper GI       Social History     Tobacco Use     Smoking status: Never Smoker     Smokeless tobacco: Never Used   Substance Use Topics     Alcohol use: No     Family History   Family history unknown: Yes         Current Outpatient Medications   Medication Sig Dispense Refill     acetaminophen (TYLENOL) 325 MG tablet Take 2 tablets (650 mg) by mouth every 6 hours as needed for mild pain 180 tablet 2     albuterol (PROVENTIL HFA) 108 (90 Base) MCG/ACT inhaler Inhale 2 puffs into the lungs every 6 hours as needed for shortness of breath / dyspnea or wheezing 8.5 g 3     amLODIPine (NORVASC) 5 MG tablet Take 2 tablets (10 mg) by mouth daily Primary MD to refill in the future. 180 tablet 0     DULoxetine (CYMBALTA) 60 MG capsule Take 1 capsule (60 mg) by mouth daily Take with 30mg to equal 90mg/day. 3 month script 90 capsule 1     ethambutol (MYAMBUTOL) 400 MG tablet        fluticasone-salmeterol (ADVAIR) 100-50 MCG/DOSE inhaler Inhale 1 puff into the lungs every 12 hours 1 each 3     gabapentin (NEURONTIN) 300 MG capsule Take 3 capsules (900 mg) by mouth 3 times daily 270 capsule 1     nortriptyline (PAMELOR) 10 MG capsule Take 1-2 capsules (10-20 mg) by mouth At Bedtime For pain and insomnia.. 60 capsule 3     DULoxetine (CYMBALTA) 30 MG capsule Take 1 capsule (30 mg) by mouth daily Take with 60mg to equal 90mg/day. 3 month script (Patient not taking: Reported on 8/3/2021) 90 capsule 0     No Known Allergies    Reviewed and updated as needed this visit by clinical staff  Tobacco  Allergies    Med Hx  Surg Hx  Fam Hx  Soc Hx        Reviewed and updated as needed this visit by Provider                Past Medical History:   Diagnosis Date     Anemia      Arthritis      COPD (chronic obstructive pulmonary disease) (H)      History of blood transfusion 2019     "[facility]     Neutropenia (H)      Pulmonary hypertension (H)      TB (tuberculosis), treated      Tuberculosis     currently being treated      Past Surgical History:   Procedure Laterality Date     BIOPSY  2018    Lung- [facility]     CORONARY ANGIOGRAPHY ADULT ORDER  05/2020    [facility]     CV PULMONARY ANGIOGRAM N/A 5/8/2020    Procedure: Pulmonary Angiogram;  Surgeon: Keenan Perez MD;  Location:  HEART CARDIAC CATH LAB     CV RIGHT HEART CATH MEASUREMENTS RECORDED N/A 5/8/2020    Procedure: CV RIGHT HEART CATH;  Surgeon: Keenan Perez MD;  Location:  HEART CARDIAC CATH LAB     ENDOSCOPY       GI SURGERY  12/2019    Upper GI       Review of Systems   Respiratory: Positive for cough and shortness of breath.    Cardiovascular: Positive for chest pain.   Genitourinary: Positive for impotence.   symptoms unchanged from baseline per his report.      OBJECTIVE:   /83   Pulse 91   Temp 97.3  F (36.3  C) (Tympanic)   Ht 1.676 m (5' 6\")   Wt 86 kg (189 lb 9.6 oz)   SpO2 92%   BMI 30.60 kg/m      Physical Exam  GENERAL: healthy, alert and no distress  EYES: Eyes grossly normal to inspection, PERRL and conjunctivae and sclerae normal  HENT: ear canals and TM's normal, nose and mouth without ulcers or lesions  NECK: no adenopathy, no asymmetry, masses, or scars and thyroid normal to palpation  RESP: lungs clear to auscultation - no rales, rhonchi or wheezes  CV: regular rate and rhythm, normal S1 S2, no S3 or S4, no murmur, click or rub, no peripheral edema and peripheral pulses strong  ABDOMEN: soft, nontender, no hepatosplenomegaly, no masses and bowel sounds normal  MS: no gross musculoskeletal defects noted, no edema  SKIN: no suspicious lesions or rashes  NEURO: Normal strength and tone, mentation intact and speech normal  PSYCH: mentation appears normal, affect normal/bright    Diagnostic Test Results:  Labs reviewed in Epic  Results for orders placed or performed in visit on " 09/14/21   CBC with platelets and differential     Status: None    Narrative    The following orders were created for panel order CBC with platelets and differential.  Procedure                               Abnormality         Status                     ---------                               -----------         ------                     CBC with platelets and d...[[id number]]                      Final result                 Please view results for these tests on the individual orders.   Comprehensive metabolic panel (BMP + Alb, Alk Phos, ALT, AST, Total. Bili, TP)     Status: Abnormal   Result Value Ref Range    Sodium 138 133 - 144 mmol/L    Potassium 3.3 (L) 3.4 - 5.3 mmol/L    Chloride 103 94 - 109 mmol/L    Carbon Dioxide (CO2) 31 20 - 32 mmol/L    Anion Gap 4 3 - 14 mmol/L    Urea Nitrogen 9 7 - 30 mg/dL    Creatinine 1.18 0.66 - 1.25 mg/dL    Calcium 9.8 8.5 - 10.1 mg/dL    Glucose 102 (H) 70 - 99 mg/dL    Alkaline Phosphatase 118 40 - 150 U/L    AST 14 0 - 45 U/L    ALT 20 0 - 70 U/L    Protein Total 8.6 6.8 - 8.8 g/dL    Albumin 4.0 3.4 - 5.0 g/dL    Bilirubin Total 0.7 0.2 - 1.3 mg/dL    GFR Estimate 73 >60 mL/min/1.73m2   Lipid panel reflex to direct LDL Fasting     Status: Abnormal   Result Value Ref Range    Cholesterol 208 (H) <200 mg/dL    Triglycerides 84 <150 mg/dL    Direct Measure HDL 57 >=40 mg/dL    LDL Cholesterol Calculated 134 (H) <=100 mg/dL    Non HDL Cholesterol 151 (H) <130 mg/dL    Patient Fasting > 8hrs? Yes     Narrative    Cholesterol  Desirable:  <200 mg/dL    Triglycerides  Normal:  Less than 150 mg/dL  Borderline High:  150-199 mg/dL  High:  200-499 mg/dL  Very High:  Greater than or equal to 500 mg/dL    Direct Measure HDL  Female:  Greater than or equal to 50 mg/dL   Male:  Greater than or equal to 40 mg/dL    LDL Cholesterol  Desirable:  <100mg/dL  Above Desirable:  100-129 mg/dL   Borderline High:  130-159 mg/dL   High:  160-189 mg/dL   Very High:  >= 190 mg/dL    Non HDL  Cholesterol  Desirable:  130 mg/dL  Above Desirable:  130-159 mg/dL  Borderline High:  160-189 mg/dL  High:  190-219 mg/dL  Very High:  Greater than or equal to 220 mg/dL   CBC with platelets and differential     Status: None   Result Value Ref Range    WBC Count 4.9 4.0 - 11.0 10e3/uL    RBC Count 4.55 4.40 - 5.90 10e6/uL    Hemoglobin 13.9 13.3 - 17.7 g/dL    Hematocrit 43.0 40.0 - 53.0 %    MCV 95 78 - 100 fL    MCH 30.5 26.5 - 33.0 pg    MCHC 32.3 31.5 - 36.5 g/dL    RDW 12.4 10.0 - 15.0 %    Platelet Count 234 150 - 450 10e3/uL    % Neutrophils 55 %    % Lymphocytes 27 %    % Monocytes 12 %    % Eosinophils 5 %    % Basophils 1 %    % Immature Granulocytes 0 %    Absolute Neutrophils 2.7 1.6 - 8.3 10e3/uL    Absolute Lymphocytes 1.3 0.8 - 5.3 10e3/uL    Absolute Monocytes 0.6 0.0 - 1.3 10e3/uL    Absolute Eosinophils 0.3 0.0 - 0.7 10e3/uL    Absolute Basophils 0.0 0.0 - 0.2 10e3/uL    Absolute Immature Granulocytes 0.0 <=0.0 10e3/uL       ASSESSMENT/PLAN:   (Z00.00) Routine general medical examination at a health care facility  (primary encounter diagnosis)  Comment: left without getting flu vaccine. Recommend he do nurse visit to receive.  Plan: Comprehensive metabolic         panel (BMP + Alb, Alk Phos, ALT, AST, Total.         Bili, TP)            (Z86.11) History of TB (tuberculosis)  Comment: He has follow up with pulmonology next month. Continues with Advair and albuterol.   Plan: Comprehensive metabolic panel (BMP + Alb, Alk         Phos, ALT, AST, Total. Bili, TP), Oxygen Order            (J44.9) Chronic obstructive pulmonary disease, unspecified COPD type (H)  Comment: as above. I recommend O2 with activity due to desaturation if activity >2 min. Order placed.  Plan: Oxygen Order            (R06.02) SOB (shortness of breath)  Comment: as above. Unchanged from baseline.  Plan: Oxygen Order            (D63.8) Anemia of chronic disease  Comment: rechecking.  Plan: CBC with platelets and differential        "     (Z13.6) CARDIOVASCULAR SCREENING; LDL GOAL LESS THAN 130  Comment: fasting today.  Plan: Lipid panel reflex to direct LDL Fasting              Patient has been advised of split billing requirements and indicates understanding: Yes  COUNSELING:   Reviewed preventive health counseling, as reflected in patient instructions       Regular exercise       Healthy diet/nutrition    Estimated body mass index is 30.6 kg/m  as calculated from the following:    Height as of this encounter: 1.676 m (5' 6\").    Weight as of this encounter: 86 kg (189 lb 9.6 oz).     Weight management plan: Discussed healthy diet and exercise guidelines    He reports that he has never smoked. He has never used smokeless tobacco.      Counseling Resources:  ATP IV Guidelines  Pooled Cohorts Equation Calculator  FRAX Risk Assessment  ICSI Preventive Guidelines  Dietary Guidelines for Americans, 2010  Dot's MyPlate  ASA Prophylaxis  Lung CA Screening    Nyla Florian, CHAZ [facility]  Answers for HPI/ROS submitted by the patient on 9/14/2021  If you checked off any problems, how difficult have these problems made it for you to do your work, take care of things at home, or get along with other people?: Not difficult at all  PHQ9 TOTAL SCORE: 1      DME (Durable Medical Equipment) Orders and Documentation  Orders Placed This Encounter   Procedures     Oxygen Order      The patient was assessed and it was determined the patient is in need of the following listed DME Supplies/Equipment. Please complete supporting documentation below to demonstrate medical necessity.      Oxygen Use Documentation  I certify that this patient, Chaim Norman has been under my care and that I, or a nurse practitioner or physician's assistant working with me, had a face-to-face encounter that meets face-to-face encounter requirements with this patient on September 14, 2021.    Chaim Norman is now in a chronic stable state and continues to " require supplemental oxygen due to continued oxygen desaturation.  This patient has been treated in part, or in whole for the following medical condition(s):  COPD J44.9  history of TB  Treatments tried and failed or ruled out to treat hypoxemia include on Advair and albuterol. Currently has concentrator for nighttime. Uses 2L NC at nighttime. Needs oxygen for activity during the day as well - requesting portable oxygen.  If portability is ordered, is the patient mobile within the home? yes

## 2021-09-15 ASSESSMENT — PATIENT HEALTH QUESTIONNAIRE - PHQ9: SUM OF ALL RESPONSES TO PHQ QUESTIONS 1-9: 1

## 2021-09-15 NOTE — TELEPHONE ENCOUNTER
It should be on the flowsheet and I made comments on there with what patient was doing at the time.  Please print and send.   Thanks!

## 2021-09-15 NOTE — TELEPHONE ENCOUNTER
Call regarding fax sent below- They also need   'Oxygen Saturation Testing Results with Activity'   Faxed over to go with the order for oxygen thank you    Danitza Wilburn RN, BSN, CMSRN  [facility]'

## 2021-10-05 ENCOUNTER — PATIENT OUTREACH (OUTPATIENT)
Dept: CARE COORDINATION | Facility: CLINIC | Age: 46
End: 2021-10-05

## 2021-10-05 NOTE — PROGRESS NOTES
Clinic Care Coordination Contact  [facility]/Voicemail       Clinical Data: CHW Outreach  Outreach attempted x 1.  Left message on patient's voicemail with call back information and requested return call.    Plan: CHW will try to reach patient again in 5-7  business days.    Elena BARAJAS CHW  Clinic Care Coordination  [facility] Clinics: [place], [place] & [place]  Phone: [phone]    Notes:  - How are you doing?  - Did you learn the status your citizenship on the 22nd of September?  - Were you able to schedule physical with PCP?

## 2021-10-13 ENCOUNTER — PATIENT OUTREACH (OUTPATIENT)
Dept: CARE COORDINATION | Facility: CLINIC | Age: 46
End: 2021-10-13

## 2021-10-13 ENCOUNTER — PATIENT OUTREACH (OUTPATIENT)
Dept: NURSING | Facility: CLINIC | Age: 46
End: 2021-10-13
Payer: COMMERCIAL

## 2021-10-13 NOTE — PROGRESS NOTES
Clinic Care Coordination Contact    Community Health Worker Follow Up    Care Gaps:     Health Maintenance Due   Topic Date Due     SPIROMETRY  Never done     URINE DRUG SCREEN  Never done     COPD ACTION PLAN  Never done     COVID-19 Vaccine (3 - Pfizer risk 3-dose series) 05/21/2021     INFLUENZA VACCINE (1) 09/01/2021       CHW will address at next outreach.    Goals:    CHW spoke with patient he said he is not doing so well. He said he spoke with the people about his citizenship and he said he passed all the questions except that last one about voting. He said had been voting.    He said he had been voting and was not supposed to be voting due he is not a citizen. He has to send  information about when he voted , registration number etc. He said he was able to reschedule appointment with PCP. Patient did not have new questions or concerns at this time.    Intervention and Education during outreach: none    CHW Plan: CHW will call patient in 1 more.    CRISTINA Mendez  Clinic Care Coordination  [facility] Clinics: [place], [place] & [place]  Phone: [phone]

## 2021-10-13 NOTE — PROGRESS NOTES
Clinic Care Coordination Contact    Situation: Patient chart reviewed by care coordinator.    Background: Patient is enrolled in Care Coordination. CHW and [facility] are following.     Assessment: CHW was able to speak with patient on 9/2. Patient mentioned that he had not seen PCP yet regarding the pain in his side because it went away. He reports that he continues to take medications as prescribed. He calls pharmacy to get his meds refilled before they run out. Patient reported that he would be speaking with someone regarding citizenship at the end of September. CHW attempted to reach patient on 10/5 for another follow up. No answer. CHW left a VM    Plan/Recommendations: CHW plans to reach out to patient again within 5-7 days from most recent outreach attempt. [facility] will plan to review chart in 6 weeks, per standard workflow, unless involvement is requested sooner.     BING Garcia  Primary Care Clinic- Social Work Care Coordinator  [facility] and [facility]  Ph: [phone]  10/13/2021 10:41 AM

## 2021-10-21 ENCOUNTER — ALLIED HEALTH/NURSE VISIT (OUTPATIENT)
Dept: OTHER | Facility: CLINIC | Age: 46
End: 2021-10-21
Payer: COMMERCIAL

## 2021-10-21 DIAGNOSIS — I27.20 PULMONARY HYPERTENSION (H): ICD-10-CM

## 2021-10-21 DIAGNOSIS — J44.9 CHRONIC OBSTRUCTIVE PULMONARY DISEASE, UNSPECIFIED COPD TYPE (H): Primary | ICD-10-CM

## 2021-10-21 PROCEDURE — 99207 PR COMMUNITY PARAMEDIC-PATIENT MEETS CRITERIA: CPT

## 2021-10-24 VITALS
DIASTOLIC BLOOD PRESSURE: 87 MMHG | TEMPERATURE: 98.1 F | RESPIRATION RATE: 12 BRPM | OXYGEN SATURATION: 95 % | HEART RATE: 91 BPM | SYSTOLIC BLOOD PRESSURE: 119 MMHG

## 2021-10-25 ENCOUNTER — PRE VISIT (OUTPATIENT)
Dept: PULMONOLOGY | Facility: CLINIC | Age: 46
End: 2021-10-25

## 2021-10-25 ENCOUNTER — OFFICE VISIT (OUTPATIENT)
Dept: PULMONOLOGY | Facility: CLINIC | Age: 46
End: 2021-10-25
Attending: NURSE PRACTITIONER
Payer: COMMERCIAL

## 2021-10-25 VITALS — OXYGEN SATURATION: 100 % | HEART RATE: 89 BPM | SYSTOLIC BLOOD PRESSURE: 126 MMHG | DIASTOLIC BLOOD PRESSURE: 88 MMHG

## 2021-10-25 DIAGNOSIS — Z86.11 HISTORY OF TB (TUBERCULOSIS): ICD-10-CM

## 2021-10-25 DIAGNOSIS — J44.9 CHRONIC OBSTRUCTIVE PULMONARY DISEASE, UNSPECIFIED COPD TYPE (H): ICD-10-CM

## 2021-10-25 DIAGNOSIS — R06.02 SHORTNESS OF BREATH: ICD-10-CM

## 2021-10-25 DIAGNOSIS — I27.20 PULMONARY HYPERTENSION (H): Primary | ICD-10-CM

## 2021-10-25 LAB
DLCOUNC-%PRED-PRE: 65 %
DLCOUNC-PRE: 17.25 ML/MIN/MMHG
DLCOUNC-PRED: 26.18 ML/MIN/MMHG
ERV-%PRED-PRE: 66 %
ERV-PRE: 0.59 L
ERV-PRED: 0.88 L
EXPTIME-PRE: 10.35 SEC
FEF2575-%PRED-POST: 9 %
FEF2575-%PRED-PRE: 10 %
FEF2575-POST: 0.31 L/SEC
FEF2575-PRE: 0.32 L/SEC
FEF2575-PRED: 3.17 L/SEC
FEFMAX-%PRED-PRE: 49 %
FEFMAX-PRE: 4.43 L/SEC
FEFMAX-PRED: 9.04 L/SEC
FEV1-%PRED-PRE: 30 %
FEV1-PRE: 0.98 L
FEV1FEV6-PRE: 56 %
FEV1FEV6-PRED: 81 %
FEV1FVC-PRE: 52 %
FEV1FVC-PRED: 81 %
FEV1SVC-PRE: 55 %
FEV1SVC-PRED: 71 %
FIFMAX-PRE: 2.46 L/SEC
FRCPLETH-%PRED-PRE: 94 %
FRCPLETH-PRE: 3.08 L
FRCPLETH-PRED: 3.26 L
FVC-%PRED-PRE: 47 %
FVC-PRE: 1.9 L
FVC-PRED: 4.03 L
IC-%PRED-PRE: 32 %
IC-PRE: 1.19 L
IC-PRED: 3.68 L
RVPLETH-%PRED-PRE: 123 %
RVPLETH-PRE: 2.49 L
RVPLETH-PRED: 2.02 L
TLCPLETH-%PRED-PRE: 67 %
TLCPLETH-PRE: 4.26 L
TLCPLETH-PRED: 6.31 L
VA-%PRED-PRE: 54 %
VA-PRE: 3.13 L
VC-%PRED-PRE: 38 %
VC-PRE: 1.77 L
VC-PRED: 4.57 L

## 2021-10-25 PROCEDURE — 94726 PLETHYSMOGRAPHY LUNG VOLUMES: CPT | Performed by: INTERNAL MEDICINE

## 2021-10-25 PROCEDURE — 94729 DIFFUSING CAPACITY: CPT | Performed by: INTERNAL MEDICINE

## 2021-10-25 PROCEDURE — 94060 EVALUATION OF WHEEZING: CPT | Performed by: INTERNAL MEDICINE

## 2021-10-25 PROCEDURE — 99205 OFFICE O/P NEW HI 60 MIN: CPT | Mod: 25 | Performed by: INTERNAL MEDICINE

## 2021-10-25 PROCEDURE — G0463 HOSPITAL OUTPT CLINIC VISIT: HCPCS | Mod: 25

## 2021-10-25 NOTE — PROGRESS NOTES
Community Paramedics Follow-up Visit  October 21, 2021  TIME: 1400    Chaim Norman is a 46 year old male being seen at home for a follow-up visit.    Present at appointment:  Patient, Community Paramedic         Chief Complaint   Patient presents with     Outreach       [place] Utilization:      Utilization    Hospital Admissions  0             ED Visits  0             No Show Count (past year)  5                Current as of: 10/21/2021  9:57 PM              /87   Pulse 91   Temp 98.1  F (36.7  C)   Resp 12   SpO2 95%     Clinical Concerns:  Current Medical Concerns:  COPD    Current Behavioral Concerns: none    Education Provided to patient: assist with Naturalization paperwork   Medication set up? No  Pill Box issued: No  Scale issued: No  Flu Shot given: No  Lab draw or specimen collection: No  Food box issued: No  Collaborative visit with PCP: No    Health Maintenance Reviewed:      Clinical Pathway: None    No  Face to Face in Home / Community    Review of Symptoms/PE    Skin: negative  Eyes: negative  Ears/Nose/Throat: negative  Respiratory: Shortness of breath and Dyspnea on exertion  Cardiovascular: lower extremity edema  Gastrointestinal: negative  Genitourinary: negative  Musculoskeletal: negative  Neurologic: negative  Psychiatric: negative    Time spent with patient: 60    The patient meets one or more of the following criteria:  * Has received hospital emergency department services three or more times in four consecutive months within a twelve month period    Acute concern/Follow-up recommendations: follow current treatment plan    Next CP visit scheduled: TBD    Issues for Provider to follow up on: Community Paramedic visited with Chaim at his apartment. Chaim received some paperwork from the [employer] agency. Chaim was confused on what he had to do and needed assistance. Chaim states he is dong well. His feet have very mild edema and he states he has very little pain in both feet. He  continues to elevate his feet and wear his compression socks daily to every other day. Will follow up with Chaim in the next week to see if he has heard anything back for his [place] Citizenship.    Provider follow up visit needed:10/25/21

## 2021-10-25 NOTE — NURSING NOTE
Chief Complaint   Patient presents with     General Visit     New pt copd     Vitals were taken and medications were reconciled.     PRAVEENA Bui

## 2021-10-25 NOTE — LETTER
10/25/2021         RE: Chaim Norman  [address]  [facility] [address]        Dear Colleague,    Thank you for referring your patient, Chaim Norman, to the [facility] LUNG [facility] AND [facility]. Please see a copy of my visit note below.    Pulmonary Clinic New Patient Consult  Reason for Consult: severe obstructive lung disease, history of TB, desires lung transplant evaluation  History of Present Illness    Mr. 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  Per Mr. 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  He was treated for approximately 6 months with return of symptoms to baseline but was noted to have significant residual lung damage.  In August of 2019 he fell ill and was found to be TB positive again and was treated through [facility] Infectious disease.  Unfortunately his course was complicated by pulmonary hypertension (WHO group 3; RHC 5/2020 with PA 53/29 w/ normal CO).  Per cardiology notes from May 2020, his angiogram showed substantial destruction of his right pulmonary arterial vasculator with distal pruning of most segments.  Pulmonary vasodilator therapy was not recommended and it was recommended that he see pulmonology for lung transplant consideration.  He was seen by Dr. Atwood ni July 2020 and referred for lung transplant evaluation.  Per a note from 2/1/2021, his transplant file was closed due to concerns regarding elevated BMI and social support.    Mr. Norman returns to our clinic today for ongoing evaluation.  He tells me that he completed TB treatment in July.  He continues to be limited by significant shortness of breath.  At present he is only able to walk about 10 feet before he would need to stop and rest.  Going back 6 months ago he was may be able  to walk 15 feet.  He is using Advair 100/50 and albuterol as needed.  He does complain of some right sided chest pain related to his damaged lung.  He uses his albuterol about 2 times a day but does not find it particularly helpful.  In general he denies any significant cough, sputum production, hemoptysis, or fever.  He has lost some weight since he was last seen in pulmonary clinic and states that his present weight is around 190 pounds give or take.  He denies any recent episodes of pneumonia or any episodes of bronchitis requiring treatment with steroids.    He is a previous smoker of marijuana but he quit in 2013.  He has never smoked cigarettes.  He presently lives in [place], [place].  He has no pets at home.  Denies any exposure to asbestos.  He previously worked as a  but had to stop due to health issues in August 2019.  Denies any recent travel outside the area.  He wears 2 L oxygen nasal cannula at bedtime but does not wear oxygen during the day.      Review of Systems:  10 of 14 systems reviewed and are negative unless otherwise stated in HPI.    Past Medical History:   Diagnosis Date     Anemia      Arthritis      COPD (chronic obstructive pulmonary disease) (H)      History of blood transfusion 2019    [facility]     Neutropenia (H)      Pulmonary hypertension (H)      TB (tuberculosis), treated      Tuberculosis     currently being treated       Past Surgical History:   Procedure Laterality Date     BIOPSY  2018    Lung- [facility]     CORONARY ANGIOGRAPHY ADULT ORDER  05/2020    [facility]     CV PULMONARY ANGIOGRAM N/A 5/8/2020    Procedure: Pulmonary Angiogram;  Surgeon: Keenan Perez MD;  Location:  HEART CARDIAC CATH LAB     CV RIGHT HEART CATH MEASUREMENTS RECORDED N/A 5/8/2020    Procedure: CV RIGHT HEART CATH;  Surgeon: Keenan Perez MD;  Location: [facility] CARDIAC CATH LAB     ENDOSCOPY       GI SURGERY  12/2019    Upper GI       Family History    Family history unknown: Yes       Social History     Socioeconomic History     Marital status:      Spouse name: Not on file     Number of children: 1     Years of education: Not on file     Highest education level: Not on file   Occupational History     Not on file   Tobacco Use     Smoking status: Never Smoker     Smokeless tobacco: Never Used   Vaping Use     Vaping Use: Never used   Substance and Sexual Activity     Alcohol use: No     Drug use: No     Sexual activity: Yes     Partners: Female     Birth control/protection: Condom   Other Topics Concern     Parent/sibling w/ CABG, MI or angioplasty before 65F 55M? Not Asked   Social History Narrative     Not on file     Social Determinants of Health     Financial Resource Strain: High Risk     Difficulty of Paying Living Expenses: Hard   Food Insecurity: Food Insecurity Present     Worried About Running Out of Food in the Last Year: Sometimes true     Ran Out of Food in the Last Year: Not on file   Transportation Needs: No Transportation Needs     Lack of Transportation (Medical): No     Lack of Transportation (Non-Medical): No   Physical Activity:      Days of Exercise per Week:      Minutes of Exercise per Session:    Stress:      Feeling of Stress :    Social Connections:      Frequency of Communication with Friends and Family:      Frequency of Social Gatherings with Friends and Family:      Attends Sabianism Services:      Active Member of Clubs or Organizations:      Attends Club or Organization Meetings:      Marital Status:    Intimate Partner Violence:      Fear of Current or Ex-Partner:      Emotionally Abused:      Physically Abused:      Sexually Abused:          No Known Allergies      Current Outpatient Medications:      acetaminophen (TYLENOL) 325 MG tablet, Take 2 tablets (650 mg) by mouth every 6 hours as needed for mild pain, Disp: 180 tablet, Rfl: 2     albuterol (PROVENTIL HFA) 108 (90 Base) MCG/ACT inhaler, Inhale 2 puffs into the  lungs every 6 hours as needed for shortness of breath / dyspnea or wheezing, Disp: 8.5 g, Rfl: 3     amLODIPine (NORVASC) 5 MG tablet, Take 2 tablets (10 mg) by mouth daily Primary MD to refill in the future., Disp: 180 tablet, Rfl: 0     DULoxetine (CYMBALTA) 30 MG capsule, Take 1 capsule (30 mg) by mouth daily Take with 60mg to equal 90mg/day. 3 month script (Patient not taking: Reported on 8/3/2021), Disp: 90 capsule, Rfl: 0     DULoxetine (CYMBALTA) 60 MG capsule, Take 1 capsule (60 mg) by mouth daily Take with 30mg to equal 90mg/day. 3 month script, Disp: 90 capsule, Rfl: 1     ethambutol (MYAMBUTOL) 400 MG tablet, , Disp: , Rfl:      fluticasone-salmeterol (ADVAIR) 100-50 MCG/DOSE inhaler, Inhale 1 puff into the lungs every 12 hours, Disp: 1 each, Rfl: 3     gabapentin (NEURONTIN) 300 MG capsule, Take 3 capsules (900 mg) by mouth 3 times daily, Disp: 270 capsule, Rfl: 1     nortriptyline (PAMELOR) 10 MG capsule, Take 1-2 capsules (10-20 mg) by mouth At Bedtime For pain and insomnia.., Disp: 60 capsule, Rfl: 3      Physical Exam:  /88   Pulse 89   SpO2 100%   GENERAL: Well developed, well nourished, alert, and in no apparent distress.  HEENT: Normocephalic, atraumatic. PERRL, EOMI. Oral mucosa is moist. No perioral cyanosis.  NECK: supple, no masses, no thyromegaly.  RESP:  Normal respiratory effort.  CTAB.  No rales, wheezes, rhonchi.  No cyanosis or clubbing.  CV: Normal S1, S2, regular rhythm, normal rate. No murmur.  No LE edema.   ABDOMEN:  non-distended.   SKIN: warm and dry. No rash.  NEURO: AAOx3.  Normal gait.  Fluent speech.  PSYCH: mentation appears normal.     Results:  PFTs: Ratio 52%, FVC 47%, FEV1 30%, %, TLC 67%, DLCO 65% (not corrected for hemoglobin).  Pulmonary function testing reveals a mixed restrictive and obstructive ventilatory defect with probable impairment in gas exchange although diffusion capacity was not corrected for hemoglobin.  There is no prior study available for  comparison.  Imaging (personally reviewed in clinic today):  CT Chest 8/9/2021:   Overall unchanged destructive changes of the right and left hemithoraces with right lung volume loss similar to previous.  Irregular nodular opacities grossly similar with overall slight decrease of Groundglass/organizing pneumonia type opacities in the left lung. Pleural calcifications on the right, cannot entirely exclude asbestos exposure rather than also it should be visible to the tuberculosis.      Assessment and Plan:   Chaim Norman is a 46 year old male with a history of pulmonary tuberculosis with residual significant pulmonary parenchymal destruction and scarring and pulmonary hypertension who presents to pulmonary clinic today for further evaluation.  1. Severe obstructive lung disease 2/2 #2  2. History of pulmonary tuberculosis with chronic residual fibrotic changes and volume loss.  Today's PFTs demonstrate a mixed obstructive and restrictive ventilatory defect with severely reduced FEV1 30%.  This is related to scarring and volume loss from history of TB. It is reasonable for him to continue on his current inhaler regimen which includes Advair and albuterol.  I cannot see that adding additional inhalers at this time would be helpful given the extent of scarring and volume loss. In an effort to optimize any lung function that he does have remaining, I am recommending referral to pulmonary rehab.  He is agreeable to this.  As he has now completed treatment for TB (7/2021) and I calculate his BMI today to be 30.5 (goal < 30 for lung transplant) I will re-refer him for lung transplant evaluation.  3. WHO Group 3 Pulmonary Hypertension.  Previously evaluated by cardiology and felt not to be a candidate for vasodilators or other treatments.  Lung transplant evaluation recommended.  Questions and concerns were answered to the patient's satisfaction.  he was provided with my contact information should new questions or concerns  arise in the interim.  he should return to clinic in 6 months.  He is COVID vaccinated.  Would be a candidate for a flu vaccine if not received as well as pneumovax given history of chronic lung disease.  Will address at next visit if not addressed sooner.    Mable More MD  Pulmonary and Critical Care Medicine    The above note was dictated using voice recognition software and may include typographical errors. Please contact the author for any clarifications.  I spent 70 minutes on the date of encounter doing chart review, review of outside records, review of test results, conducting the patient visit, completing documentation and further activities as noted above.                                        Again, thank you for allowing me to participate in the care of your patient.        Sincerely,        Lauren More MD

## 2021-10-25 NOTE — PROGRESS NOTES
Pulmonary Clinic New Patient Consult  Reason for Consult: severe obstructive lung disease, history of TB, desires lung transplant evaluation  History of Present Illness    Mr. 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  Per Mr. 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  He was treated for approximately 6 months with return of symptoms to baseline but was noted to have significant residual lung damage.  In August of 2019 he fell ill and was found to be TB positive again and was treated through [facility] Infectious disease.  Unfortunately his course was complicated by pulmonary hypertension (WHO group 3; RHC 5/2020 with PA 53/29 w/ normal CO).  Per cardiology notes from May 2020, his angiogram showed substantial destruction of his right pulmonary arterial vasculator with distal pruning of most segments.  Pulmonary vasodilator therapy was not recommended and it was recommended that he see pulmonology for lung transplant consideration.  He was seen by Dr. Atwood ni July 2020 and referred for lung transplant evaluation.  Per a note from 2/1/2021, his transplant file was closed due to concerns regarding elevated BMI and social support.    Mr. Norman returns to our clinic today for ongoing evaluation.  He tells me that he completed TB treatment in July.  He continues to be limited by significant shortness of breath.  At present he is only able to walk about 10 feet before he would need to stop and rest.  Going back 6 months ago he was may be able to walk 15 feet.  He is using Advair 100/50 and albuterol as needed.  He does complain of some right sided chest pain related to his damaged lung.  He uses his albuterol about 2 times a day but does not find it particularly helpful.  In general he denies any significant cough, sputum  production, hemoptysis, or fever.  He has lost some weight since he was last seen in pulmonary clinic and states that his present weight is around 190 pounds give or take.  He denies any recent episodes of pneumonia or any episodes of bronchitis requiring treatment with steroids.    He is a previous smoker of marijuana but he quit in 2013.  He has never smoked cigarettes.  He presently lives in [place], [place].  He has no pets at home.  Denies any exposure to asbestos.  He previously worked as a  but had to stop due to health issues in August 2019.  Denies any recent travel outside the area.  He wears 2 L oxygen nasal cannula at bedtime but does not wear oxygen during the day.      Review of Systems:  10 of 14 systems reviewed and are negative unless otherwise stated in HPI.    Past Medical History:   Diagnosis Date     Anemia      Arthritis      COPD (chronic obstructive pulmonary disease) (H)      History of blood transfusion 2019    [facility]     Neutropenia (H)      Pulmonary hypertension (H)      TB (tuberculosis), treated      Tuberculosis     currently being treated       Past Surgical History:   Procedure Laterality Date     BIOPSY  2018    Lung- [facility]     CORONARY ANGIOGRAPHY ADULT ORDER  05/2020    [facility]     CV PULMONARY ANGIOGRAM N/A 5/8/2020    Procedure: Pulmonary Angiogram;  Surgeon: Keenan Perez MD;  Location:  HEART CARDIAC CATH LAB     CV RIGHT HEART CATH MEASUREMENTS RECORDED N/A 5/8/2020    Procedure: CV RIGHT HEART CATH;  Surgeon: Keenan Perez MD;  Location: [facility] CARDIAC CATH LAB     ENDOSCOPY       GI SURGERY  12/2019    Upper GI       Family History   Family history unknown: Yes       Social History     Socioeconomic History     Marital status:      Spouse name: Not on file     Number of children: 1     Years of education: Not on file     Highest education level: Not on file   Occupational History     Not on file   Tobacco Use      Smoking status: Never Smoker     Smokeless tobacco: Never Used   Vaping Use     Vaping Use: Never used   Substance and Sexual Activity     Alcohol use: No     Drug use: No     Sexual activity: Yes     Partners: Female     Birth control/protection: Condom   Other Topics Concern     Parent/sibling w/ CABG, MI or angioplasty before 65F 55M? Not Asked   Social History Narrative     Not on file     Social Determinants of Health     Financial Resource Strain: High Risk     Difficulty of Paying Living Expenses: Hard   Food Insecurity: Food Insecurity Present     Worried About Running Out of Food in the Last Year: Sometimes true     Ran Out of Food in the Last Year: Not on file   Transportation Needs: No Transportation Needs     Lack of Transportation (Medical): No     Lack of Transportation (Non-Medical): No   Physical Activity:      Days of Exercise per Week:      Minutes of Exercise per Session:    Stress:      Feeling of Stress :    Social Connections:      Frequency of Communication with Friends and Family:      Frequency of Social Gatherings with Friends and Family:      Attends Hindu Services:      Active Member of Clubs or Organizations:      Attends Club or Organization Meetings:      Marital Status:    Intimate Partner Violence:      Fear of Current or Ex-Partner:      Emotionally Abused:      Physically Abused:      Sexually Abused:          No Known Allergies      Current Outpatient Medications:      acetaminophen (TYLENOL) 325 MG tablet, Take 2 tablets (650 mg) by mouth every 6 hours as needed for mild pain, Disp: 180 tablet, Rfl: 2     albuterol (PROVENTIL HFA) 108 (90 Base) MCG/ACT inhaler, Inhale 2 puffs into the lungs every 6 hours as needed for shortness of breath / dyspnea or wheezing, Disp: 8.5 g, Rfl: 3     amLODIPine (NORVASC) 5 MG tablet, Take 2 tablets (10 mg) by mouth daily Primary MD to refill in the future., Disp: 180 tablet, Rfl: 0     DULoxetine (CYMBALTA) 30 MG capsule, Take 1 capsule  (30 mg) by mouth daily Take with 60mg to equal 90mg/day. 3 month script (Patient not taking: Reported on 8/3/2021), Disp: 90 capsule, Rfl: 0     DULoxetine (CYMBALTA) 60 MG capsule, Take 1 capsule (60 mg) by mouth daily Take with 30mg to equal 90mg/day. 3 month script, Disp: 90 capsule, Rfl: 1     ethambutol (MYAMBUTOL) 400 MG tablet, , Disp: , Rfl:      fluticasone-salmeterol (ADVAIR) 100-50 MCG/DOSE inhaler, Inhale 1 puff into the lungs every 12 hours, Disp: 1 each, Rfl: 3     gabapentin (NEURONTIN) 300 MG capsule, Take 3 capsules (900 mg) by mouth 3 times daily, Disp: 270 capsule, Rfl: 1     nortriptyline (PAMELOR) 10 MG capsule, Take 1-2 capsules (10-20 mg) by mouth At Bedtime For pain and insomnia.., Disp: 60 capsule, Rfl: 3      Physical Exam:  /88   Pulse 89   SpO2 100%   GENERAL: Well developed, well nourished, alert, and in no apparent distress.  HEENT: Normocephalic, atraumatic. PERRL, EOMI. Oral mucosa is moist. No perioral cyanosis.  NECK: supple, no masses, no thyromegaly.  RESP:  Normal respiratory effort.  CTAB.  No rales, wheezes, rhonchi.  No cyanosis or clubbing.  CV: Normal S1, S2, regular rhythm, normal rate. No murmur.  No LE edema.   ABDOMEN:  non-distended.   SKIN: warm and dry. No rash.  NEURO: AAOx3.  Normal gait.  Fluent speech.  PSYCH: mentation appears normal.     Results:  PFTs: Ratio 52%, FVC 47%, FEV1 30%, %, TLC 67%, DLCO 65% (not corrected for hemoglobin).  Pulmonary function testing reveals a mixed restrictive and obstructive ventilatory defect with probable impairment in gas exchange although diffusion capacity was not corrected for hemoglobin.  There is no prior study available for comparison.  Imaging (personally reviewed in clinic today):  CT Chest 8/9/2021:   Overall unchanged destructive changes of the right and left hemithoraces with right lung volume loss similar to previous.  Irregular nodular opacities grossly similar with overall slight decrease of  Groundglass/organizing pneumonia type opacities in the left lung. Pleural calcifications on the right, cannot entirely exclude asbestos exposure rather than also it should be visible to the tuberculosis.      Assessment and Plan:   Chaim Norman is a 46 year old male with a history of pulmonary tuberculosis with residual significant pulmonary parenchymal destruction and scarring and pulmonary hypertension who presents to pulmonary clinic today for further evaluation.  1. Severe obstructive lung disease 2/2 #2  2. History of pulmonary tuberculosis with chronic residual fibrotic changes and volume loss.  Today's PFTs demonstrate a mixed obstructive and restrictive ventilatory defect with severely reduced FEV1 30%.  This is related to scarring and volume loss from history of TB. It is reasonable for him to continue on his current inhaler regimen which includes Advair and albuterol.  I cannot see that adding additional inhalers at this time would be helpful given the extent of scarring and volume loss. In an effort to optimize any lung function that he does have remaining, I am recommending referral to pulmonary rehab.  He is agreeable to this.  As he has now completed treatment for TB (7/2021) and I calculate his BMI today to be 30.5 (goal < 30 for lung transplant) I will re-refer him for lung transplant evaluation.  3. WHO Group 3 Pulmonary Hypertension.  Previously evaluated by cardiology and felt not to be a candidate for vasodilators or other treatments.  Lung transplant evaluation recommended.  Questions and concerns were answered to the patient's satisfaction.  he was provided with my contact information should new questions or concerns arise in the interim.  he should return to clinic in 6 months.  He is COVID vaccinated.  Would be a candidate for a flu vaccine if not received as well as pneumovax given history of chronic lung disease.  Will address at next visit if not addressed sooner.    Mable More,  MD  Pulmonary and Critical Care Medicine    The above note was dictated using voice recognition software and may include typographical errors. Please contact the author for any clarifications.  I spent 70 minutes on the date of encounter doing chart review, review of outside records, review of test results, conducting the patient visit, completing documentation and further activities as noted above.

## 2021-11-08 ENCOUNTER — TELEPHONE (OUTPATIENT)
Dept: PULMONOLOGY | Facility: CLINIC | Age: 46
End: 2021-11-08
Payer: COMMERCIAL

## 2021-11-09 ENCOUNTER — ALLIED HEALTH/NURSE VISIT (OUTPATIENT)
Dept: OTHER | Facility: CLINIC | Age: 46
End: 2021-11-09
Payer: COMMERCIAL

## 2021-11-09 DIAGNOSIS — I27.20 PULMONARY HYPERTENSION (H): ICD-10-CM

## 2021-11-09 DIAGNOSIS — Z86.11 HISTORY OF TB (TUBERCULOSIS): ICD-10-CM

## 2021-11-09 DIAGNOSIS — J44.9 CHRONIC OBSTRUCTIVE PULMONARY DISEASE, UNSPECIFIED COPD TYPE (H): Primary | ICD-10-CM

## 2021-11-09 PROCEDURE — 99207 PR COMMUNITY PARAMEDIC-PATIENT MEETS CRITERIA: CPT

## 2021-11-09 NOTE — TELEPHONE ENCOUNTER
Received fax request from   [id number]  [address]  [facility] [address]  Phone: [phone] Fax: [phone]     Requesting refill of DULoxetine (CYMBALTA) 30 MG capsule    Spoke to pharmacy. Informed patient no longer taking. Pharmacy stated understanding.     Documenting and Closing.     Jadyn Crespo [facility] [place]

## 2021-11-15 ENCOUNTER — PATIENT OUTREACH (OUTPATIENT)
Dept: NURSING | Facility: CLINIC | Age: 46
End: 2021-11-15
Payer: COMMERCIAL

## 2021-11-15 NOTE — PROGRESS NOTES
Clinic Care Coordination Contact    Community Health Worker Follow Up    Care Gaps:     Health Maintenance Due   Topic Date Due     URINE DRUG SCREEN  Never done     COPD ACTION PLAN  Never done     COVID-19 Vaccine (3 - Pfizer risk 4-dose series) 05/21/2021     INFLUENZA VACCINE (1) 09/01/2021       Care Gaps Last addressed on Care gaps addressed on 09/21    Goals:     CHW spoke with patient he said he was doing okay. He said his feet has been hurting him really bad. CHW asked di dhe want to schedule an appointment with is PCP? He said he doesn't have a ride right can it be over the phone? I said he should be able to.    CHW asked what was the status on his citizenship he said he just sent the last of the documentation that was need this morning. He said he was asked to send Voter ID number, voting registration number and voting history. He said he just has to wait for them now.    CHW transferred him to the scheduling line.    Intervention and Education during outreach: CHW transferred patient to scheduling line    CHW Plan: CHW will call patient in 1 month.    CRISTINA Mendez  Clinic Care Coordination  [facility]: [place], [place] & [place]  Phone: [phone]

## 2021-11-24 ENCOUNTER — PATIENT OUTREACH (OUTPATIENT)
Dept: CARE COORDINATION | Facility: CLINIC | Age: 46
End: 2021-11-24
Payer: COMMERCIAL

## 2021-11-24 NOTE — PROGRESS NOTES
Clinic Care Coordination Contact    Situation: Patient chart reviewed by care coordinator.    Background: Patient is enrolled in Care Coordination. CHW and SWCC are following.    Assessment: CHW spoke with patient on 11/15. He reports that his feet have been hurting him bad lately. Per chart notes, patient was considering making a phone appointment with provider. Appears patient made an appointment on 12/10 with pain provider. Patient sent the final piece of documentation for his citizenship.     Plan/Recommendations: CHW plans to reach out to patient in one month from most recent outreach. SWCC will review chart in 6 weeks, per standard workflow, unless involvement is requested sooner    BING Garcia  Primary Care Clinic- Social Work Care Coordinator  [facility] and [place]  Ph: [phone]  11/24/2021 1:28 PM

## 2021-12-04 ENCOUNTER — HEALTH MAINTENANCE LETTER (OUTPATIENT)
Age: 46
End: 2021-12-04

## 2021-12-10 ENCOUNTER — VIRTUAL VISIT (OUTPATIENT)
Dept: PALLIATIVE MEDICINE | Facility: CLINIC | Age: 46
End: 2021-12-10
Payer: COMMERCIAL

## 2021-12-10 DIAGNOSIS — Z91.199 NO-SHOW FOR APPOINTMENT: Primary | ICD-10-CM

## 2021-12-14 ENCOUNTER — PATIENT OUTREACH (OUTPATIENT)
Dept: NURSING | Facility: CLINIC | Age: 46
End: 2021-12-14
Payer: COMMERCIAL

## 2021-12-14 DIAGNOSIS — G62.0 DRUG-INDUCED PERIPHERAL NEUROPATHY (H): ICD-10-CM

## 2021-12-14 RX ORDER — GABAPENTIN 300 MG/1
900 CAPSULE ORAL 3 TIMES DAILY
Qty: 270 CAPSULE | Refills: 1 | Status: SHIPPED | OUTPATIENT
Start: 2021-12-14 | End: 2022-03-03

## 2021-12-14 NOTE — PROGRESS NOTES
Clinic Care Coordination Contact    Community Health Worker Follow Up    Care Gaps:     Health Maintenance Due   Topic Date Due     URINE DRUG SCREEN  Never done     COPD ACTION PLAN  Never done     COVID-19 Vaccine (3 - Pfizer risk 4-dose series) 05/21/2021     INFLUENZA VACCINE (1) 09/01/2021       Care gaps were not addressed.    Goals:    CHW spoke with patient he said he was doing good. CHW asked has be heard on the status of his citizenship? He said no he tried calling but maybe thy are closed for [place]  He said he will try back after the holidays.     Patient did not have any new questions or concerns at this time.    Intervention and Education during outreach: CHW advised patient to call with non-emergent questions or concerns.    CHW Plan: CHW will call patient in 1 month.    CRISTINA Mendez  Clinic Care Coordination  [facility]: [place], [place] & [place]  Phone: [phone]

## 2021-12-14 NOTE — TELEPHONE ENCOUNTER
Thank you.   Marleny WARE, RN CNP, FNP  [facility]  [facility] [place]

## 2021-12-14 NOTE — TELEPHONE ENCOUNTER
Forwarding to his Primary Care Provider to see if Nyla WARE CNP would take this over since she has seen patient the most recently. I have not seen him since February 2021 and that was a virtual visit.     Marleny WARE RN CNP, [facility]  [facility] [place]

## 2021-12-14 NOTE — TELEPHONE ENCOUNTER
Received fax request from [facility] pharmacy requesting refill(s) for gabapentin (NEURONTIN) 300 MG capsule    Last refilled on 07/01/21    Pt last seen on 02/26/21 (No Show 12/10/21)  Next appt scheduled for : none    Will facilitate refill.

## 2021-12-20 VITALS
RESPIRATION RATE: 12 BRPM | HEART RATE: 81 BPM | OXYGEN SATURATION: 93 % | SYSTOLIC BLOOD PRESSURE: 119 MMHG | DIASTOLIC BLOOD PRESSURE: 87 MMHG | TEMPERATURE: 98 F

## 2021-12-20 NOTE — PROGRESS NOTES
Community Paramedics Follow-up Visit  November 9, 2021  TIME: 1600    Chaim Norman is a 46 year old male being seen at home for a follow-up visit.    Present at appointment:  Patient, Community Paramedic         Chief Complaint   Patient presents with     Outreach       [place] Utilization:      Utilization    Hospital Admissions  0             ED Visits  0             No Show Count (past year)  4                Current as of: 12/17/2021  3:18 AM              /87   Pulse 81   Temp 98  F (36.7  C)   Resp 12   SpO2 93%     Clinical Concerns:  Current Medical Concerns:  COPD, In need of a lung transplant    Current Behavioral Concerns:     Education Provided to patient:    Medication set up? No  Pill Box issued: No  Scale issued: No  Flu Shot given: No  COVID Vaccine Given: No  Lab draw or specimen collection: No  Food box issued: No  Collaborative visit with PCP: No  Wound Care: No    Health Maintenance Reviewed:      Clinical Pathway: None    No  Face to Face in Home / Community    Review of Symptoms/PE    Skin: negative  Eyes: negative  Ears/Nose/Throat: negative  Respiratory: Shortness of breath and Dyspnea on exertion  Cardiovascular: dyspnea on exertion and exercise intolerance  Gastrointestinal: negative  Genitourinary: negative  Musculoskeletal: negative  Neurologic: negative  Psychiatric: negative    Time spent with patient: 60    The patient meets one or more of the following criteria:  * Requires services to prevent readmission to a nursing home or hospital    Acute concern/Follow-up recommendations: follow current treatment plan    Next CP visit scheduled: TBD    Issues for Provider to follow up on: Community Paramedic visited with Chaim at his apartment. Chaim is doing well and continues to lose weight. His lower extremity edema is doing very well with no edema noted at this visit. Chaim continues to work with the lung team to prepare for his lung transplant.   Assisted Chaim with some paperwork for  his health insurance and also his immigration paperwork.    Provider follow up visit needed: 12/10/21

## 2021-12-27 DIAGNOSIS — M79.2 NEUROPATHIC PAIN: ICD-10-CM

## 2021-12-27 DIAGNOSIS — M25.512 CHRONIC PAIN OF BOTH SHOULDERS: ICD-10-CM

## 2021-12-27 DIAGNOSIS — M79.671 BILATERAL FOOT PAIN: ICD-10-CM

## 2021-12-27 DIAGNOSIS — G89.29 CHRONIC PAIN OF BOTH SHOULDERS: ICD-10-CM

## 2021-12-27 DIAGNOSIS — G47.00 INSOMNIA, UNSPECIFIED TYPE: ICD-10-CM

## 2021-12-27 DIAGNOSIS — M25.511 CHRONIC PAIN OF BOTH SHOULDERS: ICD-10-CM

## 2021-12-27 DIAGNOSIS — M79.672 BILATERAL FOOT PAIN: ICD-10-CM

## 2021-12-27 RX ORDER — NORTRIPTYLINE HCL 10 MG
10-20 CAPSULE ORAL AT BEDTIME
Qty: 60 CAPSULE | Refills: 1 | Status: SHIPPED | OUTPATIENT
Start: 2021-12-27 | End: 2022-04-01

## 2021-12-27 NOTE — TELEPHONE ENCOUNTER
Received fax request from [facility] pharmacy requesting refill(s) for nortriptyline (PAMELOR) 10 MG capsule    Last refilled on 11/28/21    Pt last seen on 02/26/21 (No Show  12/10/21)  Next appt scheduled for : none    Will facilitate refill.

## 2021-12-27 NOTE — TELEPHONE ENCOUNTER
Signed Prescriptions:                        Disp   Refills    nortriptyline (PAMELOR) 10 MG capsule      60 cap*1        Sig: Take 1-2 capsules (10-20 mg) by mouth At Bedtime For           pain and insomnia. Must have clinic appointment           before another refill.  Authorizing Provider: MICHELLE CHILDERS, RN CNP, FNP  [facility]  [facility] [place]

## 2021-12-29 ENCOUNTER — NURSE TRIAGE (OUTPATIENT)
Dept: FAMILY MEDICINE | Facility: CLINIC | Age: 46
End: 2021-12-29
Payer: COMMERCIAL

## 2021-12-29 NOTE — TELEPHONE ENCOUNTER
I agree with your assessment and recommend emergency department. There are no clinic appointment available today and for pain such as this I would send him to the emergency department.   CHAZ Garrett CNP

## 2021-12-29 NOTE — TELEPHONE ENCOUNTER
"Writer contacted patient regarding provider message below. Patient was relayed provider message and patient was upset and stated \"why can't I just see her Friday\". Writer educated that the Clinic or the urgent care do not have the tools or equipment to further assess patient's chest pains. Patient verbalized understanding and had no further questions at this time. Writer assisted the patient in finding the nearest ER which was in [place]. Writer provided patient with the address to the ER.     Nyla Florian APRN CNP         12/29/21 12:08 PM  Note  I agree with your assessment and recommend emergency department. There are no clinic appointment available today and for pain such as this I would send him to the emergency department.   CHAZ Garrett CNP RN, BSN  [facility]    "

## 2021-12-29 NOTE — TELEPHONE ENCOUNTER
S-(situation): chest pain    B-(background): 4-5 day history of intermittent left chest pain.  Denies history of similar pain and denies recent air or long car travel.  Denies recent cough or upper respiratory infection.      A-(assessment): Patient reports intermittent left chest pain x4-5 days, lasts hours at a time.  States it has been present today for 6 or more hours.  The pain is sharp and does not radiate into arms, back, neck or jaw.  He rates the pain 8 on 1-10 pain scale. He denies breathing difficulty, nausea, diaphoresis or fever.  He denies doing any lifting or new exercise program.  He denies plane or long car travel in the past month. Reports pain is worse with walking any distance.     R-(recommendations):  Recommended ER per protocol due to severe, sharp and ongoing left chest pain.  Patient refuses stating he wants to see Nyla WARE CNP and only her.  ALMAZ Pepe RNY.      Reason for Disposition    SEVERE chest pain    Chest pain lasting longer than 5 minutes and occurred in last 3 days (72 hours) (Exception: feels exactly the same as previously diagnosed heartburn and has accompanying sour taste in mouth)    Additional Information    Negative: Severe difficulty breathing (e.g., struggling for each breath, speaks in single words)    Negative: Passed out (i.e., fainted, collapsed and was not responding)    Negative: Difficult to awaken or acting confused (e.g., disoriented, slurred speech)    Negative: Shock suspected (e.g., cold/pale/clammy skin, too weak to stand, low BP, rapid pulse)    Negative: Chest pain lasting longer than 5 minutes and ANY of the following:* Over 45 years old* Over 30 years old and at least one cardiac risk factor (e.g., diabetes, high blood pressure, high cholesterol, smoker, or strong family history of heart disease)* History of heart disease (i.e., angina, heart attack, heart failure, bypass surgery, takes nitroglycerin)* Pain is crushing, pressure-like, or heavy     "Negative: Heart beating < 50 beats per minute OR > 140 beats per minute    Negative: Visible sweat on face or sweat dripping down face    Negative: Followed an injury to chest    Negative: Pain also in shoulder(s) or arm(s) or jaw    Negative: Difficulty breathing    Negative: Major surgery in the past month    Negative: Hip or leg fracture (broken bone) in past month (or had cast on leg or ankle in past month)    Negative: Illness requiring prolonged bedrest in past month (e.g., immobilization, long hospital stay)    Negative: Long-distance travel in past month (e.g., car, bus, train, plane; with trip lasting 6 or more hours)    Negative: History of prior 'blood clot' in leg or lungs (i.e., deep vein thrombosis, pulmonary embolism)    Negative: History of inherited increased risk of blood clots (e.g., Factor 5 Leiden, Anti-thrombin 3, Protein C or Protein S deficiency, Prothrombin mutation)    Negative: Heart beating irregularly or very rapidly    Negative: Chest pain or 'angina' comes and goes and is happening more often (increasing in frequency) or getting worse (increasing in severity) (Exception: chest pains that last only a few seconds)    Negative: Dizziness or lightheadedness    Negative: Coughing up blood    Negative: Patient sounds very sick or weak to the triager    Negative: Patient says chest pain feels exactly the same as previously diagnosed 'heartburn'  and  describes burning in chest and accompanying sour taste in mouth    Negative: Cancer treatment in the past two months (or has cancer now)    Answer Assessment - Initial Assessment Questions  1. LOCATION: \"Where does it hurt?\"        Left chest  2. RADIATION: \"Does the pain go anywhere else?\" (e.g., into neck, jaw, arms, back)      Denies radiation of pain into arms, neck, jaw or back  3. ONSET: \"When did the chest pain begin?\" (Minutes, hours or days)       4-5 days ago  4. PATTERN \"Does the pain come and go, or has it been constant since it " "started?\"  \"Does it get worse with exertion?\"       Intermittent, worsens with walking any distance  5. DURATION: \"How long does it last\" (e.g., seconds, minutes, hours)      Today has been going on 6 hours  6. SEVERITY: \"How bad is the pain?\"  (e.g., Scale 1-10; mild, moderate, or severe)     - MILD (1-3): doesn't interfere with normal activities      - MODERATE (4-7): interferes with normal activities or awakens from sleep     - SEVERE (8-10): excruciating pain, unable to do any normal activities        Rates pain 8 out of 10   7. CARDIAC RISK FACTORS: \"Do you have any history of heart problems or risk factors for heart disease?\" (e.g., angina, prior heart attack; diabetes, high blood pressure, high cholesterol, smoker, or strong family history of heart disease)      hypertension  8. PULMONARY RISK FACTORS: \"Do you have any history of lung disease?\"  (e.g., blood clots in lung, asthma, emphysema, birth control pills)      denies  9. CAUSE: \"What do you think is causing the chest pain?\"      Does not know  10. OTHER SYMPTOMS: \"Do you have any other symptoms?\" (e.g., dizziness, nausea, vomiting, sweating, fever, difficulty breathing, cough)        Denies   11. PREGNANCY: \"Is there any chance you are pregnant?\" \"When was your last menstrual period?\"        NA    Protocols used: CHEST PAIN-A-OH      "

## 2022-01-07 ENCOUNTER — PATIENT OUTREACH (OUTPATIENT)
Dept: CARE COORDINATION | Facility: CLINIC | Age: 47
End: 2022-01-07
Payer: COMMERCIAL

## 2022-01-07 NOTE — PROGRESS NOTES
Clinic Care Coordination Contact    Situation: Patient chart reviewed by care coordinator.    Background: Patient is enrolled in Care Coordination. CHW and SWCC are following.     Assessment: CHW contacted patient on 12/14. He stated that he has not heard anything about his citizenship case yet. He reported that he tried calling to get an update on citizenship, but the office was closed. He stated that he will try again after the holidays    Plan/Recommendations: CHW will contact patient in one month. SWCC will review chart in 6 weeks, per standard workflow, unless involvement is requested sooner      BING Garcia  Primary Care Clinic- Social Work Care Coordinator  [facility] and [place]  Ph: [phone]  1/7/2022 10:40 AM

## 2022-01-13 DIAGNOSIS — M79.671 BILATERAL FOOT PAIN: ICD-10-CM

## 2022-01-13 DIAGNOSIS — M79.672 BILATERAL FOOT PAIN: ICD-10-CM

## 2022-01-13 DIAGNOSIS — M79.2 NEUROPATHIC PAIN: ICD-10-CM

## 2022-01-13 RX ORDER — DULOXETIN HYDROCHLORIDE 30 MG/1
30 CAPSULE, DELAYED RELEASE ORAL DAILY
Qty: 90 CAPSULE | Refills: 0 | Status: CANCELLED | OUTPATIENT
Start: 2022-01-13

## 2022-01-13 NOTE — TELEPHONE ENCOUNTER
Received fax request from [facility] pharmacy requesting refill(s) for DULoxetine (CYMBALTA) 30 MG capsule    Last refilled on 08/21/21    Pt last seen on 02/26/21 (No Show 12/10/21)  Next appt scheduled for : none    Will facilitate refill.

## 2022-01-14 RX ORDER — DULOXETIN HYDROCHLORIDE 30 MG/1
30 CAPSULE, DELAYED RELEASE ORAL DAILY
Qty: 90 CAPSULE | Refills: 0 | Status: SHIPPED | OUTPATIENT
Start: 2022-01-14 | End: 2022-04-14

## 2022-01-14 NOTE — TELEPHONE ENCOUNTER
Forwarding this to his Primary Care Provider Nyla Florian to see if she will assume this script since I have not seen patient since February 2021 and he was a no-show for me in December.  He has been stable on Cymbalta using 90mg/day (uses one 30mg capsule and 1 60mg capsule per day to equal 90mg/day).    He is not on opiate medication and does not require follow up in the pain clinic if Primary Care Provider will assume the Cymbalta now and gabapentin 900mg TID and nortriptyline 20mg at bedtime. Chaim BARAJAS Emerson 1975 has been on these medications with me and they have been helpful in managing his pain.     Thanks.  Marleny WARE, RN CNP, FNP  [facility]  [facility] [place]

## 2022-01-19 ENCOUNTER — PATIENT OUTREACH (OUTPATIENT)
Dept: NURSING | Facility: CLINIC | Age: 47
End: 2022-01-19
Payer: COMMERCIAL

## 2022-01-19 NOTE — PROGRESS NOTES
Clinic Care Coordination Contact    Community Health Worker Follow Up    Care Gaps:     Health Maintenance Due   Topic Date Due     URINE DRUG SCREEN  Never done     COPD ACTION PLAN  Never done     COVID-19 Vaccine (3 - Pfizer risk 4-dose series) 05/21/2021     INFLUENZA VACCINE (1) 09/01/2021     PHQ-2  01/01/2022       care gaps were not addressed.    Goals:   Goals Addressed as of 1/19/2022 at 11:19 AM                    Today       Psychosocial (pt-stated)   10%    Added 1/7/22 by Robin Jansen BSW      Goal Statement: Obtaining citizenship  Date Goal set: 1/7/2022  Barriers: None  Strengths: Patient is a great self advocate; Patient is enrolled in Care Coordination  Date to Achieve By: 5/2022  Patient expressed understanding of goal: Yes  Action steps to achieve this goal:  1. I will reach out and inquire further about citizenship status.  2. I will continue to work towards obtaining citizenship            CHW spoke with patient he said he is doing good. CHW asked patient has he heard anything about his citizenship? He said he has not heard anything CHW asked has he tried calling them? He said he plan to give them a call this week. CHW asked did patient have enough food or having trouble getting out to get food, He said no he is fine.    CHW asked is making sure to stay hydrated and staying active. He said yes he said he did not have any new questions or concerns at this time.     Intervention and Education during outreach: CHW advised patient to call clinic with non-emergent questions.    CHW Plan: CHW will call patient in 1 month.    CRISTINA Mendez  Clinic Care Coordination  [facility] Clinics: [place], [place] & [place]  Phone: [phone]

## 2022-02-01 NOTE — TELEPHONE ENCOUNTER
Please see inquiry from patient   amLODIPine (NORVASC) 5 MG tablet      Last Written Prescription Date:  4/21/20  Last Fill Quantity: 180,   # refills: 3  Last Office Visit : Danika Mckeon PA  Cardiovascular Disease  5/27/2020  [facility]  Follow up plan: He will follow up in our clinic as needed.  Future Office visit:  None scheduled    Routing refill request to provider for review/approval because:  Cardiology continue to fill or defer to primary provider?

## 2022-02-23 ENCOUNTER — PATIENT OUTREACH (OUTPATIENT)
Dept: CARE COORDINATION | Facility: CLINIC | Age: 47
End: 2022-02-23
Payer: COMMERCIAL

## 2022-02-23 NOTE — PROGRESS NOTES
Clinic Care Coordination Contact    Situation: Patient chart reviewed by care coordinator.    Background: Patient is enrolled in Care Coordination. CHW and SWCC are following.     Assessment: CHW reached out to patient on 1/19. Patient has not heard anything about his citizenship. Patient reports that he will try to call to get an update. Patient had no issues with food at the time of the call. Patient reports staying hydrated and active.     Plan/Recommendations: CHW plans to contact patient in one month. SWCC will review chart in 6 weeks, per standard workflow, unless involvement is requested sooner    BING Garcia  Primary Care Clinic- Social Work Care Coordinator  [facility] and [place]  Ph: [phone]  2/23/2022 10:38 AM

## 2022-02-28 ENCOUNTER — PATIENT OUTREACH (OUTPATIENT)
Dept: CARE COORDINATION | Facility: CLINIC | Age: 47
End: 2022-02-28
Payer: COMMERCIAL

## 2022-02-28 NOTE — PROGRESS NOTES
Clinic Care Coordination Contact  [facility]/Voicemail     Clinical Data: CHW Outreach  Outreach attempted x 1.  Left message on patient's voicemail with call back information and requested return call.    Plan: CHW will try to reach patient again in 5-7 business days.    Elena BARAJAS, CHW  Clinic Care Coordination  [facility] Clinics: [place], [place] & [place]  Phone: [phone]    Notes:  - How are you doing?  - Did you hear anything about the status of your Citizenship status?  - Any new questions or concerns

## 2022-03-01 DIAGNOSIS — G62.0 DRUG-INDUCED PERIPHERAL NEUROPATHY (H): ICD-10-CM

## 2022-03-03 RX ORDER — ACETAMINOPHEN 325 MG/1
650 TABLET ORAL EVERY 6 HOURS PRN
Qty: 180 TABLET | Refills: 1 | Status: SHIPPED | OUTPATIENT
Start: 2022-03-03 | End: 2022-07-14

## 2022-03-03 RX ORDER — GABAPENTIN 300 MG/1
900 CAPSULE ORAL 3 TIMES DAILY
Qty: 270 CAPSULE | Refills: 1 | Status: SHIPPED | OUTPATIENT
Start: 2022-03-03 | End: 2022-07-15

## 2022-03-03 NOTE — TELEPHONE ENCOUNTER
Routing refill request to provider for review/approval because:  Drug not on the [facility] refill protocol     Emi Morillo RN  [facility]           negative...

## 2022-03-08 ENCOUNTER — NURSE TRIAGE (OUTPATIENT)
Dept: FAMILY MEDICINE | Facility: CLINIC | Age: 47
End: 2022-03-08
Payer: COMMERCIAL

## 2022-03-08 NOTE — TELEPHONE ENCOUNTER
Patient needs to be evaluated in the emergency department. We have no available clinic appointments today and he should be seen ASAP. Thank you

## 2022-03-08 NOTE — TELEPHONE ENCOUNTER
Nurse Triage SBAR  Is this a 2nd Level Triage? YES, LICENSED PRACTITIONER REVIEW IS REQUIRED    Routing to provider for recommendation on patient not accepting of triage recommendations for chest pain and swelling. Will not be seen in ED.    SITUATION:                                                    Chaim Norman is a 46 year old male   Call came from central scheduling. Patient reporting chest pain and swelling in bilateral thighs.     BACKGROUND:                                                    Patient reporting 7/10 chest pain on the right side radiating to the right shoulder. Patient states it comes and goes and this chest pain started last night. He does report some shortness of breath as well. Patient states this chest pain is new and he has never experienced this pain before.     Patient also reporting swelling in bilateral thighs. Patient states he just noticed this today. Also reports numbness and tingling in legs as well. Patient denies any swelling below the knees - he states the swelling is only in his thighs.     NURSE ASSESSMENT:                                                    Per triage protocol, patient to be seen in ED/UC now. Once this advice was given to patient to be evaluated he stated he only wanted to be seen in clinic. RN educated that with new symptoms of chest pain and leg swelling patient should be seen in emergency department. Patient then stated his symptoms are not new and would like to see provider today or in the next couple of days. Advised this is not recommended for his symptoms but patient insisted. Advised patient that RN will transfer to central scheduling to see if there are any openings and if not, emergency department is where patient should be evaluated.     RECOMMENDATION(S) and PLAN:                                                    Protocol Recommended Disposition: To ED/UC for evaluation, another person to drive   Will comply with recommendation: NO - he wants to  be seen in clinic, will not go to ED    Encourage to return call clinic triage nurse if further questions/concerns that may come up or if symptoms do not improve, worsen, or new symptoms develop.    NOTES: Disposition was determined by the first positive assessment question, therefore all previous assessment questions were negative.  Guideline used: chest pain  System guideline    Nyla GALLAGHERN, RN     Reason for Disposition    Chest pain or 'angina' comes and goes and is happening more often (increasing in frequency) or getting worse (increasing in severity) (Exception: chest pains that last only a few seconds)    Additional Information    Negative: Severe difficulty breathing (e.g., struggling for each breath, speaks in single words)    Negative: Passed out (i.e., fainted, collapsed and was not responding)    Negative: Difficult to awaken or acting confused (e.g., disoriented, slurred speech)    Negative: Shock suspected (e.g., cold/pale/clammy skin, too weak to stand, low BP, rapid pulse)    Negative: Chest pain lasting longer than 5 minutes and ANY of the following:* Over 45 years old* Over 30 years old and at least one cardiac risk factor (e.g., diabetes, high blood pressure, high cholesterol, smoker, or strong family history of heart disease)* History of heart disease (i.e., angina, heart attack, heart failure, bypass surgery, takes nitroglycerin)* Pain is crushing, pressure-like, or heavy    Negative: Heart beating < 50 beats per minute OR > 140 beats per minute    Negative: Visible sweat on face or sweat dripping down face    Negative: Sounds like a life-threatening emergency to the triager    Negative: SEVERE chest pain    Negative: Pain also in shoulder(s) or arm(s) or jaw    Negative: Difficulty breathing    Negative: Cocaine use within last 3 days    Negative: Major surgery in the past month    Negative: Hip or leg fracture (broken bone) in past month (or had cast on leg or ankle in past month)     Negative: Illness requiring prolonged bedrest in past month (e.g., immobilization, long hospital stay)    Negative: Long-distance travel in past month (e.g., car, bus, train, plane; with trip lasting 6 or more hours)    Negative: History of prior 'blood clot' in leg or lungs (i.e., deep vein thrombosis, pulmonary embolism)    Negative: History of inherited increased risk of blood clots (e.g., Factor 5 Leiden, Anti-thrombin 3, Protein C or Protein S deficiency, Prothrombin mutation)    Negative: Heart beating irregularly or very rapidly    Negative: Chest pain lasting longer than 5 minutes and occurred in last 3 days (72 hours) (Exception: feels exactly the same as previously diagnosed heartburn and has accompanying sour taste in mouth)    Protocols used: CHEST PAIN-A-[place]

## 2022-03-21 ENCOUNTER — PATIENT OUTREACH (OUTPATIENT)
Dept: NURSING | Facility: CLINIC | Age: 47
End: 2022-03-21
Payer: COMMERCIAL

## 2022-03-21 NOTE — PROGRESS NOTES
Community Health Worker Follow Up    Care Gaps:     Health Maintenance Due   Topic Date Due     URINE DRUG SCREEN  Never done     COPD ACTION PLAN  Never done     COLORECTAL CANCER SCREENING  Never done     COVID-19 Vaccine (3 - Pfizer risk 4-dose series) 05/21/2021     INFLUENZA VACCINE (1) 09/01/2021     PHQ-2 (once per calendar year)  01/01/2022       Will address at his next PCP visit.     Goals:   Goals Addressed as of 3/21/2022 at 12:03 PM                    Today    1/19/22       Psychosocial (pt-stated)   30%  10%    Added 1/7/22 by Robin Jansen BSW      Goal Statement: Obtaining citizenship  Date Goal set: 1/7/2022  Barriers: None  Strengths: Patient is a great self advocate; Patient is enrolled in Care Coordination  Date to Achieve By: 5/2022  Patient expressed understanding of goal: Yes  Action steps to achieve this goal:  1. I will reach out and inquire further about citizenship status.  2. I will continue to work towards obtaining citizenship    Discussed 3/21/22              Intervention and Education during outreach: Patient has resent his citizenship application last month with the $700 fee and is waiting to hear back. Will connect with CCSW next month if he has questions or needs additional paperwork completed.    Patient had an episode this weekend when his oxygen level got low and wants to know if community paramedic is still assigned. Will follow up with CC SW.    Next outreach due: 4/18/22  CHW Plan: CHW will continue to support patient with goals through routine scheduled outreach.     Nex

## 2022-04-01 DIAGNOSIS — M79.2 NEUROPATHIC PAIN: ICD-10-CM

## 2022-04-01 DIAGNOSIS — M25.511 CHRONIC PAIN OF BOTH SHOULDERS: ICD-10-CM

## 2022-04-01 DIAGNOSIS — M79.671 BILATERAL FOOT PAIN: ICD-10-CM

## 2022-04-01 DIAGNOSIS — G89.29 CHRONIC PAIN OF BOTH SHOULDERS: ICD-10-CM

## 2022-04-01 DIAGNOSIS — M79.672 BILATERAL FOOT PAIN: ICD-10-CM

## 2022-04-01 DIAGNOSIS — G47.00 INSOMNIA, UNSPECIFIED TYPE: ICD-10-CM

## 2022-04-01 DIAGNOSIS — M25.512 CHRONIC PAIN OF BOTH SHOULDERS: ICD-10-CM

## 2022-04-01 RX ORDER — NORTRIPTYLINE HCL 10 MG
10-20 CAPSULE ORAL AT BEDTIME
Qty: 60 CAPSULE | Refills: 1 | Status: SHIPPED | OUTPATIENT
Start: 2022-04-01 | End: 2022-08-25

## 2022-04-01 NOTE — TELEPHONE ENCOUNTER
Patient has not been seen in the Pain Clinic for over a year; will route refill request to PCP.    Received fax from pharmacy requesting refill(s) for nortriptyline (PAMELOR) 10 MG capsule     Last refilled on 3/1/22    Pt last seen on 2/26/21  Next appt scheduled for none    E-prescribe to:    [id number] - [place], [place] - [address] AT [facility] [address]

## 2022-04-06 ENCOUNTER — PATIENT OUTREACH (OUTPATIENT)
Dept: CARE COORDINATION | Facility: CLINIC | Age: 47
End: 2022-04-06
Payer: COMMERCIAL

## 2022-04-06 NOTE — PROGRESS NOTES
Clinic Care Coordination Contact    Situation: Patient chart reviewed by care coordinator.    Background: Patient is enrolled in Care Coordination. CHW and SWCC are following.     Assessment: CHW contacted patient on 3/21. Patient stated that he re-sent his citizenship application last month and is waiting to hear back. Patient asked if Community Paramedic will be returning to see him. CHW routed this question to SWCC. SWCC checked in with Community Paramedic program. Patient had met all of his goals, so no additional CP visits were scheduled. If new needs arise, a new referral can be placed    Plan/Recommendations: CHW will contact patient in one month. SWCC will review chart in 6 weeks, per standard workflow    CHW will:  CHW Delegation:   1)  Due Date:  4/21       Delegation: Please ask patient if he has any new medical concerns that he will need CP involvement on. If so, notify SWCC with specific concerns and SWCC can send a message to provider for new referral.           BING Garcia  Primary Care Clinic- Social Work Care Coordinator  [facility] and [facility]  Ph: [phone]  4/6/2022 1:45 PM

## 2022-04-14 ENCOUNTER — OFFICE VISIT (OUTPATIENT)
Dept: FAMILY MEDICINE | Facility: CLINIC | Age: 47
End: 2022-04-14
Payer: COMMERCIAL

## 2022-04-14 VITALS
BODY MASS INDEX: 30.41 KG/M2 | OXYGEN SATURATION: 90 % | HEART RATE: 98 BPM | SYSTOLIC BLOOD PRESSURE: 155 MMHG | WEIGHT: 189.25 LBS | DIASTOLIC BLOOD PRESSURE: 111 MMHG | RESPIRATION RATE: 20 BRPM | TEMPERATURE: 97 F | HEIGHT: 66 IN

## 2022-04-14 DIAGNOSIS — R00.2 PALPITATIONS: ICD-10-CM

## 2022-04-14 DIAGNOSIS — M79.671 BILATERAL FOOT PAIN: ICD-10-CM

## 2022-04-14 DIAGNOSIS — M79.672 BILATERAL FOOT PAIN: ICD-10-CM

## 2022-04-14 DIAGNOSIS — I10 HYPERTENSION GOAL BP (BLOOD PRESSURE) < 140/80: ICD-10-CM

## 2022-04-14 DIAGNOSIS — Z12.11 SCREEN FOR COLON CANCER: ICD-10-CM

## 2022-04-14 DIAGNOSIS — Z59.41 FOOD INSECURITY: ICD-10-CM

## 2022-04-14 DIAGNOSIS — M79.2 NEUROPATHIC PAIN: ICD-10-CM

## 2022-04-14 DIAGNOSIS — R06.02 SHORTNESS OF BREATH: ICD-10-CM

## 2022-04-14 DIAGNOSIS — M79.89 LEG SWELLING: Primary | ICD-10-CM

## 2022-04-14 DIAGNOSIS — J44.9 CHRONIC OBSTRUCTIVE PULMONARY DISEASE, UNSPECIFIED COPD TYPE (H): ICD-10-CM

## 2022-04-14 LAB
ALBUMIN SERPL-MCNC: 3.9 G/DL (ref 3.4–5)
ALP SERPL-CCNC: 145 U/L (ref 40–150)
ALT SERPL W P-5'-P-CCNC: 27 U/L (ref 0–70)
ANION GAP SERPL CALCULATED.3IONS-SCNC: 5 MMOL/L (ref 3–14)
AST SERPL W P-5'-P-CCNC: 20 U/L (ref 0–45)
BASOPHILS # BLD AUTO: 0 10E3/UL (ref 0–0.2)
BASOPHILS NFR BLD AUTO: 1 %
BILIRUB SERPL-MCNC: 0.8 MG/DL (ref 0.2–1.3)
BUN SERPL-MCNC: 12 MG/DL (ref 7–30)
CALCIUM SERPL-MCNC: 9.8 MG/DL (ref 8.5–10.1)
CHLORIDE BLD-SCNC: 101 MMOL/L (ref 94–109)
CO2 SERPL-SCNC: 31 MMOL/L (ref 20–32)
CREAT SERPL-MCNC: 1.08 MG/DL (ref 0.66–1.25)
EOSINOPHIL # BLD AUTO: 0.1 10E3/UL (ref 0–0.7)
EOSINOPHIL NFR BLD AUTO: 3 %
ERYTHROCYTE [DISTWIDTH] IN BLOOD BY AUTOMATED COUNT: 11.8 % (ref 10–15)
GFR SERPL CREATININE-BSD FRML MDRD: 85 ML/MIN/1.73M2
GLUCOSE BLD-MCNC: 82 MG/DL (ref 70–99)
HCT VFR BLD AUTO: 45.6 % (ref 40–53)
HGB BLD-MCNC: 14.6 G/DL (ref 13.3–17.7)
IMM GRANULOCYTES # BLD: 0 10E3/UL
IMM GRANULOCYTES NFR BLD: 0 %
LYMPHOCYTES # BLD AUTO: 1.2 10E3/UL (ref 0.8–5.3)
LYMPHOCYTES NFR BLD AUTO: 28 %
MCH RBC QN AUTO: 32.1 PG (ref 26.5–33)
MCHC RBC AUTO-ENTMCNC: 32 G/DL (ref 31.5–36.5)
MCV RBC AUTO: 100 FL (ref 78–100)
MONOCYTES # BLD AUTO: 0.4 10E3/UL (ref 0–1.3)
MONOCYTES NFR BLD AUTO: 9 %
NEUTROPHILS # BLD AUTO: 2.5 10E3/UL (ref 1.6–8.3)
NEUTROPHILS NFR BLD AUTO: 60 %
PLATELET # BLD AUTO: 205 10E3/UL (ref 150–450)
POTASSIUM BLD-SCNC: 4.3 MMOL/L (ref 3.4–5.3)
PROT SERPL-MCNC: 8.8 G/DL (ref 6.8–8.8)
RBC # BLD AUTO: 4.55 10E6/UL (ref 4.4–5.9)
SODIUM SERPL-SCNC: 137 MMOL/L (ref 133–144)
TSH SERPL DL<=0.005 MIU/L-ACNC: 1.27 MU/L (ref 0.4–4)
WBC # BLD AUTO: 4.2 10E3/UL (ref 4–11)

## 2022-04-14 PROCEDURE — 36415 COLL VENOUS BLD VENIPUNCTURE: CPT | Performed by: NURSE PRACTITIONER

## 2022-04-14 PROCEDURE — 99214 OFFICE O/P EST MOD 30 MIN: CPT | Performed by: NURSE PRACTITIONER

## 2022-04-14 PROCEDURE — 93000 ELECTROCARDIOGRAM COMPLETE: CPT | Performed by: NURSE PRACTITIONER

## 2022-04-14 PROCEDURE — 80050 GENERAL HEALTH PANEL: CPT | Performed by: NURSE PRACTITIONER

## 2022-04-14 RX ORDER — ALBUTEROL SULFATE 90 UG/1
2 AEROSOL, METERED RESPIRATORY (INHALATION) EVERY 6 HOURS PRN
Qty: 8.5 G | Refills: 3 | Status: SHIPPED | OUTPATIENT
Start: 2022-04-14 | End: 2023-08-11

## 2022-04-14 RX ORDER — DULOXETIN HYDROCHLORIDE 60 MG/1
60 CAPSULE, DELAYED RELEASE ORAL DAILY
Qty: 90 CAPSULE | Refills: 1 | Status: SHIPPED | OUTPATIENT
Start: 2022-04-14 | End: 2022-10-06

## 2022-04-14 RX ORDER — DULOXETIN HYDROCHLORIDE 30 MG/1
30 CAPSULE, DELAYED RELEASE ORAL DAILY
Qty: 90 CAPSULE | Refills: 1 | Status: SHIPPED | OUTPATIENT
Start: 2022-04-14 | End: 2022-10-06

## 2022-04-14 RX ORDER — AMLODIPINE BESYLATE 5 MG/1
10 TABLET ORAL DAILY
Qty: 180 TABLET | Refills: 0 | Status: SHIPPED | OUTPATIENT
Start: 2022-04-14 | End: 2022-08-22

## 2022-04-14 RX ORDER — BUDESONIDE AND FORMOTEROL FUMARATE DIHYDRATE 160; 4.5 UG/1; UG/1
2 AEROSOL RESPIRATORY (INHALATION) 2 TIMES DAILY
Qty: 10.2 G | Refills: 3 | Status: SHIPPED | OUTPATIENT
Start: 2022-04-14 | End: 2022-10-06

## 2022-04-14 SDOH — ECONOMIC STABILITY - FOOD INSECURITY: FOOD INSECURITY: Z59.41

## 2022-04-14 ASSESSMENT — PAIN SCALES - GENERAL: PAINLEVEL: NO PAIN (0)

## 2022-04-14 ASSESSMENT — ASTHMA QUESTIONNAIRES: ACT_TOTALSCORE: 21

## 2022-04-14 NOTE — PROGRESS NOTES
Assessment & Plan     Leg swelling  Will get US. No swelling or pain present today.   - US Lower Extremity Venous Duplex Bilateral; Future  - US Lower Extremity Arterial Duplex Bilateral; Future    Palpitations  EKG with SR. Labs pending. Had ZioPatch last year and was normal. Has seen cardiology.  - EKG 12-lead complete w/read - Clinics  - Comprehensive metabolic panel (BMP + Alb, Alk Phos, ALT, AST, Total. Bili, TP); Future  - TSH; Future  - CBC with platelets and differential; Future  - Comprehensive metabolic panel (BMP + Alb, Alk Phos, ALT, AST, Total. Bili, TP)  - TSH  - CBC with platelets and differential    Screen for colon cancer  - Fecal colorectal cancer screen (FIT); Future  - Fecal colorectal cancer screen (FIT)    Chronic obstructive pulmonary disease, unspecified COPD type (H)  Has follow up with pulmonology tomorrow. Discussed importance of keeping that appointment.  - albuterol (PROVENTIL HFA) 108 (90 Base) MCG/ACT inhaler; Inhale 2 puffs into the lungs every 6 hours as needed for shortness of breath / dyspnea or wheezing  - budesonide-formoterol (SYMBICORT) 160-4.5 MCG/ACT Inhaler; Inhale 2 puffs into the lungs 2 times daily    Shortness of breath  Refilled. Using occasionally.  - albuterol (PROVENTIL HFA) 108 (90 Base) MCG/ACT inhaler; Inhale 2 puffs into the lungs every 6 hours as needed for shortness of breath / dyspnea or wheezing    Hypertension goal BP (blood pressure) < 140/80  Uncontrolled. Has been out of meds x1 month. He prefers to take 2 tablets of 5 mg rather than 1 10 mg tablet.  - amLODIPine (NORVASC) 5 MG tablet; Take 2 tablets (10 mg) by mouth daily    Neuropathic pain  Refilled.  - DULoxetine (CYMBALTA) 30 MG capsule; Take 1 capsule (30 mg) by mouth daily Take with 60mg to equal 90mg/day. 3 month script  - DULoxetine (CYMBALTA) 60 MG capsule; Take 1 capsule (60 mg) by mouth daily Take with 30mg to equal 90mg/day. 3 month script    Bilateral foot pain  Refilled.  - DULoxetine  "(CYMBALTA) 30 MG capsule; Take 1 capsule (30 mg) by mouth daily Take with 60mg to equal 90mg/day. 3 month script  - DULoxetine (CYMBALTA) 60 MG capsule; Take 1 capsule (60 mg) by mouth daily Take with 30mg to equal 90mg/day. 3 month script    Food insecurity  I gave him Matter Box today. Requesting care coordination to get involved and will refer back to community paramedic program as they were able to really help him previously with managing health at home and getting forms in line for the county.  - Community Paramedic Referral; Future  - Primary Care - Care Coordination Referral; Future       BMI:   Estimated body mass index is 30.55 kg/m  as calculated from the following:    Height as of this encounter: 1.676 m (5' 6\").    Weight as of this encounter: 85.8 kg (189 lb 4 oz).   Weight management plan: Discussed healthy diet and exercise guidelines    See Patient Instructions    Return in about 6 weeks (around 5/26/2022).     The benefits, risks and potential side effects were discussed in detail. Black box warnings discussed as relevant. All patient questions were answered. The patient was instructed to follow up immediately if any adverse reactions develop.    Return precautions discussed, including when to seek urgent/emergent care.    Patient verbalizes understanding and agrees with plan of care. Patient stable for discharge.      CHAZ Garrett CNP  [facility]    Slava Rucker is a 46 year old who presents for the following health issues swollen right leg with numbness in feet.  At times he can feel his heart race     History of Present Illness       Reason for visit:  The reason for my visit today, is to do my medical checkup.  Symptom onset:  3-7 days ago  Symptoms include:  My symptom are solemn legs.  Symptom intensity:  Moderate  Symptom progression:  Worsening  Had these symptoms before:  Yes  Has tried/received treatment for these symptoms:  No  What makes it worse:  " "No, there is nothing that make feel worse.  What makes it better:  Yes, when I washed my clothes, I feel much better.    He eats 4 or more servings of fruits and vegetables daily.He consumes 2 sweetened beverage(s) daily.He exercises with enough effort to increase his heart rate 10 to 19 minutes per day.  He exercises with enough effort to increase his heart rate 3 or less days per week.   He is taking medications regularly.     Baseline shortness of breath    Out of meds for about 1 month so hasn't taken today  BP is high - asymptomatic    Didn't have receipt for medications so couldn't attach to food stamp application so he was denied food stamps now  Had bread yesterday. Last meal Sunday (4 days ago)  Not able to work. Can't get disability because not yet a citizen  Son works to pay their bills and for food. Chaim has a lot of guilt about this  Has visit with pulmonology tomorrow          Review of Systems   Constitutional, HEENT, cardiovascular, pulmonary, GI, , musculoskeletal, neuro, skin, endocrine and psych systems are negative, except as otherwise noted.      Objective    BP (!) 155/111 (BP Location: Left arm, Patient Position: Sitting, Cuff Size: Adult Large)   Pulse 98   Temp 97  F (36.1  C) (Tympanic)   Resp 20   Ht 1.676 m (5' 6\")   Wt 85.8 kg (189 lb 4 oz)   SpO2 90%   BMI 30.55 kg/m    Body mass index is 30.55 kg/m .  Physical Exam   GENERAL: healthy, alert and no distress  EYES: Eyes grossly normal to inspection, PERRL and conjunctivae and sclerae normal  HENT: ear canals and TM's normal, nose and mouth without ulcers or lesions  NECK: no adenopathy  RESP: lungs clear to auscultation - no rales, rhonchi or wheezes  CV: regular rate and rhythm, normal S1 S2, no S3 or S4, no murmur, click or rub, no peripheral edema and peripheral pulses strong  MS: no gross musculoskeletal defects noted, no edema. Negative homans  NEURO: Normal strength and tone, mentation intact and speech normal  PSYCH: " mentation appears normal, affect normal/bright    Results for orders placed or performed in visit on 04/14/22 (from the past 24 hour(s))   CBC with platelets and differential    Narrative    The following orders were created for panel order CBC with platelets and differential.  Procedure                               Abnormality         Status                     ---------                               -----------         ------                     CBC with platelets and d...[[id number]]                      Final result                 Please view results for these tests on the individual orders.   CBC with platelets and differential   Result Value Ref Range    WBC Count 4.2 4.0 - 11.0 10e3/uL    RBC Count 4.55 4.40 - 5.90 10e6/uL    Hemoglobin 14.6 13.3 - 17.7 g/dL    Hematocrit 45.6 40.0 - 53.0 %     78 - 100 fL    MCH 32.1 26.5 - 33.0 pg    MCHC 32.0 31.5 - 36.5 g/dL    RDW 11.8 10.0 - 15.0 %    Platelet Count 205 150 - 450 10e3/uL    % Neutrophils 60 %    % Lymphocytes 28 %    % Monocytes 9 %    % Eosinophils 3 %    % Basophils 1 %    % Immature Granulocytes 0 %    Absolute Neutrophils 2.5 1.6 - 8.3 10e3/uL    Absolute Lymphocytes 1.2 0.8 - 5.3 10e3/uL    Absolute Monocytes 0.4 0.0 - 1.3 10e3/uL    Absolute Eosinophils 0.1 0.0 - 0.7 10e3/uL    Absolute Basophils 0.0 0.0 - 0.2 10e3/uL    Absolute Immature Granulocytes 0.0 <=0.4 10e3/uL

## 2022-04-15 ENCOUNTER — PATIENT OUTREACH (OUTPATIENT)
Dept: CARE COORDINATION | Facility: CLINIC | Age: 47
End: 2022-04-15
Payer: COMMERCIAL

## 2022-04-15 NOTE — PROGRESS NOTES
PCP placed new order for CCC.    CHW called to follow up with patient    UTC/Voicemail       Clinical Data: Care Coordinator Outreach  Outreach attempted x 1.  Left message on patient's voicemail with call back information and requested return call.  Plan:   Care Coordinator will try to reach patient again in 1-2 business days.

## 2022-04-18 ENCOUNTER — PATIENT OUTREACH (OUTPATIENT)
Dept: CARE COORDINATION | Facility: CLINIC | Age: 47
End: 2022-04-18

## 2022-04-18 ENCOUNTER — ANCILLARY PROCEDURE (OUTPATIENT)
Dept: ULTRASOUND IMAGING | Facility: CLINIC | Age: 47
End: 2022-04-18
Attending: NURSE PRACTITIONER
Payer: COMMERCIAL

## 2022-04-18 ENCOUNTER — TELEPHONE (OUTPATIENT)
Dept: FAMILY MEDICINE | Facility: CLINIC | Age: 47
End: 2022-04-18

## 2022-04-18 DIAGNOSIS — M79.89 LEG SWELLING: ICD-10-CM

## 2022-04-18 PROCEDURE — 93925 LOWER EXTREMITY STUDY: CPT | Performed by: RADIOLOGY

## 2022-04-18 NOTE — TELEPHONE ENCOUNTER
Writer contacted patient regarding provider message below. Patient was relayed provider message below regarding results. Patient verbalized understanding and had no further questions or concerns at this time.     ----- Message from CHAZ Beasley CNP sent at 4/18/2022  1:02 PM CDT -----  Please call patient to be sure he got the [employer] message and has no questions  Chaim,  Your ultrasound results were normal. I think your pain is related to the nerve pain. Please continue your medications without changes at this time. Please let us know if you have any questions.  Thank you for allowing us to participate in your care.  CHAZ Garrett CNP, RN, BSN  [facility]

## 2022-04-18 NOTE — PROGRESS NOTES
[facility]/Voicemail       Clinical Data: Care Coordinator Outreach  Outreach attempted x 2.  Left message on patient's voicemail with call back information and requested return call.  Plan:  Care Coordinator will try to reach patient again in 3-5 business days.  Next outreach due: 4/22/22

## 2022-05-03 ENCOUNTER — PATIENT OUTREACH (OUTPATIENT)
Dept: CARE COORDINATION | Facility: CLINIC | Age: 47
End: 2022-05-03
Payer: COMMERCIAL

## 2022-05-03 NOTE — LETTER
[facility] CARE COORDINATION  [address]  [place] [address]    May 3, 2022    Chaim Norman  [address]   [place] [address]      Dear Chaim,    I have been attempting to reach you since our last contact. I would like to continue to work with you and provide any additional support you may need on achieving your health care related goals. I would appreciate if you would give me a call at [phone] to let me know if you would like to continue working together. I know that there are many things that can affect our ability to communicate and I hope we can continue to work together.    All of us at the [facility] are invested in your health and are here to assist you in meeting your goals.     Sincerely,    CRISTINA Mcelroy  [place], [place], [place], [place] and [facility]  [phone]

## 2022-05-03 NOTE — PROGRESS NOTES
[facility]/Voicemail       Clinical Data: Care Coordinator Outreach  Outreach attempted x 3.  Left message on patient's voicemail with call back information and requested return call.  Plan: Care Coordinator will send unable to contact letter with care coordinator contact information via [employer]. Care Coordinator will try to reach patient again in 10 business days.  Next outreach due: 5/17/22

## 2022-05-24 ENCOUNTER — PATIENT OUTREACH (OUTPATIENT)
Dept: CARE COORDINATION | Facility: CLINIC | Age: 47
End: 2022-05-24
Payer: COMMERCIAL

## 2022-05-24 NOTE — PROGRESS NOTES
Clinic Care Coordination Contact    Situation: Patient chart reviewed by care coordinator.    Background: Patient was enrolled in Care Coordination. CHW and SWCC were following.     Assessment: CHW attempted to reach patient x3 as of 5/3. No answer and no call back. CHW sent unable to contact letter to patient as well    Plan/Recommendations: CHW was planning to outreach to patient one more time. SWCC believes disenrollment is appropriate given the amount of outreach attempts. SWCC disenrolled patient. No further outreaches will take place. Please send new referral if further needs arise.     BING Garcia  Primary Care Clinic- Social Work Care Coordinator  [facility] and [place]  Ph: [phone]  5/24/2022 3:20 PM

## 2022-05-31 ENCOUNTER — PATIENT OUTREACH (OUTPATIENT)
Dept: CARE COORDINATION | Facility: CLINIC | Age: 47
End: 2022-05-31
Payer: COMMERCIAL

## 2022-06-28 ENCOUNTER — TELEPHONE (OUTPATIENT)
Dept: FAMILY MEDICINE | Facility: CLINIC | Age: 47
End: 2022-06-28

## 2022-06-28 NOTE — TELEPHONE ENCOUNTER
Bear RN from [facility] calling,   Pt completed active TB treatment July 2021, last summer.  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      She does not see any notes confirming this in care everywhere.  Bear KATZ is not sure why pt was calling her.      Do you have any more information about this issue?  Diann GALLAGHERN, RN

## 2022-07-15 DIAGNOSIS — G62.0 DRUG-INDUCED PERIPHERAL NEUROPATHY (H): ICD-10-CM

## 2022-07-15 RX ORDER — GABAPENTIN 300 MG/1
900 CAPSULE ORAL 3 TIMES DAILY
Qty: 270 CAPSULE | Refills: 1 | Status: SHIPPED | OUTPATIENT
Start: 2022-07-15 | End: 2022-10-06

## 2022-07-15 NOTE — TELEPHONE ENCOUNTER
Routing refill request to provider for review/approval because:  Drug not on the [facility] refill protocol     Macrina Buckner RN  [facility]

## 2022-07-20 NOTE — PROGRESS NOTES
"CARDIOLOGY PULMONARY HYPERTENSION CLINIC VIRTUAL VISIT    Chaim Norman is a 44 year old male who is being evaluated via a billable video visit.      The patient has been notified of following:     \"This video visit will be conducted via a call between you and your physician/provider. We have found that certain health care needs can be provided without the need for an in-person physical exam.  This service lets us provide the care you need with a video conversation.  If a prescription is necessary we can send it directly to your pharmacy.  If lab work is needed we can place an order for that and you can then stop by our lab to have the test done at a later time.    Video visits are billed at different rates depending on your insurance coverage.  Please reach out to your insurance provider with any questions.    If during the course of the call the physician/provider feels a video visit is not appropriate, you will not be charged for this service.\"    Patient has given verbal consent for Video visit? Yes    How would you like to obtain your AVS? Matthew    Patient would like the video invitation sent by: Text to cell phone: [phone]      I have reviewed and updated the patient's Past Medical History, Social History, Family History and Medication List.    MEDICATIONS:  Current Outpatient Medications   Medication Sig Dispense Refill     acetaminophen (TYLENOL) 325 MG tablet Take 2 tablets (650 mg) by mouth every 6 hours as needed for mild pain 180 tablet 3     albuterol (PROVENTIL HFA) 108 (90 Base) MCG/ACT inhaler Inhale 2 puffs into the lungs every 6 hours as needed for shortness of breath / dyspnea or wheezing 8.5 g 3     amLODIPine (NORVASC) 5 MG tablet Take 2 tablets (10 mg) by mouth daily 180 tablet 3     bedaquiline (SITRURO) 100 MG tablet Take 200 mg by mouth Every Mon, Wed, Fri Morning       COMPOUNDED NON-CONTROLLED SUBSTANCE (CMPD RX) - PHARMACY TO MIX COMPOUNDED MEDICATION Take one capsule containing 4.5mg " of naltrexone by mouth per day 135 mg 1     cycloSERINE (SEROMYCIN) 250 MG capsule Take 250 mg by mouth 2 times daily       diclofenac (VOLTAREN) 1 % topical gel Place 2 g onto the skin 4 times daily as needed for moderate pain Of both shoulders 1 Tube 3     DULoxetine 60 MG PO capsule Take 1 capsule (60 mg) by mouth daily 30 capsule 3     ethambutol (MYAMBUTOL) 400 MG tablet Take 1,200 mg by mouth daily       fluticasone-salmeterol (ADVAIR) 100-50 MCG/DOSE inhaler Inhale 1 puff into the lungs every 12 hours 1 Inhaler 2     gabapentin (NEURONTIN) 300 MG capsule Take 3 capsules (900 mg) by mouth 3 times daily 270 capsule 1     levofloxacin (LEVAQUIN) 500 MG tablet Take 750 mg by mouth daily       melatonin 3 MG tablet Take 1 tablet (3 mg) by mouth nightly as needed for sleep 30 tablet 3     nortriptyline (PAMELOR) 10 MG capsule Take 1-2 capsules (10-20 mg) by mouth At Bedtime For pain and insomnia 60 capsule 1     oxyCODONE (ROXICODONE) 5 MG tablet Take 1 tablet (5 mg) by mouth 2 times daily as needed for severe pain 14 tablet 0     potassium chloride ER (KLOR-CON M) 20 MEQ CR tablet Take 1 tablet (20 mEq) by mouth daily 30 tablet 3     pyrazinamide 500 MG tablet Take 1,500 mg by mouth daily         ALLERGIES  Patient has no known allergies.      Primary PH cardiologist: Dr. Chauhan      HPI:  Mr. Norman is a pleasant 44 year old AAM with a PMhx including tuberculosis first diagnosed in 2014 with subsequent pulmonary complications including lung destruction, hypertension, neuropathy, and pulmonary hypertension after an echocardiogram showed RV dilation and dysfunction. He was seen by Dr. Chauhan initially in Jan 2020 after an echo performed at [facility] in December of 2019 showed RV dilation and dysfunction. At that time he stated that his breathing had actually started getting better on his TB medications. He does not do a lot of activity because he has bad neuropathy in his feet so walking distances is nearly  impossible. Overall, he was felt to be a WHO FC III but his limitations are mostly due to his neuropathy.     Chaim also had a CT scan performed at [facility] in December which showed bilateral lung destruction. It did not appear that he had formal pulmonary function tests to his recollection or review of the chart. A formal right heart catheterization and pulmonary angiogram was recommended, but due to COVID-19 restrictions, this was initially postponed.    He ultimately had this performed on 5/8/2020. He was found to have mildly elevated right sided filling pressures and LVEDP with moderate pulmonary hypertension and normal cardiac output (RA 11, PAP 53/29/40, unable to obtain PCWP, LVEDP 19. CO TD 6.97). 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 Today, we were to have a video visit to discuss these results, though he was unable to get this to work. Thus, we converted to a telephone visit.    Dr. Chauhan had reviewed the above results previously and does not recommend pulmonary vasodilator therapy. He feels he would be best served by seeing a pulmonologist and to see whether a lung transplantation is an option for him.          Assessment and Plan:     1. Pulmonary hypertension.   --Moderate PH in the setting of tuberculosis and significant lung destruction. This is felt to be WHO group 3, and pulmonary vasodilators have not been recommended. Dr Chauhan has referred him for formal pulmonary and possible transplant evaluation. I relayed this information to Aleksandar Emerson today, and he is agreeable to this.       Follow up plan: He will follow up in our clinic as needed.       Phone visit details:  Start time 1444  End time 5142      Danika Mckeon PA-C  [facility]  Pager [phone]         O-T Advancement Flap Text: The defect edges were debeveled with a #15 scalpel blade.  Given the location of the defect, shape of the defect and the proximity to free margins an O-T advancement flap was deemed most appropriate.  Using a sterile surgical marker, an appropriate advancement flap was drawn incorporating the defect and placing the expected incisions within the relaxed skin tension lines where possible.    The area thus outlined was incised deep to adipose tissue with a #15 scalpel blade.  The skin margins were undermined to an appropriate distance in all directions utilizing iris scissors.

## 2022-08-22 DIAGNOSIS — I10 HYPERTENSION GOAL BP (BLOOD PRESSURE) < 140/80: ICD-10-CM

## 2022-08-22 RX ORDER — AMLODIPINE BESYLATE 5 MG/1
10 TABLET ORAL DAILY
Qty: 180 TABLET | Refills: 0 | Status: SHIPPED | OUTPATIENT
Start: 2022-08-22 | End: 2022-10-06

## 2022-08-25 DIAGNOSIS — M79.2 NEUROPATHIC PAIN: ICD-10-CM

## 2022-08-25 DIAGNOSIS — M25.512 CHRONIC PAIN OF BOTH SHOULDERS: ICD-10-CM

## 2022-08-25 DIAGNOSIS — M25.511 CHRONIC PAIN OF BOTH SHOULDERS: ICD-10-CM

## 2022-08-25 DIAGNOSIS — M79.671 BILATERAL FOOT PAIN: ICD-10-CM

## 2022-08-25 DIAGNOSIS — G89.29 CHRONIC PAIN OF BOTH SHOULDERS: ICD-10-CM

## 2022-08-25 DIAGNOSIS — G47.00 INSOMNIA, UNSPECIFIED TYPE: ICD-10-CM

## 2022-08-25 DIAGNOSIS — M79.672 BILATERAL FOOT PAIN: ICD-10-CM

## 2022-08-25 RX ORDER — NORTRIPTYLINE HCL 10 MG
10-20 CAPSULE ORAL AT BEDTIME
Qty: 60 CAPSULE | Refills: 1 | Status: SHIPPED | OUTPATIENT
Start: 2022-08-25 | End: 2022-10-06

## 2022-09-15 ENCOUNTER — TELEPHONE (OUTPATIENT)
Dept: CARDIOLOGY | Facility: CLINIC | Age: 47
End: 2022-09-15
Payer: COMMERCIAL

## 2022-09-15 DIAGNOSIS — Z00.6 RESEARCH SUBJECT: Primary | ICD-10-CM

## 2022-09-15 PROCEDURE — 99207 PR NO CHARGE NURSE ONLY: CPT

## 2022-09-15 NOTE — TELEPHONE ENCOUNTER
"  Oban Study Pre-Visit Call      Study description:   Multiconditions PPG Study. The purpose of this research study is to collect data related to health for the development of mobile technologies. This data will include physiological signal recordings from medical devices and data collection software on [employer] Watches (\"study watches\"). This study is not to provide any treatment nor assess safety and effectiveness, but rather to collect information for research and  purposes.     Chaim Norman a 47 year old male, was called today to discuss participation in the Oban study. The following was reviewed with the patient.       You agree to comply with COVID precautionary measures required by local public health ordinances, workplace health and safety protocols, and/or the Study Team. Such measure may include, for example, wearing a mask, complying with social distancing guidelines, and/ or providing evidence of negative COVID-19 test result NA       You are fully vaccinated per the most recent CDC guidelines NA      You do not have any of the following symptoms: fever or chills; difficulty breathing; sustained loss of taste smell, or appetite. NA      You do not have any of the following unexplained symptoms: fatigue or nausea; whole body muscle aches; new or unexpected headache; sore throat; congestion or runny nose; diarrhea or vomiting NA      You have not had close contact with someone who is suspected or confirmed to have active COVID-19 in the last 14 days.NA    Writer left message with participant educating on COVID-19 precautions and requirements. Left call-back number in the event participant is not able to comply with precautions/requirements. [employer] Scheduled.      Reminders    Please come 10 minutes early for your scheduled appointment time.    Bring your vaccination card with you to your scheduled appointment.     No smoking 2 hours prior to your appointment time.    Wear loose shirt.    Eat " before you arrive.       Krissy Luna RN

## 2022-09-16 ENCOUNTER — ALLIED HEALTH/NURSE VISIT (OUTPATIENT)
Dept: CARDIOLOGY | Facility: CLINIC | Age: 47
End: 2022-09-16
Payer: COMMERCIAL

## 2022-09-16 ENCOUNTER — OFFICE VISIT (OUTPATIENT)
Dept: CARDIOLOGY | Facility: CLINIC | Age: 47
End: 2022-09-16

## 2022-09-16 ENCOUNTER — VIRTUAL VISIT (OUTPATIENT)
Dept: CARDIOLOGY | Facility: CLINIC | Age: 47
End: 2022-09-16
Payer: COMMERCIAL

## 2022-09-16 VITALS
DIASTOLIC BLOOD PRESSURE: 110 MMHG | SYSTOLIC BLOOD PRESSURE: 148 MMHG | TEMPERATURE: 98 F | HEIGHT: 66 IN | HEART RATE: 86 BPM | BODY MASS INDEX: 31.07 KG/M2 | WEIGHT: 193.34 LBS | RESPIRATION RATE: 16 BRPM | OXYGEN SATURATION: 91 %

## 2022-09-16 DIAGNOSIS — Z00.6 EXAMINATION OF PARTICIPANT OR CONTROL IN CLINICAL RESEARCH: Primary | ICD-10-CM

## 2022-09-16 DIAGNOSIS — J44.9 CHRONIC OBSTRUCTIVE PULMONARY DISEASE, UNSPECIFIED COPD TYPE (H): Primary | ICD-10-CM

## 2022-09-16 PROCEDURE — 99207 PR NO CHARGE NURSE ONLY: CPT | Mod: 93

## 2022-09-16 PROCEDURE — 99207 PR NO CHARGE NURSE ONLY: CPT

## 2022-09-16 PROCEDURE — 99207 PR NO CHARGE-RESEARCH SERVICE: CPT

## 2022-09-16 NOTE — PROGRESS NOTES
Staci Study At-Home Note      Study description: Multiconditions PPG Sub-Study. The purpose of this research study is to collect data related to health for the development of mobile technologies. This data will include physiological signal recordings from medical devices and smart Desmos data collection software. This study is not to provide any treatment. This study will only collect information for research and  purposes.     Subject ID:  [id number]       SCREENING     Demographic Info  Chaim Norman   1975          47 year old  male    Past Medical History:   Diagnosis Date     Anemia      Arthritis      COPD (chronic obstructive pulmonary disease) (H)      History of blood transfusion 2019    [facility]     Neutropenia (H)      Pulmonary hypertension (H)      TB (tuberculosis), treated      Tuberculosis     currently being treated       Current Outpatient Medications:      acetaminophen (TYLENOL) 325 MG tablet, Take 2 tablets (650 mg) by mouth every 6 hours as needed for mild pain, Disp: 180 tablet, Rfl: 1     albuterol (PROVENTIL HFA) 108 (90 Base) MCG/ACT inhaler, Inhale 2 puffs into the lungs every 6 hours as needed for shortness of breath / dyspnea or wheezing, Disp: 8.5 g, Rfl: 3     amLODIPine (NORVASC) 5 MG tablet, Take 2 tablets (10 mg) by mouth daily, Disp: 180 tablet, Rfl: 0     budesonide-formoterol (SYMBICORT) 160-4.5 MCG/ACT Inhaler, Inhale 2 puffs into the lungs 2 times daily, Disp: 10.2 g, Rfl: 3     DULoxetine (CYMBALTA) 30 MG capsule, Take 1 capsule (30 mg) by mouth daily Take with 60mg to equal 90mg/day. 3 month script, Disp: 90 capsule, Rfl: 1     DULoxetine (CYMBALTA) 60 MG capsule, Take 1 capsule (60 mg) by mouth daily Take with 30mg to equal 90mg/day. 3 month script, Disp: 90 capsule, Rfl: 1     gabapentin (NEURONTIN) 300 MG capsule, Take 3 capsules (900 mg) by mouth 3 times daily, Disp: 270 capsule, Rfl: 1     nortriptyline (PAMELOR) 10 MG capsule, Take 1-2  "capsules (10-20 mg) by mouth At Bedtime For pain and insomnia., Disp: 60 capsule, Rfl: 1    No Known Allergies     Past Surgical History:   Procedure Laterality Date     BIOPSY  2018     CORONARY ANGIOGRAPHY ADULT ORDER  05/2020     CV PULMONARY ANGIOGRAM N/A 5/8/2020     CV RIGHT HEART CATH MEASUREMENTS RECORDED N/A 5/8/2020     ENDOSCOPY       GI SURGERY  12/2019            Child-Bearing Potential?: N/A (Male)    Race: Black or   Race (Secondary): N/A    : No    Ethnicity: Non-/     Vitals:  Time Subject Sat: 0905   BP (!) 148/110 (BP Location: Right arm, Patient Position: Chair, Cuff Size: Adult Regular)   Pulse 86   Temp 98  F (36.7  C) (Oral)   Resp 16   Ht 1.68 m (5' 6.14\")   Wt 87.7 kg (193 lb 5.5 oz)   SpO2 91%   BMI 31.07 kg/m       Sponsor Expected Values   Blood Pressure: SBP: ; DBP: 40-90  Pulse:  bpm  Temp: 35.5-37.5  C  Respiration: 10-23  Ht: in cm  Wt: in kg  SpO2%: %  BMI: Rounded to nearest whole number      Screening Info:  Respiratory Conditions: COPD    Spirometer Test Results (FEV%): 30    Condition Severity: Severe - Stage 3 (FEV 30-49%)    Sleep Conditions:  Sleep Apnea Diagnosis: No  Use of CPAP at Night: No  Stop Breathing or Gasping/Gurgling Sounds at Night: No  Snoring at Night: Yes    SPO2%  ? 95% during 3 minutes? Yes    Oxygen Therapy: Yes    Minutes of Exercise per Week: >60  Type of Activity: Both      Measurements & Preferences:  Dominant Hand: Right   Preferred Watch Hand: Left    Volunteer-Reported Wooten Scale: 6  Staff-Recorded Wooten Scale: 6    Hairiness Level: A: Thin Hair, Low Density     Wrist Circumference:  Left: 196 mm       Right: 191 mm    Spectrometer Values:            Left:   L*: 40.72    A*: 11.13   B*: 15.09      Right: L*: 41.64    A*: 12.32   B*: 16.16         STUDY PROCEDURE DATA      Study Date: 09/16/22  Study Time (Education Start Time): 10:30:00   Device IDs:  Day Watch ID 1: " [id number]    Night Watch ID 2: [id number]    Watch Enclosure 1: Aluminum      Watch Enclosure 2: Aluminum   Watch Size 1: 40 mm  Watch Size 2: 40 mm  Nonin ID 1: [id number]   Nonin ID 2: [id number]   Lifestyle:  Moisturizing Cream/Lotion applied at wrist? No  Additional Comments (Device/participant issues): N/A     16-SEP-2022  Edil Mcgarry

## 2022-09-16 NOTE — PROGRESS NOTES
"Oban Study At-Home Phone Visit    Study description:   Multiconditions PPG Sub-Study. The purpose of this research study is to collect data related to health for the development of mobile technologies. This data will include physiological signal recordings from medical devices and smart watch data collection software. This study is not to provide any treatment. This study will only collect information for research and  purposes.       Subject Number: [id number]    Study Day: Day 1    Oban study subject was called today to;     Confirm subjects are following the subject instructions     Address any technical difficulties     Answer any questions    Reminders Checklist:   [x]  Unlock the watches: Passcode - [id number]    -When placing them on the  or on themselves   [x]  Visually confirm Wi-Fi connection and charging setup  [x]  Unlock phones   -Unlock before placing on   -Should NOT show lock icon  [x]  Check Flubber annita for incomplete surveys   -Morning surveys: End of Night task on NIGHT phone    -Evening surveys: End of Day task on DAY phone  [x]  Take devices off before showering/getting them wet  [x]  Make sure to dismiss any update notifications. -Tap \"Not Now\"  [x]  Good Nonin wear    -While resting/relaxing, not while typing doing anything hand intensive   [x]  Remind them of next appointment with us     Adverse Events & Con Med Assessment Performed?   [x]  (If yes, please generate adverse event report for PI to cosign)      16-SEP-2022     Edil Mcgarry      "

## 2022-09-16 NOTE — PROGRESS NOTES
At-Home Staci Inclusion/Exclusion Criteria:     Study Name: Staci  : Amanuel Berry MD    Protocol version: 4.0 - [id number]    Criteria #  Inclusion Criterita (ALL MUST BE YES)  YES/NO/N/A   1   Male and female subjects at least 18 years old at the time of the screening visit.  Yes   2   Wrist circumference and 120mm-245mm (inclusive).  Yes   3   Ability to understand and provide written informed consent.  Yes   4   Willing and able to comply with study procedures, activities, and duration as described in the ICF.   Yes   5  If not vaccinated and up to date with COVID -19 vaccinations, willing to take a rapid AG test, or PCR test, and produce a negative result within 3 days of the study visit(s).   Yes   6   Didn't smoke at least 2 hours before screening (or study procedures).  Yes   7   Neither subject, nor any individuals living with subject, have had new development in the following within the last 14 days prior to study screening:        a. Have failed to comply with any country, state, and local travel restrictions.         b. Have had any unexpected flu-like symptoms (such as fever, chills, cough, shortness of breath, diarrhea, sore throat, runny nose, or trouble breathing).        c. Have had any contact with people confirmed COVID-19.         d. Have been confirmed to have COVID-19 and have not subsequently received a negative COVID-19 test result.    Yes   8   If Cohort 1 (In-Lab Cardiac Conditions):         a. Indication of a rhythm disorder (dated up to 5 years ago) as outlined in Table A (see Protocol page 9), and be present at the time of screening.     NA   9   If Cohort 2 (In-Lab Respiratory Conditions):          a. Prior diagnosis of one of the following conditions, within 5 years: 1) Moderate (GOLD Stage 2) COPD, 2) Severe or Very Severe (GOLD Stage 3 or 4) COPD, 3) Idiopathic pulmonary fibrosis.          b. Record of spirometry FEV% result (within 5 years) are available.    NA    10   If Cohort 3 (At-Home respiratory Conditions):         a. Prior diagnosis of one of the following conditions, within 5 years: 1) Moderate (GOLD Stage 2) COPD, 2) Severe or Very Severe (GOLD Stage 3 or 4) COPD, 3) Idiopathic pulmonary fibrosis.          b. Record of spirometry FEV% result (within 5 years) are available.          c. Willing and able to use a study provided iPhone and navigate study Ines flow.          d. Stable WIFI at home and are able to connect it to study iPhones     Yes       Criteria # Exclusion Criteria (ALL MUST BE NO) YES/NO/N/A   1   Individuals with severe contact allergies to standard adhesives, or other materials found in pulse oximetry sensors, ECG electrodes, respiration monitor electrodes, wearables device bands and watch surfaces.    No   2   Individuals that do not have at least 2 intact fingers (excluding thumb, *pinky will be excluded only for cohort 1 and cohort 2) on non-preferred hand to wear a watch.    No   3   Open wound(s) or active infections on wrists at study watch wear locations or where the ECG electrodes may be placed.    No   4   Physical disability that prevents safe and adequate testing.  No   5   Individuals with a pacemaker or an automated implantable cardioverter-defibrillator (AICD).    No   6   Individuals with physical scars, tattoos, or other skin markings on wrists where sensors or finger sensor are to be worn.    No   7   Individuals with clinically significant hand tremors, as judged by a Study Investigator.    No   8   Pregnant women.     No   9   Subjects with any medical history, physical exam, vital sign or any other study procedure finding/assessment that in the opinion of the Investigator could compromise subject safety during study participation or interfere with the study integrity and/or the accurate assessment of the study objectives.    No   10   Presence of skin conditions or disease at the fingers of SpO2% application sites that could  interfere with SpO2% sensor placement or the accuracy of measurement. Such conditions include, but are not limited to: extensive scarring, skin lesions, redness, infection or edema at target measurement sites.     No   11   Presence of long fingernails that interfere with the placement of the SpO2% sensor or nail polish at the fingers of SpO2% application sites.    No   12   Medical history or physical assessment finding that makes the subject inappropriate for participation, according to the investigator.    No     Patient does fulfill study inclusion criteria and no exclusion criteria are found.     Amanuel Berry MD    16-SEP-2022    Edil Mcgarry

## 2022-09-16 NOTE — PROGRESS NOTES
Oban Study At-Home Consent      Study description:   Multiconditions PPG Sub-Study. The purpose of this research study is to collect data related to health for the development of mobile technologies. This data will include physiological signal recordings from medical devices and smart watch data collection software. This study is not to provide any treatment. This study will only collect information for research and  purposes.     Chaim Norman a 47 year old male, was onsite today to discuss participation in the At-Home portion of the Oban study.   The consent form was reviewed with the patient.     The review of the study included:    Study Purpose     COVID-19 Criteria     At-Home Study Participation    Participant Responsibilities      Study Data and Devices    Benefits and Risks of Participation    Compensation and Costs of Participation    Voluntary Participation    Confidentiality     Injury and Legal Rights    The subject was queried in regards to his willingness to continue and his questions were answered to his satisfaction.     The patient has given his agreement to volunteer to participate in the above noted study.     The At-Home consent form and HIPPA form version 23 Aug 2022 was signed 16-SEP-2022 onsite in the Clinic Research Unit.     A copy of the At-Home Oban consent will be placed in subject's medical record.  A copy of the consent form was given to the subject today.    Study data is directly entered into Epic per protocol.     No study procedures were done prior to Chaim Norman providing informed consent.       Edil Mcgarry

## 2022-09-16 NOTE — PROGRESS NOTES
"Oban Study Physical Exam      Medical History Reviewed? Yes    Physical Examination  For abnormal findings, please evaluate if the finding is Clinically Significant (by 'CS') or Not Clinically Significant (by 'NCS')  General Appearance   Normal  Head and Neck   Normal  Lungs     Normal  Cardiovascular   Normal  Abdomen    Abnormal; NCS obese  Musculoskeletal/Extremities  Normal   Lymph Nodes    Normal  Skin     Normal  Neurological    Normal    Tremor (If present document)  Absent    Vitals:  /110; Pt. seemed anxious; I am also not convinced he is taking his medication; stressed importance to take meds and to followup with PCP- (BP Location: Right arm, Patient Position: Chair, Cuff Size: Adult Regular)   Pulse 86   Temp 98  F (36.7  C) (Oral)   Resp 16   Ht 1.68 m (5' 6.14\")   Wt 87.7 kg (193 lb 5.5 oz)   SpO2 91%   BMI 31.07 kg/m    BSA 2.02 m      COVID: No symptoms, chills, shortness of breath, or difficulty breathing, muscle or body aches, headache, loss of taste or smell, sore throat, runny nose, congestion, nausea, vomiting or diarrhea according to the [place] Department of Health and Human Services based on the CARES Act.     A rapid Covid-19 test was performed on site and was negative prior to study enrollment.      COVID Vaccinations:   Immunization History   Administered Date(s) Administered     COVID-19,PF,Pfizer (12+ Yrs) 04/01/2021, 04/23/2021     FLU 6-35 months 10/11/2019, 10/02/2020     Influenza Vaccine IM > 6 months Valent IIV4 (Alfuria,Fluzone) 10/27/2018     TDAP Vaccine (Adacel) 11/20/2018       Smoking History  Are you currently smoking or vaping? No  How Many Years Have You Smoked or Vaped? Never  Packs or E-Cigs Per Day: 0     Electrocardiogram   ECG not applicable as subject is in the respiratory cohort.     Respiratory Conditions:   COPD    Spirometer Test Results (FEV%): 30  Condition Severity: Severe - Stage 3 (FEV 30-49%)      Courtney Elizabeth PA-C      "

## 2022-09-18 ENCOUNTER — HEALTH MAINTENANCE LETTER (OUTPATIENT)
Age: 47
End: 2022-09-18

## 2022-09-19 ENCOUNTER — TELEPHONE (OUTPATIENT)
Dept: CARDIOLOGY | Facility: CLINIC | Age: 47
End: 2022-09-19
Payer: COMMERCIAL

## 2022-09-19 DIAGNOSIS — Z00.6 EXAMINATION OF PARTICIPANT OR CONTROL IN CLINICAL RESEARCH: Primary | ICD-10-CM

## 2022-09-19 PROCEDURE — 99207 PR NO CHARGE NURSE ONLY: CPT

## 2022-09-19 NOTE — TELEPHONE ENCOUNTER
Unscheduled; Oban Study At-Home Phone Visit    Study description:   Multiconditions PPG Sub-Study. The purpose of this research study is to collect data related to health for the development of mobile technologies. This data will include physiological signal recordings from medical devices and smart watch data collection software. This study is not to provide any treatment. This study will only collect information for research and  purposes.       Subject Number: [id number]    Study Day: Day 3    Subject called today to get a few questions answered RE when to complete the surveys, and how long to wear the day/nightwatches. Subject was given proper education.    Oban study subject was called today to;     Confirm subjects are following the subject instructions     Address any technical difficulties     Answer any questions    19-SEP-2022    Florentin Villalobos

## 2022-09-20 ENCOUNTER — ALLIED HEALTH/NURSE VISIT (OUTPATIENT)
Dept: CARDIOLOGY | Facility: CLINIC | Age: 47
End: 2022-09-20
Payer: COMMERCIAL

## 2022-09-20 DIAGNOSIS — Z00.6 EXAMINATION OF PARTICIPANT OR CONTROL IN CLINICAL RESEARCH: Primary | ICD-10-CM

## 2022-09-20 PROCEDURE — 99207 PR NO CHARGE NURSE ONLY: CPT

## 2022-09-20 NOTE — PROGRESS NOTES
Staci Study At-Home On-Site Visit    Study description:   Multiconditions PPG Sub-Study. The purpose of this research study is to collect data related to health for the development of mobile technologies. This data will include physiological signal recordings from medical devices and smart watch data collection software. This study is not to provide any treatment. This study will only collect information for research and  purposes.       Subject Number: [id number]    Study Day: Day 5    Chaim Nroman a 47 year old male, was onsite today to return their first Nonin device and receive their third Nonin devices, per study protocol. With the new device, the subject was reminded to continue following the subject instructions. All questions were answered to their satisfaction.     Nonin ID 3: [id number]     Adverse Events & Con Med Assessment Performed?   [x]     20- SEP-2022    Edil Mcgarry

## 2022-09-23 ENCOUNTER — VIRTUAL VISIT (OUTPATIENT)
Dept: CARDIOLOGY | Facility: CLINIC | Age: 47
End: 2022-09-23
Payer: COMMERCIAL

## 2022-09-23 DIAGNOSIS — Z00.6 EXAMINATION OF PARTICIPANT OR CONTROL IN CLINICAL RESEARCH: Primary | ICD-10-CM

## 2022-09-23 PROCEDURE — 99207 PR NO CHARGE NURSE ONLY: CPT | Mod: 93

## 2022-09-23 NOTE — PROGRESS NOTES
"Oban Study At-Home Phone Visit    Study description:   Multiconditions PPG Sub-Study. The purpose of this research study is to collect data related to health for the development of mobile technologies. This data will include physiological signal recordings from medical devices and smart watch data collection software. This study is not to provide any treatment. This study will only collect information for research and  purposes.       Subject Number: [id number]    Study Day: Day 8     Oban study subject was called today to;     Confirm subjects are following the subject instructions     Address any technical difficulties     Answer any questions    Reminders Checklist:   [x]  Unlock the watches: Passcode - [id number]    -When placing them on the  or on themselves   [x]  Visually confirm Wi-Fi connection and charging setup  [x]  Unlock phones   -Unlock before placing on   -Should NOT show lock icon  [x]  Check Flubber annita for incomplete surveys   -Morning surveys: End of Night task on NIGHT phone    -Evening surveys: End of Day task on DAY phone  [x]  Take devices off before showering/getting them wet  [x]  Make sure to dismiss any update notifications. -Tap \"Not Now\"  [x]  Good Nonin wear    -While resting/relaxing, not while typing doing anything hand intensive   [x]  Remind them of next appointment with us     Adverse Events & Con Med Assessment Performed?   [x]  (If yes, please generate adverse event report for PI to felipe)    Subject reported he switched to his yellow Nonin yesterday. Reviewed what procedures to follow when that oximeter dies before the end of his study. All questions answered.     23-SEP-2022    Florentin Villalobos      "

## 2022-09-26 ENCOUNTER — VIRTUAL VISIT (OUTPATIENT)
Dept: CARDIOLOGY | Facility: CLINIC | Age: 47
End: 2022-09-26
Payer: COMMERCIAL

## 2022-09-26 DIAGNOSIS — Z00.6 EXAMINATION OF PARTICIPANT OR CONTROL IN CLINICAL RESEARCH: Primary | ICD-10-CM

## 2022-09-26 PROCEDURE — 99207 PR NO CHARGE NURSE ONLY: CPT | Mod: 93

## 2022-09-30 ENCOUNTER — ALLIED HEALTH/NURSE VISIT (OUTPATIENT)
Dept: CARDIOLOGY | Facility: CLINIC | Age: 47
End: 2022-09-30
Payer: COMMERCIAL

## 2022-09-30 DIAGNOSIS — Z00.6 EXAMINATION OF PARTICIPANT OR CONTROL IN CLINICAL RESEARCH: Primary | ICD-10-CM

## 2022-09-30 PROCEDURE — 99207 PR NO CHARGE NURSE ONLY: CPT

## 2022-09-30 NOTE — PROGRESS NOTES
Staci At-Home Study End Note    Study Description:   Multiconditions PPG Sub-Study. The purpose of this research study is to collect data related to health for the development of mobile technologies. This data will include physiological signal recordings from medical devices and smart watch data collection software. This study is not to provide any treatment. This study will only collect information for research and  purposes.     Adverse Events & Con Med Assessment Performed?   [x]     Did the Subject Complete the At-Home Session? Yes    If no, Termination Reason: N/A    Study Termination/Completion Date: 30-SEP-2022    Subject returned devices today and this completes this study for the subject.    Edil Mcgarry

## 2022-10-06 ENCOUNTER — ANCILLARY PROCEDURE (OUTPATIENT)
Dept: GENERAL RADIOLOGY | Facility: CLINIC | Age: 47
End: 2022-10-06
Attending: NURSE PRACTITIONER
Payer: COMMERCIAL

## 2022-10-06 ENCOUNTER — OFFICE VISIT (OUTPATIENT)
Dept: FAMILY MEDICINE | Facility: CLINIC | Age: 47
End: 2022-10-06
Payer: COMMERCIAL

## 2022-10-06 VITALS
BODY MASS INDEX: 31.07 KG/M2 | HEIGHT: 66 IN | TEMPERATURE: 97.1 F | DIASTOLIC BLOOD PRESSURE: 97 MMHG | OXYGEN SATURATION: 94 % | SYSTOLIC BLOOD PRESSURE: 139 MMHG | WEIGHT: 193.3 LBS | HEART RATE: 91 BPM

## 2022-10-06 DIAGNOSIS — G47.00 INSOMNIA, UNSPECIFIED TYPE: ICD-10-CM

## 2022-10-06 DIAGNOSIS — M79.672 BILATERAL FOOT PAIN: ICD-10-CM

## 2022-10-06 DIAGNOSIS — M25.512 CHRONIC PAIN OF BOTH SHOULDERS: ICD-10-CM

## 2022-10-06 DIAGNOSIS — M79.671 BILATERAL FOOT PAIN: ICD-10-CM

## 2022-10-06 DIAGNOSIS — M25.511 CHRONIC PAIN OF BOTH SHOULDERS: ICD-10-CM

## 2022-10-06 DIAGNOSIS — G89.29 CHRONIC PAIN OF BOTH SHOULDERS: ICD-10-CM

## 2022-10-06 DIAGNOSIS — Z12.11 SCREEN FOR COLON CANCER: ICD-10-CM

## 2022-10-06 DIAGNOSIS — Z86.11 HISTORY OF TB (TUBERCULOSIS): ICD-10-CM

## 2022-10-06 DIAGNOSIS — M79.2 NEUROPATHIC PAIN: ICD-10-CM

## 2022-10-06 DIAGNOSIS — I10 HYPERTENSION GOAL BP (BLOOD PRESSURE) < 140/80: ICD-10-CM

## 2022-10-06 DIAGNOSIS — Z13.6 CARDIOVASCULAR SCREENING; LDL GOAL LESS THAN 130: ICD-10-CM

## 2022-10-06 DIAGNOSIS — E66.01 MORBID OBESITY (H): ICD-10-CM

## 2022-10-06 DIAGNOSIS — G62.0 DRUG-INDUCED PERIPHERAL NEUROPATHY (H): ICD-10-CM

## 2022-10-06 DIAGNOSIS — R05.9 COUGH, UNSPECIFIED TYPE: Primary | ICD-10-CM

## 2022-10-06 DIAGNOSIS — Z13.1 SCREENING FOR DIABETES MELLITUS: ICD-10-CM

## 2022-10-06 DIAGNOSIS — I27.20 PULMONARY HYPERTENSION (H): ICD-10-CM

## 2022-10-06 DIAGNOSIS — R05.9 COUGH, UNSPECIFIED TYPE: ICD-10-CM

## 2022-10-06 DIAGNOSIS — J44.1 CHRONIC OBSTRUCTIVE PULMONARY DISEASE WITH ACUTE EXACERBATION (H): ICD-10-CM

## 2022-10-06 LAB
ANION GAP SERPL CALCULATED.3IONS-SCNC: 6 MMOL/L (ref 3–14)
BUN SERPL-MCNC: 14 MG/DL (ref 7–30)
CALCIUM SERPL-MCNC: 10 MG/DL (ref 8.5–10.1)
CHLORIDE BLD-SCNC: 101 MMOL/L (ref 94–109)
CHOLEST SERPL-MCNC: 158 MG/DL
CO2 SERPL-SCNC: 30 MMOL/L (ref 20–32)
CREAT SERPL-MCNC: 1.02 MG/DL (ref 0.66–1.25)
CREAT UR-MCNC: 101 MG/DL
ERYTHROCYTE [DISTWIDTH] IN BLOOD BY AUTOMATED COUNT: 11.3 % (ref 10–15)
FASTING STATUS PATIENT QL REPORTED: YES
GFR SERPL CREATININE-BSD FRML MDRD: 90 ML/MIN/1.73M2
GLUCOSE BLD-MCNC: 106 MG/DL (ref 70–99)
HBA1C MFR BLD: 5.6 % (ref 0–5.6)
HCT VFR BLD AUTO: 44.5 % (ref 40–53)
HDLC SERPL-MCNC: 55 MG/DL
HGB BLD-MCNC: 14.5 G/DL (ref 13.3–17.7)
LDLC SERPL CALC-MCNC: 86 MG/DL
MCH RBC QN AUTO: 31.4 PG (ref 26.5–33)
MCHC RBC AUTO-ENTMCNC: 32.6 G/DL (ref 31.5–36.5)
MCV RBC AUTO: 96 FL (ref 78–100)
MICROALBUMIN UR-MCNC: 138 MG/L
MICROALBUMIN/CREAT UR: 136.63 MG/G CR (ref 0–17)
NONHDLC SERPL-MCNC: 103 MG/DL
PLATELET # BLD AUTO: 230 10E3/UL (ref 150–450)
POTASSIUM BLD-SCNC: 3.4 MMOL/L (ref 3.4–5.3)
RBC # BLD AUTO: 4.62 10E6/UL (ref 4.4–5.9)
SODIUM SERPL-SCNC: 137 MMOL/L (ref 133–144)
TRIGL SERPL-MCNC: 83 MG/DL
WBC # BLD AUTO: 5.1 10E3/UL (ref 4–11)

## 2022-10-06 PROCEDURE — 80048 BASIC METABOLIC PNL TOTAL CA: CPT | Performed by: NURSE PRACTITIONER

## 2022-10-06 PROCEDURE — 36415 COLL VENOUS BLD VENIPUNCTURE: CPT | Performed by: NURSE PRACTITIONER

## 2022-10-06 PROCEDURE — 82043 UR ALBUMIN QUANTITATIVE: CPT | Performed by: NURSE PRACTITIONER

## 2022-10-06 PROCEDURE — 80061 LIPID PANEL: CPT | Performed by: NURSE PRACTITIONER

## 2022-10-06 PROCEDURE — 71046 X-RAY EXAM CHEST 2 VIEWS: CPT | Mod: TC | Performed by: RADIOLOGY

## 2022-10-06 PROCEDURE — 90686 IIV4 VACC NO PRSV 0.5 ML IM: CPT | Performed by: NURSE PRACTITIONER

## 2022-10-06 PROCEDURE — 99214 OFFICE O/P EST MOD 30 MIN: CPT | Mod: 25 | Performed by: NURSE PRACTITIONER

## 2022-10-06 PROCEDURE — 85027 COMPLETE CBC AUTOMATED: CPT | Performed by: NURSE PRACTITIONER

## 2022-10-06 PROCEDURE — 90471 IMMUNIZATION ADMIN: CPT | Performed by: NURSE PRACTITIONER

## 2022-10-06 PROCEDURE — 83036 HEMOGLOBIN GLYCOSYLATED A1C: CPT | Performed by: NURSE PRACTITIONER

## 2022-10-06 RX ORDER — AMLODIPINE BESYLATE 5 MG/1
10 TABLET ORAL DAILY
Qty: 180 TABLET | Refills: 0 | Status: SHIPPED | OUTPATIENT
Start: 2022-10-06 | End: 2023-03-20

## 2022-10-06 RX ORDER — GABAPENTIN 300 MG/1
900 CAPSULE ORAL 3 TIMES DAILY
Qty: 270 CAPSULE | Refills: 1 | Status: SHIPPED | OUTPATIENT
Start: 2022-10-06 | End: 2023-03-03

## 2022-10-06 RX ORDER — PREDNISONE 20 MG/1
40 TABLET ORAL DAILY
Qty: 10 TABLET | Refills: 0 | Status: SHIPPED | OUTPATIENT
Start: 2022-10-06 | End: 2022-10-11

## 2022-10-06 RX ORDER — BUDESONIDE AND FORMOTEROL FUMARATE DIHYDRATE 160; 4.5 UG/1; UG/1
2 AEROSOL RESPIRATORY (INHALATION) 2 TIMES DAILY
Qty: 10.2 G | Refills: 3 | Status: SHIPPED | OUTPATIENT
Start: 2022-10-06 | End: 2023-08-11

## 2022-10-06 RX ORDER — DULOXETIN HYDROCHLORIDE 30 MG/1
30 CAPSULE, DELAYED RELEASE ORAL DAILY
Qty: 90 CAPSULE | Refills: 1 | Status: SHIPPED | OUTPATIENT
Start: 2022-10-06 | End: 2023-03-03

## 2022-10-06 RX ORDER — LISINOPRIL 10 MG/1
10 TABLET ORAL DAILY
Qty: 30 TABLET | Refills: 1 | Status: SHIPPED | OUTPATIENT
Start: 2022-10-06 | End: 2023-01-17

## 2022-10-06 RX ORDER — DULOXETIN HYDROCHLORIDE 60 MG/1
60 CAPSULE, DELAYED RELEASE ORAL DAILY
Qty: 90 CAPSULE | Refills: 1 | Status: SHIPPED | OUTPATIENT
Start: 2022-10-06 | End: 2023-08-11

## 2022-10-06 RX ORDER — NORTRIPTYLINE HCL 10 MG
10-20 CAPSULE ORAL AT BEDTIME
Qty: 60 CAPSULE | Refills: 1 | Status: SHIPPED | OUTPATIENT
Start: 2022-10-06 | End: 2023-08-11

## 2022-10-06 ASSESSMENT — PAIN SCALES - GENERAL: PAINLEVEL: NO PAIN (0)

## 2022-10-06 NOTE — PROGRESS NOTES
Pulse Oximeter for home use DME faxed to [facility] [phone] on 10/6/22 at 11:20am

## 2022-10-06 NOTE — PROGRESS NOTES
Assessment & Plan     Cough, unspecified type  Ongoing x2 months this episode. Chest x-ray below. Will treat with prednisone 40 mg daily x5 days.  - CBC with platelets; Future  - XR Chest 2 Views; Future  - CBC with platelets  - Primary Care - Care Coordination Referral; Future    Chronic obstructive pulmonary disease, unspecified COPD type (H)  Has not been taking Symbicort - discussed importance of taking as prescribed. Using albuterol 2-3 times per day. Hasn't followed up with pulmonology because he thought it was only for lung transplant. Referral placed for care coordination as well. He needs help making appointments. Has several no shows d/t transportation. We previously filled out [employer] paperwork but he states he never heard back so hasn't been able to use the service.  - budesonide-formoterol (SYMBICORT) 160-4.5 MCG/ACT Inhaler; Inhale 2 puffs into the lungs 2 times daily  - Primary Care - Care Coordination Referral; Future  - Miscellaneous Order for DME - ONLY FOR DME    Hypertension goal BP (blood pressure) < 140/80  Uncontrolled. Continue amlodipine. Adding lisinopril. Low salt diet. Follow up 1 month.  - amLODIPine (NORVASC) 5 MG tablet; Take 2 tablets (10 mg) by mouth daily  - Basic metabolic panel  (Ca, Cl, CO2, Creat, Gluc, K, Na, BUN); Future  - Albumin Random Urine Quantitative with Creat Ratio; Future  - lisinopril (ZESTRIL) 10 MG tablet; Take 1 tablet (10 mg) by mouth daily  - Albumin Random Urine Quantitative with Creat Ratio  - Basic metabolic panel  (Ca, Cl, CO2, Creat, Gluc, K, Na, BUN)    Neuropathic pain  Refilled.  - DULoxetine (CYMBALTA) 30 MG capsule; Take 1 capsule (30 mg) by mouth daily Take with 60mg to equal 90mg/day. 3 month script  - DULoxetine (CYMBALTA) 60 MG capsule; Take 1 capsule (60 mg) by mouth daily Take with 30mg to equal 90mg/day. 3 month script  - nortriptyline (PAMELOR) 10 MG capsule; Take 1-2 capsules (10-20 mg) by mouth At Bedtime For pain and  "insomnia.    Bilateral foot pain  Refilled.  - DULoxetine (CYMBALTA) 30 MG capsule; Take 1 capsule (30 mg) by mouth daily Take with 60mg to equal 90mg/day. 3 month script  - DULoxetine (CYMBALTA) 60 MG capsule; Take 1 capsule (60 mg) by mouth daily Take with 30mg to equal 90mg/day. 3 month script  - nortriptyline (PAMELOR) 10 MG capsule; Take 1-2 capsules (10-20 mg) by mouth At Bedtime For pain and insomnia.    Drug-induced peripheral neuropathy (H)  Stable.  - gabapentin (NEURONTIN) 300 MG capsule; Take 3 capsules (900 mg) by mouth 3 times daily    Chronic pain of both shoulders  Refilled. Stable.  - nortriptyline (PAMELOR) 10 MG capsule; Take 1-2 capsules (10-20 mg) by mouth At Bedtime For pain and insomnia.    Insomnia, unspecified type  Refilled. Stable.  - nortriptyline (PAMELOR) 10 MG capsule; Take 1-2 capsules (10-20 mg) by mouth At Bedtime For pain and insomnia.    Screen for colon cancer  - COLOGUARD([employer])    CARDIOVASCULAR SCREENING; LDL GOAL LESS THAN 130  - Lipid panel reflex to direct LDL Fasting; Future  - Lipid panel reflex to direct LDL Fasting    Screening for diabetes mellitus  - Hemoglobin A1c; Future  - Hemoglobin A1c    Pulmonary hypertension (H)  Follows with cardiology/pulmonology.    Morbid obesity (H)  contributing to HTN. Diet/exercise discussed.    History of TB (tuberculosis)  - Primary Care - Care Coordination Referral; Future             BMI:   Estimated body mass index is 31.2 kg/m  as calculated from the following:    Height as of this encounter: 1.676 m (5' 6\").    Weight as of this encounter: 87.7 kg (193 lb 4.8 oz).       See Patient Instructions    Return in about 4 weeks (around 11/3/2022).     The benefits, risks and potential side effects were discussed in detail. Black box warnings discussed as relevant. All patient questions were answered. The patient was instructed to follow up immediately if any adverse reactions develop.    Return precautions discussed, including " when to seek urgent/emergent care.    Patient verbalizes understanding and agrees with plan of care. Patient stable for discharge.      CHAZ HADDAD CNP  [facility] is a 47 year old, presenting for the following health issues:  Cough and Shortness of Breath      History of Present Illness     Asthma:  He presents for follow up of asthma.  He has some cough, some wheezing, and some shortness of breath. He is using a relief medication 2-3 times per day. He does not have a controller medication. Patient is aware of the following triggers: cold air, emotions and exercise or sports. The patient has not had a visit to the Emergency Room, Urgent Care or Hospital due to asthma since the last clinic visit.     COPD:  He presents for follow up of COPD.  Overall, COPD symptoms are stable since last visit. He has a lot more than usual fatigue or shortness of breath with exertion and same as usual shortness of breath at rest.  He constantly coughs and does not have change in sputum. No recent fever. He can walk the length of 1-2 rooms without stopping to rest. He can walk 1 flights of stairs without resting.The patient has had no ED, urgent care, or hospital admissions because of COPD since the last visit.     He eats 0-1 servings of fruits and vegetables daily.He consumes 1 sweetened beverage(s) daily.He exercises with enough effort to increase his heart rate 9 or less minutes per day.  He exercises with enough effort to increase his heart rate 3 or less days per week.   He is taking medications regularly.       Concern - Cough and SOB  Onset: over 2 months now  Description: Cough- comes and goes. SOB with stairs and walking around  Intensity: moderate  Progression of Symptoms:  same  Accompanying Signs & Symptoms: NA  Previous history of similar problem: No  Precipitating factors:        Worsened by: movement, walking, stairs, etc    Alleviating factors:         "Improved by: sitting and relaxing  Therapies tried and outcome:  inhaler        Pleasant 47 year old male presents with concerns for cough and shortness of breath for about 2 months.   We went through his meds today. Although he reports he is taking each of them we later discovered that he has not been taking Symbicort. He has also not followed up with pulmonology because he thought it meant he had to have a lung transplant and he's not sure how he feels about that because someone told him he would only live 7 years at maximum. He sometimes uses oxygen at night but hasn't for at least a couple of months. Gets short of breath with activity. Denies chest pain and LE swelling. He reports his breathing is at his baseline. He does not have an oximeter at home.  He has trouble getting to appointments because of ride issues. In 2020 we filled out [employer] paperwork but patient reports he never heard back so has not been able to use it.     Review of Systems   Constitutional, HEENT, cardiovascular, pulmonary, GI, , musculoskeletal, neuro, skin, endocrine and psych systems are negative, except as otherwise noted.      Objective    BP (!) 139/97 (BP Location: Left arm, Patient Position: Sitting, Cuff Size: Adult Large)   Pulse 91   Temp 97.1  F (36.2  C) (Tympanic)   Ht 1.676 m (5' 6\")   Wt 87.7 kg (193 lb 4.8 oz)   SpO2 94%   BMI 31.20 kg/m    Body mass index is 31.2 kg/m .  Physical Exam   GENERAL: healthy, alert and no distress  EYES: Eyes grossly normal to inspection, PERRL and conjunctivae and sclerae normal  HENT: ear canals and TM's normal, nose and mouth without ulcers or lesions  NECK: no adenopathy  RESP: decreased throughout with some mild exp wheezing RLL  CV: regular rate and rhythm, normal S1 S2, no S3 or S4, no murmur, click or rub, no peripheral edema and peripheral pulses strong  MS: no gross musculoskeletal defects noted, no edema  PSYCH: mentation appears normal, affect " normal/bright    Results for orders placed or performed in visit on 10/06/22   XR Chest 2 Views     Status: None    Narrative    CHEST TWO VIEWS  10/6/2022 7:47 AM     HISTORY: 47-year-old patient with history of cough, shortness of  breath and history of TB.      Impression    IMPRESSION: Since August 3, 2021, diffuse opacification and  irregularity throughout the right lung with associated volume loss is  again identified. There may be slight greater degree of opacity in the  lung base, although right lung is diffusely and considerably abnormal.  Patchy opacities in the left lung also noted, fairly comparable in  appearance. No obvious pleural effusion or pneumothorax.    SNEHAL DOBBINS MD         SYSTEM ID:  [id number]   Results for orders placed or performed in visit on 10/06/22   CBC with platelets     Status: Normal   Result Value Ref Range    WBC Count 5.1 4.0 - 11.0 10e3/uL    RBC Count 4.62 4.40 - 5.90 10e6/uL    Hemoglobin 14.5 13.3 - 17.7 g/dL    Hematocrit 44.5 40.0 - 53.0 %    MCV 96 78 - 100 fL    MCH 31.4 26.5 - 33.0 pg    MCHC 32.6 31.5 - 36.5 g/dL    RDW 11.3 10.0 - 15.0 %    Platelet Count 230 150 - 450 10e3/uL   Hemoglobin A1c     Status: Normal   Result Value Ref Range    Hemoglobin A1C 5.6 0.0 - 5.6 %

## 2022-10-10 ENCOUNTER — TELEPHONE (OUTPATIENT)
Dept: TRANSPLANT | Facility: CLINIC | Age: 47
End: 2022-10-10

## 2022-10-10 ENCOUNTER — PATIENT OUTREACH (OUTPATIENT)
Dept: CARE COORDINATION | Facility: CLINIC | Age: 47
End: 2022-10-10

## 2022-10-10 NOTE — PROGRESS NOTES
[facility]/Voicemail       Clinical Data: Care Coordinator Outreach  Outreach attempted x 1.  Left message on patient's voicemail with call back information and requested return call.  Plan:   Care Coordinator will try to reach patient again in 1-2 business days.

## 2022-10-10 NOTE — LETTER
[facility] CARE COORDINATION  [address]  [facility] [address]    October 12, 2022    Chaim Norman  [address]   [place] [facility] [address]      Dear Chaim,        I am a clinic community health worker who works with CHAZ HADDAD CNP with the [facility]. I have been trying to reach you recently to introduce Clinic Care Coordination. Below is a description of clinic care coordination and how I can further assist you.       The clinic care coordination team is made up of a registered nurse, , financial resource worker and community health worker who understand the health care system. The goal of clinic care coordination is to help you manage your health and improve access to the health care system. Our team works alongside your provider to assist you in determining your health and social needs. We can help you obtain health care and community resources, providing you with necessary information and education. We can work with you through any barriers and develop a care plan that helps coordinate and strengthen the communication between you and your care team.    Please feel free to contact me with any questions or concerns regarding care coordination and what we can offer.      We are focused on providing you with the highest-quality healthcare experience possible.    Sincerely,     CRISTINA Mcelroy  [place], [place], [place], [place] and [facility]  [phone]

## 2022-10-13 NOTE — TELEPHONE ENCOUNTER
Patient is not a candidate for lung transplant, does not have adequate social support and BMI remains elevated. Patient noted his primary care MD hogue he should see a lung doctor for his COPD.    Plan made to send message to general pulmonary to call patient to schedule an appointment.  Provided patient with general scheduling number at [facility] in the event he does not hear from scheduling in next few days.     Patient agrees with plan.

## 2022-10-20 LAB — NONINV COLON CA DNA+OCC BLD SCRN STL QL: NEGATIVE

## 2022-10-21 ENCOUNTER — TELEPHONE (OUTPATIENT)
Dept: FAMILY MEDICINE | Facility: CLINIC | Age: 47
End: 2022-10-21

## 2022-10-21 NOTE — TELEPHONE ENCOUNTER
Patient Quality Outreach    Patient is due for the following:   Hypertension -  BP check    Next Steps:   Patient has upcoming appointment, these items will be addressed at that time.    Type of outreach:    Chart review performed, no outreach needed.      Questions for provider review:    Ana Herron      Patient Quality Outreach    Patient is due for the following:   Hypertension -  BP check    Next Steps:   Patient was scheduled for BP Check    Type of outreach:    Phone, spoke to patient/parent. Scheduled BP Check      Questions for provider review:    Ana Herron

## 2022-12-22 ENCOUNTER — TELEPHONE (OUTPATIENT)
Dept: PULMONOLOGY | Facility: CLINIC | Age: 47
End: 2022-12-22

## 2022-12-22 NOTE — TELEPHONE ENCOUNTER
Called patient to see if he wanted to move up his 1/20/2023 appt since Dr. Mcclure has some openings tomorrow. If patient calls back, please schedule for PFT and office appointment. Gave patient call back number.    Shirley Solis, MARBELLA

## 2023-01-17 DIAGNOSIS — I10 HYPERTENSION GOAL BP (BLOOD PRESSURE) < 140/80: ICD-10-CM

## 2023-01-17 RX ORDER — LISINOPRIL 10 MG/1
10 TABLET ORAL DAILY
Qty: 30 TABLET | Refills: 1 | Status: SHIPPED | OUTPATIENT
Start: 2023-01-17 | End: 2023-08-11

## 2023-01-20 ENCOUNTER — TELEPHONE (OUTPATIENT)
Dept: PULMONOLOGY | Facility: CLINIC | Age: 48
End: 2023-01-20

## 2023-01-20 NOTE — TELEPHONE ENCOUNTER
LVM about rescheduling with Dr. Mcclure on 02/06 at 12:30pm. Will try again to check it is okay.  If not he will call back at [phone]

## 2023-01-27 ENCOUNTER — ALLIED HEALTH/NURSE VISIT (OUTPATIENT)
Dept: FAMILY MEDICINE | Facility: CLINIC | Age: 48
End: 2023-01-27
Payer: COMMERCIAL

## 2023-01-27 VITALS — DIASTOLIC BLOOD PRESSURE: 68 MMHG | SYSTOLIC BLOOD PRESSURE: 134 MMHG

## 2023-01-27 DIAGNOSIS — Z01.30 BP CHECK: Primary | ICD-10-CM

## 2023-01-27 PROCEDURE — 99207 PR NO CHARGE NURSE ONLY: CPT

## 2023-01-27 NOTE — PROGRESS NOTES
I met with Chaim Norman at the request of Oralia to recheck his blood pressure.  Blood pressure medications on the med list were reviewed with patient.    Patient has taken all medications as per usual regimen: Yes  Patient reports tolerating them without any issues or concerns: No    Vitals:    01/27/23 0947   BP: 134/68   BP Location: Right arm   Patient Position: Sitting   Cuff Size: Adult Large       Blood pressure was taken, previous encounter was reviewed, recorded blood pressure below 140/90.  Patient was discharged and the note will be sent to the provider for final review.

## 2023-01-28 ENCOUNTER — HEALTH MAINTENANCE LETTER (OUTPATIENT)
Age: 48
End: 2023-01-28

## 2023-03-03 DIAGNOSIS — M79.2 NEUROPATHIC PAIN: ICD-10-CM

## 2023-03-03 DIAGNOSIS — M79.672 BILATERAL FOOT PAIN: ICD-10-CM

## 2023-03-03 DIAGNOSIS — G62.0 DRUG-INDUCED PERIPHERAL NEUROPATHY (H): ICD-10-CM

## 2023-03-03 DIAGNOSIS — M79.671 BILATERAL FOOT PAIN: ICD-10-CM

## 2023-03-03 RX ORDER — DULOXETIN HYDROCHLORIDE 30 MG/1
30 CAPSULE, DELAYED RELEASE ORAL DAILY
Qty: 90 CAPSULE | Refills: 1 | Status: SHIPPED | OUTPATIENT
Start: 2023-03-03 | End: 2023-08-11

## 2023-03-03 RX ORDER — GABAPENTIN 300 MG/1
900 CAPSULE ORAL 3 TIMES DAILY
Qty: 270 CAPSULE | Refills: 1 | Status: SHIPPED | OUTPATIENT
Start: 2023-03-03 | End: 2023-08-11

## 2023-03-03 RX ORDER — ACETAMINOPHEN 325 MG/1
650 TABLET ORAL EVERY 6 HOURS PRN
Qty: 180 TABLET | Refills: 1 | Status: SHIPPED | OUTPATIENT
Start: 2023-03-03 | End: 2023-08-11

## 2023-05-02 NOTE — PROGRESS NOTES
Pulmonary Clinic Note    Date of Service: 05/03/23    Chief Complaint   Patient presents with     New Patient     Chronic obstructive pulmonary disease, unspecified COPD type (H)  Shortness of breath  History of TB (tuberculosis)           A/P:  1. Chronic respiratory failure with hypoxia (H)  Continued TUBBS with minimal activity, cough with sputum production. When patient arrive in clinic today, O2 sat were low, with improvement on O2. 6-minute walk test with desaturation with hypoxemia with O2 2L at rest and 4L with activity. PFTs of severe obstruction, Moderate restriction, and mild diffusion reduction. Home regime of albuterol 2 puffs BID daily. With underlying fibrosis and obstruction/restriction, respiratory symptoms not controlled. Plan for improvement in respiratory function with home O2, referral to care coordinator for resource utilization, pulmonary rehab, and start of daily maintaince inhaler of Breztri ([id number]). Patient to follow up in clinic in 3 months or sooner if symptoms were to worsen or problems arise.   - Primary Care - Care Coordination Referral; Future  - budeson-glycopyrrol-formoterol (BREZTRI AEROSPHERE) 160-9-4.8 MCG/ACT AERO inhaler; Inhale 2 puffs into the lungs 2 times daily  Dispense: 10.7 g; Refill: 3  - Home Oxygen Order for DME - ONLY FOR DME  - Clinic follow up    2. Chronic bronchitis w/ bronchiectasis   PFTs with severe obstruction and respiratory symptoms of wheezing, TUBBS, SOB, and cough with sputum production. Underlying etiology likely multifactorial with exisiting lung disease post MDR TB, fibrosis, pulmonary HTN, and COPD. Plan for symptom management with [id number] and improving exercise tolerance with pulmonary rehab. Follow up as indicated above.   - OK to substitute medications based on formulary     History:  47M pHTN, MDR Tb (s/p tx), chronic hypoxemic respiratory failure (2L) being seen for asthma/COPD. He was admitted to the hospital 3/26-4/4 for acute on  chronic hypoxemic hypercapnic respiratory failure 2/2 CAP. He is prescribed ICS-LABA (Symbicort) and prn albuterol.      Smoking: No  Hot tub exposure: No       Recent travel: No                   Hx of incarceration: No      Bird exposure: No             Animal exposure: No        Inhalation exposure: No    Occupation: No, previously worked 5 years                          Patient presents today with desire to improve his respiratory status/ change to his plan that works better for his life. At rest he denies SOB, but is TUBBS with minimal activity such as ambulation to different rooms or dressing. Frequent cough with sputum production- white sputum. He was diagnosed with asthma~2021.   Was in the hospital for around 10 days for hypoxemic/hypercapnic respiratory failure. He takes albuterol BID, denies taking or having the Symbicort. He denies heartburn symptoms. Describes wheezing that occurs a couple times a week sporadically.The coughing/SOB does wake him up in the middle of the night. Denies LE edema or JVD. Denies reprot of snoring, lives with his son at home. Biggest concern is the SOB and not being able to do what he wants to do.   He reports that someone called him about a lung transplant and told him they only last ~7 years.   O2 tank not working at home.      10 point review of systems negative, aside from that mentioned in HPI.    BP (!) 150/98   Pulse 102   Resp 18   Wt 83 kg (183 lb)   SpO2 96%   BMI 29.54 kg/m    Gen: well-appearing, in no acute distress  HEENT:  Neck supple, trachea midline, no lymphadenopathy  Card: RRR, S1, S2 no murmur, rub, or ectopy. Evidence of clubbing in distal extremities. Mucus membranes pink/moist.  Pulm: Chest symmetrical, Lung sounds diminished RLL, with  MAITE/LLL coarse crackles  Abd: soft  MSK: no edema, no acute joint abnormality   Skin: no obvious rash or lesions  Psych: normal affect  Neuro: alert and oriented, gait steady    Labs:  Personally  reviewed    Imaging/Studies: Personally reviewed  PFTs (10/2021) - very severe obstruction w/ restrictive defect, mild reduction in DLCOunc  CT Chest (8/2021) - unchanged destructive changes on the R and L hemithoraces    RHC (5/2020) - PA 53/29/40    Past Medical History:   Diagnosis Date     Anemia      Arthritis      COPD (chronic obstructive pulmonary disease) (H)      History of blood transfusion 2019    [facility]     Neutropenia (H)      Pulmonary hypertension (H)      TB (tuberculosis), treated      Tuberculosis     currently being treated     Past Surgical History:   Procedure Laterality Date     BIOPSY  2018    Lung- [facility]     CORONARY ANGIOGRAPHY ADULT ORDER  05/2020    [facility]     CV PULMONARY ANGIOGRAM N/A 5/8/2020    Procedure: Pulmonary Angiogram;  Surgeon: Keenan Perez MD;  [facility]     CV RIGHT HEART CATH MEASUREMENTS RECORDED N/A 5/8/2020    Procedure: CV RIGHT HEART CATH;  Surgeon: Keenan Perez MD;  Location:  [facility] C[facility]     ENDOSCOPY       GI SURGERY  12/2019    Upper GI     Family History   Family history unknown: Yes     Social History     Socioeconomic History     Marital status:      Spouse name: Not on file     Number of children: 1     Years of education: Not on file     Highest education level: Not on file   Occupational History     Not on file   Tobacco Use     Smoking status: Never     Smokeless tobacco: Never   Vaping Use     Vaping status: Never Used     Passive vaping exposure: Yes   Substance and Sexual Activity     Alcohol use: No     Drug use: No     Sexual activity: Yes     Partners: Female     Birth control/protection: Condom   Other Topics Concern     Parent/sibling w/ CABG, MI or angioplasty before 65F 55M? Not Asked   Social History Narrative     Not on file     Social Determinants of Health     Financial Resource Strain: High Risk (9/3/2020)    Overall Financial Resource Strain (CARDIA)       Difficulty of Paying Living Expenses: Hard   Food Insecurity: Food Insecurity Present (9/3/2020)    Hunger Vital Sign      Worried About Running Out of Food in the Last Year: Sometimes true      Ran Out of Food in the Last Year: Not on file   Transportation Needs: No Transportation Needs (9/3/2020)    PRAPARE - Transportation      Lack of Transportation (Medical): No      Lack of Transportation (Non-Medical): No   Physical Activity: Not on file   Stress: Not on file   Social Connections: Not on file   Intimate Partner Violence: Not on file   Housing Stability: Not on file       50 minutes spent reviewing chart, reviewing test results, talking with and examining patient, formulating plan, and documentation on the day of the encounter.    Beck Mcclure MD  Pulmonary and Critical Care Medicine  [facility]     I certify that this patient, Chaim Norman has been under my care (or a nurse practitioner or physican's assistant working with me). This is the face-to-face encounter for oxygen medical necessity.      At the time of this encounter supplemental oxygen is reasonable and necessary and is expected to improve the patient's condition in a home setting.       Patient has continued oxygen desaturation due to Chronic Respiratory Failure with Hypoxia J96.11  severe persistent asthma, fibrotic lung disease.    If portability is ordered, is the patient mobile within the home? yes

## 2023-05-03 ENCOUNTER — PATIENT OUTREACH (OUTPATIENT)
Dept: CARE COORDINATION | Facility: CLINIC | Age: 48
End: 2023-05-03

## 2023-05-03 ENCOUNTER — ALLIED HEALTH/NURSE VISIT (OUTPATIENT)
Dept: PULMONOLOGY | Facility: CLINIC | Age: 48
End: 2023-05-03
Payer: COMMERCIAL

## 2023-05-03 ENCOUNTER — OFFICE VISIT (OUTPATIENT)
Dept: PULMONOLOGY | Facility: CLINIC | Age: 48
End: 2023-05-03
Payer: COMMERCIAL

## 2023-05-03 VITALS
HEART RATE: 102 BPM | WEIGHT: 183 LBS | DIASTOLIC BLOOD PRESSURE: 98 MMHG | SYSTOLIC BLOOD PRESSURE: 150 MMHG | RESPIRATION RATE: 18 BRPM | BODY MASS INDEX: 29.54 KG/M2 | OXYGEN SATURATION: 96 %

## 2023-05-03 DIAGNOSIS — R06.02 SHORTNESS OF BREATH: Primary | ICD-10-CM

## 2023-05-03 DIAGNOSIS — J42 CHRONIC BRONCHITIS, UNSPECIFIED CHRONIC BRONCHITIS TYPE (H): ICD-10-CM

## 2023-05-03 DIAGNOSIS — J96.11 CHRONIC RESPIRATORY FAILURE WITH HYPOXIA (H): Primary | ICD-10-CM

## 2023-05-03 DIAGNOSIS — J47.9 BRONCHIECTASIS WITHOUT COMPLICATION (H): ICD-10-CM

## 2023-05-03 LAB
6 MIN WALK (FT): 500 FT
6 MIN WALK (M): 152 M

## 2023-05-03 PROCEDURE — 94729 DIFFUSING CAPACITY: CPT | Performed by: INTERNAL MEDICINE

## 2023-05-03 PROCEDURE — 94375 RESPIRATORY FLOW VOLUME LOOP: CPT | Performed by: INTERNAL MEDICINE

## 2023-05-03 PROCEDURE — 94727 GAS DIL/WSHOT DETER LNG VOL: CPT | Performed by: INTERNAL MEDICINE

## 2023-05-03 PROCEDURE — 94618 PULMONARY STRESS TESTING: CPT | Performed by: INTERNAL MEDICINE

## 2023-05-03 PROCEDURE — 99204 OFFICE O/P NEW MOD 45 MIN: CPT | Performed by: STUDENT IN AN ORGANIZED HEALTH CARE EDUCATION/TRAINING PROGRAM

## 2023-05-03 RX ORDER — BUDESONIDE, GLYCOPYRROLATE, AND FORMOTEROL FUMARATE 160; 9; 4.8 UG/1; UG/1; UG/1
2 AEROSOL, METERED RESPIRATORY (INHALATION) 2 TIMES DAILY
Qty: 10.7 G | Refills: 3 | Status: SHIPPED | OUTPATIENT
Start: 2023-05-03 | End: 2023-08-11

## 2023-05-03 NOTE — PROGRESS NOTES
Chaim Norman was seen in clinic today for a PFT and six minute walk ordered by Dr. Mcclure. No complications noted.

## 2023-05-03 NOTE — NURSING NOTE
Chief Complaint   Patient presents with     New Patient     Chronic obstructive pulmonary disease, unspecified COPD type (H)  Shortness of breath  History of TB (tuberculosis)           Vitals:    05/03/23 0931   BP: (!) 153/99   BP Location: Left arm   Patient Position: Sitting   Cuff Size: Adult Regular   Pulse: 102   Resp: 18   SpO2: 96%   Weight: 83 kg (183 lb)       Body mass index is 29.54 kg/m .

## 2023-05-03 NOTE — PROGRESS NOTES
[facility]/Voicemail       Clinical Data: Care Coordinator Outreach  Outreach attempted x 1.  Left message on patient's voicemail with call back information and requested return call.  Plan:   Care Coordinator will try to reach patient again in 1-2 business days.    CRISTINA Lau [place], [place] and [facility]  [phone]

## 2023-05-03 NOTE — PATIENT INSTRUCTIONS
Start Breztri twice daily, rinse your mouth out after use. Continue as needed albuterol for worsening shortness of breath.     I have referred you to pulmonary rehab.

## 2023-05-03 NOTE — LETTER
5/3/2023         RE: Chaim Norman  [address]  [place] [address]        Dear Colleague,    Thank you for referring your patient, Chaim Norman, to the [facility] [place]. Please see a copy of my visit note below.    Pulmonary Clinic Note    Date of Service: 05/03/23    Chief Complaint   Patient presents with    New Patient     Chronic obstructive pulmonary disease, unspecified COPD type (H)  Shortness of breath  History of TB (tuberculosis)           A/P:  1. Chronic respiratory failure with hypoxia (H)  Continued TUBBS with minimal activity, cough with sputum production. When patient arrive in clinic today, O2 sat were low, with improvement on O2. 6-minute walk test with desaturation with hypoxemia with O2 2L at rest and 4L with activity. PFTs of severe obstruction, Moderate restriction, and mild diffusion reduction. Home regime of albuterol 2 puffs BID daily. With underlying fibrosis and obstruction/restriction, respiratory symptoms not controlled. Plan for improvement in respiratory function with home O2, referral to care coordinator for resource utilization, pulmonary rehab, and start of daily maintaince inhaler of Breztri (AZA-[id number]-VBXN). Patient to follow up in clinic in 3 months or sooner if symptoms were to worsen or problems arise.   - Primary Care - Care Coordination Referral; Future  - budeson-glycopyrrol-formoterol (BREZTRI AEROSPHERE) 160-9-4.8 MCG/ACT AERO inhaler; Inhale 2 puffs into the lungs 2 times daily  Dispense: 10.7 g; Refill: 3  - Home Oxygen Order for DME - ONLY FOR DME  - Clinic follow up    2. Chronic bronchitis w/ bronchiectasis   PFTs with severe obstruction and respiratory symptoms of wheezing, TUBBS, SOB, and cough with sputum production. Underlying etiology likely multifactorial with exisiting lung disease post MDR TB, fibrosis, pulmonary HTN, and COPD. Plan for symptom management with [id number] and improving exercise tolerance with pulmonary  rehab. Follow up as indicated above.   - OK to substitute medications based on formulary     History:  47M pHTN, MDR Tb (s/p tx), chronic hypoxemic respiratory failure (2L) being seen for asthma/COPD. He was admitted to the hospital 3/26-4/4 for acute on chronic hypoxemic hypercapnic respiratory failure 2/2 CAP. He is prescribed ICS-LABA (Symbicort) and prn albuterol.      Smoking: No  Hot tub exposure: No       Recent travel: No                   Hx of incarceration: No      Bird exposure: No             Animal exposure: No        Inhalation exposure: No    Occupation: No, previously worked 5 years                          Patient presents today with desire to improve his respiratory status/ change to his plan that works better for his life. At rest he denies SOB, but is TUBBS with minimal activity such as ambulation to different rooms or dressing. Frequent cough with sputum production- white sputum. He was diagnosed with asthma~2021.   Was in the hospital for around 10 days for hypoxemic/hypercapnic respiratory failure. He takes albuterol BID, denies taking or having the Symbicort. He denies heartburn symptoms. Describes wheezing that occurs a couple times a week sporadically.The coughing/SOB does wake him up in the middle of the night. Denies LE edema or JVD. Denies reprot of snoring, lives with his son at home. Biggest concern is the SOB and not being able to do what he wants to do.   He reports that someone called him about a lung transplant and told him they only last ~7 years.   O2 tank not working at home.      10 point review of systems negative, aside from that mentioned in HPI.    BP (!) 150/98   Pulse 102   Resp 18   Wt 83 kg (183 lb)   SpO2 96%   BMI 29.54 kg/m    Gen: well-appearing, in no acute distress  HEENT:  Neck supple, trachea midline, no lymphadenopathy  Card: RRR, S1, S2 no murmur, rub, or ectopy. Evidence of clubbing in distal extremities. Mucus membranes pink/moist.  Pulm: Chest  symmetrical, Lung sounds diminished RLL, with  MAITE/LLL coarse crackles  Abd: soft  MSK: no edema, no acute joint abnormality   Skin: no obvious rash or lesions  Psych: normal affect  Neuro: alert and oriented, gait steady    Labs:  Personally reviewed    Imaging/Studies: Personally reviewed  PFTs (10/2021) - very severe obstruction w/ restrictive defect, mild reduction in DLCOunc  CT Chest (8/2021) - unchanged destructive changes on the R and L hemithoraces    RHC (5/2020) - PA 53/29/40    Past Medical History:   Diagnosis Date    Anemia     Arthritis     COPD (chronic obstructive pulmonary disease) (H)     History of blood transfusion 2019    [facility]    Neutropenia (H)     Pulmonary hypertension (H)     TB (tuberculosis), treated     Tuberculosis     currently being treated     Past Surgical History:   Procedure Laterality Date    BIOPSY  2018    Lung- [facility]    CORONARY ANGIOGRAPHY ADULT ORDER  05/2020    [facility]    CV PULMONARY ANGIOGRAM N/A 5/8/2020    Procedure: Pulmonary Angiogram;  Surgeon: Keenan Perez MD;  [facility]    CV RIGHT HEART CATH MEASUREMENTS RECORDED N/A 5/8/2020    Procedure: CV RIGHT HEART CATH;  Surgeon: Keenan Perez MD;  Location:  [facility] CARD[facility] CATH LAB    ENDOSCOPY      GI SURGERY  12/2019    Upper GI     Family History   Family history unknown: Yes     Social History     Socioeconomic History    Marital status:      Spouse name: Not on file    Number of children: 1    Years of education: Not on file    Highest education level: Not on file   Occupational History    Not on file   Tobacco Use    Smoking status: Never    Smokeless tobacco: Never   Vaping Use    Vaping status: Never Used     Passive vaping exposure: Yes   Substance and Sexual Activity    Alcohol use: No    Drug use: No    Sexual activity: Yes     Partners: Female     Birth control/protection: Condom   Other Topics Concern    Parent/sibling w/ CABG, MI or  angioplasty before 65F 55M? Not Asked   Social History Narrative    Not on file     Social Determinants of Health     Financial Resource Strain: High Risk (9/3/2020)    Overall Financial Resource Strain (CARDIA)     Difficulty of Paying Living Expenses: Hard   Food Insecurity: Food Insecurity Present (9/3/2020)    Hunger Vital Sign     Worried About Running Out of Food in the Last Year: Sometimes true     Ran Out of Food in the Last Year: Not on file   Transportation Needs: No Transportation Needs (9/3/2020)    PRAPARE - Transportation     Lack of Transportation (Medical): No     Lack of Transportation (Non-Medical): No   Physical Activity: Not on file   Stress: Not on file   Social Connections: Not on file   Intimate Partner Violence: Not on file   Housing Stability: Not on file       50 minutes spent reviewing chart, reviewing test results, talking with and examining patient, formulating plan, and documentation on the day of the encounter.    Beck Mcclure MD  Pulmonary and Critical Care Medicine  [facility]     I certify that this patient, Chaim Norman has been under my care (or a nurse practitioner or physican's assistant working with me). This is the face-to-face encounter for oxygen medical necessity.      At the time of this encounter supplemental oxygen is reasonable and necessary and is expected to improve the patient's condition in a home setting.       Patient has continued oxygen desaturation due to Chronic Respiratory Failure with Hypoxia J96.11  severe persistent asthma, fibrotic lung disease.    If portability is ordered, is the patient mobile within the home? yes              Again, thank you for allowing me to participate in the care of your patient.        Sincerely,        Beck Mcclure MD

## 2023-05-04 NOTE — PROGRESS NOTES
Clinic Care Coordination Contact  Community Health Worker Initial Outreach      Reason for Referral: Complex Medical Concerns/Education   Complex Medical Concerns: Chronic Diagnosis - Use Comments   Additional Information: severe obstructive lung disease, history of Tb w/ chronic R lung fibrosis       CHW Initial Information Gathering:  Referral Source:  (Beck Mcclure MD)  Preferred Urgent Care: [facility] [place], [phone]  Current living arrangement:: I live in a private home with family  Type of residence:: Apartment  Informal Support system:: Family  No PCP office visit in Past Year: No  CHW Additional Questions  If ED/Hospital discharge, follow-up appointment scheduled as recommended?: N/A  Medication changes made following ED/Hospital discharge?: N/A  MyChart active?: Yes  Patient sent Social Determinants of Health questionnaire?: Yes    Patient accepts CC: Yes. Patient scheduled for assessment with RN CC on 5/5 at 1pm. Patient noted desire to discuss financial barriers, food assistance, medical needs.        Financial Resource Worker Screening  [facility] Benefits  Is patient requesting help applying for [facility] benefits?: Yes  Have you recently applied for any [facility] benefits?: No  How many people in your household?: 2  Do you buy/eat food together?: Yes  What is the monthly gross income for the household (wages, social security, workers comp, and pension)? : 1000    Insurance:  Was [place] insurance?: No  Is this an insurance renewal?: No  Is this a new insurance application request?: No    Care Coordination team will tell patient:   Thank you for answering all the questions, based on screening questions, our Financial Resource Worker will reach out to you with additional questions and next steps.    Rina GANDHI  Community Health Worker  [facility] Care Coordination  [facility]  [email or web address]   [email or web address]   Office: [phone]

## 2023-05-05 ENCOUNTER — PATIENT OUTREACH (OUTPATIENT)
Dept: NURSING | Facility: CLINIC | Age: 48
End: 2023-05-05
Payer: COMMERCIAL

## 2023-05-05 LAB
DLCOUNC-%PRED-PRE: 28 %
DLCOUNC-PRE: 7.48 ML/MIN/MMHG
DLCOUNC-PRED: 26.03 ML/MIN/MMHG
ERV-%PRED-PRE: 28 %
ERV-PRE: 0.28 L
ERV-PRED: 0.99 L
EXPTIME-PRE: 6.89 SEC
FEF2575-%PRED-PRE: 10 %
FEF2575-PRE: 0.31 L/SEC
FEF2575-PRED: 3.12 L/SEC
FEFMAX-%PRED-PRE: 44 %
FEFMAX-PRE: 4 L/SEC
FEFMAX-PRED: 9 L/SEC
FEV1-%PRED-PRE: 25 %
FEV1-PRE: 0.83 L
FEV1FEV6-PRE: 61 %
FEV1FEV6-PRED: 81 %
FEV1FVC-PRE: 59 %
FEV1FVC-PRED: 81 %
FEV1SVC-PRE: 63 %
FEV1SVC-PRED: 71 %
FIFMAX-PRE: 2.41 L/SEC
FIO2-PRE: 32 %
FRCPLETH-%PRED-PRE: 93 %
FRCPLETH-PRE: 3.05 L
FRCPLETH-PRED: 3.27 L
FVC-%PRED-PRE: 35 %
FVC-PRE: 1.4 L
FVC-PRED: 4.01 L
IC-%PRED-PRE: 29 %
IC-PRE: 1.04 L
IC-PRED: 3.55 L
RVPLETH-%PRED-PRE: 135 %
RVPLETH-PRE: 2.77 L
RVPLETH-PRED: 2.04 L
TLCPLETH-%PRED-PRE: 64 %
TLCPLETH-PRE: 4.09 L
TLCPLETH-PRED: 6.31 L
VA-%PRED-PRE: 45 %
VA-PRE: 2.63 L
VC-%PRED-PRE: 28 %
VC-PRE: 1.31 L
VC-PRED: 4.54 L

## 2023-05-05 ASSESSMENT — ACTIVITIES OF DAILY LIVING (ADL)
DEPENDENT_IADLS:: TRANSPORTATION
DEPENDENT_IADLS:: INDEPENDENT

## 2023-05-05 NOTE — PROGRESS NOTES
Clinic Care Coordination Contact    Clinic Care Coordination Contact  OUTREACH    Referral Information:  Referral Source: IP Handoff    Primary Diagnosis: Respiratory Disorders - Chronic obstructive pulmonary disease, unspecified COPD type (H)  Shortness of breath  History of TB (tuberculosis)    Chief Complaint   Patient presents with     Clinic Care Coordination - Initial     Universal Utilization: Clinic Utilization  Difficulty keeping appointments:: No  Compliance Concerns: No  No-Show Concerns: No  No PCP office visit in Past Year: No  Utilization    Hospital Admissions  0             ED Visits  0             No Show Count (past year)  1                Current as of: 5/5/2023 12:56 PM            Clinical Concerns:  Current Medical Concerns:    Patient Active Problem List   Diagnosis     History of TB (tuberculosis)     Weight loss     Pulmonary nodules     Late latent syphilis     Iatrogenic pneumothorax     Patient's intentional underdosing of medication regimen due to financial hardship     Anemia of chronic disease     Benign prostatic hyperplasia, unspecified whether lower urinary tract symptoms present     COPD (chronic obstructive pulmonary disease) (H)     Hepatitis B core antibody positive     Pulmonary hypertension (H)     SOB (shortness of breath)     Status post coronary angiogram     Morbid obesity (H)     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 Patient reports he has several health related goals:  -Connect with the financial recourse worker for assistance with [facility] economic assistance application.   -Call [employer] to get to pulmonology rehab/or any medical appointment  -Reduce his chronic  shortness of breath  -Reduce food insecurity  Please see patients goals below as we have action steps that support the patient to achieve his goals.   Pain  Pain (GOAL):: No  Health Maintenance Reviewed: Due/Overdue   Health Maintenance Due   Topic Date Due     URINE DRUG SCREEN  Never done     COPD ACTION PLAN  Never done     HEPATITIS B IMMUNIZATION (1 of 3 - 3-dose series) Never done     Pneumococcal Vaccine: Pediatrics (0 to 5 Years) and At-Risk Patients (6 to 64 Years) (1 - PCV) Never done     COVID-19 Vaccine (3 - Pfizer risk series) 05/21/2021     YEARLY PREVENTIVE VISIT  09/14/2022     PHQ-2 (once per calendar year)  01/01/2023     Clinical Pathway: None    Medication Management:  Medication review status: Medications reviewed and no changes reported per patient.           Functional Status:  Dependent ADLs:: Independent  Dependent IADLs:: Transportation  Bed or wheelchair confined:: No  Mobility Status: Independent  Fallen 2 or more times in the past year?: No  Any fall with injury in the past year?: No    Living Situation:  Current living arrangement:: I live in a private home with family  Type of residence:: Apartment    Lifestyle & Psychosocial Needs:    Social Determinants of Health     Tobacco Use: Low Risk  (5/3/2023)    Patient History      Smoking Tobacco Use: Never      Smokeless Tobacco Use: Never      Passive Exposure: Not on file   Alcohol Use: Not on file   Financial Resource Strain: High Risk (9/3/2020)    Overall Financial Resource Strain (CARDIA)      Difficulty of Paying Living Expenses: Hard   Food Insecurity: Food Insecurity Present (9/3/2020)    Hunger Vital Sign      Worried About Running Out of Food in the Last Year: Sometimes true      Ran Out of Food in the Last Year: Not on file   Transportation Needs: No Transportation Needs (9/3/2020)    PRAPARE - Transportation      Lack of Transportation (Medical): No      Lack of Transportation (Non-Medical): No   Physical Activity: Not on file    Stress: Not on file   Social Connections: Not on file   Intimate Partner Violence: Not on file   Depression: Not at risk (10/6/2022)    PHQ-2      PHQ-2 Score: 0   Housing Stability: Not on file     Diet:: Regular  Inadequate nutrition (GOAL):: No  Tube Feeding: No  Inadequate activity/exercise (GOAL):: No  Significant changes in sleep pattern (GOAL): No  Transportation means:: Medical transport     Uatsdin or spiritual beliefs that impact treatment:: No  Mental health DX:: No  Mental health management concern (GOAL):: No  Informal Support system:: Family     Resources and Interventions:  Current Resources:   Community Resources: Other (see comment), [facility] Programs (food assistance, awaiting approval for cash assistance, [facility])  Supplies Currently Used at Home: Oxygen Tubing/Supplies  Equipment Currently Used at Home: none  Employment Status: unemployed     Advance Care Plan/Directive  Advanced Care Plans/Directives on file:: No  Advanced Care Plan/Directive Status: Considering Options    Referrals Placed: None     Care Plan:  Care Plan: Improve breathing     Problem: Reduce my shortness of breath     Goal: I would like to carry out the treatment plans as set by my pulmonologist, Dr. Mcclure     Start Date: 5/5/2023 Expected End Date: 8/2/2023    This Visit's Progress: 0%    Note:     Barriers: finances, and transportation   Strengths: patient enrolled in care coordination.  Patient expressed understanding of goal: yes  Action steps to achieve this goal:  1. I will  my new inhaler as soon as I have the money for the co-pay  2. I will call [employer] at [phone] to set my free transportation to my pulmonary rehab appointments. Scheduling line: [phone]   3. I will follow up with my pulmonologist, Dr. Mcclure as recommended. Around 8/3/23 for my 3 month follow up appointment. Scheduling line: [phone]                      Care Plan: Community Resources     Problem: Reliable food source      Goal: Establish Options for Affordable Food Sources     Start Date: 5/5/2023 Expected End Date: 8/3/2023    This Visit's Progress: 0%    Note:     Barriers: finances and transportation   Strengths: care coordination   Patient expressed understanding of goal: yes   Action steps to achieve this goal:  1. Patient was referred to our financial resource worker to look into economic assistance with [facility], such as SNAP   2. I will call the [place] [employer] Help[employer] at [phone]  3. I will call [facility] for discounted groceries [phone]                      Care Plan: Health Maintenance     Problem: Health Maintenance Due or Overdue     Goal: Become up-to-date with health maintenance visit(s)     Start Date: 5/5/2023 Expected End Date: 8/3/2023    This Visit's Progress: 0%    Note:     Goal Statement: I will complete my annual wellness visit.    Barriers: none  Strengths: care coordination     Patient expressed understanding of goal: yes  Action steps to achieve this goal:  1. I will schedule my annual wellness visit; [address] [place] [address] ([phone])  2. I will attend my annual wellness visit.  3. I will contact my Care Management or clinic team if I have barriers to attending my annual wellness visit.                           Patient/Caregiver understanding: yes    Outreach Frequency: monthly  Future Appointments              In 4 days Nyla Barton APRN CNP [facility]    In 2 months Beck Mcclure MD [facility] JOHANNY Garcia          Plan: 1. Patient verbalized good understanding of care plans and will follow recommendations.  2. Patient enrolled in Care Coordination, goal(s) identified at this time.   3. Patient centered care plan, and introduction letter sent to patient.  4. CHW will reach out to patient per standard workflow and support goal(s), CC RN will review chart in 6 weeks to review goal progression.      Naomi Hair RN, BSN,  PHN Care Coordinator  [place], [place], and [facility]   Phone: [phone]

## 2023-05-05 NOTE — LETTER
[facility] CARE COORDINATION  [address]  [place] [facility] [address]    May 5, 2023    Chaim Norman  [address]   [place] [place] [facility] [address]      Dear Chaim,        I am a  clinic care coordinator who works with CHAZ HADDAD CNP with the [employer]. I wanted to thank you for spending the time to talk with me.  Below is a description of clinic care coordination and how I can further assist you.       The clinic care coordination team is made up of a registered nurse, , financial resource worker and community health worker who understand the health care system. The goal of clinic care coordination is to help you manage your health and improve access to the health care system. Our team works alongside your provider to assist you in determining your health and social needs. We can help you obtain health care and community resources, providing you with necessary information and education. We can work with you through any barriers and develop a care plan that helps coordinate and strengthen the communication between you and your care team.  Our services are voluntary and are offered without charge to you personally.    Please feel free to contact me with any questions or concerns regarding care coordination and what we can offer.      We are focused on providing you with the highest-quality healthcare experience possible.    Sincerely,     Naomi Hair RN, BSN, PHN Care Coordinator  [place] and [place]   Phone: [phone]

## 2023-05-05 NOTE — LETTER
[facility]  Patient Centered Plan of Care  About Me:        Patient Name:  Chaim Norman    YOB: 1975  Age:         47 year old   Ellyn MRN:    [id number] Telephone Information:  Home Phone [phone]   Mobile [phone]       Address:  [address] [place] [address] [address]  [facility] [address] Email address:  [email or web address]      Emergency Contact(s)    Name Relationship Lgl Grd Work Phone Home Phone Mobile Phone   1. SARAH NORMAN Son   [phone]   2. CARMEN MELO Cousin   [phone]           Primary language:  English     needed? No   [employer]:  [phone] op. 1  Other communication barriers:None    Preferred Method of Communication:  Mail  Current living arrangement: I live in a private home with family    Mobility Status/ Medical Equipment: Independent        Health Maintenance  Health Maintenance Reviewed: Due/Overdue   Health Maintenance Due   Topic Date Due    URINE DRUG SCREEN  Never done    COPD ACTION PLAN  Never done    HEPATITIS B IMMUNIZATION (1 of 3 - 3-dose series) Never done    Pneumococcal Vaccine: Pediatrics (0 to 5 Years) and At-Risk Patients (6 to 64 Years) (1 - PCV) Never done    COVID-19 Vaccine (3 - Pfizer risk series) 05/21/2021    YEARLY PREVENTIVE VISIT  09/14/2022    PHQ-2 (once per calendar year)  01/01/2023          My Access Plan  Medical Emergency 911   Primary Clinic Line [facility] - [phone]   24 Hour Appointment Line [phone] or  [phone]) (toll-free)   24 Hour Nurse Line [phone] (toll-free)   Preferred Urgent Care [facility], [phone]       Preferred Hospital No data recorded   Preferred Pharmacy [id number] - [place], [place] - [address] [facility] [address]     Behavioral Health Crisis Line The National Suicide Prevention Lifeline at 1-587.879.8741 or Text/Call 514              My Care Team Members  Patient Care Team         Relationship Specialty Notifications Start End    Nyla Barton APRN CNP PCP - General Nurse Practitioner  9/25/18     Phone: [phone] Fax: [phone]         [address] [place] [address]    Nyla Barton APRN CNP Assigned PCP   10/25/18     Phone: [phone] Fax: [phone]         [address] [facility] [address]    Tania Frias, RN Specialty Care Coordinator Cardiology Admissions 1/28/20     Pulmonary HTN    Phone: [phone] Pager: [phone] Fax: [phone]       Rosa Malcolm, RN Specialty Care Coordinator Cardiology Admissions 1/28/20     Pulmonary HTN    Phone: [phone] Pager: [phone] Fax: [phone]       Pat Story, RN Specialty Care Coordinator Cardiology Admissions 1/28/20     Pulmonary HTN    Phone: [phone] Pager: [phone] Fax: [phone]       Dawn Kendrick MD MD Internal Medicine  5/27/20     Phone: [phone] Fax: [phone]         [address] [place] [address]    Rod Chauhan MD Hospitalist Cardiology  7/7/20     Phone: [phone] Pager: [phone] Fax: [phone]        [address] [place] [address]    Andrea Timmons MD MD Infectious Diseases  7/7/20     Fax: [phone]          [address] [facility] [address] [facility] [address]    Rhonda Atwood MD Resident Student in organized health care education/training program  7/7/20     Phone: [phone] Fax: [phone]         [address] [place] [address] [address] [facility] [address]    Nyla Barton APRN CNP Referring Physician Family Medicine  8/3/21     Referring to Pulm    Phone: [phone] Fax: [phone]         [address] [facility] [address]    Lauren More MD MD Pulmonary Disease  8/3/21     Phone: [phone] Fax: [phone]         [address] [facility] [address] [facility] [address]    Nely Wright, RN Clinic Care Coordinator  Cardiology Admissions 4/18/22     Pulmonary HTN    Phone: [phone] Pager: [phone] Fax: [phone]       Mark Oquendo, RN Specialty Care Coordinator  Admissions 4/19/23     Phone: [phone] Pager: [phone]        Dena Esqueda Community Health Worker Primary Care - CC Admissions 5/3/23     Phone: [phone] Fax: [phone]        Naomi Hair, RN Lead Care Coordinator Primary Care - CC Admissions 5/4/23     Phone: [phone] Fax: [phone]        Jeanna Rosario MA Financial Resource Worker   5/4/23               My Care Plans  Self Management and Treatment Plan  Care Plan  Care Plan: Improve breathing       Problem: Reduce my shortness of breath       Goal: I would like to carry out the treatment plans as set by my pulmonologist, Dr. Mcclure       Start Date: 5/5/2023 Expected End Date: 8/2/2023    This Visit's Progress: 0%    Note:     Barriers: finances, and transportation   Strengths: patient enrolled in care coordination.  Patient expressed understanding of goal: yes  Action steps to achieve this goal:  1. I will  my new inhaler as soon as I have the money for the co-pay  2. I will call [employer] at [phone] to set my free transportation to my pulmonary rehab appointments. Scheduling line: [phone]   3. I will follow up with my pulmonologist, Dr. Mcclure as recommended. Around 8/3/23 for my 3 month follow up appointment. Scheduling line: [phone]                              Care Plan: Community Resources       Problem: Reliable food source       Goal: Establish Options for Affordable Food Sources       Start Date: 5/5/2023 Expected End Date: 8/3/2023    This Visit's Progress: 0%    Note:     Barriers: finances and transportation   Strengths: care coordination   Patient expressed understanding of goal: yes   Action steps to achieve this goal:  1. Patient was referred to our financial resource worker to look into economic assistance with [facility], such as  SNAP   2. I will call the [place] Food HelpLine at 430-415-8717  3. I will call [facility] for discounted groceries [phone]                              Care Plan: Health Maintenance       Problem: Health Maintenance Due or Overdue       Goal: Become up-to-date with health maintenance visit(s)       Start Date: 5/5/2023 Expected End Date: 8/3/2023    This Visit's Progress: 0%    Note:     Goal Statement: I will complete my annual wellness visit.    Barriers: none  Strengths: care coordination     Patient expressed understanding of goal: yes  Action steps to achieve this goal:  1. I will schedule my annual wellness visit; [phone] ([phone])  2. I will attend my annual wellness visit.  3. I will contact my Care Management or clinic team if I have barriers to attending my annual wellness visit.                                  Action Plans on File:                       Advance Care Plans/Directives Type:   No data recorded    My Medical and Care Information  Problem List   Patient Active Problem List   Diagnosis    History of TB (tuberculosis)    Weight loss    Pulmonary nodules    Late latent syphilis    Iatrogenic pneumothorax    Patient's intentional underdosing of medication regimen due to financial hardship    Anemia of chronic disease    Benign prostatic hyperplasia, unspecified whether lower urinary tract symptoms present    COPD (chronic obstructive pulmonary disease) (H)    Hepatitis B core antibody positive    Pulmonary hypertension (H)    SOB (shortness of breath)    Status post coronary angiogram    Morbid obesity (H)    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

## 2023-05-09 ENCOUNTER — PATIENT OUTREACH (OUTPATIENT)
Dept: CARE COORDINATION | Facility: CLINIC | Age: 48
End: 2023-05-09

## 2023-05-09 NOTE — TELEPHONE ENCOUNTER
Clinic Care Coordination Contact  Program: [facility]  County: [facility] Case #:  [facility] Worker:   Brittany #:   Subscriber #:   Renewal:  Date Applied: 23    FRW Outreach:   23- FRW establish contact with pt and confirmed program. Patient was ready to apply, FRW submitted SNAP and cash assistance application. Pt was given instruction on what to expect next as well as HC phone number. FRW  Will follow up with pt in 4 weeks.     SNAP/CASH Application Screenin. Have you had [facility] benefits before?No  2. How many people in the household, do you eat/buy food together? 2  3. What is your monthly income (include all tax members)? 2600 monthly - 1300 biweekly  4. Do you have a bank account? Yes  5. Do you have any utility bills (electricity, rent, mortgage, phone, insurance, medical bills, etc.)? Yes rent  6. Do you have social security cards and/or green cards? Green card        Health Insurance:      Referral/Screening:  Financial Resource Worker Screening  [facility] Benefits  Is patient requesting help applying for [facility] benefits?: Yes  Have you recently applied for any [facility] benefits?: No  How many people in your household?: 2  Do you buy/eat food together?: Yes  What is the monthly gross income for the household (wages, social security, workers comp, and pension)? : 1000     Insurance:  [place] insurance?: No  Is this an insurance renewal?: No  Is this a new insurance application request?: No

## 2023-05-30 ENCOUNTER — OFFICE VISIT (OUTPATIENT)
Dept: FAMILY MEDICINE | Facility: CLINIC | Age: 48
End: 2023-05-30
Payer: COMMERCIAL

## 2023-05-30 VITALS
TEMPERATURE: 97 F | RESPIRATION RATE: 20 BRPM | BODY MASS INDEX: 28.22 KG/M2 | DIASTOLIC BLOOD PRESSURE: 99 MMHG | HEART RATE: 98 BPM | WEIGHT: 175.6 LBS | SYSTOLIC BLOOD PRESSURE: 135 MMHG | HEIGHT: 66 IN | OXYGEN SATURATION: 95 %

## 2023-05-30 DIAGNOSIS — Z12.5 SCREENING FOR PROSTATE CANCER: ICD-10-CM

## 2023-05-30 DIAGNOSIS — G62.0 DRUG-INDUCED PERIPHERAL NEUROPATHY (H): ICD-10-CM

## 2023-05-30 DIAGNOSIS — J18.9 COMMUNITY ACQUIRED PNEUMONIA, UNSPECIFIED LATERALITY: ICD-10-CM

## 2023-05-30 DIAGNOSIS — I10 HTN, GOAL BELOW 140/90: ICD-10-CM

## 2023-05-30 DIAGNOSIS — Z99.81 SUPPLEMENTAL OXYGEN DEPENDENT: ICD-10-CM

## 2023-05-30 DIAGNOSIS — Z13.6 CARDIOVASCULAR SCREENING; LDL GOAL LESS THAN 160: ICD-10-CM

## 2023-05-30 DIAGNOSIS — I27.20 PULMONARY HYPERTENSION (H): ICD-10-CM

## 2023-05-30 DIAGNOSIS — Z09 HOSPITAL DISCHARGE FOLLOW-UP: Primary | ICD-10-CM

## 2023-05-30 PROBLEM — E66.01 MORBID OBESITY (H): Status: RESOLVED | Noted: 2020-07-23 | Resolved: 2023-05-30

## 2023-05-30 LAB
ERYTHROCYTE [DISTWIDTH] IN BLOOD BY AUTOMATED COUNT: 12.5 % (ref 10–15)
HCT VFR BLD AUTO: 51 % (ref 40–53)
HGB BLD-MCNC: 16.2 G/DL (ref 13.3–17.7)
MCH RBC QN AUTO: 31.5 PG (ref 26.5–33)
MCHC RBC AUTO-ENTMCNC: 31.8 G/DL (ref 31.5–36.5)
MCV RBC AUTO: 99 FL (ref 78–100)
PLATELET # BLD AUTO: 227 10E3/UL (ref 150–450)
RBC # BLD AUTO: 5.14 10E6/UL (ref 4.4–5.9)
WBC # BLD AUTO: 4.7 10E3/UL (ref 4–11)

## 2023-05-30 PROCEDURE — 80061 LIPID PANEL: CPT | Performed by: NURSE PRACTITIONER

## 2023-05-30 PROCEDURE — 80053 COMPREHEN METABOLIC PANEL: CPT | Performed by: NURSE PRACTITIONER

## 2023-05-30 PROCEDURE — 85027 COMPLETE CBC AUTOMATED: CPT | Performed by: NURSE PRACTITIONER

## 2023-05-30 PROCEDURE — 36415 COLL VENOUS BLD VENIPUNCTURE: CPT | Performed by: NURSE PRACTITIONER

## 2023-05-30 PROCEDURE — 99214 OFFICE O/P EST MOD 30 MIN: CPT | Performed by: NURSE PRACTITIONER

## 2023-05-30 PROCEDURE — G0103 PSA SCREENING: HCPCS | Performed by: NURSE PRACTITIONER

## 2023-05-30 ASSESSMENT — PATIENT HEALTH QUESTIONNAIRE - PHQ9
SUM OF ALL RESPONSES TO PHQ QUESTIONS 1-9: 4
SUM OF ALL RESPONSES TO PHQ QUESTIONS 1-9: 4
10. IF YOU CHECKED OFF ANY PROBLEMS, HOW DIFFICULT HAVE THESE PROBLEMS MADE IT FOR YOU TO DO YOUR WORK, TAKE CARE OF THINGS AT HOME, OR GET ALONG WITH OTHER PEOPLE: NOT DIFFICULT AT ALL

## 2023-05-30 NOTE — NURSING NOTE
Patient given oxygen at 3L  per ordering provider. After 10min O2 saturation is at 94%. Provider notified

## 2023-05-30 NOTE — PROGRESS NOTES
"  Assessment & Plan     Hospital discharge follow-up    Community acquired pneumonia, unspecified laterality  Resolved.    Supplemental oxygen dependent  SpO2 initially mid 80s on RA when he first arrived, up to 95% on 2-3 L NC. He reports he hasn't been using oxygen for the last month or so because it hasn't been working and he called [facility] and they haven't shown up to fix it when he has called. I called [facility] who said they were out to his house on 5/8 for re-teaching. I asked that they come out again today asap to readdress his concerns that it's not working and to be sure he knows how to use it. I explained to Chaim he must always use the supplemental O2 - 2 L NC at rest and up to 4 L with activity per pulmonology order. Risks of low O2 discussed. Patient verbalizes understanding.    HTN, goal below 140/90  Elevated today. He reports he's taking both lisinopril and amlodipine and takes at night. He is asymptomatic.   - Comprehensive metabolic panel (BMP + Alb, Alk Phos, ALT, AST, Total. Bili, TP); Future  - CBC with platelets; Future  - Comprehensive metabolic panel (BMP + Alb, Alk Phos, ALT, AST, Total. Bili, TP)  - CBC with platelets    CARDIOVASCULAR SCREENING; LDL GOAL LESS THAN 160  - Lipid panel reflex to direct LDL Fasting; Future  - Lipid panel reflex to direct LDL Fasting    Screening for prostate cancer  - PSA, screen; Future  - PSA, screen    Pulmonary hypertension (H)  Follows with pulmonology and cardiology.       Drug-induced peripheral neuropathy (H)  On duloxetine. From TB medications.              MED REC REQUIRED  Post Medication Reconciliation Status:  Discharge medications reconciled, continue medications without change    BMI:   Estimated body mass index is 28.34 kg/m  as calculated from the following:    Height as of this encounter: 1.676 m (5' 6\").    Weight as of this encounter: 79.7 kg (175 lb 9.6 oz).   Weight management plan: Discussed healthy diet and " exercise guidelines    See Patient Instructions    The benefits, risks and potential side effects were discussed in detail. Black box warnings discussed as relevant. All patient questions were answered. The patient was instructed to follow up immediately if any adverse reactions develop.    Return precautions discussed, including when to seek urgent/emergent care.    Patient verbalizes understanding and agrees with plan of care. Patient stable for discharge.      CHAZ HADDAD CNP  [facility] DANNY Rucker is a 47 year old, presenting for the following health issues:  Hospital F/U         View : No data to display.              [facility]       Hospital Follow-up Visit:    Hospital/Nursing Home/IP Rehab Facility: [facility]  Date of Admission: 3/26/2023  Date of Discharge: 4/4/2023  Reason(s) for Admission: community acquired pneumonia    Was your hospitalization related to COVID-19? No   Problems taking medications regularly:  None  Medication changes since discharge: None  Problems adhering to non-medication therapy:  None    Summary of hospitalization:  CareEverywhere information obtained and reviewed  Diagnostic Tests/Treatments reviewed.  Follow up needed: none  Other Healthcare Providers Involved in Patient s Care:         Specialist appointment - pulmonology  Update since discharge: improved.   Plan of care communicated with patient     Hasn't picked up Breztri yet due to cost. Will try to pick it up next week  Reports he's been taking all other medications on his med list as prescribed  Hasn't used oxygen in about a month. Reports that the oxygen concentrator doesn't work and that he's called [facility] but they haven't sent anyone out              Discharge Summaries  - documented in this encounter  Jose Orantes MD - 04/04/2023 11:07 AM CDT  Formatting of this note is different from the original.  Images from the original note were not  included.      HOSPITALIST DIVISION  -----------------------------------------------------  HOSPITAL DISCHARGE SUMMARY    Patient Name: Chaim Norman  YOB: 1975   Medical Record Number: [id number]   Attending Provider: Jose Orantes MD   Admission Date: 3/26/2023  Discharge Date: 4/4/2023    He will be discharged to home.    DISCHARGE DIAGNOSES:  47 y.o. 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 Found to have acute on chronic hypoxic/hypercapnia respiratory failure secondary to community-acquired pneumonia.   Problems list:  Acute on chronic hypercapnia/hypoxic respiratory failure  Community-acquired pneumonia, not septic on admission  H/o pulmonary fibrosis  H/o MDR TB  He was severe hypoxic with SpO2 55% on RA on presentation and required Bipap support. Transitioned to NC. He was started on IV rocephin and azithromycin. He is on 1.5 liters of supplemental oxygen at home. Patient was placed on Bipap following VBG with pH 7.25/pCO2 79. He was on Bipap overnight and VBG unchanged with pH 7.26/pCO2 79  Continue PO cefdinir and azithromycin until 4/9 for 14 day course per pulmonary   Pulmonary consulted, concerns for possible pulmonary TB and AFB sputum (X3) was sent and hold off bronchoscopy for now  Pulm will attempt to qualify patient for trilogy/Bipap prior to d/c   Supplemental oxygen as needed to keep SpO2 above 89-92%, currently on 4 liters  AFB sputum culture pending(preliminary negative x3)  Continue Bipap support during night and prn   Scheduled Duoneb and home inhaler   Encourage IS and Aerobika   Patient is going to follow-up with [facility]. Patient has appointment on 4/11/2023  Lip swelling, unclear cause, concerns for allergic reaction, resolved  Patient is on chronic PTA  Lisinopril and amlodipine. He developed lip swelling since 3/29 PM. No new medications given on 3/29. Swelling improved with discontinuation of PTA Lisinopril   Discontinued PTA Lisinopril   PRN benadryl and vistaril for lip swelling   Hyperkalemia, likely factitious   Likely inaccurate, no other indices consistent with electrolyte abnormality. Recheck K was normal   Elevated troponin  Likely demand induced in the setting of significant hypoxic respiratory failure (55% sat RA on admission). EKG without specific ST findings. Troponin mild trended up to 65<----55. Echo with normal LV with EF 50-55%, mild pHTN, mild reduced RV function.  HTN  PTA Amlodipine and Lisinopril./61 on admission.  Continue PTA Amlodipine   Discontinued PTA lisinopril due to new onset of lip swelling   Peripheral neuropathy  Sequela of TB treatment  Continue PTA Cymbalta and Gabapentin  Malnutrition : Does not meet malnutrition criteria    Loss of Muscle Mass: Not present  Loss of Subcutaneous Fat: Not present  Energy Intake: Less than 75% of estimated energy requirement for 3 months or greater  Interpretation of Weight Loss: Limited/no weight history available    Skin Condition Other (Comment) Coccyx (Active)   First Observed/Origin Date/First Observed/Origin Time: 04/02/23 1200 Skin Condition Type: (c) Other (Comment) Location: Coccyx Healed?: Yes     DISCHARGE MEDICATIONS:  Current Discharge Medication List       NEW MEDICATIONS   Details   albuterol HFA (PROVENTIL;VENTOLIN HFA) 90 mcg/actuation Inhl inhaler Inhale 2 puffs every 4 (four) hours as needed.  Qty: 18 g, Refills: 3     albuterol-ipratropium, conc: 3-0.5mg/3mL, (DUO-NEB) Inhl nebulizer solution Nebulize 3 mL every 6 (six) hours as needed.  Qty: 90 mL, Refills: 3     azithromycin (ZITHROMAX) 250 mg oral tablet Take 1 tablet (250 mg) by mouth once daily for 6 doses Indications: PNEUMONIA, COMMUNITY ACQUIRED.  Qty: 6 tablet, Refills: 0     cefdinir (OMNICEF) 300 mg oral  capsule Take 1 capsule (300 mg) by mouth twice a day for 13 doses Indications: PNEUMONIA, COMMUNITY ACQUIRED.  Qty: 13 capsule, Refills: 0     Mucus Clearing Device (AEROBIKA OSCILLATING PEP SYSTM) device [id number] by Misc.(Non-Drug; Combo Route) route every 12 (twelve) hours for 30 days.  Qty: 50 each, Refills: 0         MEDICATIONS CONTINUED UNCHANGED   Details   amLODIPine (NORVASC) 10 mg oral tablet Take 1 tablet (10 mg) by mouth once daily.     !! DULoxetine (CYMBALTA) 30 mg oral delayed release capsule Take 1 capsule (30 mg) by mouth once daily. Take with 60 mg capsule for total daily dose 90 mg.     !! DULoxetine (CYMBALTA) 60 mg oral delayed release capsule Take 1 capsule (60 mg) by mouth once daily. Take with 30 mg capsule for total daily dose 90 mg).     gabapentin (NEURONTIN) 300 mg oral capsule Take 3 capsules (900 mg) by mouth three times a day.     !! - Potential duplicate medications found. Please discuss with provider.       DISCONTINUED MEDICATIONS     lisinopriL (PRINIVIL) 10 mg oral tablet           FOLLOWUP:    Contact information for follow-up     [facility]   Relationship: PCP - Primary Care Clinic   [place]  [address]  [facility] [address]   Phone: [phone]     Specify time frame for follow up?: 1 Week   Instructions to follow-up provider: hospital followup         Discharge Procedure Orders   Any questions or concerns     Chills     Constipation >48 hours     Dehydration     Difficulty breathing, headache, or visual disturbance     Extreme fatigue     Hand tremor     Hives     Normal activity as tolerated     Return to previous diet   Continue on the same type of diet and foods as you were eating before your admission.     Discharge Instructions   TB culture results still pending, pulmonary studies negative. Please follow-up with your primary care doctor within 5 to 7 days. Seek medical help in case of worsening respiratory failure, chest pain, confusion or  "any other alarming symptom. Please continue take antibiotic as recommended.     Discharge Nebulizer     Specify a diagnosis? COPD   What Medications is the Patient Using with the Nebulizer? Duonebs   The following Nebulizer Supplies may be Reordered as Needed Disposable Neb Kit- (2 Per Month)   The following Nebulizer Supplies may be Reordered as Needed Non-Disposable Neb Kit- (1 Per 6 Months)   The following Nebulizer Supplies may be Reordered as Needed Nebulizer Filters- (2 Per Month)   The following Nebulizer Supplies may be Reordered as Needed Nebulizer Mask- (1 Per Month)   Estimated Length of Time Needed (Months) 99     Discharge Oxygen   Patient's Sats Must be Less Than or Equal to 88 %    Enter Highest Oxygen Flow Rate for Patient     If Greater than 4 LPM Then:  Seek help     Diagnosis? Hypoxemia due to: (MUST answer in comment)   Route? Cannula   Duration? Continuous   Prescribed Liter Flow? 2   May Use Conserving Device if Qualifies? Yes   Is patient already on home oxygen? No   Date of Most Recent Home O2 Determination Test-Arterial Blood Gas or Oxygen Sat Test (Enter test result in comments) 4/4/2023   Test was Performed within 2 Days Prior to Discharge from an Inpatient Facitlity to Home Yes   Test Performed At Rest   Estimated Length of Need in Months (1 - 99, 99 = Lifetime) \" If Pneumonia diagnosis, length of need cannot exceed 3 months\" 99   Does the Patient Require Portable Oxygen? Yes     Follow Up   Referral Priority: Routine   Referred to Provider: [facility]   Number of Visits Requested: 1     Malnutrition : Does not meet malnutrition criteria    Loss of Muscle Mass: Not present  Loss of Subcutaneous Fat: Not present  Energy Intake: Less than 75% of estimated energy requirement for 3 months or greater  Interpretation of Weight Loss: Limited/no weight history available    Skin Condition Other (Comment) Coccyx (Active)   First Observed/Origin Date/First " Observed/Origin Time: 04/02/23 1200 Skin Condition Type: (c) Other (Comment) Location: Coccyx Healed?: Yes     DISCHARGE EXAM:  GENERAL APPEARANCE: He is awake, alert and in no acute distress.    HEENT: Head - Normocephalic, atraumatic. Eyes - Normal lids and conjuntivae, PERRLA, EOMs intact. Ears - External canals normal, internal canals normal Nose - No deformity, without masses, no congestion. Oropharynx - Oral mucosa and pharynx normal, moist mucous membranes.  NECK: Supple with no adenopathy.  RESPIRATORY: Lungs clear to auscultation bilaterally.  CARDIOVASCULAR: Normal S1, normal S2, regular rhythm, without murmur.  GASTROINTESTINAL: Soft, non-tender, normal bowel sounds.   HEME/LYMPH/IMMUNOLOGIC: No palpable lymphadenopathy, no unusual bleeding or bruising.  SKIN: Intact, warm, dry. No rashes or lesions. No mottling.  NEUROLOGIC: Alert and oriented, moves all extremities. Non-focal exam.   EXTREMITIES: Distal Pulses are palpable, no edema. Brisk capillary refill present    PROCEDURES:  IMAGING:  CT CHEST/THORAX W/O CON   Final Result   IMPRESSION:   1. Markedly abnormal lung parenchyma bilaterally, with significant right lung volume loss, extensive cylindrical bronchiectasis with peribronchial wall thickening. Compensatory hyperaerated left lung with similar but lesser degree of bronchiectasis and peribronchial wall thickening. There are multiple ill-defined nodular opacities in both lungs, worst at the left lower lobe and most suggestive of a post infectious/inflammatory process. Active granulomatous disease, especially in the left lung, is not excluded.   2. Small bibasilar dependent pleural effusions.   3. Mediastinal lymphadenopathy, presumed reactive.   4. Granulomata in the lungs.     REPORT SIGNED BY Redd Rosario M.D.     XRAY CHEST PORTABLE   Final Result   IMPRESSION:   Extensive airspace opacities are present throughout both lungs. Mediastinum is shifted to the right hemithorax, which could be due  to acute and/or chronic volume loss in the right lung. Right costophrenic angle is blunted due to fluid or thickened pleura.       REPORT SIGNED BY Ricky Pepe M.D       LABS:  LABS   Recent Labs   04/04/23 0615   WBC 4.3   RBC 4.18*   HEMOGLOBIN 13.0*   HEMATOCRIT 42.7   *   MCH 31   RDW 11.7   PLATELETCT 307     No Lab Results Found (last 72 hours)   Recent Labs   04/04/23 0615   SODIUM 138   POTASSIUM 4.3   CHLORIDE 101   CARBONDIOXI 34*   ANIONGAP 3.0   GLUCOSE 104   BUNUREANRO 13   CREATININE 0.89   CALCIUMSERUM 9.5   ESTGFRMDRD >60.00       ABG: No Lab Results Found (last 72 hours)  VBG: No Lab Results Found (last 72 hours)    Invalid input(s): O2SV    Micro:  Results for orders placed or performed during the hospital encounter of 03/26/23   Cult-AFB Includes Smear   Collection Time: 03/30/23 7:07 AM   Specimen: Sputum; Site-Microbiology   Result Value Ref Range   Acid Fast Specimen Smear Negative. Culture in progress. Negative. Culture in progress.   Sputum Culture   Collection Time: 03/29/23 3:25 PM   Specimen: Sputum coughed   Result Value Ref Range   Sputum Culture Light growth normal respiratory phuc.   Gram Stain Result No bacteria seen.   Gram Stain Result Moderate WBC's / LPF   Gram Stain Result <10 Epithelial cells / LPF   Gram Stain Result Culture to follow.   Culture Blood   Collection Time: 03/26/23 10:20 AM   Specimen: Vein (Specify Site); Blood   Result Value Ref Range   Blood Culture No growth 5 days.     Jose Orantes MD  Time: 30 minutes or less      Electronically signed by Jose Orantes MD at 04/04/2023 4:10 PM CDT        Review of Systems   Constitutional, HEENT, cardiovascular, pulmonary, GI, , musculoskeletal, neuro, skin, endocrine and psych systems are negative, except as otherwise noted.      Objective    BP (!) 135/99 (BP Location: Left arm, Patient Position: Sitting, Cuff Size: Adult Regular)   Pulse 98   Temp 97  F (36.1  C) (Tympanic)   Resp 20   Ht 1.676 m (5'  "6\")   Wt 79.7 kg (175 lb 9.6 oz)   SpO2 (!) 85%   BMI 28.34 kg/m    Body mass index is 28.34 kg/m .  Physical Exam   GENERAL: healthy, alert and no distress  EYES: Eyes grossly normal to inspection, PERRL and conjunctivae and sclerae normal  HENT: ear canals and TM's normal, nose and mouth without ulcers or lesions  NECK: no adenopathy, no asymmetry, masses, or scars and thyroid normal to palpation  RESP: coarse throughout  CV: regular rate and rhythm, normal S1 S2, no S3 or S4, no murmur, click or rub, no peripheral edema and peripheral pulses strong  MS: no gross musculoskeletal defects noted, no edema  PSYCH: mentation appears normal, affect normal/bright    Results for orders placed or performed in visit on 05/30/23 (from the past 24 hour(s))   CBC with platelets   Result Value Ref Range    WBC Count 4.7 4.0 - 11.0 10e3/uL    RBC Count 5.14 4.40 - 5.90 10e6/uL    Hemoglobin 16.2 13.3 - 17.7 g/dL    Hematocrit 51.0 40.0 - 53.0 %    MCV 99 78 - 100 fL    MCH 31.5 26.5 - 33.0 pg    MCHC 31.8 31.5 - 36.5 g/dL    RDW 12.5 10.0 - 15.0 %    Platelet Count 227 150 - 450 10e3/uL                   "

## 2023-05-31 LAB
ALBUMIN SERPL-MCNC: 3.6 G/DL (ref 3.4–5)
ALP SERPL-CCNC: 125 U/L (ref 40–150)
ALT SERPL W P-5'-P-CCNC: 20 U/L (ref 0–70)
ANION GAP SERPL CALCULATED.3IONS-SCNC: 7 MMOL/L (ref 3–14)
AST SERPL W P-5'-P-CCNC: 22 U/L (ref 0–45)
BILIRUB SERPL-MCNC: 0.9 MG/DL (ref 0.2–1.3)
BUN SERPL-MCNC: 11 MG/DL (ref 7–30)
CALCIUM SERPL-MCNC: 9.3 MG/DL (ref 8.5–10.1)
CHLORIDE BLD-SCNC: 100 MMOL/L (ref 94–109)
CHOLEST SERPL-MCNC: 189 MG/DL
CO2 SERPL-SCNC: 30 MMOL/L (ref 20–32)
CREAT SERPL-MCNC: 1.15 MG/DL (ref 0.66–1.25)
FASTING STATUS PATIENT QL REPORTED: NO
GFR SERPL CREATININE-BSD FRML MDRD: 78 ML/MIN/1.73M2
GLUCOSE BLD-MCNC: 93 MG/DL (ref 70–99)
HDLC SERPL-MCNC: 49 MG/DL
LDLC SERPL CALC-MCNC: 121 MG/DL
NONHDLC SERPL-MCNC: 140 MG/DL
POTASSIUM BLD-SCNC: 3.6 MMOL/L (ref 3.4–5.3)
PROT SERPL-MCNC: 8.7 G/DL (ref 6.8–8.8)
PSA SERPL-MCNC: 3.75 UG/L (ref 0–4)
SODIUM SERPL-SCNC: 137 MMOL/L (ref 133–144)
TRIGL SERPL-MCNC: 94 MG/DL

## 2023-06-06 ENCOUNTER — PATIENT OUTREACH (OUTPATIENT)
Dept: CARE COORDINATION | Facility: CLINIC | Age: 48
End: 2023-06-06
Payer: COMMERCIAL

## 2023-06-06 NOTE — TELEPHONE ENCOUNTER
Clinic Care Coordination Contact  Program: [facility]  County: [facility] Case #:  [facility] Worker:   Brittany #:   Subscriber #:   Renewal:  Date Applied: 23    FRW Outreach:   23- frw called pt to follow on his application status, no answer left a vm. frw will follow up in a week.     23- FRW establish contact with pt and confirmed program. Patient was ready to apply, FRW submitted SNAP and cash assistance application. Pt was given instruction on what to expect next as well as HC phone number. FRW  Will follow up with pt in 4 weeks.     SNAP/CASH Application Screenin. Have you had [facility] benefits before?No  2. How many people in the household, do you eat/buy food together? 2  3. What is your monthly income (include all tax members)? 2600 monthly - 1300 biweekly  4. Do you have a bank account? Yes  5. Do you have any utility bills (electricity, rent, mortgage, phone, insurance, medical bills, etc.)? Yes rent  6. Do you have social security cards and/or green cards? Green card        Health Insurance:      Referral/Screening:  Financial Resource Worker Screening  [facility] Benefits  Is patient requesting help applying for [facility] benefits?: Yes  Have you recently applied for any [facility] benefits?: No  How many people in your household?: 2  Do you buy/eat food together?: Yes  What is the monthly gross income for the household (wages, social security, workers comp, and pension)? : 1000     Ins[place]:  [place] No  Is this an insurance renewal?: No  Is this a new insurance application request?: No

## 2023-06-12 ENCOUNTER — PATIENT OUTREACH (OUTPATIENT)
Dept: CARE COORDINATION | Facility: CLINIC | Age: 48
End: 2023-06-12
Payer: COMMERCIAL

## 2023-06-12 NOTE — PROGRESS NOTES
[facility]/Voicemail       Clinical Data: Care Coordinator Outreach  Outreach attempted x 1.  Left message on patient's voicemail with call back information and requested return call.  Plan:   Care Coordinator will try to reach patient again in 3-5 business days.    Next outreach due: 6/19/23

## 2023-06-13 NOTE — PROGRESS NOTES
Clinic Care Coordination Contact  Program: [facility]  County: [facility] Case #:  [facility] Worker:   Brittany #:   Subscriber #:   Renewal:  Date Applied: 23    FRW Outreach:   23- CTA called pt so we could talk to the county together but was unable to reach pt. LM w/ pt call back. FRW will f/u with pt in 2 weeks.   23- FRW establish contact with pt and confirmed program. Patient was ready to apply, FRW submitted SNAP and cash assistance application. Pt was given instruction on what to expect next as well as HC phone number. FRW  Will follow up with pt in 4 weeks.     SNAP/CASH Application Screenin. Have you had [facility] benefits before?No  2. How many people in the household, do you eat/buy food together? 2  3. What is your monthly income (include all tax members)? 2600 monthly - 1300 biweekly  4. Do you have a bank account? Yes  5. Do you have any utility bills (electricity, rent, mortgage, phone, insurance, medical bills, etc.)? Yes rent  6. Do you have social security cards and/or green cards? Green card        Health Insurance:      Referral/Screening:  Financial Resource Worker Screening  [facility] Benefits  Is patient requesting help applying for [facility] benefits?: Yes  Have you recently applied for any [facility] benefits?: No  How many people in your household?: 2  Do you buy/eat food together?: Yes  What is the monthly gross income for the household (wages, social security, workers comp, and pension)? : 1000     Insurance:  [place]?: No  Is this an insurance renewal?: No  Is this a new insurance application request?: No

## 2023-06-15 ENCOUNTER — PATIENT OUTREACH (OUTPATIENT)
Dept: CARE COORDINATION | Facility: CLINIC | Age: 48
End: 2023-06-15
Payer: COMMERCIAL

## 2023-06-15 NOTE — PROGRESS NOTES
Clinic Care Coordination Contact  Care Coordination Clinician Chart Review    Situation: Patient chart reviewed by Care Coordinator.       Background: Care Coordination Program started: 5/3/2023. Initial assessment completed and patient-centered care plan(s) were developed with participation from patient. Lead CC handed patient off to CHW for continued outreaches.       Assessment: Per chart review, patient outreach attempted by FRW on 6/13, and  CC CHW on 6/12. Messages have been left for patient.  planned outreach by both FRW, and CHW. Patient is not due for updated Plan of Care.  Assessments will be completed annually or as needed/with change of patient status.      Care Plan: Improve breathing     Problem: Reduce my shortness of breath     Goal: I would like to carry out the treatment plans as set by my pulmonologist, Dr. Mcclure     Start Date: 5/5/2023 Expected End Date: 8/2/2023    This Visit's Progress: 0%    Note:     Barriers: finances, and transportation   Strengths: patient enrolled in care coordination.  Patient expressed understanding of goal: yes  Action steps to achieve this goal:  1. I will  my new inhaler as soon as I have the money for the co-pay  2. I will call [employer] at [phone] to set my free transportation to my pulmonary rehab appointments. Scheduling line: [phone]   3. I will follow up with my pulmonologist, Dr. Mcclure as recommended. Around 8/3/23 for my 3 month follow up appointment. Scheduling line: [phone]                      Care Plan: Community Resources     Problem: Reliable food source     Goal: Establish Options for Affordable Food Sources     Start Date: 5/5/2023 Expected End Date: 8/3/2023    This Visit's Progress: 0%    Note:     Barriers: finances and transportation   Strengths: care coordination   Patient expressed understanding of goal: yes   Action steps to achieve this goal:  1. Patient was referred to our financial resource worker to look  into economic assistance with [facility], such as SNAP   2. I will call the [place] [employer] HelpLine at [phone]  3. I will call [facility] for discounted groceries [phone]                      Care Plan: Health Maintenance     Problem: Health Maintenance Due or Overdue     Goal: Become up-to-date with health maintenance visit(s)     Start Date: 5/5/2023 Expected End Date: 8/3/2023    This Visit's Progress: 0%    Note:     Goal Statement: I will complete my annual wellness visit.    Barriers: none  Strengths: care coordination     Patient expressed understanding of goal: yes  Action steps to achieve this goal:  1. I will schedule my annual wellness visit; [phone])  2. I will attend my annual wellness visit.  3. I will contact my Care Management or clinic team if I have barriers to attending my annual wellness visit.                              Plan/Recommendations: The patient will continue working with Care Coordination to achieve goal(s) as above. CHW will continue outreaches at minimum every 30 days and will involve Lead CC as needed or if patient is ready to move to Maintenance. Lead CC will continue to monitor CHW outreaches and patient's progress to goal(s) every 6 weeks.     Plan of Care updated and sent to patient: Lisa Hair RN, BSN, PHN Care Coordinator  [facility]   Phone: [phone]

## 2023-06-23 ENCOUNTER — PATIENT OUTREACH (OUTPATIENT)
Dept: CARE COORDINATION | Facility: CLINIC | Age: 48
End: 2023-06-23
Payer: COMMERCIAL

## 2023-06-23 NOTE — PROGRESS NOTES
[facility]/Voicemail       Clinical Data: Care Coordinator Outreach  Outreach attempted x 2.  Left message on patient's voicemail with call back information and requested return call.  Plan: Care Coordinator will send unable to contact letter with care coordinator contact information via [employer]. Care Coordinator will try to reach patient again in 10 business days.    Next outreach due: 7/7/23

## 2023-06-23 NOTE — LETTER
[facility]  [address]  [place] [address]    June 23, 2023    Chaim Norman  [address]   [facility] [address]      Dear Chaim,    I have been attempting to reach you since our last contact. I would like to continue to work with you and provide any additional support you may need on achieving your health care related goals. I would appreciate if you would give me a call at [phone] to let me know if you would like to continue working together. I know that there are many things that can affect our ability to communicate and I hope we can continue to work together.    All of us at the [facility] are invested in your health and are here to assist you in meeting your goals.     Sincerely,    Dena Esqueda

## 2023-06-27 ENCOUNTER — PATIENT OUTREACH (OUTPATIENT)
Dept: CARE COORDINATION | Facility: CLINIC | Age: 48
End: 2023-06-27
Payer: COMMERCIAL

## 2023-06-27 NOTE — TELEPHONE ENCOUNTER
Clinic Care Coordination Contact  Program: [facility]  County: [facility] Case #:  [facility] Worker:   Brittany #:   Subscriber #:   Renewal:  Date Applied: 23    FRW Outreach:   23- frw spoke with pt, he said that he hasn't heard or received anything from the [facility]. I advised that we call the county before reapply to check on the status of the application. FRW set up an appt on 23 at 9am.   23- frw called pt to follow on his application status, no answer left a vm. frw will follow up in a week.     23- FRW establish contact with pt and confirmed program. Patient was ready to apply, FRW submitted SNAP and cash assistance application. Pt was given instruction on what to expect next as well as HC phone number. FRW  Will follow up with pt in 4 weeks.     SNAP/CASH Application Screenin. Have you had [facility] benefits before?No  2. How many people in the household, do you eat/buy food together? 2  3. What is your monthly income (include all tax members)? 2600 monthly - 1300 biweekly  4. Do you have a bank account? Yes  5. Do you have any utility bills (electricity, rent, mortgage, phone, insurance, medical bills, etc.)? Yes rent  6. Do you have social security cards and/or green cards? Green card        Health Insurance:      Referral/Screening:  Financial Resource Worker Screening  [facility] Benefits  Is patient requesting help applying for [facility] benefits?: Yes  Have you recently applied for any [facility] benefits?: No  How many people in your household?: 2  Do you buy/eat food together?: Yes  What is the monthly gross income for the household (wages, social security, workers comp, and pension)? : 1000     [place]:  [place] No  Is this an insurance renewal?: No  Is this a new insurance application request?: No

## 2023-07-05 ENCOUNTER — PATIENT OUTREACH (OUTPATIENT)
Dept: CARE COORDINATION | Facility: CLINIC | Age: 48
End: 2023-07-05
Payer: COMMERCIAL

## 2023-07-12 ENCOUNTER — PATIENT OUTREACH (OUTPATIENT)
Dept: CARE COORDINATION | Facility: CLINIC | Age: 48
End: 2023-07-12
Payer: COMMERCIAL

## 2023-07-12 NOTE — TELEPHONE ENCOUNTER
Clinic Care Coordination Contact  Program: [facility]  County: [facility] Case #:  [facility] Worker:   Brittany #:   Subscriber #:   Renewal:  Date Applied: 23    FRW Outreach:   23- FRW called pt, we reapply for snap/cash again for him as he was denied. Pt was advised to call the county on Monday to complete his interview. FRW will follow up in 4 weeks with pt.     23- FRW called pt and we called hc together as he hadn't heard from his application. After being on hold for an 1hr, the [facility] rep said that pt application was denied because he fail to complete the interview. FRW offered to re-apply but pt state that he doesn't have his son's info.  FRW schedule an appt on  at 11am.    23- frw spoke with pt, he said that he hasn't heard or received anything from the [facility]. I advised that we call the [facility] before reapply to check on the status of the application. FRW set up an appt on 23 at 9am.     23- frw called pt to follow on his application status, no answer left a vm. frw will follow up in a week.     23- FRW establish contact with pt and confirmed program. Patient was ready to apply, FRW submitted SNAP and cash assistance application. Pt was given instruction on what to expect next as well as HC phone number. FRW  Will follow up with pt in 4 weeks.     SNAP/CASH Application Screenin. Have you had [facility] benefits before?No  2. How many people in the household, do you eat/buy food together? 2  3. What is your monthly income (include all tax members)? 2600 monthly - 1300 biweekly  4. Do you have a bank account? Yes  5. Do you have any utility bills (electricity, rent, mortgage, phone, insurance, medical bills, etc.)? Yes rent  6. Do you have social security cards and/or green cards? Green card        Health Insurance:      Referral/Screening:  Financial Resource Worker Screening  [facility] Benefits  Is patient requesting help applying for [facility] benefits?: Yes  Have you  recently applied for any county benefits?: No  How many people in your household?: 2  Do you buy/eat food together?: Yes  What is the monthly gross income for the household (wages, social security, workers comp, and pension)? : 1000     [place]:  [place] No  Is this an insurance renewal?: No  Is this a new insurance application request?: No

## 2023-07-17 ENCOUNTER — PATIENT OUTREACH (OUTPATIENT)
Dept: CARE COORDINATION | Facility: CLINIC | Age: 48
End: 2023-07-17

## 2023-07-17 NOTE — PROGRESS NOTES
[facility]/Voicemail       Clinical Data: Care Coordinator Outreach  Outreach attempted x 3.  Left message on patient's voicemail with call back information and requested return call.  Plan: CHW will request CC RN chart review for disenrollment, unreachable.   Care Coordinator will do no further outreaches at this time.    CRISTINA Lau  [facility]  [phone]

## 2023-07-18 ENCOUNTER — PATIENT OUTREACH (OUTPATIENT)
Dept: CARE COORDINATION | Facility: CLINIC | Age: 48
End: 2023-07-18
Payer: COMMERCIAL

## 2023-07-18 NOTE — PROGRESS NOTES
Clinic Care Coordination Contact  Care Coordination Clinician Chart Review    Situation: Patient chart reviewed by Care Coordinator.       Background: Care Coordination Program started: 5/3/2023. Initial assessment completed and patient-centered care plan(s) were developed with participation from patient. Lead CC handed patient off to W for continued outreaches.       Assessment: Per chart review, patient outreach completed by CC FRW 7/12/23. FRW goal in progress.  Patient is actively working to accomplish FRW goal. Patient's goal(s) appropriate and relevant at this time. Patient is not due for updated Plan of Care.  Assessments will be completed annually or as needed/with change of patient status.      Care Plan: Improve breathing     Problem: Reduce my shortness of breath     Goal: I would like to carry out the treatment plans as set by my pulmonologist, Dr. Mcclure     Start Date: 5/5/2023 Expected End Date: 8/2/2023    This Visit's Progress: 0%    Note:     Barriers: finances, and transportation   Strengths: patient enrolled in care coordination.  Patient expressed understanding of goal: yes  Action steps to achieve this goal:  1. I will  my new inhaler as soon as I have the money for the co-pay  2. I will call [employer] at [phone] to set my free transportation to my pulmonary rehab appointments. Scheduling line: [phone]   3. I will follow up with my pulmonologist, Dr. Mcclure as recommended. Around 8/3/23 for my 3 month follow up appointment. Scheduling line: [phone]                      Care Plan: Community Resources     Problem: Reliable food source     Goal: Establish Options for Affordable Food Sources     Start Date: 5/5/2023 Expected End Date: 8/3/2023    This Visit's Progress: 0%    Note:     Barriers: finances and transportation   Strengths: care coordination   Patient expressed understanding of goal: yes   Action steps to achieve this goal:  1. Patient was referred to our  financial resource worker to look into economic assistance with [facility], such as SNAP   2. I will call the [place] [employer] at [phone]  3. I will call [facility] for discounted groceries [phone]                      Care Plan: Health Maintenance     Problem: Health Maintenance Due or Overdue     Goal: Become up-to-date with health maintenance visit(s)     Start Date: 5/5/2023 Expected End Date: 8/3/2023    This Visit's Progress: 0%    Note:     Goal Statement: I will complete my annual wellness visit.    Barriers: none  Strengths: care coordination     Patient expressed understanding of goal: yes  Action steps to achieve this goal:  1. I will schedule my annual wellness visit; [id number] ([id number])  2. I will attend my annual wellness visit.  3. I will contact my Care Management or clinic team if I have barriers to attending my annual wellness visit.                              Plan/Recommendations: The patient will continue working with Care Coordination to achieve goal(s) as above. CHW will continue outreaches at minimum every 30 days and will involve Lead CC as needed or if patient is ready to move to Maintenance. Lead CC will continue to monitor CHW outreaches and patient's progress to goal(s) every 6 weeks.     Plan of Care updated and sent to patient: Lisa Hair RN, BSN, PHN Care Coordinator  [facility] Lino and Brandy Hernandez   Phone: [phone]

## 2023-08-08 ENCOUNTER — NURSE TRIAGE (OUTPATIENT)
Dept: FAMILY MEDICINE | Facility: CLINIC | Age: 48
End: 2023-08-08
Payer: COMMERCIAL

## 2023-08-08 NOTE — TELEPHONE ENCOUNTER
Pt calling stating he is having chest pain, started 2 days ago. Pain comes and goes and lasts for 10 to 15 minutes and happens around twice a day. Does have SOB baseline.    Per protocol d/t pt's pain lasting longer than 5 minutes during episodes recommend pt like will need to be seen in ER but that I would get message to PCP and office for further recommendation as pt states he would rather avoid ER or UC if possible and he only had 1 episode so far today.    Thanks!    Reason for Disposition   All other patients with chest pain (Exception: fleeting chest pain lasting a few seconds)   Chest pain lasting longer than 5 minutes and occurred in last 3 days (72 hours) (Exception: feels exactly the same as previously diagnosed heartburn and has accompanying sour taste in mouth)    Additional Information   Negative: SEVERE difficulty breathing (e.g., struggling for each breath, speaks in single words)   Negative: Passed out (i.e., fainted, collapsed and was not responding)   Negative: Difficult to awaken or acting confused (e.g., disoriented, slurred speech)   Negative: Shock suspected (e.g., cold/pale/clammy skin, too weak to stand, low BP, rapid pulse)   Negative: Chest pain lasting longer than 5 minutes and ANY of the following:* Over 44 years old* Over 30 years old and at least one cardiac risk factor (e.g., diabetes mellitus, high blood pressure, high cholesterol, smoker, or strong family history of heart disease)* History of heart disease (i.e., angina, heart attack, heart failure, bypass surgery, takes nitroglycerin)* Pain is crushing, pressure-like, or heavy   Negative: Heart beating < 50 beats per minute OR > 140 beats per minute   Negative: Visible sweat on face or sweat dripping down face   Negative: Sounds like a life-threatening emergency to the triager   Negative: Followed an injury to chest   Negative: SEVERE chest pain   Negative: Pain also in shoulder(s) or arm(s) or jaw   Negative: Difficulty breathing    "Negative: Cocaine use within last 3 days   Negative: Major surgery in the past month   Negative: Hip or leg fracture (broken bone) in past month (or had cast on leg or ankle in past month)   Negative: Illness requiring prolonged bedrest in past month (e.g., immobilization, long hospital stay)   Negative: Long-distance travel in past month (e.g., car, bus, train, plane; with trip lasting 6 or more hours)   Negative: History of prior 'blood clot' in leg or lungs (i.e., deep vein thrombosis, pulmonary embolism)   Negative: History of inherited increased risk of blood clots (e.g., Factor 5 Leiden, Anti-thrombin 3, Protein C or Protein S deficiency, Prothrombin mutation)   Negative: Cancer treatment in the past two months (or has cancer now)   Negative: Heart beating irregularly or very rapidly    Answer Assessment - Initial Assessment Questions  1. LOCATION: \"Where does it hurt?\"        Right chest   2. RADIATION: \"Does the pain go anywhere else?\" (e.g., into neck, jaw, arms, back)      No  3. ONSET: \"When did the chest pain begin?\" (Minutes, hours or days)       2 days ago  4. PATTERN \"Does the pain come and go, or has it been constant since it started?\"  \"Does it get worse with exertion?\"       Come and go  5. DURATION: \"How long does it last\" (e.g., seconds, minutes, hours)      10 to 15min  6. SEVERITY: \"How bad is the pain?\"  (e.g., Scale 1-10; mild, moderate, or severe)     - MILD (1-3): doesn't interfere with normal activities      - MODERATE (4-7): interferes with normal activities or awakens from sleep     - SEVERE (8-10): excruciating pain, unable to do any normal activities        Sharp pain that is moderate to severe.  7. CARDIAC RISK FACTORS: \"Do you have any history of heart problems or risk factors for heart disease?\" (e.g., angina, prior heart attack; diabetes, high blood pressure, high cholesterol, smoker, or strong family history of heart disease)      No  8. PULMONARY RISK FACTORS: \"Do you have any " "history of lung disease?\"  (e.g., blood clots in lung, asthma, emphysema, birth control pills)      COPD  9. CAUSE: \"What do you think is causing the chest pain?\"      Unsure  10. OTHER SYMPTOMS: \"Do you have any other symptoms?\" (e.g., dizziness, nausea, vomiting, sweating, fever, difficulty breathing, cough)        Difficulty breathing baseline  11. PREGNANCY: \"Is there any chance you are pregnant?\" \"When was your last menstrual period?\"        N/a    Protocols used: Chest Pain-A-[place]    Tony Ferraro RN   [facility]    "

## 2023-08-08 NOTE — TELEPHONE ENCOUNTER
Provider Response to 2nd Level Triage Request    I have reviewed the RN documentation. My recommendation is:  Refer to ED    Given his comorbidities and heart history, I don't see another option than ED  to r/o ACS.      Dell Tabor PA-C

## 2023-08-08 NOTE — TELEPHONE ENCOUNTER
Called patient and relayed provider recommendation below, patient verbalized understanding. No further questions or concerns at this time.      ELLIOTT LizamaN, RN  [facility] Primary Care [facility]

## 2023-08-09 ENCOUNTER — PATIENT OUTREACH (OUTPATIENT)
Dept: CARE COORDINATION | Facility: CLINIC | Age: 48
End: 2023-08-09
Payer: COMMERCIAL

## 2023-08-09 NOTE — TELEPHONE ENCOUNTER
Clinic Care Coordination Contact  Program: [facility] Benefit  County: [facility] Case #:  [facility] Worker:   Brittany #:   Subscriber #:   Renewal:  Date Applied: 23    FRW Outreach:   23- FRW called pt and his friend answer.he said that the pt isn't available, frw will follow up within 30days.  Jeanna Rosario  Financial Resource Worker  [facility]  Clinic Care Coordination  [phone]     23- FRW called pt, we reapply for snap/cash again for him as he was denied. Pt was advised to call the county on Monday to complete his interview. FRW will follow up in 4 weeks with pt.     23- FRW called pt and we called hc together as he hadn't heard from his application. After being on hold for an 1hr, the [facility] rep said that pt application was denied because he fail to complete the interview. FRW offered to re-apply but pt state that he doesn't have his son's info.  FRW schedule an appt on  at 11am.    23- frw spoke with pt, he said that he hasn't heard or received anything from the [facility]. I advised that we call the [facility] before reapply to check on the status of the application. FRW set up an appt on 23 at 9am.     23- frw called pt to follow on his application status, no answer left a vm. frw will follow up in a week.     23- FRW establish contact with pt and confirmed program. Patient was ready to apply, FRW submitted SNAP and cash assistance application. Pt was given instruction on what to expect next as well as HC phone number. FRW  Will follow up with pt in 4 weeks.     SNAP/CASH Application Screenin. Have you had county benefits before?No  2. How many people in the household, do you eat/buy food together? 2  3. What is your monthly income (include all tax members)? 2600 monthly - 1300 biweekly  4. Do you have a bank account? Yes  5. Do you have any utility bills (electricity, rent, mortgage, phone, insurance, medical bills, etc.)? Yes rent  6. Do you have social  security cards and/or green cards? Green card        Health Insurance:      Referral/Screening:  Financial Resource Worker Screening  [facility] Benefits  Is patient requesting help applying for [facility] benefits?: Yes  Have you recently applied for any [facility] benefits?: No  How many people in your household?: 2  Do you buy/eat food together?: Yes  What is the monthly gross income for the household (wages, social security, workers comp, and pension)? : 1000     [place]:  [place] No  Is this an insurance renewal?: No  Is this a new insurance application request?: No

## 2023-08-11 ENCOUNTER — TELEPHONE (OUTPATIENT)
Dept: FAMILY MEDICINE | Facility: CLINIC | Age: 48
End: 2023-08-11
Payer: COMMERCIAL

## 2023-08-11 DIAGNOSIS — I10 HYPERTENSION GOAL BP (BLOOD PRESSURE) < 140/80: ICD-10-CM

## 2023-08-11 DIAGNOSIS — M25.511 CHRONIC PAIN OF BOTH SHOULDERS: ICD-10-CM

## 2023-08-11 DIAGNOSIS — R06.02 SHORTNESS OF BREATH: ICD-10-CM

## 2023-08-11 DIAGNOSIS — G89.29 CHRONIC PAIN OF BOTH SHOULDERS: ICD-10-CM

## 2023-08-11 DIAGNOSIS — M79.2 NEUROPATHIC PAIN: ICD-10-CM

## 2023-08-11 DIAGNOSIS — J44.1 CHRONIC OBSTRUCTIVE PULMONARY DISEASE WITH ACUTE EXACERBATION (H): ICD-10-CM

## 2023-08-11 DIAGNOSIS — M79.671 BILATERAL FOOT PAIN: ICD-10-CM

## 2023-08-11 DIAGNOSIS — G62.0 DRUG-INDUCED PERIPHERAL NEUROPATHY (H): ICD-10-CM

## 2023-08-11 DIAGNOSIS — J44.9 CHRONIC OBSTRUCTIVE PULMONARY DISEASE, UNSPECIFIED COPD TYPE (H): ICD-10-CM

## 2023-08-11 DIAGNOSIS — J96.11 CHRONIC RESPIRATORY FAILURE WITH HYPOXIA (H): ICD-10-CM

## 2023-08-11 DIAGNOSIS — M79.672 BILATERAL FOOT PAIN: ICD-10-CM

## 2023-08-11 DIAGNOSIS — G47.00 INSOMNIA, UNSPECIFIED TYPE: ICD-10-CM

## 2023-08-11 DIAGNOSIS — M25.512 CHRONIC PAIN OF BOTH SHOULDERS: ICD-10-CM

## 2023-08-11 RX ORDER — NORTRIPTYLINE HCL 10 MG
10-20 CAPSULE ORAL AT BEDTIME
Qty: 60 CAPSULE | Refills: 1 | Status: SHIPPED | OUTPATIENT
Start: 2023-08-11 | End: 2023-11-28

## 2023-08-11 RX ORDER — AMLODIPINE BESYLATE 5 MG/1
10 TABLET ORAL DAILY
Qty: 180 TABLET | Refills: 1 | OUTPATIENT
Start: 2023-08-11

## 2023-08-11 RX ORDER — LISINOPRIL 10 MG/1
10 TABLET ORAL DAILY
Qty: 30 TABLET | Refills: 1 | Status: SHIPPED | OUTPATIENT
Start: 2023-08-11 | End: 2023-11-28

## 2023-08-11 RX ORDER — GABAPENTIN 300 MG/1
900 CAPSULE ORAL 3 TIMES DAILY
Qty: 270 CAPSULE | Refills: 0 | Status: SHIPPED | OUTPATIENT
Start: 2023-08-11 | End: 2023-11-06

## 2023-08-11 RX ORDER — DULOXETIN HYDROCHLORIDE 60 MG/1
60 CAPSULE, DELAYED RELEASE ORAL DAILY
Qty: 90 CAPSULE | Refills: 0 | Status: SHIPPED | OUTPATIENT
Start: 2023-08-11 | End: 2023-10-31

## 2023-08-11 RX ORDER — BUDESONIDE AND FORMOTEROL FUMARATE DIHYDRATE 160; 4.5 UG/1; UG/1
2 AEROSOL RESPIRATORY (INHALATION) 2 TIMES DAILY
Qty: 10.2 G | Refills: 0 | Status: SHIPPED | OUTPATIENT
Start: 2023-08-11 | End: 2023-11-03

## 2023-08-11 RX ORDER — DULOXETIN HYDROCHLORIDE 30 MG/1
30 CAPSULE, DELAYED RELEASE ORAL DAILY
Qty: 90 CAPSULE | Refills: 0 | Status: SHIPPED | OUTPATIENT
Start: 2023-08-11 | End: 2024-07-14

## 2023-08-11 RX ORDER — ALBUTEROL SULFATE 90 UG/1
2 AEROSOL, METERED RESPIRATORY (INHALATION) EVERY 6 HOURS PRN
Qty: 8.5 G | Refills: 0 | Status: SHIPPED | OUTPATIENT
Start: 2023-08-11 | End: 2023-11-03

## 2023-08-11 RX ORDER — BUDESONIDE, GLYCOPYRROLATE, AND FORMOTEROL FUMARATE 160; 9; 4.8 UG/1; UG/1; UG/1
2 AEROSOL, METERED RESPIRATORY (INHALATION) 2 TIMES DAILY
Qty: 10.7 G | Refills: 0 | Status: SHIPPED | OUTPATIENT
Start: 2023-08-11 | End: 2023-11-06

## 2023-08-11 RX ORDER — ACETAMINOPHEN 325 MG/1
650 TABLET ORAL EVERY 6 HOURS PRN
Qty: 180 TABLET | Refills: 0 | Status: SHIPPED | OUTPATIENT
Start: 2023-08-11 | End: 2024-01-11

## 2023-08-11 NOTE — TELEPHONE ENCOUNTER
Copied and pasted from another encounter to separate the patient's message:    Addendum          Reason for Call:  Appointment Request     Patient requesting this type of appt:  office visit, in person     Requested provider: Nyla Barton     Reason patient unable to be scheduled: Not within requested timeframe     When does patient want to be seen/preferred time: Same day     Comments: Patient states his skin has been itchy and when ever he scratches it begins to bruise.  Patient also mentions he needs all of his medications refilled and would llike them sent to his usual [facility] location In [place] off [address] and [address].     Could we send this information to you in [employer] or would you prefer to receive a phone call?:   Patient would prefer a phone call   Okay to leave a detailed message?: Yes at Cell number on file:        Telephone Information:   Mobile [phone]         Call taken on 8/11/2023 at 7:31 AM by Santa Beaulieu            Routing to the RN Pool to advise on the itchy skin. Sent the refill request in a different message to the refill pool,  Miya Whitfield [facility]   Primary Care

## 2023-08-11 NOTE — TELEPHONE ENCOUNTER
Reason for Call:  Appointment Request    Patient requesting this type of appt:  office visit, in person    Requested provider: Nyla Barton    Reason patient unable to be scheduled: Not within requested timeframe    When does patient want to be seen/preferred time: Same day    Comments: Patient states his skin has been itchy and when ever he scratches it begins to bruise.  Patient also mentions he needs all of his medications refilled and would llike them sent to his usual [facility] location In [place] off [address].    Could we send this information to you in [facility] or would you prefer to receive a phone call?:   Patient would prefer a phone call   Okay to leave a detailed message?: Yes at Cell number on file:    Telephone Information:   Mobile [phone]       Call taken on 8/11/2023 at 7:31 AM by Santa Beaulieu

## 2023-08-11 NOTE — TELEPHONE ENCOUNTER
Called and spoke to the patient and explained that Nyla Barton is no longer at our clinic and that he will need to establish care with another provider. He is due for a follow up appointment per Nyla Barton the end of August. Scheduled patient for an appt with Dr Parikh on 8/25/2023.  Went over medication list and t'd up meds patient is requesting to be refilled.Routing message to the refill pool. Also making separate encounter and routing to the RN Poll to address Patient's itchy skin.  Miya Whitfield [facility]   Primary Care

## 2023-08-11 NOTE — TELEPHONE ENCOUNTER
Patient states he has widespread itching is all over body, it has been going on for 3 weeks. No scratch marks or bleeding from the itching. Patient is able to work through the itching. Pt has bruising in some areas where he has scratched.    Patient declines any new foods or soaps, recent swimming, exposure to animals or allergens.     Patient does not feel short of breath at rest or having issues deep breathing. Pt states he has asthma and COPD and uses his inhaler and nebulizer. Wants meds refilled (see other encounter.)    No in person appts sooner than appointment currently scheduled, advised can go to  for worsening symptoms and keep scheduled appointment.     Marycruz Clement RN    [facility]- Primary Care

## 2023-08-14 RX ORDER — BUDESONIDE AND FORMOTEROL FUMARATE DIHYDRATE 160; 4.5 UG/1; UG/1
2 AEROSOL RESPIRATORY (INHALATION) 2 TIMES DAILY
Qty: 30.6 G | OUTPATIENT
Start: 2023-08-14

## 2023-08-14 RX ORDER — LISINOPRIL 10 MG/1
10 TABLET ORAL DAILY
Qty: 90 TABLET | OUTPATIENT
Start: 2023-08-14

## 2023-08-16 ENCOUNTER — PATIENT OUTREACH (OUTPATIENT)
Dept: CARE COORDINATION | Facility: CLINIC | Age: 48
End: 2023-08-16
Payer: COMMERCIAL

## 2023-08-16 ASSESSMENT — ACTIVITIES OF DAILY LIVING (ADL): DEPENDENT_IADLS:: TRANSPORTATION

## 2023-08-16 NOTE — LETTER
[facility] CARE COORDINATION  [address]  [place] [facility] [address]    August 22, 2023        Chaim Norman  [address]  [facility] [address]          Dear Patel Rucker is an updated Patient Centered Plan of Care for your continued enrollment in Care Coordination. Please let us know if you have additional questions, concerns, or goals that we can assist with.    Sincerely,    Naomi Hair RN, BSN, PHN Care Coordinator  [place], [place], and [place]   Phone: [phone]             [facility]  Patient Centered Plan of Care  About Me:        Patient Name:  Chaim Norman    YOB: 1975  Age:         47 year old   [place] MRN:    [id number] Telephone Information:  Home Phone [phone]   Mobile [phone]       Address:  [address]  [facility] [address] Email address:  [email or web address]      Emergency Contact(s)    Name Relationship Lgl Grd Work Phone Home Phone Mobile Phone   1. SARAH NORMAN Son   [phone]   2. CARMEN MELO Cousin   [phone]           Primary language:  English     needed? No   [place] [employer]:  [phone] op. 1  Other communication barriers:None    Preferred Method of Communication:  Mail  Current living arrangement: I live in a private home with family    Mobility Status/ Medical Equipment: Independent        Health Maintenance  Health Maintenance Reviewed: Due/Overdue   Health Maintenance Due   Topic Date Due    URINE DRUG SCREEN  Never done    COPD ACTION PLAN  Never done    HEPATITIS B IMMUNIZATION (1 of 3 - 3-dose series) Never done    Pneumococcal Vaccine: Pediatrics (0 to 5 Years) and At-Risk Patients (6 to 64 Years) (1 - PCV) Never done    COVID-19 Vaccine (3 - Pfizer risk series) 05/21/2021    YEARLY PREVENTIVE VISIT  09/14/2022    ZOSTER IMMUNIZATION (1 of 2) 08/28/2023          My Access Plan  Medical Emergency 911   Primary Clinic Line [facility]  [facility] - [phone]   24 Hour Appointment Line [phone] or  [phone]) (toll-free)   24 Hour Nurse Line [phone] (toll-free)   Preferred Urgent Care [facility] [place], [phone]     Preferred Hospital No data recorded   Preferred Pharmacy [id number] - [facility], [place] - [address] AT [facility] [place]     Behavioral Health Crisis Line The National Suicide Prevention Lifeline at 1-180.729.4033 or Text/Call 738             My Care Team Members  Patient Care Team         Relationship Specialty Notifications Start End    Nyla Barton APRN CNP PCP - General Nurse Practitioner  9/25/18     Phone: [phone] Fax: [phone]         [address] [facility] [address]    Nyla Barton APRN CNP Assigned PCP   10/25/18     Phone: [phone] Fax: [phone]         [address] [facility] [address]    Tania Frias, RN Specialty Care Coordinator Cardiology Admissions 1/28/20     Pulmonary HTN    Phone: [phone] Pager: [phone] Fax: [phone]       Rosa Malcolm, RN Specialty Care Coordinator Cardiology Admissions 1/28/20     Pulmonary HTN    Phone: [phone] Pager: [phone] Fax: [phone]       Pat Story, RN Specialty Care Coordinator Cardiology Admissions 1/28/20     Pulmonary HTN    Phone: [phone] Pager: [phone] Fax: [phone]       Dawn Kendrick MD MD Internal Medicine  5/27/20     Phone: [phone] Fax: [phone]         [address] [facility] [address]    Rod Chauhan MD Hospitalist Cardiology  7/7/20     Phone: [phone] Pager: [phone] Fax: [phone]        [address] [facility] [address]    Andrea Timmons MD MD Infectious Diseases  7/7/20     Phone: [phone] Fax: [phone] [address] [address]    Rhonda Atwood MD Resident Student in organized health  care education/training program  7/7/20     Phone: [phone] Fax: [phone]         [address] [address]    Nyla Barton APRN CNP Referring Physician Family Medicine  8/3/21     Referring to Pulm    Phone: [phone] Fax: [phone]         [address] [facility] [address]    Lauren More MD MD Pulmonary Disease  8/3/21     Phone: [phone] Fax: [phone]         [address] [facility] [address] [address] [facility] [address]    Nely Wright, RN Clinic Care Coordinator Cardiology Admissions 4/18/22     Pulmonary HTN    Phone: [phone] Pager: [phone] Fax: [phone]       Mark Oquendo, RN Specialty Care Coordinator  Admissions 4/19/23     Phone: [phone] Pager: [phone]        Naomi Hair, RN Lead Care Coordinator Primary Care - CC Admissions 5/4/23     Phone: [phone] Fax: [phone]        Jeanna Rosario MA Financial Resource Worker   5/4/23     Phone: [phone]         Beck Mcclure MD Assigned Pulmonology Provider   5/6/23     Phone: [phone] Fax: [phone]         [address] [place] [address]              My Care Plans  Self Management and Treatment Plan  Care Plan  Care Plan: Improve breathing       Problem: Reduce my shortness of breath       Goal: I would like to carry out the treatment plans as set by my pulmonologist, Dr. Mcclure       Start Date: 5/5/2023 Expected End Date: 11/20/2023    Recent Progress: 0%    Note:     Barriers: finances, and transportation   Strengths: patient enrolled in care coordination.  Patient expressed understanding of goal: yes  Action steps to achieve this goal:  1. I will  my new inhaler as soon as I have the money for the co-pay  2. I will call [employer] at [phone] to set my free transportation to my pulmonary rehab appointments. Scheduling line: [phone]   3. I will follow up with my pulmonologist, Dr. Mcclure as recommended. Around  8/3/23 for my 3 month follow up appointment. Scheduling line: [phone] . Rescheduled for 11/17/23                                Care Plan: Community Resources       Problem: Reliable food source       Goal: Establish Options for Affordable Food Sources       Start Date: 5/5/2023 Expected End Date: 8/3/2023    This Visit's Progress: 0%    Note:     Barriers: finances and transportation   Strengths: care coordination   Patient expressed understanding of goal: yes   Action steps to achieve this goal:  1. Patient was referred to our financial resource worker to look into economic assistance with [facility], such as SNAP   2. I will call the [place] [employer] HelpLine at 169-171-3800  3. I will call [facility] for All for discounted groceries [phone]                              Care Plan: Health Maintenance       Problem: Health Maintenance Due or Overdue       Goal: Become up-to-date with health maintenance visit(s)       Start Date: 5/5/2023 Expected End Date: 8/3/2023    This Visit's Progress: 0%    Note:     Goal Statement: I will complete my annual wellness visit.    Barriers: none  Strengths: care coordination     Patient expressed understanding of goal: yes  Action steps to achieve this goal:  1. I will schedule my annual wellness visit; [id number] ([phone])  2. I will attend my annual wellness visit.  3. I will contact my Care Management or clinic team if I have barriers to attending my annual wellness visit.                                  Action Plans on File:                       Advance Care Plans/Directives Type:   No data recorded    My Medical and Care Information  Problem List   Patient Active Problem List   Diagnosis    History of TB (tuberculosis)    Weight loss    Pulmonary nodules    Late latent syphilis    Iatrogenic pneumothorax    Patient's intentional underdosing of medication regimen due to financial hardship    Anemia of chronic disease    Benign prostatic hyperplasia, unspecified whether  lower urinary tract symptoms present    COPD (chronic obstructive pulmonary disease) (H)    Hepatitis B core antibody positive    Pulmonary hypertension (H)    SOB (shortness of breath)    Status post coronary angiogram    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    Care Coordination Start Date: 5/3/2023   Frequency of Care Coordination: monthly     Form Last Updated: 08/22/2023

## 2023-08-16 NOTE — TELEPHONE ENCOUNTER
Clinic Care Coordination Contact  Program: [facility] Benefit  County: [facility] Case #:  [facility] Worker:   Brittany #:   Subscriber #:   Renewal:  Date Applied: 5/9/23, reapply 7/12/23    FRW Outreach:   8/16/23- Frw called pt, he stated that he hasn't received any letter or call from the [facility]. Frw advised pt to follow up with the county as it's been a month since we submitted. Pt said that he will call them asap. Frw will follow up with pt within 30 days.   Jeanna Rosario  Financial Resource Worker  [facility] Care Coordination  [phone]     8/9/23- FRW called pt and his friend answer.he said that the pt isn't available, frw will follow up within 30days.  Jeanna Rosario  Financial Resource Worker  [facility] Care Coordination  [phone]     7/12/23- FRW called pt, we reapply for snap/cash again for him as he was denied. Pt was advised to call the [facility] on Monday to complete his interview. FRW will follow up in 4 weeks with pt.     7/5/23- FRW called pt and we called hc together as he hadn't heard from his application. After being on hold for an 1hr, the [facility] rep said that pt application was denied because he fail to complete the interview. FRW offered to re-apply but pt state that he doesn't have his son's info.  FRW schedule an appt on 7/12 at 11am.    6/27/23- frw spoke with pt, he said that he hasn't heard or received anything from the [facility]. I advised that we call the county before reapply to check on the status of the application. FRW set up an appt on 7/5/23 at 9am.     6/6/23- frw called pt to follow on his application status, no answer left a vm. frw will follow up in a week.     5/9/23- FRW establish contact with pt and confirmed program. Patient was ready to apply, FRW submitted SNAP and cash assistance application. Pt was given instruction on what to expect next as well as HC phone number. FRW  Will follow up with pt in 4 weeks.     SNAP/CASH  Application Screenin. Have you had [facility] benefits before?No  2. How many people in the household, do you eat/buy food together? 2  3. What is your monthly income (include all tax members)? 2600 monthly - 1300 biweekly  4. Do you have a bank account? Yes  5. Do you have any utility bills (electricity, rent, mortgage, phone, insurance, medical bills, etc.)? Yes rent  6. Do you have social security cards and/or green cards? Green card        Health Insurance:      Referral/Screening:  Financial Resource Worker Screening  [facility] Benefits  Is patient requesting help applying for [facility] benefits?: Yes  Have you recently applied for any [facility] benefits?: No  How many people in your household?: 2  Do you buy/eat food together?: Yes  What is the monthly gross income for the household (wages, social security, workers comp, and pension)? : 1000     [place]:  [place] No  Is this an insurance renewal?: No  Is this a new insurance application request?: No

## 2023-08-16 NOTE — PROGRESS NOTES
Clinic Care Coordination Contact  Follow Up Progress Note      Assessment: CC RN contacted patient for goal progression.   Patient rescheduled his follow up with pulmonologist, Dr. Mcclure. Has pending appointment for 11/17/23.   We discussed how to use his 3 inhalers per his med list. Patient has the 3 inhalers and is taking as advised.   Patients last appointment with PCP was 5/30, and recommended to follow up in 3 months. Patient has an est care visit with ALICJA CALIXTO pending for 8/25/23.   Patient stated he has been calling the [employer] and not getting to talk to anyone. Patient has a CC FRW goal. Appears FRW will be reaching out to him today per chart. CC RN did provide the direct line to CC FRW who has been assisting in his case.     Care Gaps: patient has active AWE goal   Health Maintenance Due   Topic Date Due    URINE DRUG SCREEN  Never done    COPD ACTION PLAN  Never done    HEPATITIS B IMMUNIZATION (1 of 3 - 3-dose series) Never done    Pneumococcal Vaccine: Pediatrics (0 to 5 Years) and At-Risk Patients (6 to 64 Years) (1 - PCV) Never done    COVID-19 Vaccine (3 - Pfizer risk series) 05/21/2021    YEARLY PREVENTIVE VISIT  09/14/2022    ZOSTER IMMUNIZATION (1 of 2) 08/28/2023     Care Plans  Care Plan: Improve breathing       Problem: Reduce my shortness of breath       Goal: I would like to carry out the treatment plans as set by my pulmonologist, Dr. Mcclure       Start Date: 5/5/2023 Expected End Date: 11/20/2023    Recent Progress: 0%    Note:     Barriers: finances, and transportation   Strengths: patient enrolled in care coordination.  Patient expressed understanding of goal: yes  Action steps to achieve this goal:  1. I will  my new inhaler as soon as I have the money for the co-pay  2. I will call [employer] at [phone] to set my free transportation to my pulmonary rehab appointments. Scheduling line: [phone]   3. I will follow up with my pulmonologist, Dr. Mcclure as  recommended. Around 8/3/23 for my 3 month follow up appointment. Scheduling line: [phone] . Rescheduled for 11/17/23                                Care Plan: Community Resources       Problem: Reliable food source       Goal: Establish Options for Affordable Food Sources       Start Date: 5/5/2023 Expected End Date: 8/3/2023    This Visit's Progress: 0%    Note:     Barriers: finances and transportation   Strengths: care coordination   Patient expressed understanding of goal: yes   Action steps to achieve this goal:  1. Patient was referred to our financial resource worker to look into economic assistance with [facility], such as SNAP   2. I will call the [place] [employer] HelpLine at [phone]  3. I will call [facility] for [facility] for discounted groceries [phone]                              Care Plan: Health Maintenance       Problem: Health Maintenance Due or Overdue       Goal: Become up-to-date with health maintenance visit(s)       Start Date: 5/5/2023 Expected End Date: 8/3/2023    This Visit's Progress: 0%    Note:     Goal Statement: I will complete my annual wellness visit.    Barriers: none  Strengths: care coordination     Patient expressed understanding of goal: yes  Action steps to achieve this goal:  1. I will schedule my annual wellness visit; [phone] ([phone])  2. I will attend my annual wellness visit.  3. I will contact my Care Management or clinic team if I have barriers to attending my annual wellness visit.                               Intervention/Education provided during outreach: Discussed patients goal and action steps. CC RN asked open ended questions, provided support, resources, and encouragement as needed. Patient will reach out sooner than planned with new questions or concerns.      Plan:   Patient to reach out to CC FRW for [facility] economic application questions.   Patient to attend both pending appointments with primary care and pulmonology.   Care Coordinator will follow up  in 1 month.    Naomi Hair RN, BSN, PHN Care Coordinator  [place], [place], and [place]   Phone: [phone]

## 2023-09-11 ENCOUNTER — PATIENT OUTREACH (OUTPATIENT)
Dept: CARE COORDINATION | Facility: CLINIC | Age: 48
End: 2023-09-11
Payer: COMMERCIAL

## 2023-09-11 NOTE — TELEPHONE ENCOUNTER
Clinic Care Coordination Contact  Program: [facility]  County: [facility] Case #:  [facility] Worker:   Brittany #:   Subscriber #:   Renewal:  Date Applied: 5/9/23, reapply 7/12/23    FRW Outreach:   9/11/23- Frw called to follow rgarding his [facility] benefit. Pt state that he received a letter from the [facility] requesting him to call back. Pt called back and couldn't get hold on [facility] rep. We have schedule an appt on 9/13 at 9am to call the county. Frw will follow up then.   Jeanna Rosario  Financial Resource Worker  [facility] Care Coordination  [phone]     8/16/23- Frw called pt, he stated that he hasn't received any letter or call from the [facility]. Frw advised pt to follow up with the county as it's been a month since we submitted. Pt said that he will call them asap. Frw will follow up with pt within 30 days.   Jeanna Rosario  Financial Resource Worker  [facility] Care Coordination  [phone]     8/9/23- FRW called pt and his friend answer.he said that the pt isn't available, frw will follow up within 30days.  Jeanna Rosario  Financial Resource Worker  [facility] Care Coordination  [phone]     7/12/23- FRW called pt, we reapply for snap/cash again for him as he was denied. Pt was advised to call the [facility] on Monday to complete his interview. FRW will follow up in 4 weeks with pt.     7/5/23- FRW called pt and we called hc together as he hadn't heard from his application. After being on hold for an 1hr, the [facility] rep said that pt application was denied because he fail to complete the interview. FRW offered to re-apply but pt state that he doesn't have his son's info.  FRW schedule an appt on 7/12 at 11am.    6/27/23- frw spoke with pt, he said that he hasn't heard or received anything from the [facility]. I advised that we call the [facility] before reapply to check on the status of the application. FRW set up an appt on 7/5/23 at 9am.      23- frw called pt to follow on his application status, no answer left a vm. frw will follow up in a week.     23- FRW establish contact with pt and confirmed program. Patient was ready to apply, FRW submitted SNAP and cash assistance application. Pt was given instruction on what to expect next as well as HC phone number. FRW  Will follow up with pt in 4 weeks.     SNAP/CASH Application Screenin. Have you had [facility] benefits before?No  2. How many people in the household, do you eat/buy food together? 2  3. What is your monthly income (include all tax members)? 2600 monthly - 1300 biweekly  4. Do you have a bank account? Yes  5. Do you have any utility bills (electricity, rent, mortgage, phone, insurance, medical bills, etc.)? Yes rent  6. Do you have social security cards and/or green cards? Green card        Health Insurance:      Referral/Screening:  Financial Resource Worker Screening  County Benefits  Is patient requesting help applying for [facility] benefits?: Yes  Have you recently applied for any county benefits?: No  How many people in your household?: 2  Do you buy/eat food together?: Yes  What is the monthly gross income for the household (wages, social security, workers comp, and pension)? : 1000     [place]:  [place] No  Is this an insurance renewal?: No  Is this a new insurance application request?: No

## 2023-09-13 ENCOUNTER — PATIENT OUTREACH (OUTPATIENT)
Dept: CARE COORDINATION | Facility: CLINIC | Age: 48
End: 2023-09-13

## 2023-09-13 ENCOUNTER — PATIENT OUTREACH (OUTPATIENT)
Dept: CARE COORDINATION | Facility: CLINIC | Age: 48
End: 2023-09-13
Payer: COMMERCIAL

## 2023-09-13 NOTE — PROGRESS NOTES
Clinic Care Coordination Contact    Situation: Patient chart reviewed by care coordinator.    Background: patient enrolled with primary care coordination. Patient active with CC medical goal and CC FRW goal     Assessment: CC FRW contacted patient today to assist in CC FRW/patient goal.   CC RN addressed medical goal at last outreach, patient has pending pulmonology follow up.     Plan/Recommendations: CC RN will review chart in 30 days to evaluate outcome of FRW outcome.     Naomi Hair RN, BSN, PHN Care Coordinator  [place], [place], and [place]   Phone: [phone]

## 2023-09-13 NOTE — TELEPHONE ENCOUNTER
Clinic Care Coordination Contact  Program: [facility] Benefit  County: [facility] Case #:  [facility] Worker:   Brittany #:   Subscriber #:   Renewal:  Date Applied: 5/9/23-denied,7/12/23-denied, reapply 9/13/23    FRW Outreach:   9/13/23- FRW called the pt at appt time, we called the county together to follow up on his 2nd application. After being on hold for to 2hr, [facility] had inform us that pt application was denied due to missing the interview. FRW explain to the pt that missing interview can't happen again as this is the 2nd time. Frw complete a new snap/cash application. Pt had been advised to call the county after3-5 business days to complete the interview. FRW will follow up within 30 days.  Jeanna Rosario  Financial Resource Worker  [facility] Care Coordination  [phone]     9/11/23- Frw called to follow rgarding his county benefit. Pt state that he received a letter from the [facility] requesting him to call back. Pt called back and couldn't get hold on [facility] rep. We have schedule an appt on 9/13 at 9am to call the [facility]. Frw will follow up then.   Jeanna Rosario  Financial Resource Worker  [facility] Care Coordination  [phone]     8/16/23- Frw called pt, he stated that he hasn't received any letter or call from the [facility]. Frw advised pt to follow up with the county as it's been a month since we submitted. Pt said that he will call them asap. Frw will follow up with pt within 30 days.   Jeanna Rosario  Financial Resource Worker  [facility] Care Coordination  [phone]     8/9/23- FRW called pt and his friend answer.he said that the pt isn't available, frw will follow up within 30days.  Jeanna Rosario  Financial Resource Worker  [facility] Care Coordination  [phone]     7/12/23- FRW called pt, we reapply for snap/cash again for him as he was denied. Pt was advised to call the [facility] on Monday to complete his  interview. FRW will follow up in 4 weeks with pt.     23- FRW called pt and we called hc together as he hadn't heard from his application. After being on hold for an 1hr, the [facility] rep said that pt application was denied because he fail to complete the interview. FRW offered to re-apply but pt state that he doesn't have his son's info.  FRW schedule an appt on  at 11am.    23- frw spoke with pt, he said that he hasn't heard or received anything from the [facility]. I advised that we call the county before reapply to check on the status of the application. FRW set up an appt on 23 at 9am.     23- frw called pt to follow on his application status, no answer left a vm. frw will follow up in a week.     23- FRW establish contact with pt and confirmed program. Patient was ready to apply, FRW submitted SNAP and cash assistance application. Pt was given instruction on what to expect next as well as HC phone number. FRW  Will follow up with pt in 4 weeks.     SNAP/CASH Application Screenin. Have you had [facility] benefits before?No  2. How many people in the household, do you eat/buy food together? 2  3. What is your monthly income (include all tax members)? 2600 monthly - 1300 biweekly  4. Do you have a bank account? Yes  5. Do you have any utility bills (electricity, rent, mortgage, phone, insurance, medical bills, etc.)? Yes rent  6. Do you have social security cards and/or green cards? Green card        Health Insurance:      Referral/Screening:  Financial Resource Worker Screening  [facility] Benefits  Is patient requesting help applying for [facility] benefits?: Yes  Have you recently applied for any [facility] benefits?: No  How many people in your household?: 2  Do you buy/eat food together?: Yes  What is the monthly gross income for the household (wages, social security, workers comp, and pension)? : 1000     [place]:  [place] No  Is this an insurance renewal?:  No  Is this a new insurance application request?: No

## 2023-10-11 ENCOUNTER — PATIENT OUTREACH (OUTPATIENT)
Dept: CARE COORDINATION | Facility: CLINIC | Age: 48
End: 2023-10-11
Payer: COMMERCIAL

## 2023-10-11 NOTE — TELEPHONE ENCOUNTER
Clinic Care Coordination Contact  Program: County Benefit  County: [facility] Case #:  [facility] Worker:   Brittany #:   Subscriber #:   Renewal:  Date Applied: 5/9/23-denied,7/12/23-denied, reapply 9/13/23    FRW Outreach:   10/11/23- Frw called pt to follow up on snap/cash application we submitted. Pt stated that he has call the county they confirmed receiving the application and ask pt to allow them more time. Frw ask pt how long was it since his last call pt, state about 3 weeks and didn't received any letter. Frw advised pt to call them again as soon as possible. Frw will follow up with 30 days.  Jeanna Rosario  Financial Resource Worker  [facility] Care Coordination  [phone]     9/13/23- FRW called the pt at appt time, we called the county together to follow up on his 2nd application. After being on hold for to 2hr, [facility]ending [facility] had inform us that pt application was denied due to missing the interview. FRW explain to the pt that missing interview can't happen again as this is the 2nd time. Frw complete a new snap/cash application. Pt had been advised to call the county after3-5 business days to complete the interview. FRW will follow up within 30 days.  Jeanna Rosario  Financial Resource Worker  [facility] Care Coordination  [phone]     9/11/23- Frw called to follow rgarding his county benefit. Pt state that he received a letter from the Count includes the [facility] requesting him to call back. Pt called back and couldn't get hold on Count includes the [facility] rep. We have schedule an appt on 9/13 at 9am to call the county. Frw will follow up then.   Jeanna Rosario  Financial Resource Worker  [facility] Care Coordination  [phone]     8/16/23- Frw called pt, he stated that he hasn't received any letter or call from the Count includes the [facility]. Frw advised pt to follow up with the county as it's been a month since we submitted. Pt said that he will call them asap. Frw will follow up with pt within  30 days.   Jeanna Rosario  Financial Resource Worker  [facility] Care Coordination  [phone]     23- FRW called pt and his friend answer.he said that the pt isn't available, frw will follow up within 30days.  Jeanna Rosario  Financial Resource Worker  [facility] Care Coordination  [phone]     23- FRW called pt, we reapply for snap/cash again for him as he was denied. Pt was advised to call the county on Monday to complete his interview. FRW will follow up in 4 weeks with pt.     23- FRW called pt and we called hc together as he hadn't heard from his application. After being on hold for an 1hr, the [facility] rep said that pt application was denied because he fail to complete the interview. FRW offered to re-apply but pt state that he doesn't have his son's info.  FRW schedule an appt on  at 11am.    23- frw spoke with pt, he said that he hasn't heard or received anything from the [facility]. I advised that we call the [facility] before reapply to check on the status of the application. FRW set up an appt on 23 at 9am.     23- frw called pt to follow on his application status, no answer left a vm. frw will follow up in a week.     23- FRW establish contact with pt and confirmed program. Patient was ready to apply, FRW submitted SNAP and cash assistance application. Pt was given instruction on what to expect next as well as HC phone number. FRW  Will follow up with pt in 4 weeks.     SNAP/CASH Application Screenin. Have you had county benefits before?No  2. How many people in the household, do you eat/buy food together? 2  3. What is your monthly income (include all tax members)? 2600 monthly - 1300 biweekly  4. Do you have a bank account? Yes  5. Do you have any utility bills (electricity, rent, mortgage, phone, insurance, medical bills, etc.)? Yes rent  6. Do you have social security cards and/or green cards? Green card        Health  Insurance:      Referral/Screening:  Financial Resource Worker Screening  County Benefits  Is patient requesting help applying for [facility] benefits?: Yes  Have you recently applied for any [facility] benefits?: No  How many people in your household?: 2  Do you buy/eat food together?: Yes  What is the monthly gross income for the household (wages, social security, workers comp, and pension)? : 1000     [place]:  [place] No  Is this an insurance renewal?: No  Is this a new insurance application request?: No

## 2023-10-13 ENCOUNTER — PATIENT OUTREACH (OUTPATIENT)
Dept: CARE COORDINATION | Facility: CLINIC | Age: 48
End: 2023-10-13
Payer: COMMERCIAL

## 2023-10-13 NOTE — PROGRESS NOTES
Clinic Care Coordination Contact  Care Coordination Clinician Chart Review    Situation: Patient chart reviewed by Care Coordinator.       Background: Care Coordination Program started: 5/3/2023. Initial assessment completed and patient-centered care plan(s) were developed with participation from patient. Lead CC handed patient off to W for continued outreaches.       Assessment: Per chart review, patient outreach completed by CC FRW on 10/11/23.  Patient is actively working to accomplish goal(s). Patient's goal(s) appropriate and relevant at this time. Patient is not due for updated Plan of Care.  Assessments will be completed annually or as needed/with change of patient status.       Care Plan: Improve breathing       Problem: Reduce my shortness of breath       Goal: I would like to carry out the treatment plans as set by my pulmonologist, Dr. Mcclure       Start Date: 5/5/2023 Expected End Date: 11/20/2023    Recent Progress: 0%    Note:     Barriers: finances, and transportation   Strengths: patient enrolled in care coordination.  Patient expressed understanding of goal: yes  Action steps to achieve this goal:  1. I will  my new inhaler as soon as I have the money for the co-pay  2. I will call [employer] at [phone] to set my free transportation to my pulmonary rehab appointments. Scheduling line: [phone]   3. I will follow up with my pulmonologist, Dr. Mcclure as recommended. Around 8/3/23 for my 3 month follow up appointment. Scheduling line: [phone] . Rescheduled for 11/17/23                                Care Plan: Community Resources       Problem: Reliable food source       Goal: Establish Options for Affordable Food Sources       Start Date: 5/5/2023 Expected End Date: 8/3/2023    This Visit's Progress: 0%    Note:     Barriers: finances and transportation   Strengths: care coordination   Patient expressed understanding of goal: yes   Action steps to achieve this goal:  1.  Patient was referred to our financial resource worker to look into economic assistance with [facility], such as SNAP   2. I will call the [place] [employer] HelpLine at [phone]  3. I will call [employer] for discounted groceries [phone]                              Care Plan: Health Maintenance       Problem: Health Maintenance Due or Overdue       Goal: Become up-to-date with health maintenance visit(s)       Start Date: 5/5/2023 Expected End Date: 8/3/2023    This Visit's Progress: 0%    Note:     Goal Statement: I will complete my annual wellness visit.    Barriers: none  Strengths: care coordination     Patient expressed understanding of goal: yes  Action steps to achieve this goal:  1. I will schedule my annual wellness visit; [phone] ([phone])  2. I will attend my annual wellness visit.  3. I will contact my Care Management or clinic team if I have barriers to attending my annual wellness visit.                                   Plan/Recommendations:   -The patient will continue working with Care Coordination to achieve goal(s) as above.   -CC FRW will continue outreaches at minimum every 30 days to wokr on FRW goal.   -CC RN and patient previously connected and discussed his medical goal: to attend his pulmonology visit, this appointment  is pending.   -Lead CC will continue to monitor CHW outreaches and patient's progress to goal(s) every 6 weeks.     Plan of Care updated and sent to patient: Lisa Hair RN, BSN, PHN Care Coordinator  [place], [place], and Brandy Hernandez   Phone: [phone]

## 2023-10-27 DIAGNOSIS — M79.2 NEUROPATHIC PAIN: ICD-10-CM

## 2023-10-27 DIAGNOSIS — M79.672 BILATERAL FOOT PAIN: ICD-10-CM

## 2023-10-27 DIAGNOSIS — M79.671 BILATERAL FOOT PAIN: ICD-10-CM

## 2023-10-31 RX ORDER — DULOXETIN HYDROCHLORIDE 60 MG/1
CAPSULE, DELAYED RELEASE ORAL
Qty: 90 CAPSULE | Refills: 0 | Status: SHIPPED | OUTPATIENT
Start: 2023-10-31 | End: 2024-07-08

## 2023-11-01 ENCOUNTER — PATIENT OUTREACH (OUTPATIENT)
Dept: CARE COORDINATION | Facility: CLINIC | Age: 48
End: 2023-11-01
Payer: COMMERCIAL

## 2023-11-01 NOTE — TELEPHONE ENCOUNTER
Clinic Care Coordination Contact  Program: Pending sale to [facility]  County: [facility] Case #:  [facility] Worker:   Brittany #:   Subscriber #:   Renewal:  Date Applied: 5/9/23-denied,7/12/23-denied, reapply 9/13/23    FRW Outreach:   11/1/23- Frw called pt to follow up on the [facility] benefit application. Pt state that he is try of getting denied and explain to him the reason of denial the for the past application. Pt keep missing the interview dateline. He express that he is tried and isn't interested in moving forward. Frw told him to go into the [facility] offices if he changes his mind. Frw will close referral and remove pt from panel.  Jeanna Rosario  Financial Resource Worker  [facility] Care Coordination  [phone]     10/11/23- Frw called pt to follow up on snap/cash application we submitted. Pt stated that he has call the county they confirmed receiving the application and ask pt to allow them more time. Frw ask pt how long was it since his last call pt, state about 3 weeks and didn't received any letter. Frw advised pt to call them again as soon as possible. Frw will follow up with 30 days.  Jeanna Rosario  Financial Resource Worker  [facility] Care Coordination  [phone]     9/13/23- FRW called the pt at appt time, we called the county together to follow up on his 2nd application. After being on hold for to 2hr, [facility] rep had inform us that pt application was denied due to missing the interview. FRW explain to the pt that missing interview can't happen again as this is the 2nd time. Frw complete a new snap/cash application. Pt had been advised to call the county after3-5 business days to complete the interview. FRW will follow up within 30 days.  Jeanna Rosario  Financial Resource Worker  [facility] Care Coordination  [phone]     9/11/23- Ortiz called to follow rgarding his county benefit. Pt state that he received a letter from the [facility] hospital  requesting him to call back. Pt called back and couldn't get hold on [facility] rep. We have schedule an appt on 9/13 at 9am to call the [facility]. Frw will follow up then.   Jeanna Rosario  Financial Resource Worker  [facility] Care Coordination  [phone]     8/16/23- Frw called pt, he stated that he hasn't received any letter or call from the [facility]. Frw advised pt to follow up with the county as it's been a month since we submitted. Pt said that he will call them asap. Frw will follow up with pt within 30 days.   Jeanna Rosario  Financial Resource Worker  [facility] Care Coordination  [phone]     8/9/23- FRW called pt and his friend answer.he said that the pt isn't available, frw will follow up within 30days.  Jeanna Rosario  Financial Resource Worker  [facility] Care Coordination  [phone]     7/12/23- FRW called pt, we reapply for snap/cash again for him as he was denied. Pt was advised to call the [facility] on Monday to complete his interview. FRW will follow up in 4 weeks with pt.     7/5/23- FRW called pt and we called hc together as he hadn't heard from his application. After being on hold for an 1hr, the [facility] rep said that pt application was denied because he fail to complete the interview. FRW offered to re-apply but pt state that he doesn't have his son's info.  FRW schedule an appt on 7/12 at 11am.    6/27/23- frw spoke with pt, he said that he hasn't heard or received anything from the [facility]. I advised that we call the [facility] before reapply to check on the status of the application. FRW set up an appt on 7/5/23 at 9am.     6/6/23- frw called pt to follow on his application status, no answer left a vm. frw will follow up in a week.     5/9/23- FRW establish contact with pt and confirmed program. Patient was ready to apply, FRW submitted SNAP and cash assistance application. Pt was given instruction on what to expect next as well as HC  phone number. FRW  Will follow up with pt in 4 weeks.     SNAP/CASH Application Screenin. Have you had [facility] benefits before?No  2. How many people in the household, do you eat/buy food together? 2  3. What is your monthly income (include all tax members)? 2600 monthly - 1300 biweekly  4. Do you have a bank account? Yes  5. Do you have any utility bills (electricity, rent, mortgage, phone, insurance, medical bills, etc.)? Yes rent  6. Do you have social security cards and/or green cards? Green card        Health Insurance:      Referral/Screening:  Financial Resource Worker Screening  [facility] Benefits  Is patient requesting help applying for [facility] benefits?: Yes  Have you recently applied for any [facility] benefits?: No  How many people in your household?: 2  Do you buy/eat food together?: Yes  What is the monthly gross income for the household (wages, social security, workers comp, and pension)? : 1000     [place]:  [place] No  Is this an insurance renewal?: No  Is this a new insurance application request?: No

## 2023-11-03 DIAGNOSIS — R06.02 SHORTNESS OF BREATH: ICD-10-CM

## 2023-11-03 DIAGNOSIS — J44.1 CHRONIC OBSTRUCTIVE PULMONARY DISEASE WITH ACUTE EXACERBATION (H): ICD-10-CM

## 2023-11-03 DIAGNOSIS — J44.9 CHRONIC OBSTRUCTIVE PULMONARY DISEASE, UNSPECIFIED COPD TYPE (H): ICD-10-CM

## 2023-11-03 RX ORDER — BUDESONIDE AND FORMOTEROL FUMARATE DIHYDRATE 160; 4.5 UG/1; UG/1
2 AEROSOL RESPIRATORY (INHALATION) 2 TIMES DAILY
Qty: 10.2 G | Refills: 0 | Status: SHIPPED | OUTPATIENT
Start: 2023-11-03 | End: 2023-11-06

## 2023-11-03 RX ORDER — BUDESONIDE AND FORMOTEROL FUMARATE DIHYDRATE 160; 4.5 UG/1; UG/1
2 AEROSOL RESPIRATORY (INHALATION) 2 TIMES DAILY
Qty: 10.2 G | Refills: 0 | OUTPATIENT
Start: 2023-11-03

## 2023-11-03 RX ORDER — ALBUTEROL SULFATE 90 UG/1
2 AEROSOL, METERED RESPIRATORY (INHALATION) EVERY 6 HOURS PRN
Qty: 18 G | Refills: 0 | Status: SHIPPED | OUTPATIENT
Start: 2023-11-03 | End: 2023-11-06

## 2023-11-06 ENCOUNTER — VIRTUAL VISIT (OUTPATIENT)
Dept: FAMILY MEDICINE | Facility: CLINIC | Age: 48
End: 2023-11-06
Payer: COMMERCIAL

## 2023-11-06 DIAGNOSIS — I10 HYPERTENSION GOAL BP (BLOOD PRESSURE) < 140/80: ICD-10-CM

## 2023-11-06 DIAGNOSIS — M79.2 NEUROPATHIC PAIN: ICD-10-CM

## 2023-11-06 DIAGNOSIS — J44.9 CHRONIC OBSTRUCTIVE PULMONARY DISEASE, UNSPECIFIED COPD TYPE (H): Primary | ICD-10-CM

## 2023-11-06 PROCEDURE — 99443 PR PHYSICIAN TELEPHONE EVALUATION 21-30 MIN: CPT | Mod: 95 | Performed by: FAMILY MEDICINE

## 2023-11-06 RX ORDER — ALBUTEROL SULFATE 90 UG/1
2 AEROSOL, METERED RESPIRATORY (INHALATION) EVERY 6 HOURS PRN
Qty: 18 G | Refills: 0 | Status: SHIPPED | OUTPATIENT
Start: 2023-11-06 | End: 2024-01-11

## 2023-11-06 RX ORDER — AMLODIPINE BESYLATE 5 MG/1
10 TABLET ORAL DAILY
Qty: 180 TABLET | Refills: 1 | Status: SHIPPED | OUTPATIENT
Start: 2023-11-06 | End: 2024-09-12

## 2023-11-06 RX ORDER — GABAPENTIN 300 MG/1
900 CAPSULE ORAL 3 TIMES DAILY
Qty: 270 CAPSULE | Refills: 0 | Status: SHIPPED | OUTPATIENT
Start: 2023-11-06 | End: 2024-08-13

## 2023-11-06 RX ORDER — BUDESONIDE, GLYCOPYRROLATE, AND FORMOTEROL FUMARATE 160; 9; 4.8 UG/1; UG/1; UG/1
2 AEROSOL, METERED RESPIRATORY (INHALATION) 2 TIMES DAILY
Qty: 10.7 G | Refills: 0 | Status: SHIPPED | OUTPATIENT
Start: 2023-11-06 | End: 2024-01-11

## 2023-11-06 RX ORDER — BUDESONIDE AND FORMOTEROL FUMARATE DIHYDRATE 160; 4.5 UG/1; UG/1
2 AEROSOL RESPIRATORY (INHALATION) 2 TIMES DAILY
Qty: 10.2 G | Refills: 0 | Status: SHIPPED | OUTPATIENT
Start: 2023-11-06 | End: 2023-11-17

## 2023-11-06 NOTE — COMMUNITY RESOURCES LIST (ENGLISH)
11/06/2023   [facility]  N/A  For questions about this resource list or additional care needs, please contact your primary care clinic or care manager.  Phone: [phone]   Email: N/A   Address: [address] [place] [address]   Hours: N/A        Financial Stability       Rent and mortgage payment assistance  1  [facility] - Emergency Assistance Program (EAP) - Rent Assistance Distance: 1.26 miles      In-Person, Phone/Virtual   [address] [place] [address]  Language: American Sign Language, English  Hours: Mon - Fri 8:00 AM - 4:00 PM  Fees: Free   Phone: (658) 234-7508 Email: TravelTriangle@[employer] Website: https://www.[employer]/residents#human-services     2  [facility] Distance: 1.83 miles      In-Person   [address] [place] [address]  Language: English  Hours: Mon - Fri 8:00 AM - 4:00 PM  Fees: Free   Phone: (118) 280-1003 Email: info@[facility] Website: https://[employer]/          Food and Nutrition       Food pantry  3  [facility] - Emergency Assistance Program (EAP) - Food Distance: 1.26 miles      In-Person, Phone/Virtual   [address] [place] [address]  Language: American Sign Language, English  Hours: Mon - Fri 8:00 AM - 4:00 PM  Fees: Free   Phone: (514) 494-6225 Email: servicetocario@[employer] Website: https://www.[employer]/residents#human-services     4  Community Emergency Assistance Programs (CEAP) - [facility] - Food Market Distance: 1.26 miles      Pickup   [address] [place] [address]  Language: English, Luxembourgish  Hours: Mon - Tue 9:00 AM - 4:30 PM , Wed 9:00 AM - 7:00 PM , Thu 9:00 AM - 4:30 PM  Fees: Free   Phone: (582) 879-4745 Email: info@[employer] Website: http://www.ceaScriptRock.org     SNAP application  assistance  5  [facility] Distance: 1.26 miles      In-Person, Phone/Virtual   [address] [place] [address]  Language: American Sign Language, English  Hours: Mon - Fri 8:00 AM - 4:00 PM  Fees: Free   Phone: (779) 802-7986 Email: martina@[facility] Website: https://www.[facility]/residents#human-services     6  [facility] Distance: 1.53 miles      Phone/Virtual   [address] [place] [address]  Language: English, Greenlandic  Hours: Mon 9:00 AM - 1:00 PM , Tue - Thu 9:00 AM - 4:00 PM , Fri 9:00 AM - 1:00 PM  Fees: Free   Phone: (876) 748-1566 Email: info@[facility] Center[facility].[employer] Website: http://www.[facility] CenterWe [facility] Kn[facility]ters.org/     Soup kitchen or free meals  7  [facility] Faith [employer] - Loaves and Fishes Distance: 1.18 miles      [facility]   [address] [place] [address]  Language: English  Hours: Mon 12:00 PM - 1:00 PM , Wed 12:00 PM - 1:00 PM , Fri 12:00 PM - 1:00 PM  Fees: Free   Phone: (586) 192-9658 Ext.107 Email: admin@[facility] Website: http://[facility].org     8  [facility] Faith [employer] - [employer] - Loaves and Fishes Distance: 4.44 miles      [facility]   [address] [place] [address]  Language: English  Hours: Wed 5:30 PM - 6:30 PM  Fees: Free   Phone: (980) 819-8583 Email: jefferson@[employer] Website: https://www.[employer].org/          Transportation       Free or low-cost transportation  9  [facility] - Bus Passes - Free or low-cost transportation Distance: 9.28 miles      In-Person    [address] [place] [address]  Language: English  Hours: Tue 9:30 AM - 11:30 AM , Thu 9:30 AM - 11:30 AM  Fees: Free   Phone: (319) 448-2129 Email: info@[employer] Website: http://www.[facility]idel[facility] Systems/     10  [facility] Distance: 9.53 miles      In-Person   [address] [place] [address]  Language: English  Hours: Mon - Wed 9:30  AM - 12:00 PM , Mon - Wed 1:00 PM - 2:00 PM Appt. Only  Fees: Free   Phone: (672) 660-5902 Email: info@[employer]aintolaf.org Website: http://www.saintolaf.org/     Transportation to medical appointments  11  [facility] Family [facility] ([facility]) Distance: 3.05 miles      In-Person   [address] [place] [address]  Language: Kiswahili, Turkish, English, Gujarati, Donna, Luxembourgish, Lao, English, Romansh, Persian  Hours: Mon - Wed 9:00 AM - 5:00 PM , Thu 12:00 PM - 6:00 PM , Fri 9:00 AM - 5:00 PM , Sun 10:30 AM - 2:00 PM Appt. Only  Fees: Free   Phone: (551) 692-6400 Email: info@[facility].org Website: https://www.[facility].org/     12  [place] Transportation Distance: 3.27 miles      In-Person   [address] [facility] [address] [place] [address]  Language: English  Hours: Mon - Sat 4:00 AM - 6:00 PM  Fees: Insurance, Self Pay   Phone: (256) 368-4597 Email: delighttransportation1@[employer] Website: https://helpmeconnect.web.[facility]./HelpMeConnect/Providers/Delight_Transportation/Transportation/2?returnUrl=%2FHelpMeConnect%2FSearch%2FBasicNeeds%2FTransportation%2FTransportationServices%3Fstart%3D40          Important Numbers & Websites       Emergency Services   911  City Services   311  Poison Control   (340) 478-5004  Suicide Prevention Lifeline   (988) 357-1970 (TALK)  Child Abuse Hotline   (146) 144-4093 (4-A-Child)  Sexual Assault Hotline   (791) 943-4295 (HOPE)  National Runaway Safeline   (791) 596-2563 (RUNAWAY)  All-Options Talkline   (194) 232-8155  Substance Abuse Referral   (477) 674-1117 (HELP)

## 2023-11-06 NOTE — PROGRESS NOTES
Instructions Relayed to Patient by Virtual Roomer:         Reminded patient to ensure they were logged on to virtual visit by arrival time listed. Documented in appointment notes if patient had flexibility to initiate visit sooner than arrival time. If pediatric virtual visit, ensured pediatric patient along with parent/guardian will be present for video visit.     Patient offered the website [email or web address] and/or phone number to [employer] Help line: [phone]   Chaim is a 48 year old who is being evaluated via a billable telephone visit.      What phone number would you like to be contacted at? [phone]   How would you like to obtain your AVS? Mail a copy    Distant Location (provider location):  On-site    Assessment & Plan     Chaim presented as telephone visit for COPD medication refills.  Chronic obstructive pulmonary disease, unspecified COPD type (H)  -History of COPD, quit smoking 25 years ago.  -Ran out of inhalers (all 3 inhalers) for the past 2 weeks.  -He has pulmonology appointment coming up on 11/17/2023.  -Recommend to follow-up in the clinic/ER sooner if he is experiencing any fever/worsening breathing difficulty/productive cough.    - albuterol (VENTOLIN HFA) 108 (90 Base) MCG/ACT inhaler; Inhale 2 puffs into the lungs every 6 hours as needed for shortness of breath or wheezing    Hypertension goal BP (blood pressure) < 140/80  -Wanted refills for amlodipine.  -Recommended to schedule yearly checkup next month to check blood pressures.  - amLODIPine (NORVASC) 5 MG tablet; Take 2 tablets (10 mg) by mouth daily    Neuropathic pain  -Wanted gabapentin refill.    Recommended to schedule yearly checkup in a month to check vitals/vaccines/other chronic issues.      25 minutes spent by me on the date of the encounter doing chart review, review of outside records, review of test results, patient visit, and documentation            Marija Santana MD  [facility]  [facility] DANNY Rucker is a 48 year old, presenting for the following health issues:  No chief complaint on file.      History of Present Illness       Reason for visit:  Refills for COPD    He eats 4 or more servings of fruits and vegetables daily.He consumes 1 sweetened beverage(s) daily.He exercises with enough effort to increase his heart rate 30 to 60 minutes per day.  He exercises with enough effort to increase his heart rate 7 days per week.   He is taking medications regularly.       Medication Followup of COPD  Taking Medication as prescribed: yes  Side Effects:  feet hurt while taking it  Medication Helping Symptoms:  NO        Review of Systems   Constitutional, HEENT, cardiovascular, pulmonary, gi and gu systems are negative, except as otherwise noted.      Objective           Vitals:  No vitals were obtained today due to virtual visit.    Physical Exam   healthy, alert, and no distress  PSYCH: Alert and oriented times 3; coherent speech, normal   rate and volume, able to articulate logical thoughts, able   to abstract reason, no tangential thoughts, no hallucinations   or delusions  His affect is normal  RESP: No cough, no audible wheezing, able to talk in full sentences  Remainder of exam unable to be completed due to telephone visits                Phone call duration: 25 minutes

## 2023-11-16 NOTE — PROGRESS NOTES
Pulmonary Clinic Note    Date of Service: 11/17/2023     Chief Complaint   Patient presents with    RECHECK     Chronic respiratory failure with hypoxia (H) +2 more       A/P:  48M pHTN, MDR Tb (s/p tx), chronic hypoxemic respiratory failure (3.5L) being seen for follow up obstructive lung disease. Pt continues to have significant dyspnea. He acute desaturation this AM seems to be related to running out of O2, he had quick improvement w/ replacement of O2. I do not feel there is an emergent indication that requires hospitalization. No concern for infection, cardiac etiology, or pulmonary embolism.     - continue EOA-[id number]-[id number] (Breztri) twice daily, rinse mouth out after use  - continue prn albuterol  - pulmonary rehab referral  - lung transplant referral   - OK to substitute medications based on formulary     History:  48M pHTN, MDR Tb (s/p tx), chronic hypoxemic respiratory failure (3.5L) being seen for follow up obstructive lung disease. Last seen 5/2023. He is prescribed [id number] (Breztri) and prn albuterol. SpO2 76% after using the bathroom today in clinic. Back up to 96% now. Breathing feels good with improvement in O2. This AM, was not feeling well. He was out of O2 this AM he reports. Someone in the building wanted him to go to the ED, but he declined. He has noted more TUBBS w/ minimal activity. No SOB at rest. No CP or chest tightness. Rare cough. No wheezing. No F/C. No night sweats. No unexpected weight loss. No recent illnesses. Using O2 at home, 3.5L at all times including sleep. Checks SpO2 at home, typically 91%+, but does drop into 80s if he exerts himself w/o O2. Has been using Breztri and Symbicort. Using albuterol q6h, which is helpful.     Smoking: No  Hot tub exposure: No       Recent travel: No                   Hx of incarceration: No      Bird exposure: No             Animal exposure: No        Inhalation exposure: No                         10 point review of systems negative, aside from  that mentioned in HPI.    BP (!) 147/104 (BP Location: Left arm, Patient Position: Sitting, Cuff Size: Adult Large)   Pulse 104   Resp 20   SpO2 96%   Gen: well-appearing  HEENT: Mallampati IV  Card: RRR  Pulm: diminished on R, otherwise clear   Abd: soft  MSK: no edema, no acute joint abnormality   Skin: no obvious rash  Psych: normal affect  Neuro: alert and oriented     Labs:  Personally reviewed    Imaging/Studies: Personally reviewed  PFTs (5/2023) - severe obstruction, moderate restriction, severe diffusion defect   6MWT (5/2023) - hypoxemia w/ ambulation on RA, reduced distance, significant desaturation w/o hypoxemia on 4L  CT Chest (3/2023) - markedly abnormal lung parenchyma b/l w/ significant R volume loss, cylindrical bronchiectasis, small bibasilar dependent pleural effusions   PFTs (10/2021) - very severe obstruction w/ restrictive defect, mild reduction in DLCOunc  CT Chest (8/2021) - unchanged destructive changes on the R and L hemithoraces     RHC (5/2020) - PA 53/29/40    Past Medical History:   Diagnosis Date    Anemia     Arthritis     COPD (chronic obstructive pulmonary disease) (H)     History of blood transfusion 2019    [facility]    Neutropenia (H24)     Pulmonary hypertension (H)     TB (tuberculosis), treated     Tuberculosis     currently being treated     Past Surgical History:   Procedure Laterality Date    BIOPSY  2018    Lung- [facility]    CORONARY ANGIOGRAPHY ADULT ORDER  05/2020    [facility]    CV PULMONARY ANGIOGRAM N/A 5/8/2020    Procedure: Pulmonary Angiogram;  Surgeon: Keenan Perez MD;  Location:  HEART CARDIAC CATH LAB    CV RIGHT HEART CATH MEASUREMENTS RECORDED N/A 5/8/2020    Procedure: CV RIGHT HEART CATH;  Surgeon: Keenan Perez MD;  Location:  HEART CARDIAC CATH LAB    ENDOSCOPY      GI SURGERY  12/2019    Upper GI     Family History   Family history unknown: Yes     Social History     Socioeconomic History    Marital status:       Spouse name: Not on file    Number of children: 1    Years of education: Not on file    Highest education level: Not on file   Occupational History    Not on file   Tobacco Use    Smoking status: Never    Smokeless tobacco: Never   Vaping Use    Vaping Use: Never used   Substance and Sexual Activity    Alcohol use: No    Drug use: No    Sexual activity: Yes     Partners: Female     Birth control/protection: Condom   Other Topics Concern    Parent/sibling w/ CABG, MI or angioplasty before 65F 55M? Not Asked   Social History Narrative    Not on file     Social Determinants of Health     Financial Resource Strain: Low Risk  (11/6/2023)    Financial Resource Strain     Within the past 12 months, have you or your family members you live with been unable to get utilities (heat, electricity) when it was really needed?: No   Food Insecurity: High Risk (11/6/2023)    Food Insecurity     Within the past 12 months, did you worry that your food would run out before you got money to buy more?: Yes     Within the past 12 months, did the food you bought just not last and you didn t have money to get more?: Yes   Transportation Needs: High Risk (11/6/2023)    Transportation Needs     Within the past 12 months, has lack of transportation kept you from medical appointments, getting your medicines, non-medical meetings or appointments, work, or from getting things that you need?: Yes   Physical Activity: Not on file   Stress: Not on file   Social Connections: Not on file   Interpersonal Safety: Not on file   Housing Stability: High Risk (11/6/2023)    Housing Stability     Do you have housing? : Yes     Are you worried about losing your housing?: Yes       45 minutes spent reviewing chart, reviewing test results, talking with and examining patient, formulating plan, and documentation on the day of the encounter.    Beck Mcclure MD  Pulmonary and Critical Care Medicine  [facility]

## 2023-11-17 ENCOUNTER — REFERRAL (OUTPATIENT)
Dept: TRANSPLANT | Facility: CLINIC | Age: 48
End: 2023-11-17

## 2023-11-17 ENCOUNTER — OFFICE VISIT (OUTPATIENT)
Dept: PULMONOLOGY | Facility: CLINIC | Age: 48
End: 2023-11-17
Payer: COMMERCIAL

## 2023-11-17 VITALS
OXYGEN SATURATION: 96 % | WEIGHT: 187.8 LBS | RESPIRATION RATE: 20 BRPM | BODY MASS INDEX: 30.31 KG/M2 | HEART RATE: 104 BPM | DIASTOLIC BLOOD PRESSURE: 104 MMHG | SYSTOLIC BLOOD PRESSURE: 147 MMHG

## 2023-11-17 DIAGNOSIS — R06.09 DYSPNEA ON EXERTION: Primary | ICD-10-CM

## 2023-11-17 PROCEDURE — 99215 OFFICE O/P EST HI 40 MIN: CPT | Performed by: STUDENT IN AN ORGANIZED HEALTH CARE EDUCATION/TRAINING PROGRAM

## 2023-11-17 ASSESSMENT — PAIN SCALES - GENERAL: PAINLEVEL: NO PAIN (0)

## 2023-11-17 NOTE — NURSING NOTE
"Prior to rooming, patient reported SOB after using restroom. Spa02 noted at 76% on 3.5L with POC. Patient was \"hunched over\" and noted to be air hungry. Patient was immediately roomed and placed on 4L continues 02 with clinic 02 tank. Spa02 improved to 96% on 4L with patient resting. Patient reported feeling better after resting. Notified Dr Mcclure who will see patient.    Zia Wan RN    "

## 2023-11-17 NOTE — LETTER
November 20, 2023      Chaim Norman  [address]  [place] [address]      Dear Chaim,    Thank you for your interest in the Transplant Center at [facility]. We look forward to being a part of your care team and assisting you through the transplant process.    As we discussed, your transplant coordinator is Roxana Atkins (Lung).  You may call your coordinator at any time with questions or concerns.  Your first scheduled call will be on November 22, 2023 between 10:00 am and 12:00 pm.  If this needs to change, call [phone].    Please complete the following.    Fill out and return the enclosed forms  Authorization for Electronic Communication  Authorization to Discuss Protected Health Information  Authorization for Release of Protected Health Information  Sign up for:  [employer], access to your electronic medical record (see enclosed pamphlet)  [employer], a transplant education website       My Transplant Place   You can use these tools to learn more about your transplant, communicate with your care team, and track your medical details  Sincerely,  Solid Organ Transplant  [facility]  cc: Referring Physician PCP

## 2023-11-17 NOTE — LETTER
11/17/2023         RE: Chaim Norman  [address]  [place] [address]        Dear Colleague,    Thank you for referring your patient, Chaim Norman, to the [facility] [place]. Please see a copy of my visit note below.    Pulmonary Clinic Note    Date of Service: 11/17/2023     Chief Complaint   Patient presents with     RECHECK     Chronic respiratory failure with hypoxia (H) +2 more       A/P:  48M pHTN, MDR Tb (s/p tx), chronic hypoxemic respiratory failure (3.5L) being seen for follow up obstructive lung disease. Pt continues to have significant dyspnea. He acute desaturation this AM seems to be related to running out of O2, he had quick improvement w/ replacement of O2. I do not feel there is an emergent indication that requires hospitalization. No concern for infection, cardiac etiology, or pulmonary embolism.     - continue TJN-[id number]-ONWP (Breztri) twice daily, rinse mouth out after use  - continue prn albuterol  - pulmonary rehab referral  - lung transplant referral   - OK to substitute medications based on formulary     History:  48M pHTN, MDR Tb (s/p tx), chronic hypoxemic respiratory failure (3.5L) being seen for follow up obstructive lung disease. Last seen 5/2023. He is prescribed [id number] (Breztri) and prn albuterol. SpO2 76% after using the bathroom today in clinic. Back up to 96% now. Breathing feels good with improvement in O2. This AM, was not feeling well. He was out of O2 this AM he reports. Someone in the building wanted him to go to the ED, but he declined. He has noted more TUBBS w/ minimal activity. No SOB at rest. No CP or chest tightness. Rare cough. No wheezing. No F/C. No night sweats. No unexpected weight loss. No recent illnesses. Using O2 at home, 3.5L at all times including sleep. Checks SpO2 at home, typically 91%+, but does drop into 80s if he exerts himself w/o O2. Has been using Breztri and Symbicort. Using albuterol q6h, which is helpful.      Smoking: No  Hot tub exposure: No       Recent travel: No                   Hx of incarceration: No      Bird exposure: No             Animal exposure: No        Inhalation exposure: No                         10 point review of systems negative, aside from that mentioned in HPI.    BP (!) 147/104 (BP Location: Left arm, Patient Position: Sitting, Cuff Size: Adult Large)   Pulse 104   Resp 20   SpO2 96%   Gen: well-appearing  HEENT: Mallampati IV  Card: RRR  Pulm: diminished on R, otherwise clear   Abd: soft  MSK: no edema, no acute joint abnormality   Skin: no obvious rash  Psych: normal affect  Neuro: alert and oriented     Labs:  Personally reviewed    Imaging/Studies: Personally reviewed  PFTs (5/2023) - severe obstruction, moderate restriction, severe diffusion defect   6MWT (5/2023) - hypoxemia w/ ambulation on RA, reduced distance, significant desaturation w/o hypoxemia on 4L  CT Chest (3/2023) - markedly abnormal lung parenchyma b/l w/ significant R volume loss, cylindrical bronchiectasis, small bibasilar dependent pleural effusions   PFTs (10/2021) - very severe obstruction w/ restrictive defect, mild reduction in DLCOunc  CT Chest (8/2021) - unchanged destructive changes on the R and L hemithoraces     RHC (5/2020) - PA 53/29/40    Past Medical History:   Diagnosis Date     Anemia      Arthritis      COPD (chronic obstructive pulmonary disease) (H)      History of blood transfusion 2019    [facility]     Neutropenia (H24)      Pulmonary hypertension (H)      TB (tuberculosis), treated      Tuberculosis     currently being treated     Past Surgical History:   Procedure Laterality Date     BIOPSY  2018    Lung- [facility]     CORONARY ANGIOGRAPHY ADULT ORDER  05/2020    [facility]     CV PULMONARY ANGIOGRAM N/A 5/8/2020    Procedure: Pulmonary Angiogram;  Surgeon: Keenan Perez MD;  Location:  HEART CARDIAC CATH LAB     CV RIGHT HEART CATH MEASUREMENTS RECORDED N/A 5/8/2020    Procedure:  CV RIGHT HEART CATH;  Surgeon: Keenan Perez MD;  Location:  HEART CARDIAC CATH LAB     ENDOSCOPY       GI SURGERY  12/2019    Upper GI     Family History   Family history unknown: Yes     Social History     Socioeconomic History     Marital status:      Spouse name: Not on file     Number of children: 1     Years of education: Not on file     Highest education level: Not on file   Occupational History     Not on file   Tobacco Use     Smoking status: Never     Smokeless tobacco: Never   Vaping Use     Vaping Use: Never used   Substance and Sexual Activity     Alcohol use: No     Drug use: No     Sexual activity: Yes     Partners: Female     Birth control/protection: Condom   Other Topics Concern     Parent/sibling w/ CABG, MI or angioplasty before 65F 55M? Not Asked   Social History Narrative     Not on file     Social Determinants of Health     Financial Resource Strain: Low Risk  (11/6/2023)    Financial Resource Strain      Within the past 12 months, have you or your family members you live with been unable to get utilities (heat, electricity) when it was really needed?: No   Food Insecurity: High Risk (11/6/2023)    Food Insecurity      Within the past 12 months, did you worry that your food would run out before you got money to buy more?: Yes      Within the past 12 months, did the food you bought just not last and you didn t have money to get more?: Yes   Transportation Needs: High Risk (11/6/2023)    Transportation Needs      Within the past 12 months, has lack of transportation kept you from medical appointments, getting your medicines, non-medical meetings or appointments, work, or from getting things that you need?: Yes   Physical Activity: Not on file   Stress: Not on file   Social Connections: Not on file   Interpersonal Safety: Not on file   Housing Stability: High Risk (11/6/2023)    Housing Stability      Do you have housing? : Yes      Are you worried about losing your housing?: Yes        45 minutes spent reviewing chart, reviewing test results, talking with and examining patient, formulating plan, and documentation on the day of the encounter.    Beck Mcclure MD  Pulmonary and Critical Care Medicine  [facility]       Again, thank you for allowing me to participate in the care of your patient.        Sincerely,        Beck Mcclure MD

## 2023-11-20 ENCOUNTER — DOCUMENTATION ONLY (OUTPATIENT)
Dept: TRANSPLANT | Facility: CLINIC | Age: 48
End: 2023-11-20
Payer: COMMERCIAL

## 2023-11-20 VITALS — WEIGHT: 178 LBS | HEIGHT: 66 IN | BODY MASS INDEX: 28.61 KG/M2

## 2023-11-20 NOTE — TELEPHONE ENCOUNTER
SOT LUNG INTAKE    Intake completed with patient  November 20, 2023    [facility]    Referring Provider:Dr. Beck Mcclure  Primary Care Provider: JASMIN DARDEN MD  Specialist: Cardiology: chart  Source/Facility:[facility]  Diagnosis: chronic hypoxemic respiratory failure, PH, Obstructive lung disease    Hx: Tuberculosis of lung, seen ID @ [facility]      Smoking/nicotine use history: none per patient (contradicts Dr. Carmine Santana's note on 11/6/23 stating quit smoking 25 years ago)  Alcohol use history: no  Drug use history: no  Cancer history: no  Cardiac history: PH  BMI: 28.7  Oxygen use at rest: 3.5 LMP with activity: 3.5 L      Comments    Is patient in a group home/assisted living? no  Does patient have a guardian? no    Referral intake process completed.  Patient is aware that after financial approval is received, medical records will be requested.   Patient confirmed for a callback from transplant coordinator on November 22, 2023. (within 2 weeks)      Patient verbal consent given to access medical records and documents outside of [facility] through [facility]. yes    Confirmed coordinator will discuss evaluation process in more detail at the time of their call.   Patient is aware of the need to arrange age appropriate cancer screening, vaccinations, and dental care.  Reminded patient to complete questionnaire, complete medical records release, and review packet prior to evaluation visit .  Assessed patient for special needs (ie--wheelchair, assistance, guardian, and ):  none   Patient instructed to call [phone] with questions.

## 2023-11-26 DIAGNOSIS — M79.671 BILATERAL FOOT PAIN: ICD-10-CM

## 2023-11-26 DIAGNOSIS — G89.29 CHRONIC PAIN OF BOTH SHOULDERS: ICD-10-CM

## 2023-11-26 DIAGNOSIS — M79.2 NEUROPATHIC PAIN: ICD-10-CM

## 2023-11-26 DIAGNOSIS — M25.511 CHRONIC PAIN OF BOTH SHOULDERS: ICD-10-CM

## 2023-11-26 DIAGNOSIS — G47.00 INSOMNIA, UNSPECIFIED TYPE: ICD-10-CM

## 2023-11-26 DIAGNOSIS — M79.672 BILATERAL FOOT PAIN: ICD-10-CM

## 2023-11-26 DIAGNOSIS — I10 HYPERTENSION GOAL BP (BLOOD PRESSURE) < 140/80: ICD-10-CM

## 2023-11-26 DIAGNOSIS — M25.512 CHRONIC PAIN OF BOTH SHOULDERS: ICD-10-CM

## 2023-11-28 RX ORDER — LISINOPRIL 10 MG/1
10 TABLET ORAL DAILY
Qty: 30 TABLET | Refills: 1 | Status: SHIPPED | OUTPATIENT
Start: 2023-11-28 | End: 2024-06-04

## 2023-11-28 RX ORDER — NORTRIPTYLINE HCL 10 MG
CAPSULE ORAL
Qty: 60 CAPSULE | Refills: 1 | Status: SHIPPED | OUTPATIENT
Start: 2023-11-28 | End: 2024-07-08

## 2023-11-29 ENCOUNTER — PATIENT OUTREACH (OUTPATIENT)
Dept: CARE COORDINATION | Facility: CLINIC | Age: 48
End: 2023-11-29
Payer: COMMERCIAL

## 2023-11-29 ASSESSMENT — ACTIVITIES OF DAILY LIVING (ADL): DEPENDENT_IADLS:: TRANSPORTATION

## 2023-11-29 NOTE — PROGRESS NOTES
Clinic Care Coordination Contact  Follow Up Progress Note      Assessment: CC RN contacted patient for goal progression.   -patient no longer working with FRW at patient request. FRW goal removed.   -patients previous PCP Nyla Barton is no longer at [facility]. Patient had a visit with Marija Payne MD and patient felt it was a positive rapport. With patients permission his PCP was updated. At the end of our call CC RN warm transferred patients call over to the priority scheduling line and patient make his AWE at the request of provider in the A/P notes.   -we discussed patients 11/17 pulmonology AVS..patient reports he has the 2 inhalers filled and at home and using per sig. Patient has pending pulm rehab visit 12/4/23, and stated he has been contacted by SOT.   -we discussed patients pending visits.     Care Gaps: to be addressed at pending AWE.   Health Maintenance Due   Topic Date Due    URINE DRUG SCREEN  Never done    COPD ACTION PLAN  Never done    HEPATITIS B IMMUNIZATION (1 of 3 - 3-dose series) Never done    Pneumococcal Vaccine: Pediatrics (0 to 5 Years) and At-Risk Patients (6 to 64 Years) (1 - PCV) Never done    COVID-19 Vaccine (3 - Pfizer risk series) 05/21/2021    YEARLY PREVENTIVE VISIT  09/14/2022    INFLUENZA VACCINE (1) 09/01/2023    ZOSTER IMMUNIZATION (1 of 2) 08/28/2023     Care Plans  Care Plan: Improve breathing       Problem: Reduce my shortness of breath       Goal: I would like to carry out the treatment plans as set by my pulmonologist, Dr. Mcclure       Start Date: 5/5/2023 Expected End Date: 11/20/2023    Recent Progress: 0%    Note:     Barriers: finances, and transportation   Strengths: patient enrolled in care coordination.  Patient expressed understanding of goal: yes  Action steps to achieve this goal:  1. I will  my new inhaler as soon as I have the money for the co-pay  2. I will call [employer] at [phone] to set my free transportation  to my pulmonary rehab appointments. Scheduling line: [phone]   3. I will follow up with my pulmonologist, Dr. Mcclure as recommended. Around 8/3/23 for my 3 month follow up appointment. Scheduling line: [phone] . Rescheduled for 11/17/23                                Care Plan: Annual Wellness Visit 1/5/24       Problem: Health Maintenance Due or Overdue       Goal: Become up-to-date with health maintenance visit(s)       Start Date: 5/5/2023 Expected End Date: 8/3/2023    Recent Progress: 0%    Note:     Goal Statement: I will complete my annual wellness visit 1/5/24.    Barriers: none  Strengths: care coordination     Patient expressed understanding of goal: yes  Action steps to achieve this goal:  1. I will schedule my annual wellness visit; [phone] ([phone])  2. I will attend my annual wellness visit.  3. I will contact my Care Management or clinic team if I have barriers to attending my annual wellness visit.                                 Intervention/Education provided during outreach: Discussed patients goal and action steps. CC RN asked open ended questions, provided support, resources, and encouragement as needed. Patient will reach out sooner than planned with new questions or concerns.       Outreach Frequency: monthly, more frequently as needed    Plan:   Patient to work on his CC goals.   CHW will reach out to patient per standard workflow and support goal(s), CC RN will review chart in 6 weeks to review goal progression.      Naomi Hair RN, BSN, PHN Care Coordinator  [facility]   Phone: [phone]

## 2023-11-29 NOTE — LETTER
[facility]  Patient Centered Plan of Care  About Me:        Patient Name:  Chaim Norman    YOB: 1975  Age:         48 year old   Ellyn MRN:    [id number] Telephone Information:  Home Phone [phone]   Mobile [phone]       Address:  [address] [place] [address] [address]  [facility] [address] Email address:  [email or web address]      Emergency Contact(s)    Name Relationship Lgl Grd Work Phone Home Phone Mobile Phone   1. SARAH NORMAN Son   [phone]   2. CARMEN MELO Cousin   [phone]           Primary language:  English     needed? No   [employer]:  [phone] op. 1  Other communication barriers:None    Preferred Method of Communication:  Mail  Current living arrangement: I live in a private home with family    Mobility Status/ Medical Equipment: Independent        Health Maintenance  Health Maintenance Reviewed: Due/Overdue   Health Maintenance Due   Topic Date Due    URINE DRUG SCREEN  Never done    COPD ACTION PLAN  Never done    HEPATITIS B IMMUNIZATION (1 of 3 - 3-dose series) Never done    Pneumococcal Vaccine: Pediatrics (0 to 5 Years) and At-Risk Patients (6 to 64 Years) (1 - PCV) Never done    COVID-19 Vaccine (3 - Pfizer risk series) 05/21/2021    YEARLY PREVENTIVE VISIT  09/14/2022    INFLUENZA VACCINE (1) 09/01/2023    ZOSTER IMMUNIZATION (1 of 2) 08/28/2023          My Access Plan  Medical Emergency 911   Primary Clinic Line [facility] - [phone]   24 Hour Appointment Line [phone] or  [phone]) (toll-free)   24 Hour Nurse Line [phone] (toll-free)   Preferred Urgent Care [facility], [phone]     Preferred Hospital No data recorded   Preferred Pharmacy [id number] - [place], [place] - [address] [facility] AT [facility] [address] [facility]     Behavioral Health Crisis Line The National Suicide Prevention Lifeline at  1-405.109.3307 or Text/Call 988           My Care Team Members  Patient Care Team         Relationship Specialty Notifications Start End    Marija Payne MD PCP - General Family Medicine  11/29/23     Phone: [phone] Fax: [phone] [address] [facility] [address]    Tania Frias, RN Specialty Care Coordinator Cardiology Admissions 1/28/20     Pulmonary HTN    Phone: [phone] Pager: [phone] Fax: [phone]       Rosa Malcolm, RN Specialty Care Coordinator Cardiology Admissions 1/28/20     Pulmonary HTN    Phone: [phone] Pager: [phone] Fax: [phone]       Pat Story, RN Specialty Care Coordinator Cardiology Admissions 1/28/20     Pulmonary HTN    Phone: [phone] Pager: [phone] Fax: [phone]       Dawn Kendrick MD MD Internal Medicine  5/27/20     Phone: [phone] Fax: [phone]         [address] [facility] [address]    Rod Chauhan MD Hospitalist Cardiology  7/7/20     Phone: [phone] Pager: [phone] Fax: [phone]        [address] [facility] [address]    Andrea Timmons MD MD Infectious Diseases  7/7/20     Phone: [phone] Fax: [phone] [address] [facility] [address] [facility] [address]    Rhonda Atwood MD Resident Student in organized health care education/training program  7/7/20     Phone: [phone] Fax: [phone]         [address] [place] [address] [address] [facility] [address]    Lauren More MD MD Pulmonary Disease  8/3/21     Phone: [phone] Fax: [phone]         [address] [place] [address] [address] [facility] [address]    Nely Wright, RN Clinic Care Coordinator Cardiology Admissions 4/18/22     Pulmonary HTN    Phone: [phone] Pager: [phone] Fax: [phone]       Mark Oquendo, RN Specialty Care Coordinator  Admissions 4/19/23     Phone: [phone] Pager: [phone]        Naomi Hair, RN Lead Care Coordinator Primary  Care - CC Admissions 5/4/23     Phone: [phone] Fax: [phone]        Beck Mcclure MD Assigned Pulmonology Provider   5/6/23     Phone: [phone] Fax: [phone]         [address] [facility] [address] [facility] [address]    Marija Payne MD Assigned PCP   11/11/23     Phone: [phone] Fax: [phone]         [address] [facility] [address]    Dena Esqueda CHW Community Health Worker Primary Care - CC Admissions 11/29/23     Phone: [phone] Fax: [phone]                    My Care Plans  Self Management and Treatment Plan    Care Plan  Care Plan: Improve breathing       Problem: Reduce my shortness of breath       Goal: I would like to carry out the treatment plans as set by my pulmonologist, Dr. Mcclure       Start Date: 5/5/2023 Expected End Date: 11/20/2023    Recent Progress: 0%    Note:     Barriers: finances, and transportation   Strengths: patient enrolled in care coordination.  Patient expressed understanding of goal: yes  Action steps to achieve this goal:  1. I will take my medications as advised.   2. I will use my home oxygen at all times.   3. I will call [email or web address] at [phone] to set my free transportation to my medical appointments. Scheduling line: [phone]   4. I will continue to follow up with my pulmonologist, Dr. Mcclure as recommended.   5. Dena the community health worker will review my appointment after visit summaries and walk through my follow up recommendations with me.                               Care Plan: Annual Wellness Visit 1/5/24       Problem: Health Maintenance Due or Overdue       Goal: Become up-to-date with health maintenance visit(s)       Start Date: 5/5/2023 Expected End Date: 8/3/2023    Recent Progress: 0%    Note:     Goal Statement: I will complete my annual wellness visit 1/5/24.    Barriers: none  Strengths: care coordination     Patient expressed understanding of goal: yes  Action  steps to achieve this goal:  1. I will schedule my annual wellness visit; [id number] ([phone])  2. I will attend my annual wellness visit.  3. I will contact my Care Management or clinic team if I have barriers to attending my annual wellness visit.                                 Action Plans on File:                       Advance Care Plans/Directives:   Advanced Care Plan/Directives on file:   No    Discussed with patient/caregiver(s): No data recorded           My Medical and Care Information  Problem List   Patient Active Problem List   Diagnosis    History of TB (tuberculosis)    Weight loss    Pulmonary nodules    Late latent syphilis    Iatrogenic pneumothorax    Patient's intentional underdosing of medication regimen due to financial hardship    Anemia of chronic disease    Benign prostatic hyperplasia, unspecified whether lower urinary tract symptoms present    COPD (chronic obstructive pulmonary disease) (H)    Hepatitis B core antibody positive    Pulmonary hypertension (H)    SOB (shortness of breath)    Status post coronary angiogram    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

## 2024-01-04 ENCOUNTER — PATIENT OUTREACH (OUTPATIENT)
Dept: CARE COORDINATION | Facility: CLINIC | Age: 49
End: 2024-01-04
Payer: COMMERCIAL

## 2024-01-04 ENCOUNTER — TELEPHONE (OUTPATIENT)
Dept: TRANSPLANT | Facility: CLINIC | Age: 49
End: 2024-01-04
Payer: COMMERCIAL

## 2024-01-04 DIAGNOSIS — Z01.818 ENCOUNTER FOR PRE-TRANSPLANT EVALUATION FOR LUNG TRANSPLANT: Primary | ICD-10-CM

## 2024-01-04 DIAGNOSIS — J44.9 COPD (CHRONIC OBSTRUCTIVE PULMONARY DISEASE) (H): ICD-10-CM

## 2024-01-04 NOTE — PROGRESS NOTES
[facility]/Voicemail    Clinical Data: Care Coordinator Outreach    Outreach Documentation Number of Outreach Attempt   1/4/2024  12:22 PM 1       Left message on patient's voicemail with call back information and requested return call.    Plan:   Care Coordinator will try to reach patient again in 3-5 business days.    Next outreach due: 1/11/24

## 2024-01-05 NOTE — TELEPHONE ENCOUNTER
LUNG TRANSPLANT REFERRAL STATUS UPDATE   Transplant Pulmonologist: Cathy Pederson   Transplant Coordinator: Roxana Atkins   Referred: 11/17/2023   Current Phase: Referral   Current transplant status: Active       Pending issues: During previous discussions with patient, he reported he did not have good social support locally as most of his family is still in [place].  Follow-up call placed on 1/4/2024 prior to closing most recent referral.  Patient reported his cousin Cameron is now living with him and is willing to take on the role of primary social support.  Chaim confirmed Cameron will be available 24/7 for the first 90 days post transplant.      Next steps:   Plan made for patient to be seen by Dr. Pederson in person 2/22/2024 9:50 AM with PFTs, 6-minute walk, and labs prior.  Patient was offered earlier appointments, but preferred this date and time.  Requested patient social support Cameron attend new patient visit with Dr. Pederson on 2/22/2024 to receive basic information and education on lung transplant and social support person responsibilities.     Transplant coordinator provided patient with callback information.

## 2024-01-11 DIAGNOSIS — G62.0 DRUG-INDUCED PERIPHERAL NEUROPATHY (H): ICD-10-CM

## 2024-01-11 DIAGNOSIS — J44.9 CHRONIC OBSTRUCTIVE PULMONARY DISEASE, UNSPECIFIED COPD TYPE (H): ICD-10-CM

## 2024-01-11 RX ORDER — ACETAMINOPHEN 325 MG/1
650 TABLET ORAL EVERY 6 HOURS PRN
Qty: 180 TABLET | Refills: 0 | Status: SHIPPED | OUTPATIENT
Start: 2024-01-11 | End: 2024-06-03

## 2024-01-11 RX ORDER — ALBUTEROL SULFATE 90 UG/1
2 AEROSOL, METERED RESPIRATORY (INHALATION) EVERY 6 HOURS PRN
Qty: 18 G | Refills: 0 | Status: SHIPPED | OUTPATIENT
Start: 2024-01-11 | End: 2024-04-23

## 2024-01-11 RX ORDER — BUDESONIDE, GLYCOPYRROLATE, AND FORMOTEROL FUMARATE 160; 9; 4.8 UG/1; UG/1; UG/1
2 AEROSOL, METERED RESPIRATORY (INHALATION) 2 TIMES DAILY
Qty: 10.7 G | Refills: 0 | Status: SHIPPED | OUTPATIENT
Start: 2024-01-11 | End: 2024-04-24

## 2024-01-15 ENCOUNTER — PATIENT OUTREACH (OUTPATIENT)
Dept: CARE COORDINATION | Facility: CLINIC | Age: 49
End: 2024-01-15
Payer: COMMERCIAL

## 2024-01-15 NOTE — PROGRESS NOTES
"Clinic Care Coordination Contact  Care Coordination Clinician Chart Review    Situation: Patient chart reviewed by Care Coordinator.       Background: Care Coordination Program started: 5/3/2023. Initial assessment completed and patient-centered care plan(s) were developed with participation from patient. Lead CC handed patient off to CHW for continued outreaches.       Assessment: Per chart review, patient outreach attempted by CC CHW on 1/4/24 with plans to call out to patient again. Patient \"No Showed\" his 1/5/24 AWE.     CHW to discuss CC Goals with patient at next outreach.       Plan/Recommendations: CHW outreach patient as planned. CC RN will review chart in 4 weeks for call outcome.     Naomi Hair RN, BSN, PHN Care Coordinator  Section, [place], and Brandy Hernandez   Phone: [phone]   "

## 2024-01-16 ENCOUNTER — PATIENT OUTREACH (OUTPATIENT)
Dept: CARE COORDINATION | Facility: CLINIC | Age: 49
End: 2024-01-16
Payer: COMMERCIAL

## 2024-01-16 NOTE — LETTER
[facility] CARE COORDINATION  [address]  [facility] [address]    January 16, 2024    Chaim Adonaycindy Swen  [address]   [facility] [place] [address]      Dear Chaim,    I have been attempting to reach you since our last contact. I would like to continue to work with you and provide any additional support you may need on achieving your health care related goals. I would appreciate if you would give me a call at [phone] to let me know if you would like to continue working together. I know that there are many things that can affect our ability to communicate and I hope we can continue to work together.    All of us at the [facility] are invested in your health and are here to assist you in meeting your goals.     Sincerely,    CRISTINA Mina

## 2024-01-16 NOTE — PROGRESS NOTES
[facility]/Voicemail    Clinical Data: Care Coordinator Outreach    Outreach Documentation Number of Outreach Attempt   1/4/2024  12:22 PM 1   1/16/2024   3:49 PM 2       Left message on patient's voicemail with call back information and requested return call.    Plan: Care Coordinator will send unable to contact letter with care coordinator contact information via [employer]. Care Coordinator will try to reach patient again in 10 business days.    Next outreach due: 1/31/24

## 2024-02-05 ENCOUNTER — PATIENT OUTREACH (OUTPATIENT)
Dept: CARE COORDINATION | Facility: CLINIC | Age: 49
End: 2024-02-05
Payer: MEDICAID

## 2024-02-05 DIAGNOSIS — Z71.89 COUNSELING AND COORDINATION OF CARE: Primary | ICD-10-CM

## 2024-02-05 ASSESSMENT — ACTIVITIES OF DAILY LIVING (ADL): DEPENDENT_IADLS:: TRANSPORTATION

## 2024-02-05 NOTE — LETTER
[facility]  Patient Centered Plan of Care  About Me:        Patient Name:  Chaim Norman    YOB: 1975  Age:         48 year old   Ellyn MRN:    [id number] Telephone Information:  Home Phone [phone]   Mobile [phone]       Address:  [address]  [place] [address] Email address:  [email or web address]      Emergency Contact(s)    Name Relationship Lgl Grd Work Phone Home Phone Mobile Phone   1. SARAH NOMRAN Son   [phone]   2. CARMEN MELO Cousin   [phone]           Primary language:  English     needed? No   [employer]:  [phone] op. 1  Other communication barriers:None    Preferred Method of Communication:  Mail  Current living arrangement: I live in a private home with family    Mobility Status/ Medical Equipment: Independent        Health Maintenance  Health Maintenance Reviewed: Not assessed      My Access Plan  Medical Emergency 911   Primary Clinic Line [facility] - [phone]   24 Hour Appointment Line [phone] or  [phone] ([phone]) (toll-free)   24 Hour Nurse Line [phone] (toll-free)   Preferred Urgent Care [facility], [phone]     [facility]  [phone]     Preferred [facility]acy [id number] - [facility] [address] [facility] [address] [facility]     Behavioral Health Crisis Line The National Suicide Prevention Lifeline at 1-644.514.7638 or Text/Call 088           My Care Team Members  Patient Care Team         Relationship Specialty Notifications Start End    Marija Payne MD PCP - General Family Medicine  11/29/23     Phone: [phone] Fax: [phone]         [address] [facility] [address]    Tania Frias RN Specialty Care Coordinator Cardiology Admissions 1/28/20     Pulmonary HTN    Phone:  [phone] Pager: [phone] Fax: [phone]       Rosa Malcolm, RN Specialty Care Coordinator Cardiology Admissions 1/28/20     Pulmonary HTN    Phone: [phone] Pager: [phone] Fax: [phone]       Pat Story, RN Specialty Care Coordinator Cardiology Admissions 1/28/20     Pulmonary HTN    Phone: [phone] Pager: [phone] Fax: [phone]       Dawn Kendrick MD MD Internal Medicine  5/27/20     Phone: [phone] Fax: [phone]         [address] [facility] [address]    Rdo Chauhan MD Hospitalist Cardiology  7/7/20     Phone: [phone] Pager: [phone] Fax: [phone]        [address] [facility] [address]    Andrea Timmons MD MD Infectious Diseases  7/7/20     Phone: [phone] Fax: [phone] [address] [facility] [address] [facility] [address]    Rhonda Atwood MD Resident Student in organized health care education/training program  7/7/20     Phone: [phone] Fax: [phone]         [address] [place] [address] [address] [facility] [address]    Lauren More MD MD Pulmonary Disease  8/3/21     Phone: [phone] Fax: [phone]         [address] [facility] [address] [facility] [address]    Nely Wright, RN Clinic Care Coordinator Cardiology Admissions 4/18/22     Pulmonary HTN    Phone: [phone] Pager: [phone] Fax: [phone]       Mark Oquendo, RN Specialty Care Coordinator  Admissions 4/19/23     Phone: [phone] Pager: [phone]        Naomi Hair, RN Lead Care Coordinator Primary Care - CC Admissions 5/4/23     Phone: [phone] Fax: [phone]        Beck Mcclure MD Assigned Pulmonology Provider   5/6/23     Phone: [phone] Fax: [phone]         [address] [place] [address]    Marija Payne MD Assigned PCP   11/11/23     Phone: [phone] Fax: [phone]         [address] [facility] [address]    Dena Esqueda,  CHW Community Health Worker Primary Care - CC Admissions 11/29/23     Phone: [phone] Fax: [phone]        Cathy Pederson MD MD Pulmonary & Critical Care Medicine  1/5/24     Phone: [phone] Fax: [phone]         [address] [place] [address]                My Care Plans  Self Management and Treatment Plan    Care Plan  Care Plan: Improve breathing       Problem: Reduce my shortness of breath       Goal: I would like to carry out the treatment plans as set by my pulmonologist, Dr. Mcclure       Start Date: 5/5/2023 Expected End Date: 4/15/2024    Recent Progress: 0%    Note:     Barriers: finances, and transportation   Strengths: patient enrolled in care coordination.  Patient expressed understanding of goal: yes  Action steps to achieve this goal:  1. I will take my medications as advised.   2. I will use my home oxygen at all times.   3. I will call [employer] at [phone] to set my free transportation to my medical appointments. Scheduling line: [phone]   4. I will continue to follow up with my pulmonologist, Dr. Mcclure as recommended.   5. Dena, the community health worker will review my appointment after visit summaries and walk through my follow up recommendations with me.                               Care Plan: Annual Wellness Visit 1/5/24       Problem: Health Maintenance Due or Overdue       Goal: Become up-to-date with health maintenance visit(s)       Start Date: 5/5/2023 Expected End Date: 8/3/2023    Recent Progress: 0%    Note:     Goal Statement: I will complete my annual wellness visit 1/5/24.    Barriers: none  Strengths: care coordination     Patient expressed understanding of goal: yes  Action steps to achieve this goal:  1. I will schedule my annual wellness visit; [id number] ([phone])  2. I will attend my annual wellness visit.  3. I will contact my Care Management or clinic team if I have barriers to attending my annual wellness visit.                                  Action Plans on File:                       Advance Care Plans/Directives:   Advanced Care Plan/Directives on file:   No    Discussed with patient/caregiver(s): No data recorded           My Medical and Care Information  Problem List   Patient Active Problem List   Diagnosis    History of TB (tuberculosis)    Weight loss    Pulmonary nodules    Late latent syphilis    Iatrogenic pneumothorax    Patient's intentional underdosing of medication regimen due to financial hardship    Anemia of chronic disease    Benign prostatic hyperplasia, unspecified whether lower urinary tract symptoms present    COPD (chronic obstructive pulmonary disease) (H)    Hepatitis B core antibody positive    Pulmonary hypertension (H)    SOB (shortness of breath)    Status post coronary angiogram    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

## 2024-02-05 NOTE — PROGRESS NOTES
Clinic Care Coordination Contact  Clinic Care Coordination Contact  OUTREACH with Post Discharge Assessment    Referral Information:  Referral Source: IP Handoff    Primary Diagnosis: Respiratory Disorders - other (Acute pulmonary embolism)    Chief Complaint   Patient presents with    Clinic Care Coordination - Post Hospital    Clinic Care Coordination - Follow-up        [place] Utilization: Clinic Utilization  Difficulty keeping appointments:: No  Compliance Concerns: No  No-Show Concerns: No  No PCP office visit in Past Year: No  Utilization      No Show Count (past year)  1             ED Visits  0             Hospital Admissions  0                    Current as of: 2/3/2024  9:37 PM              Clinical Concerns:  Current Medical Concerns:    Patient Active Problem List   Diagnosis    History of TB (tuberculosis)    Weight loss    Pulmonary nodules    Late latent syphilis    Iatrogenic pneumothorax    Patient's intentional underdosing of medication regimen due to financial hardship    Anemia of chronic disease    Benign prostatic hyperplasia, unspecified whether lower urinary tract symptoms present    COPD (chronic obstructive pulmonary disease) (H)    Hepatitis B core antibody positive    Pulmonary hypertension (H)    SOB (shortness of breath)    Status post coronary angiogram    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     Education Provided to patient: Per his IP AVS.    Pain  Pain (GOAL):: No  Health Maintenance Reviewed: Not assessed  Clinical Pathway: None    Admission:    Admission Date: 02/02/24   Reason for Admission: SOB/CP/fall  Discharge:   Discharge Date: 02/04/24  Discharge Diagnosis: Acute pulmonary embolism    Enrollment  Outreach Frequency: monthly, more frequently as needed    Discharge  "Assessment  How are you doing now that you are home?: \"Im doing good\" patient will develop SOB if off his home oxygen and with activity. But SOB resolves with home Oxygen and rest.  Patient reports his is finishing his old prescription of symbicort then will  the breztri as prescribed by pulmonology. Patient also out of albuterol inhaler patient agreeable to call pharmacy for refill. Patient agreeable to MTM referral, care transition IP to home with 2+ changes in his home meds. MTM referral was placed. We discussed that patient must report to pharmacy if a prescription has been discontinued otherwise, if on auto refill he will be getting incorrect prescriptions. Patient expressed financial concerns, a FRW referral with further details was placed. CC RN instructed patient to answer his phone, patient agreeable. We reviewed his 11/17 pulm OV, and patient was referred to pulm rehab. Scheduling line was provided to patient now. CC RN warm transferred his call to our post hospital follow up scheduling line to make his 1 wk PCP follow up visit. We also discussed his pending appointments, patient advised these appointments are also viewable via [employer].  How are your symptoms? (Red Flag symptoms escalate to triage hotline per guidelines): Improved  Do you feel your condition is stable enough to be safe at home until your provider visit?: Yes  Does the patient have their discharge instructions? : Yes  Does the patient have questions regarding their discharge instructions? : No  Were you started on any new medications or were there changes to any of your previous medications? : Yes  Does the patient have all of their medications?: No (see comment)  Do you have questions regarding any of your medications? : No  Do you have all of your needed medical supplies or equipment (DME)?  (i.e. oxygen tank, CPAP, cane, etc.): Yes  Discharge follow-up appointment scheduled within 14 calendar days? : No  Is patient agreeable to " assistance with scheduling? : Yes         Post-op (Clinicians Only)  Did the patient have surgery or a procedure: No  Fever: No  Chills: No  Eating & Drinking: eating and drinking without complaints/concerns  PO Intake: regular diet  Bowel Function: normal  Date of last BM: 02/04/24  Urinary Status: voiding without complaint/concerns    Medication Management:  Medication review status: Medications reviewed.  Changes noted per patient report.    Functional Status:  Dependent ADLs:: Independent  Dependent IADLs:: Transportation  Bed or wheelchair confined:: No  Mobility Status: Independent  Fallen 2 or more times in the past year?: No  Any fall with injury in the past year?: No    Living Situation:  Current living arrangement:: I live in a private home with family  Type of residence:: Apartment    Lifestyle & Psychosocial Needs:    Social Determinants of Health     Food Insecurity: High Risk (11/6/2023)    Food Insecurity     Within the past 12 months, did you worry that your food would run out before you got money to buy more?: Yes     Within the past 12 months, did the food you bought just not last and you didn t have money to get more?: Yes   Depression: At risk (5/30/2023)    PHQ-2     PHQ-2 Score: 6   Housing Stability: High Risk (11/6/2023)    Housing Stability     Do you have housing? : Yes     Are you worried about losing your housing?: Yes   Tobacco Use: Low Risk  (11/20/2023)    Patient History     Smoking Tobacco Use: Never     Smokeless Tobacco Use: Never     Passive Exposure: Not on file   Financial Resource Strain: Low Risk  (11/6/2023)    Financial Resource Strain     Within the past 12 months, have you or your family members you live with been unable to get utilities (heat, electricity) when it was really needed?: No   Alcohol Use: Not on file   Transportation Needs: High Risk (11/6/2023)    Transportation Needs     Within the past 12 months, has lack of transportation kept you from medical  appointments, getting your medicines, non-medical meetings or appointments, work, or from getting things that you need?: Yes   Physical Activity: Not on file   Interpersonal Safety: Not on file   Stress: Not on file   Social Connections: Not on file     Diet:: Regular  Inadequate nutrition (GOAL):: No  Tube Feeding: No  Inadequate activity/exercise (GOAL):: No  Significant changes in sleep pattern (GOAL): No  Transportation means:: Medical transport     Anglican or spiritual beliefs that impact treatment:: No  Mental health management concern (GOAL):: No  Chemical Dependency Status: No Current Concerns  Informal Support system:: Family     Resources and Interventions:  Current Resources:   Community Resources: Other (see comment), [facility] Programs (food assistance, awaiting approval for cash assistance, [facility])  Supplies Currently Used at Home: Oxygen Tubing/Supplies  Equipment Currently Used at Home: none  Employment Status: unemployed    Advance Care Plan/Directive  Advanced Care Plans/Directives on file:: No    Referrals Placed: Financial Services     Care Plan:   Care Plan: Improve breathing       Problem: Reduce my shortness of breath       Goal: I would like to carry out the treatment plans as set by my pulmonologist, Dr. Mcclure       Start Date: 5/5/2023 Expected End Date: 4/15/2024    Recent Progress: 0%    Note:     Barriers: finances, and transportation   Strengths: patient enrolled in care coordination.  Patient expressed understanding of goal: yes  Action steps to achieve this goal:  1. I will take my medications as advised.   2. I will use my home oxygen at all times.   3. I will call "[employer]" at [phone] to set my free transportation to my medical appointments. Scheduling line: [phone]   4. I will continue to follow up with my pulmonologist, Dr. Mcclure as recommended.   5. Dena, the community health worker will review my appointment after visit summaries and walk through my follow up  recommendations with me.                               Care Plan: Annual Wellness Visit 1/5/24       Problem: Health Maintenance Due or Overdue       Goal: Become up-to-date with health maintenance visit(s)       Start Date: 5/5/2023 Expected End Date: 8/3/2023    Recent Progress: 0%    Note:     Goal Statement: I will complete my annual wellness visit 1/5/24.    Barriers: none  Strengths: care coordination     Patient expressed understanding of goal: yes  Action steps to achieve this goal:  1. I will schedule my annual wellness visit; [phone] ([phone])  2. I will attend my annual wellness visit.  3. I will contact my Care Management or clinic team if I have barriers to attending my annual wellness visit.                                 Patient/Caregiver understanding: yes     Outreach Frequency: monthly, more frequently as needed  Future Appointments                In 1 week Luz Maria Arenas PA-C [facility], DANNY GARCIA    In 2 weeks  LAB [facility] Lab [facility]    In 2 weeks  PFL A [facility] Pulmonary Function Testing [facility]    In 2 weeks  PFL 6 MINUTE WALK 1 [facility] Pulmonary Function Testing [facility]    In 2 weeks [facility] Pederson [facility]    In 3 months Beck Mcclure MD [facility] Specialty Clinic JOHANNY Garcia            Plan: 1. Patient to follow his IP AVS follow recommendations.  2. Patient enrolled in Care Coordination, goal(s) identified at this time.   3. Patient centered care plan sent to patient.  4. CC RN will reach out to patient in 1 month, if additional need(s) arise patient encouraged to contact CC RN or care team directly sooner.     Naomi Hair RN, BSN, PHN Care Coordinator  [place] and [facility]   Phone: [phone]

## 2024-02-05 NOTE — Clinical Note
Good morning,  Patient is enrolled with CC. I called to complete the post hospital follow up assessment. Noted discrepancies with meds. Patients IP AVS did not include his Lisinopril 10 mg tab, or nortriptyline 10 mg tab, however, patient is taking. We added his new eliquis prescription, and patient is not taking the Breztri as recommended, but finishing his Symbicort prior to filling other. Patient also out of albuterol, CC RN advised patient to call his pharmacy for available refills. MTM referral was placed for further med support.  Due to med discrepancies, writer routing chart to PCP per standard workflow.  Thank you, Naomi Hair RN, BSN, PHN Care Coordinator [place], [place], and [facility] Hernandez  Phone: [phone]

## 2024-02-06 ENCOUNTER — PATIENT OUTREACH (OUTPATIENT)
Dept: CARE COORDINATION | Facility: CLINIC | Age: 49
End: 2024-02-06
Payer: MEDICAID

## 2024-02-13 NOTE — PROGRESS NOTES
Medication Therapy Management (MTM) Encounter    ASSESSMENT:                            Medication Adherence/Access: See below for considerations    Hospital discharge follow-up: no discrepancies noted. Pt will be running out of his blood pressure medications soon and asked what to do. Looked at chart notes and it seems PCP wanted pt to come back for a follow-up to do med refills. Would benefit from assistance with scheduling this visit.    PLAN:                            1. Gave patient phone number for [place] so he could call to schedule visit for med refills    2. Discharge med rec completed - no discrepancies noted    Follow-up: with PCP for med refills    SUBJECTIVE/OBJECTIVE:                          Chaim Norman is a 48 year old male called for a transitions of care visit. He was discharged from [facility] on 2/4/24 for acute PE.      Reason for visit: transitions of care.    Allergies/ADRs: Reviewed in chart. Has allergy to lisinopril in chart but patient doesn't think this happened  Past Medical History: Reviewed in chart  Tobacco: He reports that he has never smoked. He has never used smokeless tobacco.  Alcohol: not currently using    Medication Adherence/Access: patient doesn't have insurance    Hospital discharge follow-up: pt has history of multidrug resistant TB, respiratory failure, and COPD and presented to the ED with chest pain/shortness of breath. Was found to have submassive PE. Was started on heparin drip and transitioned to DOAC. Thrombophilia labs were ordered and patient was advised to follow-up with PCP on this. Patient reports he is running out of lisinopril and needs a refill. We discussed that last visit with PCP he was told to schedule a blood pressure follow-up but he hasn't done this yet. He is not using the inhalers right now because he can't afford them without insurance. He is working with social work on this.          BP Readings from Last 3 Encounters:   11/17/23 (!)  147/104   05/30/23 (!) 135/99   05/03/23 (!) 150/98     Pulse Readings from Last 3 Encounters:   11/17/23 104   05/30/23 98   05/03/23 102       Current Outpatient Medications   Medication Sig    acetaminophen (TYLENOL) 325 MG tablet Take 2 tablets (650 mg) by mouth every 6 hours as needed for mild pain    amLODIPine (NORVASC) 5 MG tablet Take 2 tablets (10 mg) by mouth daily    apixaban ANTICOAGULANT (ELIQUIS) 5 MG tablet Take 5 mg by mouth 2 times daily    BREZTRI AEROSPHERE 160-9-4.8 MCG/ACT AERO inhaler INHALE 2 PUFFS INTO THE LUNGS TWICE DAILY (Patient not taking: Reported on 2/5/2024)    DULoxetine (CYMBALTA) 30 MG capsule Take 1 capsule (30 mg) by mouth daily Take with 60mg to equal 90mg/day. 3 month script    DULoxetine (CYMBALTA) 60 MG capsule TAKE ONE CAPSULE BY MOUTH DAILY. TAKE ALONG WITH ONE DULOXETINE 30MG CAPSULE FOR TOTAL DAILY DOSE OF 90MG.    gabapentin (NEURONTIN) 300 MG capsule Take 3 capsules (900 mg) by mouth 3 times daily    lisinopril (ZESTRIL) 10 MG tablet TAKE 1 TABLET(10 MG) BY MOUTH DAILY    nortriptyline (PAMELOR) 10 MG capsule TAKE 1 TO 2 CAPSULES(10 TO 20 MG) BY MOUTH AT BEDTIME FOR PAIN OR INSOMNIA    VENTOLIN  (90 Base) MCG/ACT inhaler INHALE 2 PUFFS INTO THE LUNGS EVERY 6 HOURS AS NEEDED FOR SHORTNESS OF BREATH OR WHEEZING (Patient not taking: Reported on 2/5/2024)     No current facility-administered medications for this visit.     Lab Results   Component Value Date    A1C 5.6 10/06/2022    GLC 93 05/30/2023     05/30/2023    POTASSIUM 3.6 05/30/2023    ZANDER 9.3 05/30/2023    CHLORIDE 100 05/30/2023    CO2 30 05/30/2023    BUN 11 05/30/2023    CR 1.15 05/30/2023    GFRESTIMATED 78 05/30/2023    CHOL 189 05/30/2023     (H) 05/30/2023    HDL 49 05/30/2023    TRIG 94 05/30/2023    UMALCR 136.63 (H) 10/06/2022    TSH 1.27 04/14/2022       ----------------  Post Discharge Medication Reconciliation Status: discharge medications reconciled, continue medications without  change.    I spent 9 minutes with this patient today. All changes were made via collaborative practice agreement with Marija Santana MD. A copy of the visit note was provided to the patient's provider(s).    A summary of these recommendations was declined by the patient.    Kimmy Lowe, PharmD, [facility]  Medication Therapy Management Provider  [phone]    Telemedicine Visit Details  Type of service:  Telephone visit  Start Time: 1:00 PM  End Time: 1:09 PM     Medication Therapy Recommendations  Pulmonary hypertension (H)    Current Medication: lisinopril (ZESTRIL) 10 MG tablet   Rationale: Medication product not available - Adherence - Adherence   Recommendation: Make Appt with PCP   Status: Accepted - no CPA Needed

## 2024-02-14 ENCOUNTER — PATIENT OUTREACH (OUTPATIENT)
Dept: CARE COORDINATION | Facility: CLINIC | Age: 49
End: 2024-02-14

## 2024-02-14 ENCOUNTER — VIRTUAL VISIT (OUTPATIENT)
Dept: PHARMACY | Facility: CLINIC | Age: 49
End: 2024-02-14
Attending: FAMILY MEDICINE

## 2024-02-14 DIAGNOSIS — Z09 HOSPITAL DISCHARGE FOLLOW-UP: Primary | ICD-10-CM

## 2024-02-14 PROCEDURE — 99207 PR NO CHARGE LOS: CPT | Mod: 93 | Performed by: PHARMACIST

## 2024-02-14 NOTE — Clinical Note
STEWART - saw patient for discharge med rec. No discrepancies noted. He did say he will be running out lisinopril and asked for a refill. I saw that he was due to see you so asked that he schedule a follow-up for med renewal. Thanks! Kimmy

## 2024-02-15 ENCOUNTER — PATIENT OUTREACH (OUTPATIENT)
Dept: CARE COORDINATION | Facility: CLINIC | Age: 49
End: 2024-02-15

## 2024-02-15 ENCOUNTER — APPOINTMENT (OUTPATIENT)
Dept: CARE COORDINATION | Facility: CLINIC | Age: 49
End: 2024-02-15
Payer: MEDICAID

## 2024-02-15 NOTE — PATIENT INSTRUCTIONS
Clayton Rucker, it was great talking with you today!     Recommendations from today's MTM visit:                                                      1. Discharge med rec completed - no discrepancies noted    2. You need to schedule an appointment with your PCP to get medications renewed. Call the number I provided you with to schedule this visit    I value your experience and would be very grateful for your time with providing feedback on our clinic survey. You may receive a survey via email or text message in the next few days.     To schedule another MTM appointment, please call the clinic directly or you may call the MTM scheduling line at [phone] or toll-free at [phone].     My Clinical Pharmacist's contact information:                                                      Please feel free to contact me with any questions or concerns you have.      Kimmy Lowe, PharmD, BCACP  [facility]  Phone: [phone]

## 2024-02-25 ENCOUNTER — HEALTH MAINTENANCE LETTER (OUTPATIENT)
Age: 49
End: 2024-02-25

## 2024-03-04 ENCOUNTER — TELEPHONE (OUTPATIENT)
Dept: FAMILY MEDICINE | Facility: CLINIC | Age: 49
End: 2024-03-04

## 2024-03-04 ENCOUNTER — PATIENT OUTREACH (OUTPATIENT)
Dept: CARE COORDINATION | Facility: CLINIC | Age: 49
End: 2024-03-04

## 2024-03-04 NOTE — TELEPHONE ENCOUNTER
Reason for Call:  Appointment Request    Patient requesting this type of appt:  Caqre coordination    Requested provider: RN Visit    Reason patient unable to be scheduled: Needs to be scheduled by clinic    When does patient want to be seen/preferred time:  as soon as possible    Comments: Patient request for appt with care coordinator     Could we send this information to you in [email or web address] or would you prefer to receive a phone call?:   Patient would prefer a phone call   Okay to leave a detailed message?: Yes at Cell number on file:    Telephone Information:   Mobile [phone]       Call taken on 3/4/2024 at 7:49 AM by Sada Jones

## 2024-03-04 NOTE — TELEPHONE ENCOUNTER
Please see 3/4/24 patient out reach encounter.   Thank you, Naomi Hair RN, BSN, PHN Care Coordinator  [place], [place], and [facility]   Phone: [phone]

## 2024-03-04 NOTE — PROGRESS NOTES
Clinic Care Coordination Contact  Follow Up Progress Note      Assessment: patient calling stating that he would like to connect with our financial resource worker.   CC RN provided patient with Mable's direct line [phone] and warm transferred his call to her.     Care Gaps to be addressed at future contact.   Health Maintenance Due   Topic Date Due    URINE DRUG SCREEN  Never done    COPD ACTION PLAN  Never done    Pneumococcal Vaccine: Pediatrics (0 to 5 Years) and At-Risk Patients (6 to 64 Years) (1 of 2 - PCV) Never done    HEPATITIS B IMMUNIZATION (1 of 3 - 19+ 3-dose series) Never done    COVID-19 Vaccine (3 - Pfizer risk series) 05/21/2021    YEARLY PREVENTIVE VISIT  09/14/2022    PHQ-2 (once per calendar year)  01/01/2024    ZOSTER IMMUNIZATION (1 of 2) 08/28/2023     Care Plans  Care Plan: Improve breathing       Problem: Reduce my shortness of breath       Goal: I would like to carry out the treatment plans as set by my pulmonologist, Dr. Mcclure       Start Date: 5/5/2023 Expected End Date: 4/15/2024    Recent Progress: 0%    Note:     Barriers: finances, and transportation   Strengths: patient enrolled in care coordination.  Patient expressed understanding of goal: yes  Action steps to achieve this goal:  1. I will take my medications as advised.   2. I will use my home oxygen at all times.   3. I will call [email or web address] at [phone] to set my free transportation to my medical appointments. Scheduling line: [phone]   4. I will continue to follow up with my pulmonologist, Dr. Mcclure as recommended.   5. Dena the community health worker will review my appointment after visit summaries and walk through my follow up recommendations with me.                               Care Plan: Annual Wellness Visit 1/5/24 (needs to be rescheduled)       Problem: Health Maintenance Due or Overdue       Goal: Become up-to-date with health maintenance visit(s)       Start Date: 5/5/2023 Expected End  Date: 6/4/2024    Recent Progress: 0%    Note:     Goal Statement: I will complete my annual wellness visit . Needs to be rescheduled.     Barriers: none  Strengths: care coordination     Patient expressed understanding of goal: yes  Action steps to achieve this goal:  1. I will schedule my annual wellness visit; [phone] ([phone])  2. I will attend my annual wellness visit.  3. I will contact my Care Management or clinic team if I have barriers to attending my annual wellness visit.                                 Intervention/Education provided during outreach: Discussed patients request and provided needed resource. CC RN asked open ended questions, provided support, resources, and encouragement as needed. Patient will reach out sooner than planned with new questions or concerns.       Plan:   Patient to connect with Mable ELY.   CHW to outreach to patient in 1 month to assist and work on goals.   CC RN to perform a chart review in 6 weeks to monitor for progress.   Naomi Hair RN, BSN, PHN Care Coordinator  [place], [place], and Brandy Hernandez   Phone: [phone]

## 2024-03-14 ENCOUNTER — PATIENT OUTREACH (OUTPATIENT)
Dept: CARE COORDINATION | Facility: CLINIC | Age: 49
End: 2024-03-14
Payer: COMMERCIAL

## 2024-03-19 NOTE — LETTER
RE: Chaim Norman  [address]  [facility] [address]       April 21, 2020    Nyla Florian NP, DNP  Family Medicine  [place], [place]    Dear Dr. Florian,    I had the pleasure of seeing your patient Chaim Norman at the [facility] Pulmonary Hypertension clinic.  As you know, Chaim is a very pleasant 44 year old male with a past medical history tuberculosis first diagnosed in 2014 with subsequent pulmonary complications including lung destruction, hypertension, neuropathy, and likely pulmonary hypertension after an echocardiogram showed RV dilation and dysfunction. Chaim comes in today after having an echocardiogram performed at [facility] in December of 2019 which showed RV dilation and dysfunction. Chaim states that his breathing is actually getting better recently on his TB medications. He does not do a lot of activity because he has bad neuropathy in his feet so walking distances is nearly impossible. He is limited by this so going up a flight of stairs is difficult because of foot pain and not dyspnea. He denies any chest pain or pressure, presyncope, or syncope. He has no orthopnea or paroxysmal dyspnea. 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    Chaim is currently seeing ID at [facility] for his TB. He tells me he is taking his medications. He is frustrated by this diagnosis. He does have a lot of pain in his legs and his biggest concern today is his foot pain and neuropathy. He is scheduled to see a pain clinic and Dr. Florian has been working with him using gabapentin for his pain.    Chaim also had a CT scan performed at [facility] in December which showed bilateral lung destruction. He has not had any pulmonary function tests to his recollection or per my review of the chart.    Chaim returns to clinic today after struggling with weight gain. He was started on lasix but stopped because it was causing some issues with excess urination. He is  actually feeling much better and is gaining weight because he is eating a lot more. He is feeling better and hoping to get an answer to his dyspnea.     PAST MEDICAL HISTORY:  -Active TB  -Anemia  -Pancytopenia  -Echo suggesting pulmonary hypertension    FAMILY HISTORY:  Family History   Family history unknown: Yes       SOCIAL HISTORY:  Social History     Socioeconomic History     Marital status:      Spouse name: Not on file     Number of children: Not on file     Years of education: Not on file     Highest education level: Not on file   Occupational History     Not on file   Social Needs     Financial resource strain: Not on file     Food insecurity:     Worry: Not on file     Inability: Not on file     Transportation needs:     Medical: Not on file     Non-medical: Not on file   Tobacco Use     Smoking status: Never Smoker     Smokeless tobacco: Never Used   Substance and Sexual Activity     Alcohol use: No     Drug use: No     Sexual activity: Yes     Partners: Female     Birth control/protection: Condom   Lifestyle     Physical activity:     Days per week: Not on file     Minutes per session: Not on file     Stress: Not on file   Relationships     Social connections:     Talks on phone: Not on file     Gets together: Not on file     Attends Orthodox service: Not on file     Active member of club or organization: Not on file     Attends meetings of clubs or organizations: Not on file     Relationship status: Not on file     Intimate partner violence:     Fear of current or ex partner: Not on file     Emotionally abused: Not on file     Physically abused: Not on file     Forced sexual activity: Not on file   Other Topics Concern     Not on file   Social History Narrative     Not on file       CURRENT MEDICATIONS:  Current Outpatient Medications   Medication Sig Dispense Refill     acetaminophen (TYLENOL) 325 MG tablet Take 2 tablets (650 mg) by mouth every 6 hours as needed for mild pain 180 tablet 3      "albuterol (PROVENTIL HFA) 108 (90 Base) MCG/ACT inhaler Inhale 2 puffs into the lungs every 6 hours as needed for shortness of breath / dyspnea or wheezing 8.5 g 3     amLODIPine (NORVASC) 5 MG tablet Take 1 tablet (5 mg) by mouth daily 30 tablet 2     bedaquiline (SITRURO) 100 MG tablet Take 200 mg by mouth Every Mon, Wed, Fri Morning       cycloSERINE (SEROMYCIN) 250 MG capsule Take 250 mg by mouth 2 times daily       diclofenac (VOLTAREN) 1 % topical gel Place 2 g onto the skin 4 times daily as needed for moderate pain Of both shoulders 1 Tube 3     DULoxetine 60 MG PO capsule Take 1 capsule (60 mg) by mouth daily 30 capsule 3     ethambutol (MYAMBUTOL) 400 MG tablet Take 1,200 mg by mouth daily       fluticasone-salmeterol (ADVAIR) 100-50 MCG/DOSE inhaler Inhale 1 puff into the lungs every 12 hours 1 Inhaler 2     furosemide (LASIX) 20 MG tablet Take 2 tablets (40 mg) by mouth daily 60 tablet 3     gabapentin (NEURONTIN) 300 MG capsule Take 3 capsules (900 mg) by mouth 3 times daily 270 capsule 1     levofloxacin (LEVAQUIN) 500 MG tablet Take 750 mg by mouth daily       melatonin 3 MG tablet Take 1 tablet (3 mg) by mouth nightly as needed for sleep 30 tablet 3     nortriptyline (PAMELOR) 10 MG capsule Take 1-2 capsules (10-20 mg) by mouth At Bedtime For pain and insomnia 60 capsule 1     oxyCODONE (ROXICODONE) 5 MG tablet Take 1 tablet (5 mg) by mouth 2 times daily as needed for severe pain 14 tablet 0     potassium chloride ER (KLOR-CON M) 20 MEQ CR tablet Take 1 tablet (20 mEq) by mouth daily 30 tablet 3     pyrazinamide 500 MG tablet Take 1,500 mg by mouth daily       vitamin B6 (PYRIDOXINE) 100 MG tablet Take 2 tablets (200 mg) by mouth daily         ROS:   10 point ROS negative except HPI    EXAM:  BP (!) 133/102 (BP Location: Right arm, Patient Position: Sitting, Cuff Size: Adult Regular)   Pulse 97   Temp 97.9  F (36.6  C) (Oral)   Ht 1.626 m (5' 4\")   Wt 83.1 kg (183 lb 1.6 oz)   SpO2 97%   BMI " 31.43 kg/m    General: appears comfortable, alert and articulate  Head: normocephalic, atraumatic  Eyes: anicteric sclera, EOMI  Neck: no adenopathy  Orophyarynx: moist mucosa, no lesions, dentition intact  Heart: regular, S1/S2, no murmur, gallop, rub, estimated JVP 3 cm  Lungs: decreased breath sounds bilaterally, worse on right side than left  Abdomen: soft, non-tender, bowel sounds present, no hepatosplenomegaly  Extremities: no clubbing, cyanosis or edema  Neurological: normal speech and affect, no gross motor deficits    Labs:  CBC RESULTS:  Lab Results   Component Value Date    WBC 4.5 04/21/2020    RBC 4.43 04/21/2020    HGB 13.7 04/21/2020    HCT 43.2 04/21/2020    MCV 98 04/21/2020    MCH 30.9 04/21/2020    MCHC 31.7 04/21/2020    RDW 14.1 04/21/2020     04/21/2020       CMP RESULTS:  Lab Results   Component Value Date     10/18/2018    POTASSIUM 3.9 10/18/2018    CHLORIDE 101 10/18/2018    CO2 30 10/18/2018    ANIONGAP 6 10/18/2018    GLC 76 10/18/2018    BUN 17 10/18/2018    CR 1.16 10/18/2018    GFRESTIMATED 68 10/18/2018    GFRESTBLACK 82 10/18/2018    ZANDER 9.8 10/18/2018    BILITOTAL 1.5 (H) 10/18/2018    ALBUMIN 4.4 10/18/2018    ALKPHOS 93 10/18/2018    ALT 30 10/18/2018    AST 27 10/18/2018        Echocardiogram 12/30/2019 ([facility]):  The left ventricular systolic function is normal, estimated LVEF 60-65%.  Abnormal septal motion secondary to right ventricular volume and pressure overload.  Severely reduced right ventricular systolic function. The right ventricle is quantitatively enlarged.  There is trace circumferential pericardial effusion.  Severe pulmonary hypertension, estimated right ventricular systolic pressure is 64 mmHg.    CT Scan 12/29/19 ([facility])  Small volume ascites is not changed.  Chronic changes of the right lung with further volume loss and mediastinal shift to the right since 8/30/2019.  Groundglass infiltrates of the left upper lobe and lingula are  Detail Level: Detailed consistent with infectious or inflammatory process.  Lobulated mass of the left lower lobe with pleural thickening has decreased in size with interval resolution of the cavitation since 8/30/2019.    Assessment and Plan: Chaim Norman is a 44 year old male with history of active TB with significant lung destruction who presents for evaluation of pulmonary hypertension.     1. 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 Because of COVID-19 we have been unable to complete his work-up. I would like to get his hemodynamic study completed because he has gained a lot of weight. I don't think this is right heart failure, but I want to be certain.     If he does have significant pulmonary hypertension, we may need to get him started on inhaled therapy to reduce the risk of VQ mismatch.     2. Hypertension: Diastolic BP is quite elevated, we will increase amlodipine to 10 mg daily.     It was a pleasure seeing your patient Chaim Norman at the [facility] Pulmonary Hypertension clinic.  Please contact us with any questions or concerns that you may have.    Sincerely,    Rod Chauhan MD, PhD   of Medicine  Center for Pulmonary Hypertension  Cardiovascular Division  [facility]                     Detail Level: Zone Detail Level: Generalized Detail Level: Simple

## 2024-04-09 ENCOUNTER — PATIENT OUTREACH (OUTPATIENT)
Dept: CARE COORDINATION | Facility: CLINIC | Age: 49
End: 2024-04-09
Payer: COMMERCIAL

## 2024-04-09 NOTE — PROGRESS NOTES
[facility]/Voicemail    Clinical Data: Care Coordinator Outreach    Outreach Documentation Number of Outreach Attempt   4/9/2024  12:03 PM 1       Left message on patient's voicemail with call back information and requested return call.    Plan:   Care Coordinator will try to reach patient again in 3-5 business days.    Next outreach due: 4/16/24

## 2024-04-15 ENCOUNTER — PATIENT OUTREACH (OUTPATIENT)
Dept: CARE COORDINATION | Facility: CLINIC | Age: 49
End: 2024-04-15
Payer: COMMERCIAL

## 2024-04-15 NOTE — PROGRESS NOTES
Clinic Care Coordination Contact  Care Coordination Clinician Chart Review    Situation: Patient chart reviewed by Care Coordinator.       Background: Care Coordination Program started: 5/3/2023. Initial assessment completed and patient-centered care plan(s) were developed with participation from patient. Lead CC handed patient off to CHW for continued outreaches.       Assessment: Per chart review, patients FRW program has been completed. Appears [facility] FRW is no longer on care team. CC CHW attempted outreach on 4/9/24.  CHW plans to try out reaching again. Patient is not due for updated Plan of Care.  Assessments will be completed annually or as needed/with change of patient status.      Care Plan: Improve breathing       Problem: Reduce my shortness of breath       Goal: I would like to carry out the treatment plans as set by my pulmonologist, Dr. Mcclure       Start Date: 5/5/2023 Expected End Date: 4/15/2024    Recent Progress: 0%    Note:     Barriers: finances, and transportation   Strengths: patient enrolled in care coordination.  Patient expressed understanding of goal: yes  Action steps to achieve this goal:  1. I will take my medications as advised.   2. I will use my home oxygen at all times.   3. I will call [employer] at [phone] to set my free transportation to my medical appointments. Scheduling line: [phone]   4. I will continue to follow up with my pulmonologist, Dr. Mcclure as recommended.   5. Dena, the community health worker will review my appointment after visit summaries and walk through my follow up recommendations with me.                               Care Plan: Annual Wellness Visit 1/5/24 (needs to be rescheduled)       Problem: Health Maintenance Due or Overdue       Goal: Become up-to-date with health maintenance visit(s)       Start Date: 5/5/2023 Expected End Date: 6/4/2024    Recent Progress: 0%    Note:     Goal Statement: I will  complete my annual wellness visit . Needs to be rescheduled.     Barriers: none  Strengths: care coordination     Patient expressed understanding of goal: yes  Action steps to achieve this goal:  1. I will schedule my annual wellness visit; [phone] ([phone])  2. I will attend my annual wellness visit.  3. I will contact my Care Management or clinic team if I have barriers to attending my annual wellness visit.                                    Plan/Recommendations: The patient will continue working with Care Coordination to achieve goal(s) as above. CHW will continue outreaches at minimum every 30 days and will involve Lead CC as needed or if patient is ready to move to Maintenance. Lead CC will continue to monitor CHW outreaches and patient's progress to goal(s) every 6 weeks.     Plan of Care updated and sent to patient: Lisa Hair RN, BSN, PHN Care Coordinator  [place], [place], and Brandy [facility]   Phone: [phone]

## 2024-04-19 ENCOUNTER — PATIENT OUTREACH (OUTPATIENT)
Dept: CARE COORDINATION | Facility: CLINIC | Age: 49
End: 2024-04-19
Payer: COMMERCIAL

## 2024-04-19 NOTE — PROGRESS NOTES
Clinic Care Coordination Contact  [facility]       Clinical Data: CHW Outreach    Outreach attempted x 2. Briefly Spoke with Patient (Chaim).  Patient expressed that he is currently unavailable. Further expressing that he would like to contact CHW at a later time. CHW acknowledged and provided call back information and requested return call.    Plan: CHW will make another attempt to reach the patient via phone or MyChart.    CHW outreach in the next 2 weeks.      CRISTINA Chaudhry  Clinic Care Coordination   [facility]   Phone: [phone]  [email or web address]

## 2024-04-23 DIAGNOSIS — J44.9 CHRONIC OBSTRUCTIVE PULMONARY DISEASE, UNSPECIFIED COPD TYPE (H): ICD-10-CM

## 2024-04-23 RX ORDER — ALBUTEROL SULFATE 90 UG/1
2 AEROSOL, METERED RESPIRATORY (INHALATION) EVERY 6 HOURS PRN
Qty: 18 G | Refills: 0 | Status: SHIPPED | OUTPATIENT
Start: 2024-04-23 | End: 2024-06-04

## 2024-04-24 RX ORDER — BUDESONIDE, GLYCOPYRROLATE, AND FORMOTEROL FUMARATE 160; 9; 4.8 UG/1; UG/1; UG/1
2 AEROSOL, METERED RESPIRATORY (INHALATION) 2 TIMES DAILY
Qty: 10.7 G | Refills: 0 | Status: SHIPPED | OUTPATIENT
Start: 2024-04-24 | End: 2024-06-04

## 2024-05-09 ENCOUNTER — PATIENT OUTREACH (OUTPATIENT)
Dept: CARE COORDINATION | Facility: CLINIC | Age: 49
End: 2024-05-09
Payer: COMMERCIAL

## 2024-05-09 NOTE — PROGRESS NOTES
[facility]/Voicemail    Clinical Data: Care Coordinator Outreach    Outreach Documentation Number of Outreach Attempt   4/9/2024  12:03 PM 1   5/9/2024  12:57 PM 2       Left message on patient's voicemail with call back information and requested return call.    Plan:   Care Coordinator will try to reach patient again in 10 business days.    Next outreach due: 5/22/24

## 2024-05-16 ENCOUNTER — TELEPHONE (OUTPATIENT)
Dept: FAMILY MEDICINE | Facility: CLINIC | Age: 49
End: 2024-05-16
Payer: COMMERCIAL

## 2024-05-16 DIAGNOSIS — J44.9 COPD (CHRONIC OBSTRUCTIVE PULMONARY DISEASE) (H): ICD-10-CM

## 2024-05-16 DIAGNOSIS — Z01.818 ENCOUNTER FOR PRE-TRANSPLANT EVALUATION FOR LUNG TRANSPLANT: ICD-10-CM

## 2024-05-16 NOTE — LETTER
May 16, 2024    Chaim Norman  [address]  [place] [address]      Dear Mr. Norman,   The purpose of this letter is to let you know that on  5/16/2024 the [facility] lung transplant team, closed you referral for consideration of lung transplant evaluation. This is because our team has been unable to see you in the transplant clinic for a new patient visit with transplant pulmonologist Dr. Cathy Pederson.  You were scheduled but unable to attend your new patient appointments with her on 2/22/2024, 4/4/2024, and 5/13/2024.  Post lung transplant outcomes are significantly impacted by a patient's ability to complete all follow-up appointments and testing on a consistent and regular basis.   Important things you should know:  If you would like to discuss the decision, or if your medical status changes you may schedule a return visits with your doctor by calling [phone] and asking to speak to your transplant coordinator.  We recommend that you continue to follow up with your primary care doctor in order to manage your health concerns.  We want you to know that our program has physician and surgeon coverage 24 hours a day, 365 days a year. In addition, our transplant surgeons and physicians will not be on call for two or more transplant programs more than 30 miles apart unless the circumstances have been reviewed and approved by the [employer] Organ [employer]aring (UNOS) Membership and Professional Standards Committee (MPSC). Finally, our primary physician and primary surgeons are not designated as the primary surgeon or primary physician at more than 1 transplant hospital. If this coverage changes or there are substantial program changes, you will be notified in writing by letter.   Attached is a letter from UNOS that describes the services and information offered to patients by UNOS and the Organ Procurement and Transplantation Network (OPTN).    Thank you for  allowing us to participate in your care.  We wish you well.  Sincerely,        Solid Organ Transplant  [facility]    Enclosures: UNOS Letter  cc: Care Team    The Organ Procurement and Transplantation Network Toll-free patient services line:  [phone]  Your resource for organ transplant information    Staffed 8:30 am - 5:00 pm ET Monday - Friday Leave a message 24/7 to receive a call back    The Organ Procurement and Transplantation Network (OPTN) is the national transplant system. It makes the policies that decide how donated organs are matched to patients waiting for a transplant. The OPTN:    Makes sure donated organs get matched to people on the transplant waiting list  Tells people about the donation and transplant processes  Makes sure that the public knows about the need for more organ and tissue donations    The OPTN has a free patient services line that you can call to:  Get more information about:  Organ donation and organ transplants  Donation and transplant policies  Get an information kit with:  A list of transplant hospitals  Waiting list information  Talk about any questions you may have about your transplant hospital or organ procurement organization. The staff will do their best to help you or point you to others who may help.  Find out how you can volunteer with the OPTN and help shape transplant policy     The patient services line number is: 9-273-564-6224    Patient services line staff CANNOT answer questions about your own medical care, including:  Waiting list status  Test results  Medical records  You will need to call your transplant hospital for this information.    The following websites have more information about transplantation and donation:    OPTN: https://optn.transplant.[facility].gov/    For potential living donors and transplant recipients:    Living with transplant: https://www.transplantliving.org/    Living donation  process: https://optn.transplant.hrsa.gov/living-donation/    Financial assistance: https://www.livingdonorassistance.org/    Transplantation data: https://www.srtr.org/    Organ donation: https://www.organdonor.gov/    Volunteer with the OPTN: https://optn.transplant.hrsa.gov/get-involved/

## 2024-05-16 NOTE — CONFIDENTIAL NOTE
Patient scheduled to see Dr. Pederson for new patient transplant visit x 3.  Patient canceled and rescheduled x 2, on third appointment patient no showed.  Patient referral to lung transplant closed due to no follow-up by patient.     PFT and  6-minute walk not completed, orders released in error.

## 2024-05-16 NOTE — TELEPHONE ENCOUNTER
Patient Quality Outreach    Patient is due for the following:   Hypertension -  BP check  Physical Preventive Adult Physical      Topic Date Due    Pneumococcal Vaccine (1 of 2 - PCV) Never done    Hepatitis B Vaccine (1 of 3 - 19+ 3-dose series) Never done    COVID-19 Vaccine (3 - Pfizer risk series) 05/21/2021    Zoster (Shingles) Vaccine (1 of 2) 08/28/2023       Next Steps:   Schedule a Adult Preventative    Type of outreach:    Sent [employer] message.    Next Steps:  Reach out within 90 days via [employer].    Max number of attempts reached: Yes. Will try again in 90 days if patient still on fail list.    Questions for provider review:    None           Levi Burgess MA

## 2024-05-28 ENCOUNTER — PATIENT OUTREACH (OUTPATIENT)
Dept: CARE COORDINATION | Facility: CLINIC | Age: 49
End: 2024-05-28
Payer: COMMERCIAL

## 2024-05-28 NOTE — LETTER
[facility] CARE COORDINATION  May 28, 2024    Chaim Norman  [address]   [facility] [address]      Dear Chaim,    I have been unsuccessful in reaching you since our last contact. At this time the Care Coordination team will make no further attempts to reach you, however this does not change your ability to continue receiving care from your providers at your primary care clinic. If you need additional support from a care coordinator in the future please contact Dena mayberry Community Heatlh Worker (CHW) at [phone].    All of us at [facility] are invested in your health and are here to assist you in meeting your goals.     Sincerely,    Primary Care Coordination

## 2024-05-28 NOTE — PROGRESS NOTES
Clinic Care Coordination Contact  [facility]/Voicemail    Clinical Data: patient due for his CC annual assessment. CHW had made previous attempts to reach patient for a routine check in.     Outreach Documentation Number of Outreach Attempt   5/9/2024  12:57 PM 2   5/28/2024  11:51 AM 3       Left message on patient's voicemail with call back information and requested return call.    Plan: Care Coordinator will send disenrollment letter with care coordinator contact information via [employer]. Care Coordinator will do no further outreaches at this time.    Naomi Hair RN, BSN, PHN Care Coordinator  [place] and [facility]   Phone: [phone]

## 2024-05-29 DIAGNOSIS — G62.0 DRUG-INDUCED PERIPHERAL NEUROPATHY (H): ICD-10-CM

## 2024-05-29 DIAGNOSIS — J44.9 CHRONIC OBSTRUCTIVE PULMONARY DISEASE, UNSPECIFIED COPD TYPE (H): ICD-10-CM

## 2024-05-29 DIAGNOSIS — I10 HYPERTENSION GOAL BP (BLOOD PRESSURE) < 140/80: ICD-10-CM

## 2024-06-03 RX ORDER — ACETAMINOPHEN 325 MG/1
TABLET ORAL
Qty: 180 TABLET | Refills: 0 | Status: SHIPPED | OUTPATIENT
Start: 2024-06-03 | End: 2024-08-13

## 2024-06-04 DIAGNOSIS — I10 HYPERTENSION GOAL BP (BLOOD PRESSURE) < 140/80: ICD-10-CM

## 2024-06-04 RX ORDER — LISINOPRIL 10 MG/1
10 TABLET ORAL DAILY
Qty: 30 TABLET | Refills: 1 | Status: SHIPPED | OUTPATIENT
Start: 2024-06-04 | End: 2024-09-23

## 2024-06-04 RX ORDER — ALBUTEROL SULFATE 90 UG/1
2 AEROSOL, METERED RESPIRATORY (INHALATION) EVERY 6 HOURS PRN
Qty: 18 G | Refills: 0 | Status: SHIPPED | OUTPATIENT
Start: 2024-06-04 | End: 2024-08-13

## 2024-06-04 RX ORDER — BUDESONIDE, GLYCOPYRROLATE, AND FORMOTEROL FUMARATE 160; 9; 4.8 UG/1; UG/1; UG/1
2 AEROSOL, METERED RESPIRATORY (INHALATION) 2 TIMES DAILY
Qty: 10.7 G | Refills: 0 | Status: SHIPPED | OUTPATIENT
Start: 2024-06-04 | End: 2024-08-13

## 2024-06-04 NOTE — TELEPHONE ENCOUNTER
Called patient and relayed provider message below:        Patient states that he does NOT have an allergy to lisinopril - states he has no known allergies. No lip/tongue swelling. Patient would like this removed from chart.    Writer did assist in scheduling for yearly visit to discuss meds for later this month, patient aware of appt time.      Routing to provider to review/advise, all meds still pended      Aury Ruvalcaba RN, BSN  [facility]

## 2024-06-05 RX ORDER — LISINOPRIL 10 MG/1
10 TABLET ORAL DAILY
Qty: 90 TABLET | OUTPATIENT
Start: 2024-06-05

## 2024-07-05 DIAGNOSIS — G89.29 CHRONIC PAIN OF BOTH SHOULDERS: ICD-10-CM

## 2024-07-05 DIAGNOSIS — M79.671 BILATERAL FOOT PAIN: ICD-10-CM

## 2024-07-05 DIAGNOSIS — M79.2 NEUROPATHIC PAIN: ICD-10-CM

## 2024-07-05 DIAGNOSIS — M79.672 BILATERAL FOOT PAIN: ICD-10-CM

## 2024-07-05 DIAGNOSIS — G47.00 INSOMNIA, UNSPECIFIED TYPE: ICD-10-CM

## 2024-07-05 DIAGNOSIS — M25.511 CHRONIC PAIN OF BOTH SHOULDERS: ICD-10-CM

## 2024-07-05 DIAGNOSIS — M25.512 CHRONIC PAIN OF BOTH SHOULDERS: ICD-10-CM

## 2024-07-08 RX ORDER — NORTRIPTYLINE HCL 10 MG
CAPSULE ORAL
Qty: 60 CAPSULE | Refills: 1 | Status: SHIPPED | OUTPATIENT
Start: 2024-07-08 | End: 2024-09-23

## 2024-07-08 RX ORDER — DULOXETIN HYDROCHLORIDE 60 MG/1
CAPSULE, DELAYED RELEASE ORAL
Qty: 90 CAPSULE | Refills: 0 | Status: SHIPPED | OUTPATIENT
Start: 2024-07-08

## 2024-07-14 DIAGNOSIS — M79.2 NEUROPATHIC PAIN: ICD-10-CM

## 2024-07-14 DIAGNOSIS — M79.671 BILATERAL FOOT PAIN: ICD-10-CM

## 2024-07-14 DIAGNOSIS — M79.672 BILATERAL FOOT PAIN: ICD-10-CM

## 2024-07-15 RX ORDER — DULOXETIN HYDROCHLORIDE 30 MG/1
30 CAPSULE, DELAYED RELEASE ORAL DAILY
Qty: 90 CAPSULE | Refills: 0 | Status: SHIPPED | OUTPATIENT
Start: 2024-07-15

## 2024-08-13 DIAGNOSIS — J44.9 CHRONIC OBSTRUCTIVE PULMONARY DISEASE, UNSPECIFIED COPD TYPE (H): ICD-10-CM

## 2024-08-13 DIAGNOSIS — G62.0 DRUG-INDUCED PERIPHERAL NEUROPATHY (H): ICD-10-CM

## 2024-08-13 RX ORDER — GABAPENTIN 300 MG/1
900 CAPSULE ORAL 3 TIMES DAILY
Qty: 270 CAPSULE | Refills: 0 | Status: SHIPPED | OUTPATIENT
Start: 2024-08-13

## 2024-08-13 RX ORDER — ACETAMINOPHEN 325 MG/1
TABLET ORAL
Qty: 180 TABLET | Refills: 0 | Status: SHIPPED | OUTPATIENT
Start: 2024-08-13

## 2024-08-13 RX ORDER — BUDESONIDE, GLYCOPYRROLATE, AND FORMOTEROL FUMARATE 160; 9; 4.8 UG/1; UG/1; UG/1
2 AEROSOL, METERED RESPIRATORY (INHALATION) 2 TIMES DAILY
Qty: 10.7 G | Refills: 0 | Status: SHIPPED | OUTPATIENT
Start: 2024-08-13

## 2024-08-13 RX ORDER — ALBUTEROL SULFATE 90 UG/1
2 AEROSOL, METERED RESPIRATORY (INHALATION) EVERY 6 HOURS PRN
Qty: 18 G | Refills: 0 | Status: SHIPPED | OUTPATIENT
Start: 2024-08-13

## 2024-08-13 NOTE — TELEPHONE ENCOUNTER
Called and informed patient of sent prescriptions. Scheduled patient follow up for 09/17/2024.    Debbie Rodgers

## 2024-09-12 DIAGNOSIS — I10 HYPERTENSION GOAL BP (BLOOD PRESSURE) < 140/80: ICD-10-CM

## 2024-09-12 RX ORDER — AMLODIPINE BESYLATE 5 MG/1
10 TABLET ORAL DAILY
Qty: 180 TABLET | Refills: 0 | Status: SHIPPED | OUTPATIENT
Start: 2024-09-12

## 2024-09-21 DIAGNOSIS — M25.511 CHRONIC PAIN OF BOTH SHOULDERS: ICD-10-CM

## 2024-09-21 DIAGNOSIS — G47.00 INSOMNIA, UNSPECIFIED TYPE: ICD-10-CM

## 2024-09-21 DIAGNOSIS — M25.512 CHRONIC PAIN OF BOTH SHOULDERS: ICD-10-CM

## 2024-09-21 DIAGNOSIS — I10 HYPERTENSION GOAL BP (BLOOD PRESSURE) < 140/80: ICD-10-CM

## 2024-09-21 DIAGNOSIS — M79.2 NEUROPATHIC PAIN: ICD-10-CM

## 2024-09-21 DIAGNOSIS — M79.671 BILATERAL FOOT PAIN: ICD-10-CM

## 2024-09-21 DIAGNOSIS — G89.29 CHRONIC PAIN OF BOTH SHOULDERS: ICD-10-CM

## 2024-09-21 DIAGNOSIS — M79.672 BILATERAL FOOT PAIN: ICD-10-CM

## 2024-09-23 RX ORDER — LISINOPRIL 10 MG/1
10 TABLET ORAL DAILY
Qty: 90 TABLET | Refills: 0 | Status: SHIPPED | OUTPATIENT
Start: 2024-09-23

## 2024-09-23 RX ORDER — NORTRIPTYLINE HCL 10 MG
CAPSULE ORAL
Qty: 60 CAPSULE | Refills: 1 | Status: SHIPPED | OUTPATIENT
Start: 2024-09-23

## 2024-09-23 NOTE — TELEPHONE ENCOUNTER
Called number on file was informed number did not belong to patient. No other number on file. Unable to reach patient.    Debbie Rodgers

## 2024-10-14 DIAGNOSIS — M79.2 NEUROPATHIC PAIN: ICD-10-CM

## 2024-10-14 DIAGNOSIS — M79.671 BILATERAL FOOT PAIN: ICD-10-CM

## 2024-10-14 DIAGNOSIS — I10 HYPERTENSION GOAL BP (BLOOD PRESSURE) < 140/80: ICD-10-CM

## 2024-10-14 DIAGNOSIS — M79.672 BILATERAL FOOT PAIN: ICD-10-CM

## 2024-10-14 RX ORDER — DULOXETIN HYDROCHLORIDE 30 MG/1
CAPSULE, DELAYED RELEASE ORAL
Qty: 90 CAPSULE | Refills: 0 | Status: SHIPPED | OUTPATIENT
Start: 2024-10-14 | End: 2024-11-12

## 2024-10-14 RX ORDER — AMLODIPINE BESYLATE 5 MG/1
10 TABLET ORAL DAILY
Qty: 180 TABLET | Refills: 0 | OUTPATIENT
Start: 2024-10-14

## 2024-10-14 RX ORDER — DULOXETIN HYDROCHLORIDE 60 MG/1
CAPSULE, DELAYED RELEASE ORAL
Qty: 90 CAPSULE | Refills: 0 | Status: SHIPPED | OUTPATIENT
Start: 2024-10-14 | End: 2024-11-12

## 2024-10-14 RX ORDER — LISINOPRIL 10 MG/1
10 TABLET ORAL DAILY
Qty: 90 TABLET | Refills: 0 | OUTPATIENT
Start: 2024-10-14

## 2024-11-07 ENCOUNTER — NURSE TRIAGE (OUTPATIENT)
Dept: FAMILY MEDICINE | Facility: CLINIC | Age: 49
End: 2024-11-07
Payer: COMMERCIAL

## 2024-11-07 DIAGNOSIS — J44.9 CHRONIC OBSTRUCTIVE PULMONARY DISEASE, UNSPECIFIED COPD TYPE (H): ICD-10-CM

## 2024-11-07 DIAGNOSIS — I10 HYPERTENSION GOAL BP (BLOOD PRESSURE) < 140/80: ICD-10-CM

## 2024-11-07 DIAGNOSIS — G62.0 DRUG-INDUCED PERIPHERAL NEUROPATHY (H): ICD-10-CM

## 2024-11-07 RX ORDER — ALBUTEROL SULFATE 90 UG/1
2 INHALANT RESPIRATORY (INHALATION) EVERY 6 HOURS PRN
Qty: 18 G | Refills: 0 | Status: SHIPPED | OUTPATIENT
Start: 2024-11-07 | End: 2024-11-12

## 2024-11-07 RX ORDER — AMLODIPINE BESYLATE 5 MG/1
10 TABLET ORAL DAILY
Qty: 180 TABLET | Refills: 0 | OUTPATIENT
Start: 2024-11-07

## 2024-11-07 RX ORDER — AMLODIPINE BESYLATE 5 MG/1
10 TABLET ORAL DAILY
Qty: 180 TABLET | Refills: 0 | Status: SHIPPED | OUTPATIENT
Start: 2024-11-07 | End: 2024-11-12

## 2024-11-07 RX ORDER — BUDESONIDE, GLYCOPYRROLATE, AND FORMOTEROL FUMARATE 160; 9; 4.8 UG/1; UG/1; UG/1
2 AEROSOL, METERED RESPIRATORY (INHALATION) 2 TIMES DAILY
Qty: 10.7 G | Refills: 0 | Status: SHIPPED | OUTPATIENT
Start: 2024-11-07 | End: 2024-11-12

## 2024-11-07 RX ORDER — GABAPENTIN 300 MG/1
900 CAPSULE ORAL 3 TIMES DAILY
Qty: 270 CAPSULE | Refills: 0 | Status: SHIPPED | OUTPATIENT
Start: 2024-11-07 | End: 2024-11-12

## 2024-11-07 NOTE — TELEPHONE ENCOUNTER
Nurse Triage SBAR    Is this a 2nd Level Triage? YES, LICENSED PRACTITIONER REVIEW IS REQUIRED    Situation: On and off chest pain for 1 week, approximally 2 episodes each day lasting over an hour, requesting an appointment.      Background:   Hx: HTN   - asthma, on 2.5 L of oxygen with Nasal Canula      Assessment:  Pt currently not having chest pain.   Does have heart burn in the morning, but it went away after drinking hot water.     1. LOCATION: right side, tons of pressure, pain is sharp.   2. RADIATION:  Neck, arm, back   3. ONSET: 1 week ago. Last night was the last episode.   4. PATTERN:  pain come and go, does not it get worse with exertion  5. DURATION: more than an hour each episode.   6. SEVERITY: 8/10, wake pt up from sleep.   7. CARDIAC RISK FACTORS: have high blood pressure and is taking his medication.   - Denies any history of heart problems or risk factors for heart disease (e.g., angina, prior heart attack; diabetes, high cholesterol, smoker, or strong family history of heart disease)  8. PULMONARY RISK FACTORS: asthma, his lungs are not good per pt. Currently on 2.5 L on NC   9. CAUSE: NA   10. OTHER SYMPTOMS: Denies dizziness, nausea, vomiting, sweating, fever.   - report difficulty breathing (the same), cough (not new)   Pt speaking in full sentences and noticing dry cough during call.     Protocol Recommended Disposition:   See in Office Today    Recommendation: Recommended to be seen sooner, pt does not have ride during the available time with PCP and would only like to see PCP.     Booked pt with PCP when it works for pt 11/12/24 as pt is only have ride in the morning and requesting to see PCP only.     Routed to provider    Does the patient meet one of the following criteria for ADS visit consideration? 16+ years old, with an [facility] PCP     TIP  Providers, please consider if this condition is appropriate for management at one of our Acute and Diagnostic Services sites.     If patient is a  good candidate, please use dotphrase <dot>triageresponse and select Refer to ADS to document.     Reason for Disposition   Patient wants to be seen    Additional Information   Negative: SEVERE difficulty breathing (e.g., struggling for each breath, speaks in single words)   Negative: Difficult to awaken or acting confused (e.g., disoriented, slurred speech)   Negative: Shock suspected (e.g., cold/pale/clammy skin, too weak to stand, low BP, rapid pulse)   Negative: Passed out (i.e., lost consciousness, collapsed and was not responding)   Negative: Chest pain lasting longer than 5 minutes and ANY of the following:         Pain is crushing, pressure-like, or heavy         Over 44 years old          Over 30 years old and one cardiac risk factor (e.g diabetes, high blood pressure, high cholesterol, smoker, or family history of heart disease)         History of heart disease (e.g. angina, heart attack, heart failure, bypass surgery, takes nitroglycerin)   Negative: Heart beating < 50 beats per minute OR > 140 beats per minute   Negative: Visible sweat on face or sweat dripping down face   Negative: Sounds like a life-threatening emergency to the triager   Negative: Followed an injury to chest   Negative: SEVERE chest pain   Negative: Pain also in shoulder(s) or arm(s) or jaw   Negative: Difficulty breathing   Negative: Cocaine use within last 3 days   Negative: Major surgery in the past month   Negative: Hip or leg fracture (broken bone) in past month (or had cast on leg or ankle in past month)   Negative: Illness requiring prolonged bedrest in past month (e.g., immobilization, long hospital stay)   Negative: Long-distance travel in past month (e.g., car, bus, train, plane; with trip lasting 6 or more hours)   Negative: History of prior 'blood clot' in leg or lungs (i.e., deep vein thrombosis, pulmonary embolism)   Negative: History of inherited increased risk of blood clots (e.g., Factor 5 Leiden, Anti-thrombin 3,  Protein C or Protein S deficiency, Prothrombin mutation)   Negative: Cancer treatment in the past two months (or has cancer now)   Negative: Heart beating irregularly or very rapidly   Negative: Chest pain lasting longer than 5 minutes and occurred in last 3 days (72 hours) (Exception: Feels exactly the same as previously diagnosed heartburn and has accompanying sour taste in mouth.)   Negative: Chest pain or 'angina' comes and goes and is happening more often (increasing in frequency) or getting worse (increasing in severity) (Exception: Chest pains that last only a few seconds.)   Negative: Dizziness or lightheadedness   Negative: Coughing up blood   Negative: Patient sounds very sick or weak to the triager   Negative: Patient says chest pain feels exactly the same as previously diagnosed 'heartburn' and describes burning in chest and accompanying sour taste in mouth   Negative: Fever > 100.4 F (38.0 C)   Negative: Chest pain(s) lasting a few seconds persists > 3 days   Negative: Rash in same area as pain (may be described as 'small blisters')   Negative: All other patients with chest pain (Exception: Fleeting chest pain lasting a few seconds.)    Protocols used: Chest Pain-A-[place]    Asher BIRD RN

## 2024-11-07 NOTE — TELEPHONE ENCOUNTER
After triage call, pt requesting medications.     1) Gabapentin   2) Amlodipine, pt received 180 tabs, but states he will not have enough.   3) Breztri   4) Daly RAMOS. RN

## 2024-11-07 NOTE — TELEPHONE ENCOUNTER
Called and spoke with patient and relayed provider's message. Patient stated that he is not having chest pain right now. Writer advised the safest option for evaluation of chest pain (even intermittent chest pain) is the ER, and he should go there as soon as possible. Patient asking if he has an appointment scheduled on Tuesday. Advised his appointment is still scheduled, we will not cancel unless he is admitted to the hospital or he cancels the appointment. Pt verbalized understanding.       Marycruz Clement RN    [facility]- Primary Care

## 2024-11-12 ENCOUNTER — OFFICE VISIT (OUTPATIENT)
Dept: FAMILY MEDICINE | Facility: CLINIC | Age: 49
End: 2024-11-12
Payer: COMMERCIAL

## 2024-11-12 VITALS — DIASTOLIC BLOOD PRESSURE: 80 MMHG | SYSTOLIC BLOOD PRESSURE: 128 MMHG

## 2024-11-12 DIAGNOSIS — M25.512 CHRONIC PAIN OF BOTH SHOULDERS: ICD-10-CM

## 2024-11-12 DIAGNOSIS — G47.00 INSOMNIA, UNSPECIFIED TYPE: ICD-10-CM

## 2024-11-12 DIAGNOSIS — I27.82 CHRONIC PULMONARY EMBOLISM WITHOUT ACUTE COR PULMONALE, UNSPECIFIED PULMONARY EMBOLISM TYPE (H): ICD-10-CM

## 2024-11-12 DIAGNOSIS — M79.2 NEUROPATHIC PAIN: ICD-10-CM

## 2024-11-12 DIAGNOSIS — M25.511 CHRONIC PAIN OF BOTH SHOULDERS: ICD-10-CM

## 2024-11-12 DIAGNOSIS — G62.0 DRUG-INDUCED PERIPHERAL NEUROPATHY (H): ICD-10-CM

## 2024-11-12 DIAGNOSIS — M79.672 BILATERAL FOOT PAIN: ICD-10-CM

## 2024-11-12 DIAGNOSIS — G89.29 CHRONIC PAIN OF BOTH SHOULDERS: ICD-10-CM

## 2024-11-12 DIAGNOSIS — J44.9 CHRONIC OBSTRUCTIVE PULMONARY DISEASE, UNSPECIFIED COPD TYPE (H): ICD-10-CM

## 2024-11-12 DIAGNOSIS — R06.09 DYSPNEA ON EXERTION: Primary | ICD-10-CM

## 2024-11-12 DIAGNOSIS — Z59.9 FINANCIAL DIFFICULTIES: ICD-10-CM

## 2024-11-12 DIAGNOSIS — M79.671 BILATERAL FOOT PAIN: ICD-10-CM

## 2024-11-12 DIAGNOSIS — I10 HYPERTENSION GOAL BP (BLOOD PRESSURE) < 140/80: ICD-10-CM

## 2024-11-12 PROCEDURE — 99215 OFFICE O/P EST HI 40 MIN: CPT | Performed by: FAMILY MEDICINE

## 2024-11-12 RX ORDER — ACETAMINOPHEN 325 MG/1
650 TABLET ORAL EVERY 6 HOURS PRN
Qty: 180 TABLET | Refills: 0 | Status: SHIPPED | OUTPATIENT
Start: 2024-11-12

## 2024-11-12 RX ORDER — ALBUTEROL SULFATE 90 UG/1
2 INHALANT RESPIRATORY (INHALATION) EVERY 6 HOURS PRN
Qty: 18 G | Refills: 0 | Status: SHIPPED | OUTPATIENT
Start: 2024-11-12

## 2024-11-12 RX ORDER — AMLODIPINE BESYLATE 5 MG/1
10 TABLET ORAL DAILY
Qty: 180 TABLET | Refills: 0 | Status: SHIPPED | OUTPATIENT
Start: 2024-11-12

## 2024-11-12 RX ORDER — GABAPENTIN 300 MG/1
900 CAPSULE ORAL 3 TIMES DAILY
Qty: 270 CAPSULE | Refills: 3 | Status: SHIPPED | OUTPATIENT
Start: 2024-11-12

## 2024-11-12 RX ORDER — BUDESONIDE, GLYCOPYRROLATE, AND FORMOTEROL FUMARATE 160; 9; 4.8 UG/1; UG/1; UG/1
2 AEROSOL, METERED RESPIRATORY (INHALATION) 2 TIMES DAILY
Qty: 10.7 G | Refills: 0 | Status: SHIPPED | OUTPATIENT
Start: 2024-11-12

## 2024-11-12 RX ORDER — LISINOPRIL 10 MG/1
10 TABLET ORAL DAILY
Qty: 90 TABLET | Refills: 0 | Status: SHIPPED | OUTPATIENT
Start: 2024-11-12

## 2024-11-12 RX ORDER — NORTRIPTYLINE HYDROCHLORIDE 10 MG/1
10 CAPSULE ORAL
Qty: 60 CAPSULE | Refills: 3 | Status: SHIPPED | OUTPATIENT
Start: 2024-11-12

## 2024-11-12 RX ORDER — DULOXETIN HYDROCHLORIDE 60 MG/1
60 CAPSULE, DELAYED RELEASE ORAL DAILY
Qty: 90 CAPSULE | Refills: 3 | Status: SHIPPED | OUTPATIENT
Start: 2024-11-12

## 2024-11-12 RX ORDER — DULOXETIN HYDROCHLORIDE 30 MG/1
30 CAPSULE, DELAYED RELEASE ORAL DAILY
Qty: 90 CAPSULE | Refills: 3 | Status: SHIPPED | OUTPATIENT
Start: 2024-11-12

## 2024-11-12 SDOH — ECONOMIC STABILITY - INCOME SECURITY: PROBLEM RELATED TO HOUSING AND ECONOMIC CIRCUMSTANCES, UNSPECIFIED: Z59.9

## 2024-11-12 NOTE — PATIENT INSTRUCTIONS
- In clinic you had episode of desaturation with oxygen going down to 60s on 3.5 L O2. Upon increasing oxygen to 6 L ,your saturation improved and we were able to down titrate oxygen to 4 L maintaining sats between 90-92 %    -Please go to ER to r/o pulmonary embolus.    -Please be consistent with eliquis (blood thinner)  you take for lung clot.    -I am sending referral to pulmonary rehab and I  will send a message to your pulmonologist.    -I will refill your blood thinners, inhalers, BP medications

## 2024-11-12 NOTE — PROGRESS NOTES
Assessment & Plan     Dyspnea on exertion  - with history of chronic obstructive lung disease (requiring 3.5 L oxygen at baseline 24 hrs) presented with h/o intermittent chest discomfort and dyspnea on exertion.  -Chest discomfort mainly noted on right lateral chest upon lying down.  No chest discomfort upon rest.  -He initially desaturated to 60s with 3.5 L oxygen (home oxygen), increased oxygen to 6 L for few minutes and gradually titrating it down to 4 L and was able to maintain saturation between 90 to 92%.  -History of pulmonary embolus diagnosed in February 2024, not on Eliquis for few months now.    PLAN:  -Reviewed pulmonologist note dated 11/17/2023.  -Recommended going to ER to rule out pulmonary embolus.  -He has not been to pulmonary rehab yet  but prefers to start.  He is not interested in lung transplant.  -Priority referral sent to pulmonary rehab.    - Oxygen Order  - Pulmonary Rehab  Referral; Future    Chronic obstructive pulmonary disease, unspecified COPD type (H)  -Refilled his inhalers.    - budeson-glycopyrrol-formoterol (BREZTRI AEROSPHERE) 160-9-4.8 MCG/ACT AERO inhaler; Inhale 2 puffs into the lungs 2 times daily.  - albuterol (VENTOLIN HFA) 108 (90 Base) MCG/ACT inhaler; Inhale 2 puffs into the lungs every 6 hours as needed for shortness of breath or wheezing.  - Pulmonary Rehab  Referral; Future    Hypertension goal BP (blood pressure) < 140/80  -Blood pressure at goal today.(120s/80s)    - amLODIPine (NORVASC) 5 MG tablet; Take 2 tablets (10 mg) by mouth daily.  - lisinopril (ZESTRIL) 10 MG tablet; Take 1 tablet (10 mg) by mouth daily.    Chronic pulmonary embolism without acute cor pulmonale, unspecified pulmonary embolism type (H)  -Refilled Eliquis.  -He has not taken blood thinners for several months.  -Due to his increased need of oxygen and dyspnea on exertion more than his baseline, recommend to go to ER to rule out PE.    - apixaban ANTICOAGULANT  "(ELIQUIS) 5 MG tablet; Take 1 tablet (5 mg) by mouth 2 times daily.    Financial difficulties  -Financial difficulties affording medications and transportation.    - Primary Care - Care Coordination Referral; Future          BMI  Estimated body mass index is 28.73 kg/m  as calculated from the following:    Height as of 11/20/23: 1.676 m (5' 6\").    Weight as of 11/20/23: 80.7 kg (178 lb).             Slava Rucker is a 49 year old, presenting for the following health issues:  Chest Pain    HPI               Review of Systems  Constitutional, HEENT, cardiovascular, pulmonary, gi and gu systems are negative, except as otherwise noted.      Objective    There were no vitals taken for this visit.  There is no height or weight on file to calculate BMI.  Physical Exam   GENERAL: alert and no distress  NECK: no adenopathy, no asymmetry, masses, or scars  RESP: lungs clear to auscultation - no rales, rhonchi or wheezes  CV: regular rate and rhythm, normal S1 S2, no S3 or S4, no murmur, click or rub, no peripheral edema  ABDOMEN: soft, nontender, no hepatosplenomegaly, no masses and bowel sounds normal  MS: no gross musculoskeletal defects noted, no edema            Signed Electronically by: Marija Santana MD    "

## 2024-11-13 ENCOUNTER — PATIENT OUTREACH (OUTPATIENT)
Dept: CARE COORDINATION | Facility: CLINIC | Age: 49
End: 2024-11-13
Payer: COMMERCIAL

## 2024-11-13 NOTE — PROGRESS NOTES
Clinic Care Coordination Contact  Community Health Worker Initial Outreach    CHW Initial Information Gathering:  Referral Source: PCP  Preferred Hospital: [facility] [place]  [phone]  Preferred Urgent Care: [facility] [place], [phone]  Current living arrangement:: I live in a private home with family  Type of residence:: Apartment  Community Resources: None  Supplies Currently Used at Home: Oxygen Tubing/Supplies  Equipment Currently Used at Home: none  Informal Support system:: Family  No PCP office visit in Past Year: No  Transportation means:: Friend       Patient accepts CC: Yes. Patient scheduled for assessment with the SW on 11/15/24 at 11:00 am. Patient noted desire to discuss supports for finances, medication affordability and resources for transportation.     Yoni CHW  [phone]  [facility] Care Resource [facility]

## 2024-11-15 ENCOUNTER — PATIENT OUTREACH (OUTPATIENT)
Dept: NURSING | Facility: CLINIC | Age: 49
End: 2024-11-15
Payer: COMMERCIAL

## 2024-11-15 DIAGNOSIS — Z71.89 OTHER SPECIFIED COUNSELING: Primary | Chronic | ICD-10-CM

## 2024-11-15 NOTE — PROGRESS NOTES
Clinic Care Coordination Contact  Follow Up Progress Note      Assessment: MIGUEL CC outreached to pt for initial assessment per CHW scheduling. SW introduce self and role of clinic care coordination. Pt noted he is currently at hospital for chest pain. Pt was seen by his PCP earlier in the week. Pt shared the last time he was on SNAP benefits was back in 2021, he has applied, but missed the interview so did not qualify for benefits. SW suggest pt to reapply. Pt asked he will need help to reapply for SNAP. SW will place [facility] FRW referral for pt. FRW will outreach to pt just like MIGUEL CC and will help pt complete SNAP application. SW will also share local food [employer] information with pt. Pt agreeable to plan.     Pt has [employer]. SW informed pt he can call [employer] (P: [phone] or [phone]) to chedule taxi rides to get to his medical appointments. Pt is on [place] he has medical transportation. Pt express he was not aware of this. Pt ask for SW to mail him the information. Pt express no other questions or concerns.     SW submit FRW referral.     Care Gaps:    Health Maintenance Due   Topic Date Due    URINE DRUG SCREEN  Never done    COPD ACTION PLAN  Never done    Pneumococcal Vaccine: Pediatrics (0 to 5 Years) and At-Risk Patients (6 to 64 Years) (1 of 2 - PCV) Never done    HEPATITIS B IMMUNIZATION (1 of 3 - 19+ 3-dose series) Never done    COVID-19 Vaccine (3 - Pfizer risk series) 05/21/2021    YEARLY PREVENTIVE VISIT  09/14/2022    ZOSTER IMMUNIZATION (1 of 2) Never done    PHQ-2 (once per calendar year)  01/01/2024    BMP  05/30/2024    INFLUENZA VACCINE (1) 09/01/2024       Care Plans: N/A    Intervention/Education provided during outreach: SW shared [employer] (P: [phone] or [phone]) information with pt and will submit referral to [facility] FRW to help pt complete SNAP application. Pt reports understanding and denies any additional questions or concerns at this time.       Plan: Pt  will review resources shared via mail. FRW will make outreach to pt to help complete SNAP benefits. No further outreaches will be made at this time unless a new referral is made or a change in the pt's status occurs.     Patient was provided with this writer's contact information and encouraged to call with any questions or concerns.     MIGUEL FRANKS will mail care coordination introduction letter with resources discussed today to pt.     Resources:   Transportation:   [facility] [employer] (P: [phone] or [phone])     Food:   Local food shelves  www.[employer].org      [place] Food Helpline -  5-448-646-5334/718-907-2569  http://www.[employer].org/programs/[place]food[place]/  Preliminary screenings for SNAP, Medical Assistance, Energy Assistance, WIC (Women, Infants, Children), School Meal Program,  Assistance, Earned Income Tax Credit and Working   Family Credit, and SNAP Application Assistance      BING Alexandra  Social Work Primary Care Clinic Care Coordinator  [facility]  [phone]  [email or web address]

## 2024-11-15 NOTE — LETTER
[facility] CARE COORDINATION  [address]  [place] [address]      November 15, 2024      Chaim Elizabeth Emerson  [address]   [place] [address]      Dear Chaim,    I am a clinic care coordinator who works with Marija Santana MD with the [facility]. I wanted to thank you for spending the time to talk with me.  Below is a description of clinic care coordination and how I can further assist you.       The clinic care coordination team is made up of a registered nurse, , financial resource worker and community health worker who understand the health care system. The goal of clinic care coordination is to help you manage your health and improve access to the health care system. Our team works alongside your provider to assist you in determining your health and social needs. We can help you obtain health care and community resources, providing you with necessary information and education. We can work with you through any barriers and develop a care plan that helps coordinate and strengthen the communication between you and your care team.  Our services are voluntary and are offered without charge to you personally. Below are the resources we discussed.     Resources:   Transportation:   [facility] [employer] (P: [phone] or [phone])     Food:   Local food shelves  www.[employer].org      [place] Food Helpline -  7-280-781-6380/977-554-9790  http://www.[employer].org/programs[place]/  Preliminary screenings for SNAP, Medical Assistance, Energy Assistance, WIC (Women, Infants, Children), School Meal Program,  Assistance, Earned Income Tax Credit and Working   Family Credit, and SNAP Application Assistance    Please feel free to contact me with any questions or concerns regarding care coordination and what we can offer.      We are focused on providing you with the highest-quality healthcare experience  possible.    Sincerely,     Stephanie Luo [facility]  Social Work Primary Care Clinic Care Coordinator  [facility]  [phone]  [email or web address]

## 2024-11-18 ENCOUNTER — PATIENT OUTREACH (OUTPATIENT)
Dept: CARE COORDINATION | Facility: CLINIC | Age: 49
End: 2024-11-18
Payer: COMMERCIAL

## 2024-12-16 DIAGNOSIS — J44.9 CHRONIC OBSTRUCTIVE PULMONARY DISEASE, UNSPECIFIED COPD TYPE (H): ICD-10-CM

## 2024-12-16 RX ORDER — BUDESONIDE, GLYCOPYRROLATE, AND FORMOTEROL FUMARATE 160; 9; 4.8 UG/1; UG/1; UG/1
2 AEROSOL, METERED RESPIRATORY (INHALATION) 2 TIMES DAILY
Qty: 10.7 G | Refills: 5 | Status: SHIPPED | OUTPATIENT
Start: 2024-12-16

## 2024-12-18 ENCOUNTER — PATIENT OUTREACH (OUTPATIENT)
Dept: CARE COORDINATION | Facility: CLINIC | Age: 49
End: 2024-12-18
Payer: COMMERCIAL

## 2024-12-18 NOTE — PROGRESS NOTES
FRW Update   12/18/24 Established patient outreach attempted x 1 was unable to reach. Left message on voicemail with call back information and requested return call.  Plan: Current outreach date reflects FRW 's follow up within 30 days.

## 2025-01-08 DIAGNOSIS — I10 HYPERTENSION GOAL BP (BLOOD PRESSURE) < 140/80: ICD-10-CM

## 2025-01-08 DIAGNOSIS — J44.9 CHRONIC OBSTRUCTIVE PULMONARY DISEASE, UNSPECIFIED COPD TYPE (H): ICD-10-CM

## 2025-01-08 RX ORDER — ALBUTEROL SULFATE 90 UG/1
2 AEROSOL, METERED RESPIRATORY (INHALATION) EVERY 6 HOURS PRN
Qty: 54 G | Refills: 0 | Status: SHIPPED | OUTPATIENT
Start: 2025-01-08

## 2025-01-08 RX ORDER — LISINOPRIL 10 MG/1
10 TABLET ORAL DAILY
Qty: 90 TABLET | Refills: 0 | OUTPATIENT
Start: 2025-01-08

## 2025-01-22 ENCOUNTER — PATIENT OUTREACH (OUTPATIENT)
Dept: CARE COORDINATION | Facility: CLINIC | Age: 50
End: 2025-01-22
Payer: COMMERCIAL

## 2025-01-22 NOTE — PROGRESS NOTES
W Update  1/22/25 JHOANA called and talked with patient over the phone he stated that he has not heard back from them yet JHOANA called [facility] and had to leave a message to have them call the patient back

## 2025-02-01 DIAGNOSIS — G62.0 DRUG-INDUCED PERIPHERAL NEUROPATHY: ICD-10-CM

## 2025-02-02 DIAGNOSIS — I10 HYPERTENSION GOAL BP (BLOOD PRESSURE) < 140/80: ICD-10-CM

## 2025-02-02 DIAGNOSIS — G62.0 DRUG-INDUCED PERIPHERAL NEUROPATHY: ICD-10-CM

## 2025-02-04 RX ORDER — GABAPENTIN 300 MG/1
900 CAPSULE ORAL 3 TIMES DAILY
Qty: 270 CAPSULE | Refills: 3 | Status: SHIPPED | OUTPATIENT
Start: 2025-02-04

## 2025-02-04 RX ORDER — AMLODIPINE BESYLATE 5 MG/1
10 TABLET ORAL DAILY
Qty: 180 TABLET | Refills: 0 | Status: SHIPPED | OUTPATIENT
Start: 2025-02-04

## 2025-02-04 RX ORDER — ACETAMINOPHEN 325 MG/1
TABLET ORAL
Qty: 180 TABLET | Refills: 0 | Status: SHIPPED | OUTPATIENT
Start: 2025-02-04

## 2025-02-13 ENCOUNTER — NURSE TRIAGE (OUTPATIENT)
Dept: FAMILY MEDICINE | Facility: CLINIC | Age: 50
End: 2025-02-13
Payer: COMMERCIAL

## 2025-02-13 NOTE — TELEPHONE ENCOUNTER
RN called and spoke with Dr. Roberts. Reviewed the case. She will have one of her nurses call the patient and get more information. She states they may be able to see pt tomorrow, but may need to go to the ER.   ADS team will follow-up with patient. No further action needed from this RN.       GIANNA Bailey [facility]          RN called and spoke with patient. Relayed below provider message as written. RN explained the difference between ADS and ER. Pt stated he prefers to go to the ER tomorrow. RN reviewed risks with patient, but he prefers to go tomorrow.    RN gave patient phone number for pulmonary rehab and advise he call them to schedule an appointment, pt verbalized understanding and states he will call.     At the end of the call patient stated if someone will call him back regarding an appointment tomorrow at [facility]. RN informed pt that he said he will be going to the ER tomorrow. However, pt states now he wants to be seen at the ADS tomorrow. RN did let pt know that typically ADS is a same-day appointemnt and may be unlikely that they will see patient tomorrow.       RN will call and discuss further with ADS provider.         Tanja Lee RN    [facility]          Please recommend to be seen at ADS or ER.     Also, please check with him if he started pulmonary rehab.  I have placed a referral in November.  Please provide the number for him to schedule an appointment if he has not already.     Also, please ask pt to have regular checkups with his pulmonologist too once stable .     Thank you,   -

## 2025-02-13 NOTE — TELEPHONE ENCOUNTER
ADS provider asked writer to reach out to patient regarding his level of SOB and if he would be stable enough for the ADS tomorrow.  Provider directed that if patient could barely walk or was needing a lot more oxygen than his baseline he would need to go to the ED and not ADS.      Patient notes he is on 4.5 LPM of oxygen and back in November had changed himself from 3.5 to 4.5 LPM and has been on that level since.      Patient also notes he can only walk 4-5 steps without gasping for breath.  Discussed with patient that he should go to the ED today due to this severe shortness of breath.  Patient verbalized understanding and stated he would go to [facility] in [place].      Kristina Kjellberg, MSN, RN

## 2025-02-13 NOTE — TELEPHONE ENCOUNTER
Nurse Triage SBAR    Is this a 2nd Level Triage? YES, LICENSED PRACTITIONER REVIEW IS REQUIRED    Situation: ongoing and worsening shortness of breath, requesting an appointment.     Background: OV 11/12/24  Dyspnea on exertion  - with history of chronic obstructive lung disease (requiring 3.5 L oxygen at baseline 24 hrs) presented with h/o intermittent chest discomfort and dyspnea on exertion.  -Chest discomfort mainly noted on right lateral chest upon lying down.  No chest discomfort upon rest.  -He initially desaturated to 60s with 3.5 L oxygen (home oxygen), increased oxygen to 6 L for few minutes and gradually titrating it down to 4 L and was able to maintain saturation between 90 to 92%.  -History of pulmonary embolus diagnosed in February 2024, not on Eliquis for few months now.    Assessment:    Worsening SOB per pt, worsening since this January. Haven't breathing well for a long time, years ago. The shortness of breath is constant.     O2 is at 94-96 with 4.5 L     Denies chest pain, but does have occasional Chest burn.     He does have some Coughs which is affecting his eyes, I am not fully understanding this as it is difficult to understand his speech.     He is able to stand, walk to bathroom and kitchen with Oxygen on.     Protocol Recommended Disposition:   No disposition on file.    Recommendation: Care advise and red flags reviewed. Pt states he is about 20-30 mins away from ED. He will plan to go tomorrow instead of today. He does not have anyone with him at the moment. Decline me calling anyone for him.    Routing to PCP to further advise, if an office visit is appropriate. Pt is requesting this.     Routed to provider    Does the patient meet one of the following criteria for ADS visit consideration? 16+ years old, with an [facility] PCP     TIP  Providers, please consider if this condition is appropriate for management at one of our Acute and Diagnostic Services sites.     If patient is a good  candidate, please use dotphrase <dot>triageresponse and select Refer to ADS to document.     Reason for Disposition   MODERATE difficulty breathing (e.g., speaks in phrases, SOB even at rest, pulse 100-120) of new-onset or worse than normal    Additional Information   Negative: SEVERE difficulty breathing (e.g., struggling for each breath, speaks in single words, pulse > 120)   Negative: Breathing stopped and hasn't returned   Negative: Choking on something   Negative: Bluish (or gray) lips or face   Negative: Difficult to awaken or acting confused (e.g., disoriented, slurred speech)   Negative: Passed out (e.g., fainted, lost consciousness, blacked out and was not responding)   Negative: Wheezing started suddenly after medicine, an allergic food, or bee sting   Negative: Stridor (harsh sound while breathing in)   Negative: Slow, shallow and weak breathing   Negative: Sounds like a life-threatening emergency to the triager   Negative: Chest pain   Negative: Wheezing (high pitched whistling sound) and previous asthma attacks or use of asthma medicines   Negative: Breathing difficulty and within 14 days of COVID-19 EXPOSURE (close contact) with someone diagnosed with COVID-19 (e.g., COVID test positive)   Negative: Breathing difficulty and COVID-19 is widespread in the community   Negative: Breathing diffculty and only present when coughing   Negative: Breathing difficulty and only from stuffy nose   Negative: Breathing diffculty and only from stuffy nose or runny nose from common cold    Protocols used: Breathing Difficulty-A-DAVINA BIRD RN

## 2025-03-04 ENCOUNTER — PATIENT OUTREACH (OUTPATIENT)
Dept: CARE COORDINATION | Facility: CLINIC | Age: 50
End: 2025-03-04
Payer: COMMERCIAL

## 2025-03-04 NOTE — PROGRESS NOTES
FRW Update  3/4/25  FRW called and patient stated that he was approved for Benefits and he did not need FRW help at this time with any new needs FRW closed the program

## 2025-03-15 ENCOUNTER — HEALTH MAINTENANCE LETTER (OUTPATIENT)
Age: 50
End: 2025-03-15

## 2025-03-17 DIAGNOSIS — I10 HYPERTENSION GOAL BP (BLOOD PRESSURE) < 140/80: ICD-10-CM

## 2025-03-17 RX ORDER — LISINOPRIL 10 MG/1
10 TABLET ORAL DAILY
Qty: 90 TABLET | Refills: 0 | Status: SHIPPED | OUTPATIENT
Start: 2025-03-17

## 2025-03-31 DIAGNOSIS — M79.2 NEUROPATHIC PAIN: ICD-10-CM

## 2025-03-31 DIAGNOSIS — M79.671 BILATERAL FOOT PAIN: ICD-10-CM

## 2025-03-31 DIAGNOSIS — M79.672 BILATERAL FOOT PAIN: ICD-10-CM

## 2025-03-31 DIAGNOSIS — G62.0 DRUG-INDUCED PERIPHERAL NEUROPATHY: ICD-10-CM

## 2025-03-31 RX ORDER — DULOXETIN HYDROCHLORIDE 60 MG/1
CAPSULE, DELAYED RELEASE ORAL
Qty: 90 CAPSULE | Refills: 3 | OUTPATIENT
Start: 2025-03-31

## 2025-04-01 RX ORDER — ACETAMINOPHEN 325 MG/1
TABLET ORAL
Qty: 180 TABLET | Refills: 0 | Status: SHIPPED | OUTPATIENT
Start: 2025-04-01

## 2025-05-13 ENCOUNTER — TELEPHONE (OUTPATIENT)
Dept: FAMILY MEDICINE | Facility: CLINIC | Age: 50
End: 2025-05-13
Payer: COMMERCIAL

## 2025-05-13 NOTE — TELEPHONE ENCOUNTER
Reviewed chart. Patient has not been seen since 11/2024--called patient and scheduled an appt with provider for 5/19/2025.

## 2025-05-13 NOTE — TELEPHONE ENCOUNTER
General Call    Contacts       Contact Date/Time Type Contact Phone/Fax    05/13/2025 09:38 AM CDT Phone (Incoming) Chaim Norman (Self) [phone] (M)          Reason for Call:  Pt called in and is requesting that Dr. Lou to send an email about his disability due to not being able to work. Pt gave me an email address and contact information and I had a tough time trying to understanding this pt.     So not sure if info listed below is correct.     This is the information that I gathered from this pt.     [email or web address]   [phone]  Fax- [phone]      Could we send this information to you in [facility] or would you prefer to receive a phone call?:   Patient would prefer a phone call   Okay to leave a detailed message?: Yes at Cell number on file:    Telephone Information:   Mobile [phone]

## 2025-05-19 ENCOUNTER — VIRTUAL VISIT (OUTPATIENT)
Dept: FAMILY MEDICINE | Facility: CLINIC | Age: 50
End: 2025-05-19
Payer: COMMERCIAL

## 2025-05-19 DIAGNOSIS — Z02.89 ENCOUNTER FOR COMPLETION OF FORM WITH PATIENT: Primary | ICD-10-CM

## 2025-05-19 DIAGNOSIS — J44.9 CHRONIC OBSTRUCTIVE PULMONARY DISEASE, UNSPECIFIED COPD TYPE (H): ICD-10-CM

## 2025-05-19 PROCEDURE — 98012 SYNCH AUDIO-ONLY EST SF 10: CPT | Performed by: FAMILY MEDICINE

## 2025-05-19 NOTE — PROGRESS NOTES
Chaim is a 49 year old who is being evaluated via a billable telephone visit.    What phone number would you like to be contacted at? [phone]  How would you like to obtain your AVS? Matthew  Originating Location (pt. Location): Home    Distant Location (provider location):  On-site  Telephone visit completed due to the patient did not consent to a video visit.    Assessment & Plan     Encounter for completion of form with patient  -Chaim presented as telephone visit.  He had housing forms to be completed.  - Either Chaim or his son can drop the forms at the .  I will complete them once I receive them.  -Chaim has not been working for 5 years now due to his lung issues and needing oxygen full-time.      Chronic obstructive pulmonary disease, unspecified COPD type (H)  -Had hospital stay from 4/2/2025 till 4/8/25 for COPD exacerbation.  - Since this is a telephone visit, I could not see the patient.  - He was able to talk in complete sentences but speech is very slow with pauses in between  - Clinically, patient mentioned that he feels the same, still continuing with 4.5 L oxygen chronically.        MED REC REQUIRED  Post Medication Reconciliation Status:  Discharge medications reconciled, continue medications without change        Subjective   Chaim is a 49 year old, presenting for the following health issues:  Hospital F/U        11/6/2023     2:12 PM   Additional Questions   Roomed by clarence     [facility] Follow-up Visit:    Hospital/Nursing Home/IP Rehab Facility: [facility]   Date of Admission: 04/02/2025  Date of Discharge: 04/08/2025  Reason(s) for Admission: Shortness of Breath   Was the patient in the ICU or did the patient experience delirium during hospitalization?  No  Do you have any other stressors you would like to discuss with your provider? No    Problems taking medications regularly:  None  Medication changes since discharge: None  Problems adhering to  non-medication therapy:  None    Summary of hospitalization: [facility] discharge summary reviewed    Diagnostic Tests/Treatments reviewed.  Follow up needed: none  Other Healthcare Providers Involved in Patient s Care:         Pulmonology  Update since discharge: stable.         Plan of care communicated with patient                   Review of Systems  Constitutional, HEENT, cardiovascular, pulmonary, gi and gu systems are negative, except as otherwise noted.      Objective           Vitals:  No vitals were obtained today due to virtual visit.    Physical Exam   General: Alert and no distress //Respiratory: No audible wheeze, cough, or shortness of breath // Psychiatric:  Appropriate affect, tone, and pace of words            Phone call duration: 10 minutes  Signed Electronically by: Marija Santana MD

## 2025-05-20 DIAGNOSIS — G62.0 DRUG-INDUCED PERIPHERAL NEUROPATHY: ICD-10-CM

## 2025-05-20 RX ORDER — ACETAMINOPHEN 325 MG/1
TABLET ORAL
Qty: 180 TABLET | Refills: 0 | Status: SHIPPED | OUTPATIENT
Start: 2025-05-20

## 2025-06-09 ENCOUNTER — TELEPHONE (OUTPATIENT)
Dept: FAMILY MEDICINE | Facility: CLINIC | Age: 50
End: 2025-06-09
Payer: COMMERCIAL

## 2025-06-09 DIAGNOSIS — J44.9 CHRONIC OBSTRUCTIVE PULMONARY DISEASE, UNSPECIFIED COPD TYPE (H): ICD-10-CM

## 2025-06-09 DIAGNOSIS — I10 HYPERTENSION GOAL BP (BLOOD PRESSURE) < 140/80: ICD-10-CM

## 2025-06-09 RX ORDER — AMLODIPINE BESYLATE 5 MG/1
10 TABLET ORAL DAILY
Qty: 180 TABLET | Refills: 0 | Status: SHIPPED | OUTPATIENT
Start: 2025-06-09

## 2025-06-09 RX ORDER — ALBUTEROL SULFATE 90 UG/1
2 AEROSOL, METERED RESPIRATORY (INHALATION) EVERY 6 HOURS PRN
Qty: 54 G | Refills: 0 | Status: SHIPPED | OUTPATIENT
Start: 2025-06-09

## 2025-06-09 RX ORDER — LISINOPRIL 10 MG/1
10 TABLET ORAL DAILY
Qty: 90 TABLET | Refills: 0 | Status: SHIPPED | OUTPATIENT
Start: 2025-06-09

## 2025-06-09 NOTE — TELEPHONE ENCOUNTER
Plan does not cover budeson-glycopyrrol-formoterol (BREZTRI AEROSPHERE) 160-9-4.8 MCG/ACT AERO inhaler .      Please go to [email or web address] to initiate Prior Auth or switch to alternative medication.    Insurance type and ID number: [id number]      Additional Information:     Chen Clark

## 2025-06-11 NOTE — TELEPHONE ENCOUNTER
Retail Pharmacy Prior Authorization Team   Phone: [phone]    PA Initiation    Medication: BREZTRI AEROSPHERE 160-9-4.8 MCG/ACT IN AERO  Insurance Company: [employer] - Phone [phone] Fax [phone]  Pharmacy Filling the Rx: [id number] - [place], [place] - [address] AT [facility] [address]  Filling Pharmacy Phone: [phone]  Filling Pharmacy Fax:    Start Date: 6/11/2025

## 2025-06-12 NOTE — TELEPHONE ENCOUNTER
Prior Authorization Approval    Authorization Effective Date: 6/11/2025  Authorization Expiration Date: 6/11/2026  Medication: BREZTRI AEROSPHER 160-9-4.8 MCG/ACT AERO inhaler-APPROVED  Reference #:     Insurance Company: [employer] - Phone [phone] Fax [phone]  Which Pharmacy is filling the prescription (Not needed for infusion/clinic administered): [id number] - [place], [place] - [address] [facility] AT [facility]  Pharmacy Notified: Yes  Patient Notified: Instructed pharmacy to notify patient when script is ready to /ship.

## 2025-07-15 DIAGNOSIS — J44.9 CHRONIC OBSTRUCTIVE PULMONARY DISEASE, UNSPECIFIED COPD TYPE (H): ICD-10-CM

## 2025-07-15 RX ORDER — BUDESONIDE, GLYCOPYRROLATE, AND FORMOTEROL FUMARATE 160; 9; 4.8 UG/1; UG/1; UG/1
2 AEROSOL, METERED RESPIRATORY (INHALATION) 2 TIMES DAILY
Qty: 10.7 G | Refills: 5 | Status: SHIPPED | OUTPATIENT
Start: 2025-07-15

## (undated) DEVICE — KIT RIGHT HEART CATH 60130719

## (undated) DEVICE — INTRO SHEATH 7FRX10CM PINNACLE RSS702

## (undated) DEVICE — SHTH INTRO 0.021IN ID 6FR DIA

## (undated) DEVICE — FASTENER CATH BALLOON CLAMPX2 STATLOCK 0684-00-493

## (undated) DEVICE — PACK HEART RIGHT CUSTOM SAN32RHF18

## (undated) DEVICE — WIRE GUIDE 0.035"X150CM EMERALD J TIP 502521

## (undated) DEVICE — KIT HAND CONTROL ACIST 014644 AR-P54

## (undated) DEVICE — WIRE GUIDE 0.025"X150CM EMERALD J TIP 502524

## (undated) DEVICE — SLEEVE TR BAND RADIAL COMPRESSION DEVICE 24CM TRB24-REG

## (undated) DEVICE — GLIDEWIRE TERUMO .035X180CM 1.5,, J-TIP GR3525

## (undated) DEVICE — CATH ANGIO INFINITI PIGTAIL 145 6 SH 6FRX110CM  534-652S

## (undated) DEVICE — SLEEVE TR BAND RADIAL COMPRESSION DEVICE 29CM XX-RF06L

## (undated) DEVICE — Device

## (undated) RX ORDER — SODIUM NITROPRUSSIDE 25 MG/ML
INJECTION INTRAVENOUS
Status: DISPENSED
Start: 2020-05-08

## (undated) RX ORDER — ADENOSINE 3 MG/ML
INJECTION, SOLUTION INTRAVENOUS
Status: DISPENSED
Start: 2020-05-08

## (undated) RX ORDER — LIDOCAINE 40 MG/G
CREAM TOPICAL
Status: DISPENSED
Start: 2020-05-08